# Patient Record
Sex: FEMALE | Race: WHITE | NOT HISPANIC OR LATINO | Employment: OTHER | URBAN - METROPOLITAN AREA
[De-identification: names, ages, dates, MRNs, and addresses within clinical notes are randomized per-mention and may not be internally consistent; named-entity substitution may affect disease eponyms.]

---

## 2017-01-03 ENCOUNTER — CARE COORDINATION (OUTPATIENT)
Dept: CARDIOLOGY | Facility: CLINIC | Age: 82
End: 2017-01-03

## 2017-01-04 NOTE — PROGRESS NOTES
Called patient's daughter Xi as the patient and her spouse are not answering calls.  Spoke to Xi's spouse and he states the patient and spouse are at there cabin and he does not have a phone number to contact them.  Asked whether or not patient has made a decision about surgery and he states the patient's is still undecided.  Will wait for patient's call.  She should be home in a few days.    Analilia Sepulveda RN   [phone]

## 2017-01-06 ENCOUNTER — HOSPITAL ENCOUNTER (EMERGENCY)
Facility: CLINIC | Age: 82
Discharge: HOME | End: 2017-01-06
Attending: EMERGENCY MEDICINE | Admitting: EMERGENCY MEDICINE
Payer: COMMERCIAL

## 2017-01-06 ENCOUNTER — APPOINTMENT (OUTPATIENT)
Dept: GENERAL RADIOLOGY | Facility: CLINIC | Age: 82
End: 2017-01-06
Attending: EMERGENCY MEDICINE
Payer: COMMERCIAL

## 2017-01-06 VITALS
TEMPERATURE: 97.6 F | OXYGEN SATURATION: 95 % | SYSTOLIC BLOOD PRESSURE: 108 MMHG | DIASTOLIC BLOOD PRESSURE: 59 MMHG | HEART RATE: 72 BPM | RESPIRATION RATE: 20 BRPM

## 2017-01-06 DIAGNOSIS — S20.221A BACK CONTUSION, RIGHT, INITIAL ENCOUNTER: ICD-10-CM

## 2017-01-06 DIAGNOSIS — M54.50 ACUTE RIGHT-SIDED LOW BACK PAIN WITHOUT SCIATICA: ICD-10-CM

## 2017-01-06 PROCEDURE — 99283 EMERGENCY DEPT VISIT LOW MDM: CPT

## 2017-01-06 PROCEDURE — 25000132 ZZH RX MED GY IP 250 OP 250 PS 637: Performed by: EMERGENCY MEDICINE

## 2017-01-06 PROCEDURE — 72100 X-RAY EXAM L-S SPINE 2/3 VWS: CPT

## 2017-01-06 RX ORDER — OXYCODONE HYDROCHLORIDE 5 MG/1
10 TABLET ORAL ONCE
Status: COMPLETED | OUTPATIENT
Start: 2017-01-06 | End: 2017-01-06

## 2017-01-06 RX ORDER — HYDROMORPHONE HCL/0.9% NACL/PF 0.2MG/0.2
0.2 SYRINGE (ML) INTRAVENOUS ONCE
Status: DISCONTINUED | OUTPATIENT
Start: 2017-01-06 | End: 2017-01-06

## 2017-01-06 RX ORDER — OXYCODONE HYDROCHLORIDE 5 MG/1
5 TABLET ORAL EVERY 6 HOURS PRN
Qty: 10 TABLET | Refills: 0 | Status: SHIPPED | OUTPATIENT
Start: 2017-01-06 | End: 2017-11-22

## 2017-01-06 RX ADMIN — OXYCODONE HYDROCHLORIDE 10 MG: 5 TABLET ORAL at 15:22

## 2017-01-06 NOTE — ED NOTES
Patient comes in via EMS for evaluation of back pain in middle and lower right side of back. Patient states she fell 2 days ago and landed on that side, denies hitting head or LOC. ABCs intact.

## 2017-01-06 NOTE — DISCHARGE INSTRUCTIONS
*You may resume diet and activities.  *Take medications as prescribed. Tylenol for pain, oxycodone for severe pain not relieved by tylenol.  Use a stool softener while taking this medication. Continue your current medications.  *Follow-up with your doctor in the next 2-3 days for reevaluation and ongoing medication prescriptions.  *Return if you develop numbness, weakness, bowel or bladder incontinence, faint or feel like you will faint or become worse in any way.    Discharge Instructions  Back Pain  You were seen today for back pain. Back pain can have many causes, but most will get better without surgery or other specific treatment. Sometimes there is a herniated ( slipped ) disc. We don t usually do MRI scans to look for these right away, since most herniated discs will get better on their own with time.  Today, we did not find any evidence that your back pain was caused by a serious condition, such as an infection, fracture, or tumor. However, sometimes symptoms develop over time and cannot be found during an emergency visit, so it is very important that you follow up with your primary doctor.  Return to the Emergency Department if:    You develop a fever with your back pain.     You have weakness or change in sensation in one or both legs.    You lose control of your bowels or bladder, or can t empty your bladder.    Your pain gets much worse.     Follow-up with your doctor:    Unless your pain has completely gone away, please make an appointment with your doctor within one week.  You may need further management of your back pain, such as more pain medication, imaging such as an X-ray or MRI, or physical therapy.    What can I do to help myself?    Remain active -- People are often afraid that they will hurt their back further or delay recovery by remaining active, but this is one of the best things you can do for your back. In fact, prolonged bed rest is not recommended. Studies have shown that people with  low back pain recover faster when they remain active. Movement helps to bring blood flow to the muscles and relieve muscle spasms as well as preventing loss of muscle strength.    Heat -- Using a heating pad can help with low back pain during the first few weeks. Do not sleep with a heating pad, as you can be burned.     Pain medications -- Take a pain medication such as, acetaminophen (Tylenol ), ibuprofen (Advil , Nuprin  ) or naproxen (Aleve ).  If you have been given a narcotic (such as codeine, hydrocodone, or oxycodone) or a muscle relaxant (such as Flexeril   or Soma  ), do not drive for four hours after you have taken it. If the narcotic contains acetaminophen (Tylenol), do not take Tylenol with it. All narcotics will cause constipation, so eat a high fiber diet.          Remember that you can always come back to the Emergency Department if you are not able to see your regular doctor in the amount of time listed above, if you get any new symptoms, or if there is anything that worries you.    Opioid Medication Information    You have been given a prescription for an opioid (narcotic) pain medicine and/or have received a pain medicine while here in the Emergency Department. These medicines can make you drowsy or impaired. You must not drive, operate dangerous equipment, or engage in any other dangerous activities while taking these medications. If you drive while taking these medications, you could be arrested for DUI, or driving under the influence. Do not drink any alcohol while you are taking these medications.   Opioid pain medications can cause addiction. If you have a history of chemical dependency of any type, you are at a higher risk of becoming addicted to pain medications.  Only take these prescribed medications to treat your pain when all other options have been tried. Take it for as short a time and as few doses as possible. Store your pain pills in a secure place, as they are frequently stolen and  provide a dangerous opportunity for children or visitors in your house to start abusing these powerful medications. We will not replace any lost or stolen medicine.  As soon as your pain is better, you should flush all your remaining medication.   Many prescription pain medications contain Tylenol  (acetaminophen), including Vicodin , Tylenol #3 , Norco , Lortab , and Percocet .  You should not take any extra pills of Tylenol  if you are using these prescription medications or you can get very sick.  Do not ever take more than 4000 mg of acetaminophen in any 24 hour period.  All opioids tend to cause constipation. Drink plenty of water and eat foods that have a lot of fiber, such as fruits, vegetables, prune juice, apple juice and high fiber cereal.  Take a laxative if you don t move your bowels at least every other day. Miralax , Milk of Magnesia, Colace , or Senna  can be used to keep you regular.

## 2017-01-06 NOTE — ED NOTES
Bed: ED39  Expected date: 1/6/17  Expected time: 2:31 PM  Means of arrival: Ambulance  Comments:  [facility] Female. back pain
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
## 2017-01-06 NOTE — ED AVS SNAPSHOT
[facility] Emergency Department    [address]    [facility] [address]    Phone:  [phone]    Fax:  [phone]                                       Giselle Chino   MRN: [id number]    Department:  [facility] Emergency Department   Date of Visit:  1/6/2017           After Visit Summary Signature Page     I have received my discharge instructions, and my questions have been answered. I have discussed any challenges I see with this plan with the nurse or doctor.    ..........................................................................................................................................  Patient/Patient Representative Signature      ..........................................................................................................................................  Patient Representative Print Name and Relationship to Patient    ..................................................               ................................................  Date                                            Time    ..........................................................................................................................................  Reviewed by Signature/Title    ...................................................              ..............................................  Date                                                            Time

## 2017-01-06 NOTE — ED AVS SNAPSHOT
[facility] Emergency Department    [address]    [facility] [address]    Phone:  [phone]    Fax:  [phone]                                       Giselle Chino   MRN: [id number]    Department:  [facility] Emergency Department   Date of Visit:  1/6/2017           Patient Information     Date Of Birth          1935        Your diagnoses for this visit were:     Acute right-sided low back pain without sciatica     Back contusion, right, initial encounter        You were seen by Marci Jackson MD.      Follow-up Information     Follow up with Shoaib Mills DO. Schedule an appointment as soon as possible for a visit in 3 days.    Specialty:  Family Practice    Contact information:    [facility]  [address]  [facility] [address] [phone]          Discharge Instructions       *You may resume diet and activities.  *Take medications as prescribed. Tylenol for pain, oxycodone for severe pain not relieved by tylenol.  Use a stool softener while taking this medication. Continue your current medications.  *Follow-up with your doctor in the next 2-3 days for reevaluation and ongoing medication prescriptions.  *Return if you develop numbness, weakness, bowel or bladder incontinence, faint or feel like you will faint or become worse in any way.    Discharge Instructions  Back Pain  You were seen today for back pain. Back pain can have many causes, but most will get better without surgery or other specific treatment. Sometimes there is a herniated ( slipped ) disc. We don t usually do MRI scans to look for these right away, since most herniated discs will get better on their own with time.  Today, we did not find any evidence that your back pain was caused by a serious condition, such as an infection, fracture, or tumor. However, sometimes symptoms develop over time and cannot be found during an emergency visit, so it is very important  that you follow up with your primary doctor.  Return to the Emergency Department if:    You develop a fever with your back pain.     You have weakness or change in sensation in one or both legs.    You lose control of your bowels or bladder, or can t empty your bladder.    Your pain gets much worse.     Follow-up with your doctor:    Unless your pain has completely gone away, please make an appointment with your doctor within one week.  You may need further management of your back pain, such as more pain medication, imaging such as an X-ray or MRI, or physical therapy.    What can I do to help myself?    Remain active -- People are often afraid that they will hurt their back further or delay recovery by remaining active, but this is one of the best things you can do for your back. In fact, prolonged bed rest is not recommended. Studies have shown that people with low back pain recover faster when they remain active. Movement helps to bring blood flow to the muscles and relieve muscle spasms as well as preventing loss of muscle strength.    Heat -- Using a heating pad can help with low back pain during the first few weeks. Do not sleep with a heating pad, as you can be burned.     Pain medications -- Take a pain medication such as, acetaminophen (Tylenol ), ibuprofen (Advil , Nuprin  ) or naproxen (Aleve ).  If you have been given a narcotic (such as codeine, hydrocodone, or oxycodone) or a muscle relaxant (such as Flexeril   or Soma  ), do not drive for four hours after you have taken it. If the narcotic contains acetaminophen (Tylenol), do not take Tylenol with it. All narcotics will cause constipation, so eat a high fiber diet.          Remember that you can always come back to the Emergency Department if you are not able to see your regular doctor in the amount of time listed above, if you get any new symptoms, or if there is anything that worries you.    Opioid Medication Information    You have been given a  prescription for an opioid (narcotic) pain medicine and/or have received a pain medicine while here in the Emergency Department. These medicines can make you drowsy or impaired. You must not drive, operate dangerous equipment, or engage in any other dangerous activities while taking these medications. If you drive while taking these medications, you could be arrested for DUI, or driving under the influence. Do not drink any alcohol while you are taking these medications.   Opioid pain medications can cause addiction. If you have a history of chemical dependency of any type, you are at a higher risk of becoming addicted to pain medications.  Only take these prescribed medications to treat your pain when all other options have been tried. Take it for as short a time and as few doses as possible. Store your pain pills in a secure place, as they are frequently stolen and provide a dangerous opportunity for children or visitors in your house to start abusing these powerful medications. We will not replace any lost or stolen medicine.  As soon as your pain is better, you should flush all your remaining medication.   Many prescription pain medications contain Tylenol  (acetaminophen), including Vicodin , Tylenol #3 , Norco , Lortab , and Percocet .  You should not take any extra pills of Tylenol  if you are using these prescription medications or you can get very sick.  Do not ever take more than 4000 mg of acetaminophen in any 24 hour period.  All opioids tend to cause constipation. Drink plenty of water and eat foods that have a lot of fiber, such as fruits, vegetables, prune juice, apple juice and high fiber cereal.  Take a laxative if you don t move your bowels at least every other day. Miralax , Milk of Magnesia, Colace , or Senna  can be used to keep you regular.                Future Appointments        Provider Department Dept Phone Center    3/24/2017 1:50 PM Tyler Frank MD [facility] [phone] [facility]      24 Hour Appointment Hotline       To make an appointment at any [place] clinic, call [phone] ([phone]). If you don't have a family doctor or clinic, we will help you find one. [place] clinics are conveniently located to serve the needs of you and your family.             Review of your medicines      START taking        Dose / Directions Last dose taken    oxyCODONE 5 MG IR tablet   Commonly known as:  ROXICODONE   Dose:  5 mg   Quantity:  10 tablet        Take 1 tablet (5 mg) by mouth every 6 hours as needed for pain   Refills:  0          Our records show that you are taking the medicines listed below. If these are incorrect, please call your family doctor or clinic.        Dose / Directions Last dose taken    amLODIPine 5 MG tablet   Commonly known as:  NORVASC   Dose:  5 mg   Quantity:  90 tablet        Take 1 tablet (5 mg) by mouth daily   Refills:  3        atorvastatin 20 MG tablet   Commonly known as:  LIPITOR   Dose:  20 mg   Quantity:  90 tablet        Take 1 tablet (20 mg) by mouth daily   Refills:  3        hydrALAZINE 25 MG tablet   Commonly known as:  APRESOLINE   Dose:  25 mg   Quantity:  90 tablet        Take 1 tablet (25 mg) by mouth 3 times daily   Refills:  11        Levothyroxine Sodium 112 MCG Caps        Take by mouth daily   Refills:  0        losartan 100 MG tablet   Commonly known as:  COZAAR   Dose:  100 mg        Take 100 mg by mouth daily   Refills:  0        metoprolol 50 MG tablet   Commonly known as:  LOPRESSOR   Dose:  25 mg        Take 0.5 tablets (25 mg) by mouth 2 times daily   Refills:  0        omeprazole 20 MG CR capsule   Commonly known as:  priLOSEC   Dose:  20 mg        Take 20 mg by mouth daily as needed Per patient.   Refills:  0        potassium chloride SA 10 MEQ CR tablet   Commonly known as:  K-DUR/KLOR-CON M   Quantity:  360 tablet        4 tablets daily   Refills:  3        torsemide 5 MG tablet   Commonly known as:   DEMADEX   Dose:  5 mg   Quantity:  45 tablet        Take 1 tablet (5 mg) by mouth every other day   Refills:  3        umeclidinium-vilanterol 62.5-25 MCG/INH oral inhaler   Commonly known as:  ANORO ELLIPTA   Dose:  1 puff   Quantity:  1 Inhaler        Inhale 1 puff into the lungs daily   Refills:  11        VESICARE 10 MG tablet   Dose:  10 mg   Generic drug:  solifenacin        Take 10 mg by mouth daily   Refills:  0                Prescriptions were sent or printed at these locations (1 Prescription)                   Other Prescriptions                Printed at Department/Unit printer (1 of 1)         oxyCODONE (ROXICODONE) 5 MG IR tablet                Procedures and tests performed during your visit     Lumbar spine XR, 2-3 views      Orders Needing Specimen Collection     None      Pending Results     No orders found from 1/5/2017 to 1/7/2017.            Pending Culture Results     No orders found from 1/5/2017 to 1/7/2017.       Test Results from your hospital stay           1/6/2017  3:43 PM - Interface, Radiant Ib      Narrative     XR LUMBAR SPINE 2-3 VIEWS 1/6/2017 3:42 PM    HISTORY: fall, right low back pain        Impression     IMPRESSION: Degenerative disc disease at the L4 level. Minimal  thoracolumbar scoliosis. Demineralized bones.    LYUBOV PIKE MD                Clinical Quality Measure: Blood Pressure Screening     Your blood pressure was checked while you were in the emergency department today. The last reading we obtained was  BP: 118/48 mmHg . Please read the guidelines below about what these numbers mean and what you should do about them.  If your systolic blood pressure (the top number) is less than 120 and your diastolic blood pressure (the bottom number) is less than 80, then your blood pressure is normal. There is nothing more that you need to do about it.  If your systolic blood pressure (the top number) is 120-139 or your diastolic blood pressure (the bottom number) is 80-89,  "your blood pressure may be higher than it should be. You should have your blood pressure rechecked within a year by a primary care provider.  If your systolic blood pressure (the top number) is 140 or greater or your diastolic blood pressure (the bottom number) is 90 or greater, you may have high blood pressure. High blood pressure is treatable, but if left untreated over time it can put you at risk for heart attack, stroke, or kidney failure. You should have your blood pressure rechecked by a primary care provider within the next 4 weeks.  If your provider in the emergency department today gave you specific instructions to follow-up with your doctor or provider even sooner than that, you should follow that instruction and not wait for up to 4 weeks for your follow-up visit.        Thank you for choosing [facility]       Thank you for choosing [facility] for your care. Our goal is always to provide you with excellent care. Hearing back from our patients is one way we can continue to improve our services. Please take a few minutes to complete the written survey that you may receive in the mail after you visit with us. Thank you!        [employer] Information     [employer] lets you send messages to your doctor, view your test results, renew your prescriptions, schedule appointments and more. To sign up, go to [email or web address] . Click on \"Log in\" on the left side of the screen, which will take you to the Welcome page. Then click on \"Sign up Now\" on the right side of the page.     You will be asked to enter the access code listed below, as well as some personal information. Please follow the directions to create your username and password.     Your access code is: [email or web address]  Expires: 2017  9:55 AM     Your access code will  in 90 days. If you need help or a new code, please call your [facility] clinic or [phone].        Care EveryWhere ID     This is your Care EveryWhere ID. This could be used by other " organizations to access your [facility] medical records  [id number]        After Visit Summary       This is your record. Keep this with you and show to your community pharmacist(s) and doctor(s) at your next visit.

## 2017-01-11 ENCOUNTER — DOCUMENTATION ONLY (OUTPATIENT)
Dept: CARDIOLOGY | Facility: CLINIC | Age: 82
End: 2017-01-11

## 2017-01-11 ENCOUNTER — HOSPITAL ENCOUNTER (OUTPATIENT)
Dept: CARDIOLOGY | Facility: CLINIC | Age: 82
Discharge: HOME | End: 2017-01-11
Attending: NURSE PRACTITIONER | Admitting: NURSE PRACTITIONER
Payer: COMMERCIAL

## 2017-01-11 DIAGNOSIS — I10 BENIGN ESSENTIAL HYPERTENSION: ICD-10-CM

## 2017-01-11 DIAGNOSIS — R00.2 PALPITATIONS: ICD-10-CM

## 2017-01-11 DIAGNOSIS — I35.1 NONRHEUMATIC AORTIC VALVE INSUFFICIENCY: ICD-10-CM

## 2017-01-11 DIAGNOSIS — R00.2 PALPITATIONS: Primary | ICD-10-CM

## 2017-01-11 DIAGNOSIS — R06.02 SOB (SHORTNESS OF BREATH): ICD-10-CM

## 2017-01-11 LAB
ANION GAP SERPL CALCULATED.3IONS-SCNC: 11 MMOL/L (ref 3–14)
BUN SERPL-MCNC: 62 MG/DL (ref 7–30)
CALCIUM SERPL-MCNC: 9.9 MG/DL (ref 8.5–10.1)
CHLORIDE SERPL-SCNC: 101 MMOL/L (ref 94–109)
CO2 SERPL-SCNC: 27 MMOL/L (ref 20–32)
CREAT SERPL-MCNC: 2.44 MG/DL (ref 0.52–1.04)
GFR SERPL CREATININE-BSD FRML MDRD: 19 ML/MIN/1.7M2
GLUCOSE SERPL-MCNC: 106 MG/DL (ref 70–99)
NT-PROBNP SERPL-MCNC: 486 PG/ML (ref 0–1800)
POTASSIUM SERPL-SCNC: 3.9 MMOL/L (ref 3.4–5.3)
SODIUM SERPL-SCNC: 139 MMOL/L (ref 133–144)

## 2017-01-11 PROCEDURE — 93270 REMOTE 30 DAY ECG REV/REPORT: CPT | Performed by: NURSE PRACTITIONER

## 2017-01-11 PROCEDURE — 93272 ECG/REVIEW INTERPRET ONLY: CPT | Performed by: INTERNAL MEDICINE

## 2017-01-11 PROCEDURE — 36415 COLL VENOUS BLD VENIPUNCTURE: CPT | Performed by: NURSE PRACTITIONER

## 2017-01-11 PROCEDURE — 83880 ASSAY OF NATRIURETIC PEPTIDE: CPT | Performed by: NURSE PRACTITIONER

## 2017-01-11 PROCEDURE — 80048 BASIC METABOLIC PNL TOTAL CA: CPT | Performed by: NURSE PRACTITIONER

## 2017-01-11 RX ORDER — POTASSIUM CHLORIDE 750 MG/1
10 TABLET, EXTENDED RELEASE ORAL 3 TIMES DAILY
COMMUNITY
Start: 2017-01-11 | End: 2018-01-24

## 2017-01-13 DIAGNOSIS — R00.2 PALPITATIONS: ICD-10-CM

## 2017-01-13 LAB
ANION GAP SERPL CALCULATED.3IONS-SCNC: 8 MMOL/L (ref 3–14)
BUN SERPL-MCNC: 56 MG/DL (ref 7–30)
CALCIUM SERPL-MCNC: 9.8 MG/DL (ref 8.5–10.1)
CHLORIDE SERPL-SCNC: 105 MMOL/L (ref 94–109)
CO2 SERPL-SCNC: 28 MMOL/L (ref 20–32)
CREAT SERPL-MCNC: 2 MG/DL (ref 0.52–1.04)
GFR SERPL CREATININE-BSD FRML MDRD: 24 ML/MIN/1.7M2
GLUCOSE SERPL-MCNC: 108 MG/DL (ref 70–99)
POTASSIUM SERPL-SCNC: 3.6 MMOL/L (ref 3.4–5.3)
SODIUM SERPL-SCNC: 141 MMOL/L (ref 133–144)

## 2017-01-13 PROCEDURE — 80048 BASIC METABOLIC PNL TOTAL CA: CPT | Performed by: NURSE PRACTITIONER

## 2017-01-13 PROCEDURE — 84439 ASSAY OF FREE THYROXINE: CPT | Performed by: NURSE PRACTITIONER

## 2017-01-13 PROCEDURE — 36415 COLL VENOUS BLD VENIPUNCTURE: CPT | Performed by: NURSE PRACTITIONER

## 2017-01-13 PROCEDURE — 84443 ASSAY THYROID STIM HORMONE: CPT | Performed by: NURSE PRACTITIONER

## 2017-01-13 NOTE — PROGRESS NOTES
Received URGENT [facility] call. Pt had severe bradycardia at 37 bpm at 6:45am today. Noted in chart, pt been having weakness and lightheadedness. See changes made per DTK on 1/11 below in note by KMortimer, RN. Pt due for BMP today at 10:30am. Called pt, she is currently sitting up in her chair and feeling pretty well. She denies significant weakness or lightheadedness at this time. Pt states she was asleep at 6:45am today. [facility] faxed, copy sent to file, copy on DTK's desk. Will send update to DTK and await lab results. CORINNE Nick

## 2017-01-13 NOTE — PROGRESS NOTES
BMP noted in EPIC, Creatinine improving. Reviewed with DTK, pt to stop Metoprolol and f/u as planned with repeat labs next week as scheduled. Attempted to call pt to review, no answer, VM full. Will try back later. CORINNE Nick

## 2017-01-13 NOTE — PROGRESS NOTES
Attempted to call pt back, no answer. Pt probably on her way to clinic now for 10:30am labs. Will try back. CORINNE Nick

## 2017-01-13 NOTE — PROGRESS NOTES
Called pt and reviewed, she will stop Metoprolol. Will repeat labs and see Dr. Machado as scheduled next week. CORINNE Nick

## 2017-01-13 NOTE — TELEPHONE ENCOUNTER
Reduce metoprolol dose  Change to long acting toprol xl 25mg tabs--1/2 tablet per day in evening    Start tomorrow  No more metoprolol/lopressor today    Will await labs

## 2017-01-14 LAB
T4 FREE SERPL-MCNC: 1.27 NG/DL (ref 0.76–1.46)
TSH SERPL DL<=0.005 MIU/L-ACNC: 6.79 MU/L (ref 0.4–4)

## 2017-01-16 NOTE — PROGRESS NOTES
Call from [facility], pt had heart rate as low as 35, sinus, between [phone] on 1/14/17. Called pt who has ongoing fatigue for review w/ Dr. Machado on 1/18. I asked her to call 911 if she feels faint. Event strips to Dr. Machado's chart prep.

## 2017-01-18 ENCOUNTER — OFFICE VISIT (OUTPATIENT)
Dept: CARDIOLOGY | Facility: CLINIC | Age: 82
End: 2017-01-18
Payer: COMMERCIAL

## 2017-01-18 VITALS
DIASTOLIC BLOOD PRESSURE: 50 MMHG | HEART RATE: 90 BPM | WEIGHT: 150.8 LBS | HEIGHT: 63 IN | BODY MASS INDEX: 26.72 KG/M2 | SYSTOLIC BLOOD PRESSURE: 134 MMHG

## 2017-01-18 DIAGNOSIS — I71.20 THORACIC AORTIC ANEURYSM WITHOUT RUPTURE (H): Primary | ICD-10-CM

## 2017-01-18 DIAGNOSIS — R00.2 PALPITATIONS: ICD-10-CM

## 2017-01-18 DIAGNOSIS — E87.6 HYPOKALEMIA: ICD-10-CM

## 2017-01-18 DIAGNOSIS — I71.20 THORACIC AORTIC ANEURYSM WITHOUT RUPTURE (H): ICD-10-CM

## 2017-01-18 LAB
ANION GAP SERPL CALCULATED.3IONS-SCNC: 10 MMOL/L (ref 3–14)
BUN SERPL-MCNC: 28 MG/DL (ref 7–30)
CALCIUM SERPL-MCNC: 9.7 MG/DL (ref 8.5–10.1)
CHLORIDE SERPL-SCNC: 104 MMOL/L (ref 94–109)
CO2 SERPL-SCNC: 25 MMOL/L (ref 20–32)
CREAT SERPL-MCNC: 1.18 MG/DL (ref 0.52–1.04)
GFR SERPL CREATININE-BSD FRML MDRD: 44 ML/MIN/1.7M2
GLUCOSE SERPL-MCNC: 100 MG/DL (ref 70–99)
HGB BLD-MCNC: 12.1 G/DL (ref 11.7–15.7)
POTASSIUM SERPL-SCNC: 3.4 MMOL/L (ref 3.4–5.3)
SODIUM SERPL-SCNC: 139 MMOL/L (ref 133–144)

## 2017-01-18 PROCEDURE — 85018 HEMOGLOBIN: CPT | Performed by: INTERNAL MEDICINE

## 2017-01-18 PROCEDURE — 36415 COLL VENOUS BLD VENIPUNCTURE: CPT | Performed by: NURSE PRACTITIONER

## 2017-01-18 PROCEDURE — 80048 BASIC METABOLIC PNL TOTAL CA: CPT | Performed by: NURSE PRACTITIONER

## 2017-01-18 PROCEDURE — 99215 OFFICE O/P EST HI 40 MIN: CPT | Performed by: INTERNAL MEDICINE

## 2017-01-18 RX ORDER — OXYBUTYNIN CHLORIDE 10 MG/1
10 TABLET, EXTENDED RELEASE ORAL DAILY
Status: ON HOLD | COMMUNITY
End: 2023-01-01

## 2017-01-18 RX ORDER — METOPROLOL SUCCINATE 25 MG/1
25 TABLET, EXTENDED RELEASE ORAL DAILY
Qty: 30 TABLET | Refills: 3 | Status: SHIPPED | OUTPATIENT
Start: 2017-01-18 | End: 2017-04-14

## 2017-01-18 NOTE — MR AVS SNAPSHOT
After Visit Summary   1/18/2017    Giselle Chino    MRN: [id number]           Patient Information     Date Of Birth          1935        Visit Information        Provider Department      1/18/2017 9:00 AM Tyler Machado MD [facility]        Today's Diagnoses     Thoracic aortic aneurysm without rupture (H)    -  1     Palpitations            Follow-ups after your visit        Additional Services     Follow-Up with Cardiac Advanced Practice Provider       ANY NP OR CAROLYN                  Your next 10 appointments already scheduled     Jan 18, 2017  9:45 AM   LAB with RU LAB   [facility] ([facility])    [address] [place] [id number] [phone]           Patient must bring picture ID.  Patient should be prepared to give a urine specimen  Please do not eat 10-12 hours before your appointment if you are coming in fasting for labs on lipids, cholesterol, or glucose (sugar).  Pregnant women should follow their Care Team instructions. Water with medications is okay. Do not drink coffee or other fluids.   If you have concerns about taking  your medications, please ask at office or if scheduling via [employer], send a message by clicking on Secure Messaging, Message Your Care Team.            Feb 17, 2017  8:45 AM   LAB with RU LAB   [facility] ([facility])    [address] [place] [id number] [phone]           Patient must bring picture ID.  Patient should be prepared to give a urine specimen  Please do not eat 10-12 hours before your appointment if you are coming in fasting for labs on lipids, cholesterol, or glucose (sugar).  Pregnant women should follow their Care Team instructions. Water with medications is okay. Do not drink coffee or other fluids.   If you have concerns about taking  your medications,  "please ask at office or if scheduling via [facility], send a message by clicking on Secure Messaging, Message Your Care Team.            Feb 17, 2017  9:30 AM   Return Visit with IRMA Holland CNP   [facility] [place] ([facility])    [address]  [facility] [id number] [phone]            Mar 24, 2017  1:50 PM   (Arrive by 1:35 PM)   Return Visit with Tyler Frank MD   [facility] ([facility])    [address]  3rd [address]  [facility] [address] [phone]              Future tests that were ordered for you today     Open Future Orders        Priority Expected Expires Ordered    Basic metabolic panel Routine 2/17/2017 1/18/2018 1/18/2017    Follow-Up with Cardiac Advanced Practice Provider Routine 2/17/2017 1/18/2018 1/18/2017            Who to contact     If you have questions or need follow up information about today's clinic visit or your schedule please contact [facility] [place] directly at [phone].  Normal or non-critical lab and imaging results will be communicated to you by MyChart, letter or phone within 4 business days after the clinic has received the results. If you do not hear from us within 7 days, please contact the clinic through [employer] or phone. If you have a critical or abnormal lab result, we will notify you by phone as soon as possible.  Submit refill requests through [facility] or call your pharmacy and they will forward the refill request to us. Please allow 3 business days for your refill to be completed.          Additional Information About Your Visit        [employer] Information     [facility] lets you send messages to your doctor, view your test results, renew your prescriptions, schedule appointments and more. To sign up, go to [email or web address] . Click on \"Log in\" on the left side of the screen, " "which will take you to the Welcome page. Then click on \"Sign up Now\" on the right side of the page.     You will be asked to enter the access code listed below, as well as some personal information. Please follow the directions to create your username and password.     Your access code is: [email or web address]  Expires: 2017  9:55 AM     Your access code will  in 90 days. If you need help or a new code, please call your [facility] or [phone].        Care EveryWhere ID     This is your Care EveryWhere ID. This could be used by other organizations to access your [facility] medical records  [id number]        Your Vitals Were     Pulse Height BMI (Body Mass Index)             90 1.6 m (5' 3\") 26.72 kg/m2          Blood Pressure from Last 3 Encounters:   17 134/50   17 108/59   16 123/63    Weight from Last 3 Encounters:   17 68.402 kg (150 lb 12.8 oz)   16 72.576 kg (160 lb)   16 69.854 kg (154 lb)              We Performed the Following     Follow-Up with Cardiologist          Today's Medication Changes          These changes are accurate as of: 17  9:27 AM.  If you have any questions, ask your nurse or doctor.               Start taking these medicines.        Dose/Directions    metoprolol 25 MG 24 hr tablet   Commonly known as:  TOPROL-XL   Used for:  Thoracic aortic aneurysm without rupture (H)   Started by:  Tyler Machado MD        Dose:  25 mg   Take 1 tablet (25 mg) by mouth daily   Quantity:  30 tablet   Refills:  3         Stop taking these medicines if you haven't already. Please contact your care team if you have questions.     VESICARE 10 MG tablet   Generic drug:  solifenacin   Stopped by:  Tyler Machado MD                Where to get your medicines      These medications were sent to [facility]pharm[id number] - [place], [place] [facility] [address]  [address] [address]     Phone:  [phone]    - metoprolol 25 MG 24 hr tablet    "          Primary Care Provider Office Phone # Fax #    Shoaib Mills [phone]       [facility] [address]  [facility] [address]        Thank you!     Thank you for choosing [facility] [place]  for your care. Our goal is always to provide you with excellent care. Hearing back from our patients is one way we can continue to improve our services. Please take a few minutes to complete the written survey that you may receive in the mail after your visit with us. Thank you!             Your Updated Medication List - Protect others around you: Learn how to safely use, store and throw away your medicines at www.disposemymeds.org.          This list is accurate as of: 1/18/17  9:27 AM.  Always use your most recent med list.                   Brand Name Dispense Instructions for use    amLODIPine 5 MG tablet    NORVASC    90 tablet    Take 1 tablet (5 mg) by mouth daily       atorvastatin 20 MG tablet    LIPITOR    90 tablet    Take 1 tablet (20 mg) by mouth daily       hydrALAZINE 25 MG tablet    APRESOLINE    90 tablet    Take 1 tablet (25 mg) by mouth 3 times daily       Levothyroxine Sodium 112 MCG Caps      Take by mouth daily       metoprolol 25 MG 24 hr tablet    TOPROL-XL    30 tablet    Take 1 tablet (25 mg) by mouth daily       omeprazole 20 MG CR capsule    priLOSEC     Take 20 mg by mouth daily as needed Per patient.       oxybutynin 10 MG 24 hr tablet    DITROPAN-XL     Take 10 mg by mouth daily       oxyCODONE 5 MG IR tablet    ROXICODONE    10 tablet    Take 1 tablet (5 mg) by mouth every 6 hours as needed for pain       potassium chloride SA 10 MEQ CR tablet    K-DUR/KLOR-CON M     2  tablets (20 meq) daily       umeclidinium-vilanterol 62.5-25 MCG/INH oral inhaler    ANORO ELLIPTA    1 Inhaler    Inhale 1 puff into the lungs daily

## 2017-01-18 NOTE — PROGRESS NOTES
2017      Shoaib Mills DO    [facility]    [address] [place]  [address]       Antonio Coreas MD    [facility]    [address]    [place]  [address]       Blaise Motley MD    [facility]    [address], [address]    [place]  [address]       RE: Giselle Chino   MRN: [id number]   : 1935      Dear Doctors:      I had the pleasure of following up on your mutual patient, Giselle Chino.  She is a 81-year-old woman whom I first met when she was really in extremis condition.  Back then, there was obviously psychosocial issues going on.  She stopped her medicines, was massively hypothyroid and was discovered to have a very large aortic aneurysm.  She had been seen previously by Cardiovascular Surgery and declined at that time her health was so poor.  She has made a significant recovery, although even today she still presents quite pleasant, much more alert but still very unkempt.  She has seen Dr. Antonio Coreas to discuss possible aortic surgery.  Of special note, however, is about 1-2 weeks ago, she had a significant fall and to the point that she bruised her right flank and it is still black and blue and she had to go to the emergency room.  I point this out because on 2016, her creatinine was 1.23 with a BUN of 45 and that was her stable number; however, on , which is a couple of days after her fall, her creatinine went up to 2.44 and on a couple of days later it was still 2.0.  When she was seen in the emergency room, I do not think they did any advanced testing, specifically no CAT scan with dye.  Our office called her.  She is wearing an event monitor for palpitations.  All it showed was that she was in sinus bradycardia during the day with heart rates at about 50, but at night when she was sleeping the heart rate went down to as low as 35.  Our office had her come  off her beta blocker.  They also had her come off her diuretic and her ARB medicine because of the acute renal failure.  I talked to the patient in depth to make sure she was not taking Advil or Aleve, and she states no, only Tylenol and oxycodone.  I do not know if she was doubling up on Tylenol doses or not.  She did have some weakness and dizziness which is improved.  She also was separately seen by Dr. Motley to get a pulmonary assessment of her possible thoracic surgery, and I highly appreciate his report.  It is in the Epic system from 01/11.  She was placed on a new inhaler, and she states her shortness of breath is now much better.  Today, her heart rate off beta blocker is 90, blood pressure has risen to 134/50.  It is interesting that she is still on a small dose of potassium, which was not stopped, and not on a diuretic, and yet today sodium is 139, potassium 3.4, BUN 28, creatinine 1.18, calcium 9.7, glucose 100.      I also see that she had a TSH drawn and she is compensated hypothyroid.  TSH is 6.79, T4 1.27.  I will ask Dr. Mills to determine if she should have repeat thyroid levels drawn in the next several months, remembering that in the past she had self-discontinued her thyroid pill and was massively hypothyroid when I first met her.      In discussing the issue in the emergency room, I cannot help but wonder if this fall was partially responsible for this significant increase in creatinine.  I am going to get a stat hemoglobin today, just to make sure that she did not have intra-abdominal bleeding which could have caused her to get hypovolemic, lower her blood pressure and have acute renal failure.  I am going to restart the metoprolol, but instead of short-acting metoprolol at 25 b.i.d., I am going to place her on Toprol-XL 25 mg daily, since her heart rate is already up to 90, her blood pressure is creeping up and she has an aortic aneurysm.  She is still wearing the cardiac event monitor,  so we will have at least another week's worth of heart rates to determine if the heart rate gets too low.  I asked her to come back in 4 weeks with another BMP check and an office visit to determine if we have to restart her previous medicines.      With regard to the surgery, Dr. Motley's report is actually better than I expected, even though he does quote a significant risk of prolonged ventilation, etc.; however, I cannot get out of my mind that this is still, in my mind, a frail woman, albeit much stronger than when I first met her, who is still unkempt and not clean.  I am still significantly concerned that she has been taking her medicines wrong, which might be what prompted the acute renal failure, and still concerned about her surgical risk, or at least her postop recovery and rehab potential.  The patient is still going to consider this.  I told her to call Dr. Banks when she comes up with her final decision.  We will still see her back in 4 weeks for evaluation of her medication, and again we will want to check her potassium and creatinine.  It is odd that she would need potassium supplement still if she is off her diuretic, which still makes me wonder if she is taking the medicines as we think she is.      Total consult time is 45 minutes, greater than 50% counseling.      Sincerely,            MD TY Kidd MD             D: 2017 09:26   T: 2017 14:02   MT: [id number]      Name:     INDIANA NYE   MRN:      [id number]        Account:      [id number]   :      1935           Service Date: 2017      Document: [id number]

## 2017-01-18 NOTE — Clinical Note
2017      Shoaib Mills DO    [facility]    [address] [place]  [address]       Antonio Coreas MD    [facility]    [address] [facility]    [place]  [address]       Blaise Motley MD    [facility]    [address] [facility]    [place]  [address]       RE: Giselle Chino   MRN: [id number]   : 1935      Dear Doctors:      I had the pleasure of following up on your mutual patient, Giselle Chino.  She is a 81-year-old woman whom I first met when she was really in extremis condition.  Back then, there was obviously psychosocial issues going on.  She stopped her medicines, was massively hypothyroid and was discovered to have a very large aortic aneurysm.  She had been seen previously by Cardiovascular Surgery and declined at that time her health was so poor.  She has made a significant recovery, although even today she still presents quite pleasant, much more alert but still very unkempt.  She has seen Dr. Antonio Coreas to discuss possible aortic surgery.  Of special note, however, is about 1-2 weeks ago, she had a significant fall and to the point that she bruised her right flank and it is still black and blue and she had to go to the emergency room.  I point this out because on 2016, her creatinine was 1.23 with a BUN of 45 and that was her stable number; however, on , which is a couple of days after her fall, her creatinine went up to 2.44 and on a couple of days later it was still 2.0.  When she was seen in the emergency room, I do not think they did any advanced testing, specifically no CAT scan with dye.  Our office called her.  She is wearing an event monitor for palpitations.  All it showed was that she was in sinus bradycardia during the day with heart rates at about 50, but at night when she was sleeping the heart rate went down to as low as 35.  Our office had her come  off her beta blocker.  They also had her come off her diuretic and her ARB medicine because of the acute renal failure.  I talked to the patient in depth to make sure she was not taking Advil or Aleve, and she states no, only Tylenol and oxycodone.  I do not know if she was doubling up on Tylenol doses or not.  She did have some weakness and dizziness which is improved.  She also was separately seen by Dr. Motley to get a pulmonary assessment of her possible thoracic surgery, and I highly appreciate his report.  It is in the Epic system from 01/11.  She was placed on a new inhaler, and she states her shortness of breath is now much better.  Today, her heart rate off beta blocker is 90, blood pressure has risen to 134/50.  It is interesting that she is still on a small dose of potassium, which was not stopped, and not on a diuretic, and yet today sodium is 139, potassium 3.4, BUN 28, creatinine 1.18, calcium 9.7, glucose 100.      I also see that she had a TSH drawn and she is compensated hypothyroid.  TSH is 6.79, T4 1.27.  I will ask Dr. Mills to determine if she should have repeat thyroid levels drawn in the next several months, remembering that in the past she had self-discontinued her thyroid pill and was massively hypothyroid when I first met her.      In discussing the issue in the emergency room, I cannot help but wonder if this fall was partially responsible for this significant increase in creatinine.  I am going to get a stat hemoglobin today, just to make sure that she did not have intra-abdominal bleeding which could have caused her to get hypovolemic, lower her blood pressure and have acute renal failure.  I am going to restart the metoprolol, but instead of short-acting metoprolol at 25 b.i.d., I am going to place her on Toprol-XL 25 mg daily, since her heart rate is already up to 90, her blood pressure is creeping up and she has an aortic aneurysm.  She is still wearing the cardiac event monitor,  so we will have at least another week's worth of heart rates to determine if the heart rate gets too low.  I asked her to come back in 4 weeks with another BMP check and an office visit to determine if we have to restart her previous medicines.      With regard to the surgery, Dr. Motley's report is actually better than I expected, even though he does quote a significant risk of prolonged ventilation, etc.; however, I cannot get out of my mind that this is still, in my mind, a frail woman, albeit much stronger than when I first met her, who is still unkempt and not clean.  I am still significantly concerned that she has been taking her medicines wrong, which might be what prompted the acute renal failure, and still concerned about her surgical risk, or at least her postop recovery and rehab potential.  The patient is still going to consider this.  I told her to call Dr. Banks when she comes up with her final decision.  We will still see her back in 4 weeks for evaluation of her medication, and again we will want to check her potassium and creatinine.  It is odd that she would need potassium supplement still if she is off her diuretic, which still makes me wonder if she is taking the medicines as we think she is.      Total consult time is 45 minutes, greater than 50% counseling.      Sincerely,            Tyler Machado MD
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
## 2017-01-19 ENCOUNTER — TELEPHONE (OUTPATIENT)
Dept: CARDIOLOGY | Facility: CLINIC | Age: 82
End: 2017-01-19

## 2017-01-19 NOTE — TELEPHONE ENCOUNTER
Attempt to devan Pt to review HGB result, no answer on any phone and all voice mail says full and not taking further messages called numerous times.  LEONARDO Castillo RN

## 2017-01-24 ENCOUNTER — CARE COORDINATION (OUTPATIENT)
Dept: CARDIOLOGY | Facility: CLINIC | Age: 82
End: 2017-01-24

## 2017-01-24 NOTE — PROGRESS NOTES
Called patient to see if she has decided to have her aneurysm surgery and patient states she fell recently and is have a lot of back and generalized muscle pain.  Patient states she will call when she feels better and discuss the surgery.    Analilia Sepulveda RN  [facility]  Cardiothoracic Surgery Care Coordinator  O) [phone]

## 2017-02-01 NOTE — PROGRESS NOTES
Event strip received dated 1/19/17. Rhythm sinus at 90 bpm, pt reported palpitations. Report strip to file. CORINNE Nick

## 2017-02-17 ENCOUNTER — OFFICE VISIT (OUTPATIENT)
Dept: CARDIOLOGY | Facility: CLINIC | Age: 82
End: 2017-02-17
Attending: INTERNAL MEDICINE
Payer: COMMERCIAL

## 2017-02-17 VITALS
HEIGHT: 64 IN | SYSTOLIC BLOOD PRESSURE: 120 MMHG | BODY MASS INDEX: 25.61 KG/M2 | WEIGHT: 150 LBS | DIASTOLIC BLOOD PRESSURE: 48 MMHG | HEART RATE: 78 BPM

## 2017-02-17 DIAGNOSIS — I71.20 THORACIC AORTIC ANEURYSM WITHOUT RUPTURE (H): ICD-10-CM

## 2017-02-17 LAB
ANION GAP SERPL CALCULATED.3IONS-SCNC: 10 MMOL/L (ref 3–14)
BUN SERPL-MCNC: 17 MG/DL (ref 7–30)
CALCIUM SERPL-MCNC: 9.5 MG/DL (ref 8.5–10.1)
CHLORIDE SERPL-SCNC: 107 MMOL/L (ref 94–109)
CO2 SERPL-SCNC: 25 MMOL/L (ref 20–32)
CREAT SERPL-MCNC: 0.82 MG/DL (ref 0.52–1.04)
GFR SERPL CREATININE-BSD FRML MDRD: 67 ML/MIN/1.7M2
GLUCOSE SERPL-MCNC: 102 MG/DL (ref 70–99)
POTASSIUM SERPL-SCNC: 3.7 MMOL/L (ref 3.4–5.3)
SODIUM SERPL-SCNC: 142 MMOL/L (ref 133–144)

## 2017-02-17 PROCEDURE — 99215 OFFICE O/P EST HI 40 MIN: CPT | Performed by: NURSE PRACTITIONER

## 2017-02-17 PROCEDURE — 36415 COLL VENOUS BLD VENIPUNCTURE: CPT | Performed by: INTERNAL MEDICINE

## 2017-02-17 PROCEDURE — 80048 BASIC METABOLIC PNL TOTAL CA: CPT | Performed by: INTERNAL MEDICINE

## 2017-02-17 RX ORDER — LOSARTAN POTASSIUM 50 MG/1
50 TABLET ORAL DAILY
Qty: 90 TABLET | Refills: 3 | Status: SHIPPED | OUTPATIENT
Start: 2017-02-17 | End: 2017-06-13

## 2017-02-17 NOTE — PATIENT INSTRUCTIONS
Restart Losartan at 50mg daily    Continue metoprol 50mg daily    Labs in 10 days nonfasting      See me back end of March for follow up
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
## 2017-02-17 NOTE — MR AVS SNAPSHOT
After Visit Summary   2/17/2017    Giselle Chino    MRN: [id number]           Patient Information     Date Of Birth          1935        Visit Information        Provider Department      2/17/2017 9:30 AM Avery Lambert APRN CNP [facility]        Today's Diagnoses     Thoracic aortic aneurysm without rupture (H)          Care Instructions    Restart Losartan at 50mg daily    Continue metoprol 50mg daily    Labs in 10 days nonfasting      See me back end of March for follow up        Follow-ups after your visit        Additional Services     Follow-Up with Cardiac Advanced Practice Provider       At [facility] on 3-31                  Your next 10 appointments already scheduled     Feb 28, 2017 10:15 AM CST   LAB with RU LAB   [facility]    [address] [address] [address] [phone]           Patient must bring picture ID.  Patient should be prepared to give a urine specimen  Please do not eat 10-12 hours before your appointment if you are coming in fasting for labs on lipids, cholesterol, or glucose (sugar).  Pregnant women should follow their Care Team instructions. Water with medications is okay. Do not drink coffee or other fluids.   If you have concerns about taking  your medications, please ask at office or if scheduling via Advanced Telemetryhart, send a message by clicking on Secure Messaging, Message Your Care Team.            Mar 24, 2017  1:50 PM CDT   (Arrive by 1:35 PM)   Return Visit with Tyler Frank MD   [facility]    [address] [place] [id number] [phone]            Mar 31, 2017 10:50 AM CDT   Return Visit with IRMA Holland CNP   [facility] ([facility])    [address] [facility]  [address]  [facility] [address]   [phone]              Future tests that were ordered for you today     Open Future Orders        Priority Expected Expires Ordered    Basic metabolic panel Routine 2017    Follow-Up with Cardiac Advanced Practice Provider Routine 3/31/2017 2018 2017            Who to contact     If you have questions or need follow up information about today's clinic visit or your schedule please contact [facility] [place] directly at [phone].  Normal or non-critical lab and imaging results will be communicated to you by Neituihart, letter or phone within 4 business days after the clinic has received the results. If you do not hear from us within 7 days, please contact the clinic through Neituihart or phone. If you have a critical or abnormal lab result, we will notify you by phone as soon as possible.  Submit refill requests through Krugle or call your pharmacy and they will forward the refill request to us. Please allow 3 business days for your refill to be completed.          Additional Information About Your Visit        MyChart Information     Krugle lets you send messages to your doctor, view your test results, renew your prescriptions, schedule appointments and more. To sign up, go to [email or web address] . Click on \"Log in\" on the left side of the screen, which will take you to the Welcome page. Then click on \"Sign up Now\" on the right side of the page.     You will be asked to enter the access code listed below, as well as some personal information. Please follow the directions to create your username and password.     Your access code is: [email or web address]  Expires: 2017 10:27 AM     Your access code will  in 90 days. If you need help or a new code, please call your [place] clinic or [phone].        Care EveryWhere ID     This is your Care EveryWhere ID. This could be used by other organizations to " "access your [facility] medical records  [id number]        Your Vitals Were     Pulse Height BMI (Body Mass Index)             78 1.626 m (5' 4\") 25.75 kg/m2          Blood Pressure from Last 3 Encounters:   02/17/17 120/48   01/18/17 134/50   01/06/17 108/59    Weight from Last 3 Encounters:   02/17/17 68 kg (150 lb)   01/18/17 68.4 kg (150 lb 12.8 oz)   12/21/16 72.6 kg (160 lb)              We Performed the Following     Follow-Up with Cardiac Advanced Practice Provider          Today's Medication Changes          These changes are accurate as of: 2/17/17 10:27 AM.  If you have any questions, ask your nurse or doctor.               Start taking these medicines.        Dose/Directions    losartan 50 MG tablet   Commonly known as:  COZAAR   Used for:  Thoracic aortic aneurysm without rupture (H)   Started by:  Avery Lambert APRN CNP        Dose:  50 mg   Take 1 tablet (50 mg) by mouth daily   Quantity:  90 tablet   Refills:  3         These medicines have changed or have updated prescriptions.        Dose/Directions    oxyCODONE 5 MG IR tablet   Commonly known as:  ROXICODONE   This may have changed:  how much to take        Dose:  5 mg   Take 1 tablet (5 mg) by mouth every 6 hours as needed for pain   Quantity:  10 tablet   Refills:  0         Stop taking these medicines if you haven't already. Please contact your care team if you have questions.     omeprazole 20 MG CR capsule   Commonly known as:  priLOSEC   Stopped by:  Avery Lambert APRN CNP           TORSEMIDE PO   Stopped by:  Avery Lambert APRN CNP                Where to get your medicines      These medications were sent to [facility] [place]/pharmacy #[id number] - [place], [place] - [address] [facility]  [address] [address] [facility] [address]     Phone:  [phone]     losartan 50 MG tablet                Primary Care Provider Office Phone # Fax #    Shoaib RIGGS DO Lina [phone]       [facility] [address] [address]  [facility] [address]        Thank you!     Thank you for choosing [facility] [place]  for your care. Our goal is always to provide you with excellent care. Hearing back from our patients is one way we can continue to improve our services. Please take a few minutes to complete the written survey that you may receive in the mail after your visit with us. Thank you!             Your Updated Medication List - Protect others around you: Learn how to safely use, store and throw away your medicines at www.disposemymeds.org.          This list is accurate as of: 2/17/17 10:27 AM.  Always use your most recent med list.                   Brand Name Dispense Instructions for use    amLODIPine 5 MG tablet    NORVASC    90 tablet    Take 1 tablet (5 mg) by mouth daily       atorvastatin 20 MG tablet    LIPITOR    90 tablet    Take 1 tablet (20 mg) by mouth daily       CYCLOBENZAPRINE HCL PO      Take 10 mg by mouth       hydrALAZINE 25 MG tablet    APRESOLINE    90 tablet    Take 1 tablet (25 mg) by mouth 3 times daily       Levothyroxine Sodium 112 MCG Caps      Take by mouth daily       losartan 50 MG tablet    COZAAR    90 tablet    Take 1 tablet (50 mg) by mouth daily       metoprolol 25 MG 24 hr tablet    TOPROL-XL    30 tablet    Take 1 tablet (25 mg) by mouth daily       oxybutynin 10 MG 24 hr tablet    DITROPAN-XL     Take 10 mg by mouth daily       oxyCODONE 5 MG IR tablet    ROXICODONE    10 tablet    Take 1 tablet (5 mg) by mouth every 6 hours as needed for pain       potassium chloride SA 10 MEQ CR tablet    K-DUR/KLOR-CON M     2  tablets (20 meq) daily       umeclidinium-vilanterol 62.5-25 MCG/INH oral inhaler    ANORO ELLIPTA    1 Inhaler    Inhale 1 puff into the lungs daily

## 2017-03-15 ENCOUNTER — TELEPHONE (OUTPATIENT)
Dept: CARDIOLOGY | Facility: CLINIC | Age: 82
End: 2017-03-15

## 2017-03-15 NOTE — TELEPHONE ENCOUNTER
Called and spoke to patient regarding her decision about having surgery.  Currently patient is not planning on having surgery to repair the aneurysm.  She will contact the [facility] when she is ready.      Analilia Sepulveda RN  [facility]  Cardiothoracic Surgery Care Coordinator  O) [phone]

## 2017-03-27 DIAGNOSIS — J44.9 COPD (CHRONIC OBSTRUCTIVE PULMONARY DISEASE) (H): Primary | ICD-10-CM

## 2017-03-27 NOTE — TELEPHONE ENCOUNTER
----- Message from Mary Carmen Hong CMA sent at 3/24/2017  1:49 PM CDT -----  Pt had an appt was scheduled for today 3/24/17. This appt was canceled by call center. Reason written as per pt. Pt says breathing is greatly improved but the inhaler prescribed was declined by insurance. She used a coupon for the first RX. Now w/o coupon its 300.oo dollars. Pt rescheduled for May but would like to try to get this covered. Pt is requesting that someone please call her in regards to the status of this script

## 2017-03-27 NOTE — TELEPHONE ENCOUNTER
Contacted pt regarding cost of Anoro Rx.  Advised that there is an alternative medication called Stiolto, pt will contact her insurance provider and enquire about cost and other possible alternatives that may be covered.  She will contact us with update.

## 2017-03-28 NOTE — TELEPHONE ENCOUNTER
Giselle returned call to clinic.  She has spoken to her insurance and they do not cover anoro or stiolto, their preferred formulary is Bevespi, a combination LAMA/LABA.  Message sent to MD to advise whether this would be suitable alternative

## 2017-03-30 ENCOUNTER — TELEPHONE (OUTPATIENT)
Dept: CARDIOLOGY | Facility: CLINIC | Age: 82
End: 2017-03-30

## 2017-03-30 DIAGNOSIS — J44.9 COPD (CHRONIC OBSTRUCTIVE PULMONARY DISEASE) (H): Primary | ICD-10-CM

## 2017-03-30 DIAGNOSIS — J44.9 COPD (CHRONIC OBSTRUCTIVE PULMONARY DISEASE) (H): ICD-10-CM

## 2017-03-30 NOTE — TELEPHONE ENCOUNTER
Called pt to review need for BMP prior to OV with [facility] on 3/31 as pt did not show for the 10 day BMP check on 2/28.  Pt states she has other plans and needed to cancel OV for 3/31.  Asked pt if she could come in for labs and she states she is unable to come in prior to r/s appt on 4/14/2017 with [facility].  BMP scheduled prior.  Appt note mailed out to pt.  Messaged update to [facility].  KMortimer RN

## 2017-04-14 ENCOUNTER — DOCUMENTATION ONLY (OUTPATIENT)
Dept: CARDIOLOGY | Facility: CLINIC | Age: 82
End: 2017-04-14

## 2017-04-14 ENCOUNTER — OFFICE VISIT (OUTPATIENT)
Dept: CARDIOLOGY | Facility: CLINIC | Age: 82
End: 2017-04-14
Payer: COMMERCIAL

## 2017-04-14 VITALS
SYSTOLIC BLOOD PRESSURE: 104 MMHG | HEIGHT: 61 IN | BODY MASS INDEX: 28.7 KG/M2 | HEART RATE: 96 BPM | WEIGHT: 152 LBS | DIASTOLIC BLOOD PRESSURE: 48 MMHG

## 2017-04-14 DIAGNOSIS — I71.20 THORACIC AORTIC ANEURYSM WITHOUT RUPTURE (H): ICD-10-CM

## 2017-04-14 DIAGNOSIS — I71.20 THORACIC AORTIC ANEURYSM WITHOUT RUPTURE (H): Primary | ICD-10-CM

## 2017-04-14 DIAGNOSIS — R00.0 TACHYCARDIA: ICD-10-CM

## 2017-04-14 LAB
ANION GAP SERPL CALCULATED.3IONS-SCNC: 4 MMOL/L (ref 3–14)
BUN SERPL-MCNC: 23 MG/DL (ref 7–30)
CALCIUM SERPL-MCNC: 9.2 MG/DL (ref 8.5–10.1)
CHLORIDE SERPL-SCNC: 104 MMOL/L (ref 94–109)
CO2 SERPL-SCNC: 30 MMOL/L (ref 20–32)
CREAT SERPL-MCNC: 0.85 MG/DL (ref 0.52–1.04)
GFR SERPL CREATININE-BSD FRML MDRD: 64 ML/MIN/1.7M2
GLUCOSE SERPL-MCNC: 98 MG/DL (ref 70–99)
POTASSIUM SERPL-SCNC: 3.7 MMOL/L (ref 3.4–5.3)
SODIUM SERPL-SCNC: 138 MMOL/L (ref 133–144)

## 2017-04-14 PROCEDURE — 80048 BASIC METABOLIC PNL TOTAL CA: CPT | Performed by: NURSE PRACTITIONER

## 2017-04-14 PROCEDURE — 36415 COLL VENOUS BLD VENIPUNCTURE: CPT | Performed by: NURSE PRACTITIONER

## 2017-04-14 PROCEDURE — 93000 ELECTROCARDIOGRAM COMPLETE: CPT | Performed by: NURSE PRACTITIONER

## 2017-04-14 PROCEDURE — 99214 OFFICE O/P EST MOD 30 MIN: CPT | Mod: 25 | Performed by: NURSE PRACTITIONER

## 2017-04-14 RX ORDER — TORSEMIDE 10 MG/1
10 TABLET ORAL DAILY
COMMUNITY
End: 2018-01-08

## 2017-04-14 RX ORDER — METOPROLOL SUCCINATE 50 MG/1
25 TABLET, EXTENDED RELEASE ORAL DAILY
Start: 2017-04-14 | End: 2017-06-13

## 2017-04-14 NOTE — PATIENT INSTRUCTIONS
Call my nurses when you get home and let them know if you have metoprolol--we want to start 25mg daily      See us back in the fall or sooner if dizzy or low blood pressure or high blood pressure

## 2017-04-14 NOTE — PROGRESS NOTES
She will be calling in    Should be on metroprolol xl 25mg daily    Not sure if she has it  She is going to check when she gets home; if she does not have it, order some    Let me know please

## 2017-04-14 NOTE — PROGRESS NOTES
"Pt calls. She has metoprolol (toprol xl) 50mg tablets. Said she has \"a lot\" of them left and would like to use up supply. I instructed her to take a half tablet (25mg) each day. Plan is for f/u w/ Dr. Machado 11/2017, I asked her to call in the interim w/ questions, concerns, side effects. She agrees to above plan.   "
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
## 2017-04-14 NOTE — MR AVS SNAPSHOT
After Visit Summary   4/14/2017    Giselle Chino    MRN: [id number]           Patient Information     Date Of Birth          1935        Visit Information        Provider Department      4/14/2017 9:30 AM Avery Lmabert APRN CNP [facility]        Today's Diagnoses     Thoracic aortic aneurysm without rupture (H)    -  1    Tachycardia          Care Instructions    Call my nurses when you get home and let them know if you have metoprolol--we want to start 25mg daily      See us back in the fall or sooner if dizzy or low blood pressure or high blood pressure        Follow-ups after your visit        Your next 10 appointments already scheduled     Jun 07, 2017  2:10 PM CDT   (Arrive by 1:55 PM)   Return Visit with Tyler Frank MD   [facility] ([facility])    [address] [place] [id number] [phone]              Who to contact     If you have questions or need follow up information about today's clinic visit or your schedule please contact [facility] [place] directly at [phone].  Normal or non-critical lab and imaging results will be communicated to you by MyChart, letter or phone within 4 business days after the clinic has received the results. If you do not hear from us within 7 days, please contact the clinic through MyChart or phone. If you have a critical or abnormal lab result, we will notify you by phone as soon as possible.  Submit refill requests through [employer] or call your pharmacy and they will forward the refill request to us. Please allow 3 business days for your refill to be completed.          Additional Information About Your Visit        MyChart Information     [employer] lets you send messages to your doctor, view your test results, renew your prescriptions, schedule appointments and more. To  "sign up, go to [email or web address] . Click on \"Log in\" on the left side of the screen, which will take you to the Welcome page. Then click on \"Sign up Now\" on the right side of the page.     You will be asked to enter the access code listed below, as well as some personal information. Please follow the directions to create your username and password.     Your access code is: [email or web address]  Expires: 2017 11:27 AM     Your access code will  in 90 days. If you need help or a new code, please call your [facility] clinic or [phone].        Care EveryWhere ID     This is your Care EveryWhere ID. This could be used by other organizations to access your [facility] medical records  [id number]        Your Vitals Were     Pulse Height BMI (Body Mass Index)             96 1.549 m (5' 1\") 28.72 kg/m2          Blood Pressure from Last 3 Encounters:   17 104/48   17 120/48   17 134/50    Weight from Last 3 Encounters:   17 68.9 kg (152 lb)   17 68 kg (150 lb)   17 68.4 kg (150 lb 12.8 oz)              We Performed the Following     EKG 12-lead complete w/read - Clinics (performed today)     Follow-Up with Cardiac Advanced Practice Provider          Today's Medication Changes          These changes are accurate as of: 17  9:59 AM.  If you have any questions, ask your nurse or doctor.               These medicines have changed or have updated prescriptions.        Dose/Directions    oxyCODONE 5 MG IR tablet   Commonly known as:  ROXICODONE   This may have changed:  how much to take        Dose:  5 mg   Take 1 tablet (5 mg) by mouth every 6 hours as needed for pain   Quantity:  10 tablet   Refills:  0                Primary Care Provider Office Phone # Fax #    Shoaibrt KEELY Mills [phone]       [facility] [address] [address] [facility] [address]        Thank you!     Thank you for choosing [facility] " [facility]  for your care. Our goal is always to provide you with excellent care. Hearing back from our patients is one way we can continue to improve our services. Please take a few minutes to complete the written survey that you may receive in the mail after your visit with us. Thank you!             Your Updated Medication List - Protect others around you: Learn how to safely use, store and throw away your medicines at www.disposemymeds.org.          This list is accurate as of: 4/14/17  9:59 AM.  Always use your most recent med list.                   Brand Name Dispense Instructions for use    amLODIPine 5 MG tablet    NORVASC    90 tablet    Take 1 tablet (5 mg) by mouth daily       atorvastatin 20 MG tablet    LIPITOR    90 tablet    Take 1 tablet (20 mg) by mouth daily       CYCLOBENZAPRINE HCL PO      Take 10 mg by mouth       Glycopyrrolate-Formoterol 9-4.8 MCG/ACT oral inhaler    BEVESPI AEROSPHERE    1 Inhaler    Inhale 2 puffs into the lungs 2 times daily       hydrALAZINE 25 MG tablet    APRESOLINE    90 tablet    Take 1 tablet (25 mg) by mouth 3 times daily       Levothyroxine Sodium 112 MCG Caps      Take by mouth daily       losartan 50 MG tablet    COZAAR    90 tablet    Take 1 tablet (50 mg) by mouth daily       metoprolol 25 MG 24 hr tablet    TOPROL-XL    30 tablet    Take 1 tablet (25 mg) by mouth daily       oxybutynin 10 MG 24 hr tablet    DITROPAN-XL     Take 10 mg by mouth daily       oxyCODONE 5 MG IR tablet    ROXICODONE    10 tablet    Take 1 tablet (5 mg) by mouth every 6 hours as needed for pain       potassium chloride SA 10 MEQ CR tablet    K-DUR/KLOR-CON M     10 mEq 3 times daily       torsemide 10 MG tablet    DEMADEX     Take 10 mg by mouth daily
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
## 2017-06-13 DIAGNOSIS — I71.20 THORACIC AORTIC ANEURYSM WITHOUT RUPTURE (H): ICD-10-CM

## 2017-06-13 RX ORDER — LOSARTAN POTASSIUM 50 MG/1
50 TABLET ORAL DAILY
Qty: 90 TABLET | Refills: 3 | Status: SHIPPED | OUTPATIENT
Start: 2017-06-13 | End: 2018-05-23

## 2017-06-13 RX ORDER — METOPROLOL SUCCINATE 25 MG/1
25 TABLET, EXTENDED RELEASE ORAL DAILY
Qty: 90 TABLET | Refills: 3 | Status: SHIPPED | OUTPATIENT
Start: 2017-06-13 | End: 2018-06-04

## 2017-06-13 NOTE — TELEPHONE ENCOUNTER
Pt calls to request refills of toprol xl and losartan. She states she's doing well and her BP is typically 110-120s/50. I asked her to call if her BP is consistently over 140/90. She agrees.

## 2017-08-02 DIAGNOSIS — I10 BENIGN ESSENTIAL HYPERTENSION: ICD-10-CM

## 2017-08-02 RX ORDER — HYDRALAZINE HYDROCHLORIDE 25 MG/1
25 TABLET, FILM COATED ORAL 3 TIMES DAILY
Qty: 180 TABLET | Refills: 3 | Status: SHIPPED | OUTPATIENT
Start: 2017-08-02 | End: 2018-03-05

## 2017-11-16 ENCOUNTER — PRE VISIT (OUTPATIENT)
Dept: CARDIOLOGY | Facility: CLINIC | Age: 82
End: 2017-11-16

## 2017-11-21 DIAGNOSIS — I10 BENIGN ESSENTIAL HYPERTENSION: ICD-10-CM

## 2017-11-21 DIAGNOSIS — I35.1 NONRHEUMATIC AORTIC VALVE INSUFFICIENCY: ICD-10-CM

## 2017-11-21 LAB
ALT SERPL W P-5'-P-CCNC: 24 U/L (ref 0–50)
ANION GAP SERPL CALCULATED.3IONS-SCNC: 5 MMOL/L (ref 3–14)
BUN SERPL-MCNC: 20 MG/DL (ref 7–30)
CALCIUM SERPL-MCNC: 9.8 MG/DL (ref 8.5–10.1)
CHLORIDE SERPL-SCNC: 102 MMOL/L (ref 94–109)
CHOLEST SERPL-MCNC: 147 MG/DL
CO2 SERPL-SCNC: 31 MMOL/L (ref 20–32)
CREAT SERPL-MCNC: 0.92 MG/DL (ref 0.52–1.04)
GFR SERPL CREATININE-BSD FRML MDRD: 58 ML/MIN/1.7M2
GLUCOSE SERPL-MCNC: 104 MG/DL (ref 70–99)
HDLC SERPL-MCNC: 68 MG/DL
LDLC SERPL CALC-MCNC: 61 MG/DL
NONHDLC SERPL-MCNC: 79 MG/DL
POTASSIUM SERPL-SCNC: 3.8 MMOL/L (ref 3.4–5.3)
SODIUM SERPL-SCNC: 138 MMOL/L (ref 133–144)
TRIGL SERPL-MCNC: 92 MG/DL

## 2017-11-21 PROCEDURE — 84460 ALANINE AMINO (ALT) (SGPT): CPT | Performed by: NURSE PRACTITIONER

## 2017-11-21 PROCEDURE — 36415 COLL VENOUS BLD VENIPUNCTURE: CPT | Performed by: NURSE PRACTITIONER

## 2017-11-21 PROCEDURE — 80061 LIPID PANEL: CPT | Performed by: NURSE PRACTITIONER

## 2017-11-21 PROCEDURE — 80048 BASIC METABOLIC PNL TOTAL CA: CPT | Performed by: NURSE PRACTITIONER

## 2017-11-22 ENCOUNTER — OFFICE VISIT (OUTPATIENT)
Dept: CARDIOLOGY | Facility: CLINIC | Age: 82
End: 2017-11-22
Attending: NURSE PRACTITIONER
Payer: COMMERCIAL

## 2017-11-22 VITALS
BODY MASS INDEX: 26.29 KG/M2 | HEART RATE: 89 BPM | SYSTOLIC BLOOD PRESSURE: 126 MMHG | HEIGHT: 64 IN | DIASTOLIC BLOOD PRESSURE: 59 MMHG | OXYGEN SATURATION: 94 % | WEIGHT: 154 LBS

## 2017-11-22 DIAGNOSIS — I10 BENIGN ESSENTIAL HYPERTENSION: ICD-10-CM

## 2017-11-22 DIAGNOSIS — I71.20 THORACIC AORTIC ANEURYSM WITHOUT RUPTURE (H): Primary | ICD-10-CM

## 2017-11-22 DIAGNOSIS — I35.9 AORTIC VALVE DISORDER: ICD-10-CM

## 2017-11-22 PROCEDURE — 99214 OFFICE O/P EST MOD 30 MIN: CPT | Performed by: INTERNAL MEDICINE

## 2017-11-22 NOTE — MR AVS SNAPSHOT
"              After Visit Summary   11/22/2017    Giselle Chino    MRN: [id number]           Patient Information     Date Of Birth          1935        Visit Information        Provider Department      11/22/2017 8:15 AM Tyler Machado MD [facility]        Today's Diagnoses     Thoracic aortic aneurysm without rupture (H)    -  1    Benign essential hypertension        Aortic valve disorder           Follow-ups after your visit        Additional Services     Follow-Up with Cardiologist                 Future tests that were ordered for you today     Open Future Orders        Priority Expected Expires Ordered    Echocardiogram Routine 5/21/2018 11/22/2018 11/22/2017    Follow-Up with Cardiologist Routine 5/21/2018 11/22/2018 11/22/2017            Who to contact     If you have questions or need follow up information about today's clinic visit or your schedule please contact [facility] directly at [phone].  Normal or non-critical lab and imaging results will be communicated to you by MyChart, letter or phone within 4 business days after the clinic has received the results. If you do not hear from us within 7 days, please contact the clinic through [employer] or phone. If you have a critical or abnormal lab result, we will notify you by phone as soon as possible.  Submit refill requests through [employer] or call your pharmacy and they will forward the refill request to us. Please allow 3 business days for your refill to be completed.          Additional Information About Your Visit        [employer] Information     [employer] lets you send messages to your doctor, view your test results, renew your prescriptions, schedule appointments and more. To sign up, go to [email or web address] . Click on \"Log in\" on the left side of the screen, which will take you to the Welcome page. Then click on \"Sign up Now\" on the right side of " "the page.     You will be asked to enter the access code listed below, as well as some personal information. Please follow the directions to create your username and password.     Your access code is: [email or web address]  Expires: 2018  8:48 AM     Your access code will  in 90 days. If you need help or a new code, please call your [facility] clinic or [phone].        Care EveryWhere ID     This is your Care EveryWhere ID. This could be used by other organizations to access your [facility] medical records  [id number]        Your Vitals Were     Pulse Height Pulse Oximetry BMI (Body Mass Index)          89 1.626 m (5' 4\") 94% 26.43 kg/m2         Blood Pressure from Last 3 Encounters:   17 126/59   17 104/48   17 120/48    Weight from Last 3 Encounters:   17 69.9 kg (154 lb)   17 68.9 kg (152 lb)   17 68 kg (150 lb)              We Performed the Following     Follow-Up with Cardiologist        Primary Care Provider Office Phone # Fax #    Sohaib RIGGS NedsabineDO darnell [phone]       [facility] [id number] [place] [id number]        Equal Access to Services     DHAVAL SALMON : Hadii aad ku hadasho Soomaali, waaxda luqadaha, qaybta kaalmada adeegyada, liza greenin hayaasobeida burton . So [facility] [phone].    ATENCIÓN: Si habla español, tiene a sneed disposición servicios gratuitos de asistencia lingüística. Llame al [phone].    We comply with applicable federal civil rights laws and [place] laws. We do not discriminate on the basis of race, color, national origin, age, disability, sex, sexual orientation, or gender identity.            Thank you!     Thank you for choosing [facility]  for your care. Our goal is always to provide you with excellent care. Hearing back from our patients is one way we can continue to improve our services. Please take a few minutes to complete the written " survey that you may receive in the mail after your visit with us. Thank you!             Your Updated Medication List - Protect others around you: Learn how to safely use, store and throw away your medicines at www.disposemymeds.org.          This list is accurate as of: 11/22/17  8:48 AM.  Always use your most recent med list.                   Brand Name Dispense Instructions for use Diagnosis    amLODIPine 5 MG tablet    NORVASC    90 tablet    Take 1 tablet (5 mg) by mouth daily    HTN (hypertension)       atorvastatin 20 MG tablet    LIPITOR    90 tablet    Take 1 tablet (20 mg) by mouth daily    Hyperlipidemia LDL goal <100       Glycopyrrolate-Formoterol 9-4.8 MCG/ACT oral inhaler    BEVESPI AEROSPHERE    1 Inhaler    Inhale 2 puffs into the lungs 2 times daily    COPD (chronic obstructive pulmonary disease) (H)       hydrALAZINE 25 MG tablet    APRESOLINE    180 tablet    Take 1 tablet (25 mg) by mouth 3 times daily    Benign essential hypertension       Levothyroxine Sodium 112 MCG Caps      Take by mouth daily        losartan 50 MG tablet    COZAAR    90 tablet    Take 1 tablet (50 mg) by mouth daily    Thoracic aortic aneurysm without rupture (H)       metoprolol 25 MG 24 hr tablet    TOPROL-XL    90 tablet    Take 1 tablet (25 mg) by mouth daily    Thoracic aortic aneurysm without rupture (H)       oxybutynin 10 MG 24 hr tablet    DITROPAN-XL     Take 10 mg by mouth daily        potassium chloride SA 10 MEQ CR tablet    K-DUR/KLOR-CON M     10 mEq 3 times daily    Benign essential hypertension       torsemide 10 MG tablet    DEMADEX     Take 10 mg by mouth daily

## 2017-11-22 NOTE — PROGRESS NOTES
2017             Shoaib Mills DO   [facility]    [address] [place] [address]      RE: Giselle Chino    MRN: [id number]   : 1935      Dear Dr. Mills:       I had the pleasure of following up on our mutual patient, Giselle Chino.  As you know, she has been a patient of mine for years.  She is an 82-year-old woman.  She is quite kind.  She was in the throes of severe mental illness when I first met her, and she was severely hypothyroid because she had stopped all of her medications.  She has a severely dilated ascending aorta.  She was refused surgery by one surgeon.  We had her see another surgeon, Dr. Coreas, who stated he would take her to surgery if she wished.  She had declined that, and Dr. Coreas has now since gone to [place].  She still has declined surgery.  At this point, there is significant aortic valve insufficiency from the aortic aneurysm.  Her last echo was actually a year ago.  That echo showed that the aorta had dilated to 77 mm with normal being under 37 mm.  She had moderate-plus aortic valve insufficiency.  Her LV size and function were normal; however, with significant aortic insufficiency, one might have expected a higher-than-normal ejection fraction.  She had borderline right heart enlargement.  She had mild to mildly moderate mitral insufficiency and mild tricuspid insufficiency.  Several years ago she had a stress test, and there was no cardiac ischemia identified.  The patient reports no chest pain.  We are trying to run her blood pressures in the roughly 120 range.  We have to be a little careful because she sometimes gets low blood pressure down in the 104 and feels lightheaded.  I told her we are trying to strike a balance here between keeping the blood pressure low so that the aorta does not expand further without the lightheadedness.  We talked about a plan today.  If her blood pressure gets much below 100, she will hold her  hydralazine for 1 dose and recheck, and then if her blood pressure is much above the 130 range systolic, she will take an extra hydralazine.  This seems to be working.  She is on low-dose diuretic, and she has absolutely no edema.  Her lungs are completely clear today, but she has a quite audible murmur, both systole and diastole.  Lab work was done today, and it is in Epic, but it shows the electrolyte panel is normal.  Lipid panel is normal.  Blood sugar is minimally elevated at 104.        At this juncture, again we talked about the warning signs of congestive heart failure from the aortic valve insufficiency, and we also talked about aortic dissection pain.  However, to be honest, if the aorta tore, it would probably be nonsurvivable, and she is already considered a poor surgical candidate, plus she has declined it.  Hopefully, that will not become an issue.  I am going to have her come back in the next 6 months for an echocardiogram, since the one I referred to is now a year old.  The last TSH I have on record was January of this year, and it was 6.79.  She probably will need to be checked, keeping in mind that this is the patient who had stopped all of her medicines and was severely hypothyroid and severely in the throes of mental illness when I first met her many years ago.        Today's visit was 25 minutes, greater than 50% counseling.      Sincerely,      MD TY Kidd MD             D: 2017 08:48   T: 2017 11:07   MT: akanksha      Name:     INDIANA NYE   MRN:      [id number]        Account:      [id number]   :      1935           Service Date: 2017      Document: [id number]
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
## 2017-11-22 NOTE — LETTER
2017             Shoaib Mills DO   [facility]    [address] [place] [address]      RE: Giselle Chino    MRN: [id number]   : 1935      Dear Dr. Mills:       I had the pleasure of following up on our mutual patient, Giselle Chino.  As you know, she has been a patient of mine for years.  She is an 82-year-old woman.  She is quite kind.  She was in the throes of severe mental illness when I first met her, and she was severely hypothyroid because she had stopped all of her medications.  She has a severely dilated ascending aorta.  She was refused surgery by one surgeon.  We had her see another surgeon, Dr. Coreas, who stated he would take her to surgery if she wished.  She had declined that, and Dr. Coreas has now since gone to [place].  She still has declined surgery.  At this point, there is significant aortic valve insufficiency from the aortic aneurysm.  Her last echo was actually a year ago.  That echo showed that the aorta had dilated to 77 mm with normal being under 37 mm.  She had moderate-plus aortic valve insufficiency.  Her LV size and function were normal; however, with significant aortic insufficiency, one might have expected a higher-than-normal ejection fraction.  She had borderline right heart enlargement.  She had mild to mildly moderate mitral insufficiency and mild tricuspid insufficiency.  Several years ago she had a stress test, and there was no cardiac ischemia identified.  The patient reports no chest pain.  We are trying to run her blood pressures in the roughly 120 range.  We have to be a little careful because she sometimes gets low blood pressure down in the 104 and feels lightheaded.  I told her we are trying to strike a balance here between keeping the blood pressure low so that the aorta does not expand further without the lightheadedness.  We talked about a plan today.  If her blood pressure gets much below 100, she will hold her  hydralazine for 1 dose and recheck, and then if her blood pressure is much above the 130 range systolic, she will take an extra hydralazine.  This seems to be working.  She is on low-dose diuretic, and she has absolutely no edema.  Her lungs are completely clear today, but she has a quite audible murmur, both systole and diastole.  Lab work was done today, and it is in Epic, but it shows the electrolyte panel is normal.  Lipid panel is normal.  Blood sugar is minimally elevated at 104.        At this juncture, again we talked about the warning signs of congestive heart failure from the aortic valve insufficiency, and we also talked about aortic dissection pain.  However, to be honest, if the aorta tore, it would probably be nonsurvivable, and she is already considered a poor surgical candidate, plus she has declined it.  Hopefully, that will not become an issue.  I am going to have her come back in the next 6 months for an echocardiogram, since the one I referred to is now a year old.  The last TSH I have on record was January of this year, and it was 6.79.  She probably will need to be checked, keeping in mind that this is the patient who had stopped all of her medicines and was severely hypothyroid and severely in the throes of mental illness when I first met her many years ago.        Today's visit was 25 minutes, greater than 50% counseling.       Outpatient Encounter Prescriptions as of 11/22/2017   Medication Sig Dispense Refill     hydrALAZINE (APRESOLINE) 25 MG tablet Take 1 tablet (25 mg) by mouth 3 times daily 180 tablet 3     metoprolol (TOPROL-XL) 25 MG 24 hr tablet Take 1 tablet (25 mg) by mouth daily 90 tablet 3     losartan (COZAAR) 50 MG tablet Take 1 tablet (50 mg) by mouth daily 90 tablet 3     torsemide (DEMADEX) 10 MG tablet Take 10 mg by mouth daily       Glycopyrrolate-Formoterol (BEVESPI AEROSPHERE) 9-4.8 MCG/ACT oral inhaler Inhale 2 puffs into the lungs 2 times daily 1 Inhaler 8      oxybutynin (DITROPAN-XL) 10 MG 24 hr tablet Take 10 mg by mouth daily       potassium chloride SA (K-DUR/KLOR-CON M) 10 MEQ CR tablet 10 mEq 3 times daily        amLODIPine (NORVASC) 5 MG tablet Take 1 tablet (5 mg) by mouth daily 90 tablet 3     atorvastatin (LIPITOR) 20 MG tablet Take 1 tablet (20 mg) by mouth daily 90 tablet 3     Levothyroxine Sodium 112 MCG CAPS Take by mouth daily        [DISCONTINUED] CYCLOBENZAPRINE HCL PO Take 10 mg by mouth       [DISCONTINUED] oxyCODONE (ROXICODONE) 5 MG IR tablet Take 1 tablet (5 mg) by mouth every 6 hours as needed for pain (Patient taking differently: Take 10 mg by mouth every 6 hours as needed for pain ) 10 tablet 0     No facility-administered encounter medications on file as of 11/22/2017.        Again, thank you for allowing me to participate in the care of your patient.      Sincerely,    Tyler Machado MD     [facility]

## 2018-01-08 DIAGNOSIS — I10 BENIGN ESSENTIAL HYPERTENSION: ICD-10-CM

## 2018-01-08 DIAGNOSIS — I10 HTN (HYPERTENSION): ICD-10-CM

## 2018-01-08 RX ORDER — TORSEMIDE 10 MG/1
10 TABLET ORAL DAILY
Qty: 90 TABLET | Refills: 3 | Status: SHIPPED | OUTPATIENT
Start: 2018-01-08 | End: 2018-12-19

## 2018-01-08 RX ORDER — AMLODIPINE BESYLATE 5 MG/1
5 TABLET ORAL DAILY
Qty: 90 TABLET | Refills: 3 | Status: SHIPPED | OUTPATIENT
Start: 2018-01-08 | End: 2019-05-17

## 2018-01-24 RX ORDER — POTASSIUM CHLORIDE 750 MG/1
10 TABLET, EXTENDED RELEASE ORAL 3 TIMES DAILY
Qty: 270 TABLET | Refills: 3 | Status: SHIPPED | OUTPATIENT
Start: 2018-01-24 | End: 2019-12-06

## 2018-03-05 DIAGNOSIS — I10 BENIGN ESSENTIAL HYPERTENSION: ICD-10-CM

## 2018-03-05 RX ORDER — HYDRALAZINE HYDROCHLORIDE 25 MG/1
25 TABLET, FILM COATED ORAL 3 TIMES DAILY
Qty: 270 TABLET | Refills: 2 | Status: ON HOLD | OUTPATIENT
Start: 2018-03-05 | End: 2018-07-03

## 2018-03-21 ENCOUNTER — TRANSFERRED RECORDS (OUTPATIENT)
Dept: HEALTH INFORMATION MANAGEMENT | Facility: CLINIC | Age: 83
End: 2018-03-21

## 2018-03-21 LAB
ALBUMIN SERPL-MCNC: 4.5 G/DL (ref 3.4–5)
ALP SERPL-CCNC: 123 U/L (ref 0–116)
ALT SERPL-CCNC: 25 U/L (ref 0–78)
ANION GAP SERPL CALCULATED.3IONS-SCNC: 17.5 MMOL/L (ref 9–19)
AST SERPL-CCNC: 20 U/L (ref 0–37)
BILIRUB SERPL-MCNC: 0.71 MG/DL (ref 0.2–1)
BILIRUBIN DIRECT: 0.2 MG/DL (ref 0–0.2)
BUN SERPL-MCNC: 16 MG/DL (ref 7–18)
CALCIUM SERPL-MCNC: 10.7 MG/DL (ref 8.5–10.1)
CHLORIDE SERPLBLD-SCNC: 103 MMOL/L (ref 98–107)
CHOL/HDLC RATIO - QUEST: NORMAL (ref 2.54–?)
CHOLEST SERPL-MCNC: 183 MG/DL (ref 0–200)
CO2 SERPL-SCNC: 30.1 MMOL/L (ref 21–32)
CREAT SERPL-MCNC: 0.88 MG/DL (ref 0.6–1.3)
ERYTHROCYTE [DISTWIDTH] IN BLOOD BY AUTOMATED COUNT: 13.4 % (ref 11.5–14.5)
GFR SERPL CREATININE-BSD FRML MDRD: ABNORMAL ML/MIN/1.73M2
GLUCOSE SERPL-MCNC: 88 MG/DL (ref 70–99)
HCT VFR BLD AUTO: 41.6 % (ref 36–45)
HDLC SERPL-MCNC: 73 MG/DL (ref 40–96)
HEMOGLOBIN: 13.7 G/DL (ref 12–15.5)
LDLC SERPL CALC-MCNC: 90 MG/DL (ref 0–130)
MCH RBC QN AUTO: 30.8 PG (ref 26.5–33)
MCHC RBC AUTO-ENTMCNC: 32.9 G/DL (ref 31.5–36.5)
MCV RBC AUTO: 93.5 FL (ref 78–99)
NONHDLC SERPL-MCNC: NORMAL MG/DL
PLATELET # BLD AUTO: 284 10^9/L (ref 150–430)
POTASSIUM SERPL-SCNC: 4.6 MMOL/L (ref 3.5–5.1)
PROT SERPL-MCNC: 8.4 G/DL (ref 6.4–8.2)
RBC # BLD AUTO: 4.45 10^12/L (ref 4–5.2)
SODIUM SERPL-SCNC: 146 MMOL/L (ref 136–145)
TRIGL SERPL-MCNC: 100 MG/DL (ref 30–200)
TSH SERPL-ACNC: 0.49 MCU/ML
WBC # BLD AUTO: 8.3 10^9/L (ref 4–10)

## 2018-05-18 ENCOUNTER — HOSPITAL ENCOUNTER (OUTPATIENT)
Dept: CARDIOLOGY | Facility: CLINIC | Age: 83
Discharge: HOME | End: 2018-05-18
Attending: INTERNAL MEDICINE | Admitting: INTERNAL MEDICINE
Payer: COMMERCIAL

## 2018-05-18 ENCOUNTER — TELEPHONE (OUTPATIENT)
Dept: CARDIOLOGY | Facility: CLINIC | Age: 83
End: 2018-05-18

## 2018-05-18 DIAGNOSIS — I35.9 AORTIC VALVE DISORDER: ICD-10-CM

## 2018-05-18 DIAGNOSIS — I71.20 THORACIC AORTIC ANEURYSM WITHOUT RUPTURE (H): ICD-10-CM

## 2018-05-18 PROCEDURE — 93306 TTE W/DOPPLER COMPLETE: CPT | Mod: 26 | Performed by: INTERNAL MEDICINE

## 2018-05-18 PROCEDURE — 93306 TTE W/DOPPLER COMPLETE: CPT

## 2018-05-18 NOTE — TELEPHONE ENCOUNTER
"RN called pt to find out how she is doing since her echo today. Pt denies SOB and no chest pain. She stated she does have dizziness at times but this is not new. She reports having some ankle swelling in both ankles but \"not bad\" starting the past couples of day. Advised pt to keep her legs elevated when sitting in a chair and if it worsens to call.   "

## 2018-05-23 ENCOUNTER — OFFICE VISIT (OUTPATIENT)
Dept: CARDIOLOGY | Facility: CLINIC | Age: 83
End: 2018-05-23
Attending: INTERNAL MEDICINE
Payer: COMMERCIAL

## 2018-05-23 VITALS
BODY MASS INDEX: 26.91 KG/M2 | WEIGHT: 157.6 LBS | SYSTOLIC BLOOD PRESSURE: 122 MMHG | HEART RATE: 78 BPM | HEIGHT: 64 IN | DIASTOLIC BLOOD PRESSURE: 50 MMHG

## 2018-05-23 DIAGNOSIS — I10 BENIGN ESSENTIAL HYPERTENSION: ICD-10-CM

## 2018-05-23 DIAGNOSIS — I71.20 THORACIC AORTIC ANEURYSM WITHOUT RUPTURE (H): ICD-10-CM

## 2018-05-23 DIAGNOSIS — I35.1 NONRHEUMATIC AORTIC VALVE INSUFFICIENCY: Primary | ICD-10-CM

## 2018-05-23 PROCEDURE — 99214 OFFICE O/P EST MOD 30 MIN: CPT | Performed by: INTERNAL MEDICINE

## 2018-05-23 RX ORDER — LOSARTAN POTASSIUM 100 MG/1
100 TABLET ORAL DAILY
COMMUNITY
End: 2018-05-23

## 2018-05-23 RX ORDER — LOSARTAN POTASSIUM 100 MG/1
50 TABLET ORAL DAILY
Qty: 30 TABLET | Refills: 1 | COMMUNITY
Start: 2018-05-23 | End: 2019-11-12
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
## 2018-05-23 NOTE — PROGRESS NOTES
Service Date: 05/23/2018      HISTORY OF PRESENT ILLNESS:  This is an introductory note to Giselle Chino.  I talked to Darcy, your nurse practitioner, today.  Please read the copious notes previously.  Ms. Chino is an 82-year-old woman.  I met her about 5 years ago.  She was in very bad straits when I met her.  She had a psychologic crisis.  She had an aortic aneurysm up to 74-78 mm with mild AS and mild AI and abdominal aortic aneurysm of 55 mm.  She has chronic lung disease with FEV1 of 59% of predicted.  The exact details about her mental status have slowly gotten better.  She had stopped all of her medicines at one point and when Dr. Pam Mulligan saw her for aorta surgery, he said that she was a very poor candidate for such a high risk surgery and I agreed.  She was in very bad straits medically and psychologically.  Slowly though, she started getting better and she saw your partner, Dr. Coreas, and he contemplated doing surgery.  The patient has always been hesitant but is now getting more and more worried about the problem with this the aorta and valve.  I have kept her blood pressure very low as a medical treatment for this aortic aneurysm.  She notes some dizziness now and also her weight is up a few pounds.  Although I do not see fluid, I have no doubt that she is starting to retain fluid.  We reviewed her echocardiogram today.  It shows normal LVEF of 55%-60%, normal LV size which is important but what is more important is that she has 3+ aortic insufficiency.  Her aorta is the same or even larger than it was before.  In regards to the dizziness, her sitting systolic blood pressure is 122.  Standing, it was 90.  I have no doubt that her dizziness is from some of the medicines I am giving her plus the aortic valve insufficiency.  If you will notice, she has a wide pulse pressure, consistent with the aortic insufficiency.  I am going to decrease the losartan from 100 mg down to 50 mg.  When you see her next  week, could you kindly also check blood pressure sitting and standing and if she has a significant aortic orthostatic drop, then I would probably take the amlodipine 5 mg and cut that in half to 2.5.  At some point, I will probably have to increase her Demadex from 10 mg a day up to 20 but I want to make sure she is not already hypotensive before I increase the torsemide dose.  So, I will also have our nurse practitioner see her next month.  The main issue for you, of course, is to discuss with her, is she a candidate for conduit aortic replacement with a valve in ascending aorta, fully understanding that even her descending aorta is enlarged?  Obviously, this is high risk and it is a patient who had abnormal pulmonary function tests.  No doubt you are going to want to get CAT scan of the aorta and a pulmonary function test and perhaps even carotid artery studies and then eventually a heart catheterization if both you and she agree to potentially move forward with an evaluation for aorta surgery.      We fully understand and she fully understands that this is a very high risk surgery.  She also understands that I have gone as far as I can go medically and she understands that if the aorta ruptures, there is a high likelihood of sudden death.      All these were discussed with her today over the course of this 30-minute office visit.      Thank you for allowing me to see her and again, if you could kindly check her blood pressure when you see her next week, as I alluded to above, and then our nurse practitioner will see her a few weeks after that with the plan as outlined above.      Total consult time 30 minutes, greater than 50% counseling.      Tyler White MD       cc:   Pam Mulligan MD    [facility] Cardiothoracic Surgery    [address]   [place] [address]      Shoaib Mills DO    [facility]    [address] [place] [address]         TYLER WHITE MD              D: 2018   T: 2018   MT: PEDRO PABLO      Name:     INDIANA NYE   MRN:      [id number]        Account:      [id number]   :      1935           Service Date: 2018      Document: [id number]

## 2018-05-23 NOTE — MR AVS SNAPSHOT
After Visit Summary   5/23/2018    Giselle Chino    MRN: [id number]           Patient Information     Date Of Birth          1935        Visit Information        Provider Department      5/23/2018 9:00 AM Tyler Machado MD [facility]        Today's Diagnoses     Nonrheumatic aortic valve insufficiency    -  1    Benign essential hypertension        Thoracic aortic aneurysm without rupture (H)           Follow-ups after your visit        Additional Services     Follow-Up with Cardiac Advanced Practice Provider       Any np--prefer rebekah            Follow-Up with Cardiologist                 Your next 10 appointments already scheduled     Jul 05, 2018  1:10 PM CDT   Return Visit with IRMA Holland CNP   [facility] ([facility])    [address]  [facility] [address] [phone]              Future tests that were ordered for you today     Open Future Orders        Priority Expected Expires Ordered    Follow-Up with Cardiac Advanced Practice Provider Routine 6/22/2018 5/23/2019 5/23/2018    Basic metabolic panel Routine 11/19/2018 5/23/2019 5/23/2018    Follow-Up with Cardiologist Routine 11/19/2018 5/23/2019 5/23/2018            Who to contact     If you have questions or need follow up information about today's clinic visit or your schedule please contact [facility] directly at [phone].  Normal or non-critical lab and imaging results will be communicated to you by MyChart, letter or phone within 4 business days after the clinic has received the results. If you do not hear from us within 7 days, please contact the clinic through MyChart or phone. If you have a critical or abnormal lab result, we will notify you by phone as soon as possible.  Submit refill requests through [employer] or call your pharmacy and they will  "forward the refill request to us. Please allow 3 business days for your refill to be completed.          Additional Information About Your Visit        Care EveryWhere ID     This is your Care EveryWhere ID. This could be used by other organizations to access your [place] medical records  [id number]        Your Vitals Were     Pulse Height BMI (Body Mass Index)             78 1.626 m (5' 4\") 27.05 kg/m2          Blood Pressure from Last 3 Encounters:   05/23/18 122/50   11/22/17 126/59   04/14/17 104/48    Weight from Last 3 Encounters:   05/23/18 71.5 kg (157 lb 9.6 oz)   11/22/17 69.9 kg (154 lb)   04/14/17 68.9 kg (152 lb)              We Performed the Following     Follow-Up with Cardiologist          Today's Medication Changes          These changes are accurate as of 5/23/18  9:47 AM.  If you have any questions, ask your nurse or doctor.               These medicines have changed or have updated prescriptions.        Dose/Directions    losartan 100 MG tablet   Commonly known as:  COZAAR   This may have changed:  how much to take   Changed by:  Tyler Machado MD        Dose:  50 mg   Take 0.5 tablets (50 mg) by mouth daily   Quantity:  30 tablet   Refills:  1                Primary Care Provider Office Phone # Fax #    Shoaib Mills  [phone]       [facility] [address] [place] [address]        Equal Access to Services     DHAVAL SALMON AH: Hadii maris ku hadasho Sodieterali, waaxda luqadaha, qaybta kaalmada adeegyada, liza ta. So [facility] [phone].    ATENCIÓN: Si habla español, tiene a sneed disposición servicios gratuitos de asistencia lingüística. Llame al [phone].    We comply with applicable federal civil rights laws and [place] laws. We do not discriminate on the basis of race, color, national origin, age, disability, sex, sexual orientation, or gender identity.            Thank you!     Thank you for choosing " [facility]  for your care. Our goal is always to provide you with excellent care. Hearing back from our patients is one way we can continue to improve our services. Please take a few minutes to complete the written survey that you may receive in the mail after your visit with us. Thank you!             Your Updated Medication List - Protect others around you: Learn how to safely use, store and throw away your medicines at www.disposemymeds.org.          This list is accurate as of 5/23/18  9:47 AM.  Always use your most recent med list.                   Brand Name Dispense Instructions for use Diagnosis    amLODIPine 5 MG tablet    NORVASC    90 tablet    Take 1 tablet (5 mg) by mouth daily    HTN (hypertension)       atorvastatin 20 MG tablet    LIPITOR    90 tablet    Take 1 tablet (20 mg) by mouth daily    Hyperlipidemia LDL goal <100       Glycopyrrolate-Formoterol 9-4.8 MCG/ACT oral inhaler    BEVESPI AEROSPHERE    1 Inhaler    Inhale 2 puffs into the lungs 2 times daily    COPD (chronic obstructive pulmonary disease) (H)       hydrALAZINE 25 MG tablet    APRESOLINE    270 tablet    Take 1 tablet (25 mg) by mouth 3 times daily    Benign essential hypertension       Levothyroxine Sodium 112 MCG Caps      Take by mouth daily        losartan 100 MG tablet    COZAAR    30 tablet    Take 0.5 tablets (50 mg) by mouth daily        metoprolol succinate 25 MG 24 hr tablet    TOPROL-XL    90 tablet    Take 1 tablet (25 mg) by mouth daily    Thoracic aortic aneurysm without rupture (H)       oxybutynin 10 MG 24 hr tablet    DITROPAN-XL     Take 10 mg by mouth daily        potassium chloride SA 10 MEQ CR tablet    K-DUR/KLOR-CON M    270 tablet    Take 1 tablet (10 mEq) by mouth 3 times daily    Benign essential hypertension       torsemide 10 MG tablet    DEMADEX    90 tablet    Take 1 tablet (10 mg) by mouth daily    HTN (hypertension)

## 2018-05-23 NOTE — LETTER
5/23/2018      Shoaib Mills DO  [facility] [address]  [facility] [address]      RE: Giselle Chino       Dear Colleague,    I had the pleasure of seeing Giselle Chino in the [facility].    Service Date: 05/23/2018      HISTORY OF PRESENT ILLNESS:  This is an introductory note to Giselle Chino.  I talked to Darcy, your nurse practitioner, today.  Please read the copious notes previously.  Ms. Chino is an 82-year-old woman.  I met her about 5 years ago.  She was in very bad straits when I met her.  She had a psychologic crisis.  She had an aortic aneurysm up to 74-78 mm with mild AS and mild AI and abdominal aortic aneurysm of 55 mm.  She has chronic lung disease with FEV1 of 59% of predicted.  The exact details about her mental status have slowly gotten better.  She had stopped all of her medicines at one point and when Dr. Pam Mulligan saw her for aorta surgery, he said that she was a very poor candidate for such a high risk surgery and I agreed.  She was in very bad straits medically and psychologically.  Slowly though, she started getting better and she saw your partner, Dr. Coreas, and he contemplated doing surgery.  The patient has always been hesitant but is now getting more and more worried about the problem with this the aorta and valve.  I have kept her blood pressure very low as a medical treatment for this aortic aneurysm.  She notes some dizziness now and also her weight is up a few pounds.  Although I do not see fluid, I have no doubt that she is starting to retain fluid.  We reviewed her echocardiogram today.  It shows normal LVEF of 55%-60%, normal LV size which is important but what is more important is that she has 3+ aortic insufficiency.  Her aorta is the same or even larger than it was before.  In regards to the dizziness, her sitting systolic blood pressure is 122.  Standing, it was 90.  I have no doubt that her dizziness  is from some of the medicines I am giving her plus the aortic valve insufficiency.  If you will notice, she has a wide pulse pressure, consistent with the aortic insufficiency.  I am going to decrease the losartan from 100 mg down to 50 mg.  When you see her next week, could you kindly also check blood pressure sitting and standing and if she has a significant aortic orthostatic drop, then I would probably take the amlodipine 5 mg and cut that in half to 2.5.  At some point, I will probably have to increase her Demadex from 10 mg a day up to 20 but I want to make sure she is not already hypotensive before I increase the torsemide dose.  So, I will also have our nurse practitioner see her next month.  The main issue for you, of course, is to discuss with her, is she a candidate for conduit aortic replacement with a valve in ascending aorta, fully understanding that even her descending aorta is enlarged?  Obviously, this is high risk and it is a patient who had abnormal pulmonary function tests.  No doubt you are going to want to get CAT scan of the aorta and a pulmonary function test and perhaps even carotid artery studies and then eventually a heart catheterization if both you and she agree to potentially move forward with an evaluation for aorta surgery.      We fully understand and she fully understands that this is a very high risk surgery.  She also understands that I have gone as far as I can go medically and she understands that if the aorta ruptures, there is a high likelihood of sudden death.      All these were discussed with her today over the course of this 30-minute office visit.      Thank you for allowing me to see her and again, if you could kindly check her blood pressure when you see her next week, as I alluded to above, and then our nurse practitioner will see her a few weeks after that with the plan as outlined above.      Total consult time 30 minutes, greater than 50% counseling.      Tyler  MD Carter       cc:   Pam Mulligan MD    [facility] Cardiothoracic Surgery    [address] [facility] [address]   [place] [address]      Shoaib Mills DO    [facility]    [address] [address]         TYLER MACHADO MD             D: 2018   T: 2018   MT: DW      Name:     INDIANA NYE   MRN:      [id number]        Account:      [id number]   :      1935           Service Date: 2018      Document: [id number]         Outpatient Encounter Prescriptions as of 2018   Medication Sig Dispense Refill     amLODIPine (NORVASC) 5 MG tablet Take 1 tablet (5 mg) by mouth daily 90 tablet 3     atorvastatin (LIPITOR) 20 MG tablet Take 1 tablet (20 mg) by mouth daily 90 tablet 3     Glycopyrrolate-Formoterol (BEVESPI AEROSPHERE) 9-4.8 MCG/ACT oral inhaler Inhale 2 puffs into the lungs 2 times daily (Patient not taking: Reported on 2018) 1 Inhaler 8     hydrALAZINE (APRESOLINE) 25 MG tablet Take 1 tablet (25 mg) by mouth 3 times daily 270 tablet 2     Levothyroxine Sodium 112 MCG CAPS Take by mouth daily        losartan (COZAAR) 100 MG tablet Take 0.5 tablets (50 mg) by mouth daily 30 tablet 1     metoprolol (TOPROL-XL) 25 MG 24 hr tablet Take 1 tablet (25 mg) by mouth daily 90 tablet 3     oxybutynin (DITROPAN-XL) 10 MG 24 hr tablet Take 10 mg by mouth daily       potassium chloride SA (K-DUR/KLOR-CON M) 10 MEQ CR tablet Take 1 tablet (10 mEq) by mouth 3 times daily 270 tablet 3     torsemide (DEMADEX) 10 MG tablet Take 1 tablet (10 mg) by mouth daily 90 tablet 3     [DISCONTINUED] losartan (COZAAR) 100 MG tablet Take 100 mg by mouth daily       [DISCONTINUED] losartan (COZAAR) 50 MG tablet Take 1 tablet (50 mg) by mouth daily 90 tablet 3     No facility-administered encounter medications on file as of 2018.        Again, thank you for allowing me to participate in the care of your patient.      Sincerely,    Tyler Machado MD     [facility]  [facility]

## 2018-06-04 ENCOUNTER — OFFICE VISIT (OUTPATIENT)
Dept: CARDIOLOGY | Facility: CLINIC | Age: 83
End: 2018-06-04
Payer: COMMERCIAL

## 2018-06-04 VITALS — SYSTOLIC BLOOD PRESSURE: 144 MMHG | HEART RATE: 88 BPM | DIASTOLIC BLOOD PRESSURE: 67 MMHG

## 2018-06-04 DIAGNOSIS — I71.20 THORACIC AORTIC ANEURYSM WITHOUT RUPTURE (H): ICD-10-CM

## 2018-06-04 DIAGNOSIS — I71.21 ASCENDING AORTIC ANEURYSM (H): Primary | ICD-10-CM

## 2018-06-04 RX ORDER — METOPROLOL SUCCINATE 25 MG/1
25 TABLET, EXTENDED RELEASE ORAL DAILY
Qty: 90 TABLET | Refills: 3 | Status: SHIPPED | OUTPATIENT
Start: 2018-06-04 | End: 2019-06-28

## 2018-06-04 NOTE — MR AVS SNAPSHOT
After Visit Summary   6/4/2018    Giselle Chino    MRN: [id number]           Patient Information     Date Of Birth          1935        Visit Information        Provider Department      6/4/2018 3:30 PM Pam Mulligan MD [facility] Cardiothoracic        Today's Diagnoses     Ascending aortic aneurysm (H)    -  1       Follow-ups after your visit        Your next 10 appointments already scheduled     Jun 11, 2018 12:30 PM CDT   Pulmonary Function with RH PULMONARY FUNCTION   [facility] Respiratory Therapy ([facility])    [address]  [facility] [address]   [phone]           No Inhalers for 6 hours prior to test No Smoking 2 hours prior to test            Jun 13, 2018 10:00 AM CDT   CT CHEST/ABD/PELVIS ANGIO W PROCESSING with SCICT1   [facility] (Cardiovascular Imaging at [facility])    [address] [place] [address]  [address]  [facility] [address]   [phone]           Please bring any scans or X-rays taken at other hospitals, if similar tests were done. Also bring a list of your medicines, including vitamins, minerals and over-the-counter drugs. It is safest to leave personal items at home.  Be sure to tell your doctor:   If you have any allergies.   If there s any chance you are pregnant.   If you are breastfeeding.    If you have diabetes as your medication may need to be adjusted for this exam.  You will have contrast for this exam. To prepare:   Do not eat or drink for 2 hours before your exam. If you need to take medicine, you may take it with small sips of water. (We may ask you to take liquid medicine as well.)   The day before your exam, drink extra fluids at least six 8-ounce glasses (unless your doctor tells you to restrict your fluids).  Patients over 70 or patients with diabetes or kidney problems:   If you haven t had a blood test (creatinine test) within the last 30 days, the Cardiologist/Radiologist may  require you to get this test prior to your exam.  Please wear loose clothing, such as a sweat suit or jogging clothes. Avoid snaps, zippers and other metal. We may ask you to undress and put on a hospital gown.  If you have any questions, please call the Imaging Department where you will have your exam.            Jul 05, 2018  1:10 PM CDT   Return Visit with IRMA Holland CNP   [facility] ([facility])    [address]  [facility] [address] [phone]              Who to contact     Please call your clinic at [phone] to:    Ask questions about your health    Make or cancel appointments    Discuss your medicines    Learn about your test results    Speak to your doctor            Additional Information About Your Visit        Care EveryWhere ID     This is your Care EveryWhere ID. This could be used by other organizations to access your [place] medical records  [id number]        Your Vitals Were     Pulse                   88            Blood Pressure from Last 3 Encounters:   06/04/18 144/67   05/23/18 122/50   11/22/17 126/59    Weight from Last 3 Encounters:   05/23/18 71.5 kg (157 lb 9.6 oz)   11/22/17 69.9 kg (154 lb)   04/14/17 68.9 kg (152 lb)                 Where to get your medicines      These medications were sent to [facility] #[id number] - [place], [place] - [address]  [address] [facility] [address]     Phone:  [phone]     metoprolol succinate 25 MG 24 hr tablet          Primary Care Provider Office Phone # Fax #    Shoaib Mills  [phone]       [facility] [address] [facility] [address]        Equal Access to Services     DHAVAL SALMON : Hamzah Nolan, julianna delgadillo, liza kelly So [facility] [phone].    ATENCIÓN: Si habla español, tiene a sneed disposición  servicios gratuitos de asistencia lingüística. Manuel valadez [phone].    We comply with applicable federal civil rights laws and [place] laws. We do not discriminate on the basis of race, color, national origin, age, disability, sex, sexual orientation, or gender identity.            Thank you!     Thank you for choosing [facility] CARDIOTHORACIC  for your care. Our goal is always to provide you with excellent care. Hearing back from our patients is one way we can continue to improve our services. Please take a few minutes to complete the written survey that you may receive in the mail after your visit with us. Thank you!             Your Updated Medication List - Protect others around you: Learn how to safely use, store and throw away your medicines at www.disposemymeds.org.          This list is accurate as of 6/4/18 11:59 PM.  Always use your most recent med list.                   Brand Name Dispense Instructions for use Diagnosis    amLODIPine 5 MG tablet    NORVASC    90 tablet    Take 1 tablet (5 mg) by mouth daily    HTN (hypertension)       atorvastatin 20 MG tablet    LIPITOR    90 tablet    Take 1 tablet (20 mg) by mouth daily    Hyperlipidemia LDL goal <100       Glycopyrrolate-Formoterol 9-4.8 MCG/ACT oral inhaler    BEVESPI AEROSPHERE    1 Inhaler    Inhale 2 puffs into the lungs 2 times daily    COPD (chronic obstructive pulmonary disease) (H)       hydrALAZINE 25 MG tablet    APRESOLINE    270 tablet    Take 1 tablet (25 mg) by mouth 3 times daily    Benign essential hypertension       Levothyroxine Sodium 112 MCG Caps      Take by mouth daily        losartan 100 MG tablet    COZAAR    30 tablet    Take 0.5 tablets (50 mg) by mouth daily        metoprolol succinate 25 MG 24 hr tablet    TOPROL-XL    90 tablet    Take 1 tablet (25 mg) by mouth daily    Thoracic aortic aneurysm without rupture (H)       oxybutynin 10 MG 24 hr tablet    DITROPAN-XL     Take 10 mg by mouth daily        potassium chloride SA  10 MEQ CR tablet    K-DUR/KLOR-CON M    270 tablet    Take 1 tablet (10 mEq) by mouth 3 times daily    Benign essential hypertension       torsemide 10 MG tablet    DEMADEX    90 tablet    Take 1 tablet (10 mg) by mouth daily    HTN (hypertension)
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
## 2018-06-05 DIAGNOSIS — I71.21 ASCENDING AORTIC ANEURYSM (H): Primary | ICD-10-CM
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
## 2018-06-11 ENCOUNTER — HOSPITAL ENCOUNTER (OUTPATIENT)
Dept: RESPIRATORY THERAPY | Facility: CLINIC | Age: 83
Discharge: HOME | End: 2018-06-11
Attending: SURGERY | Admitting: SURGERY
Payer: COMMERCIAL

## 2018-06-11 VITALS — OXYGEN SATURATION: 98 %

## 2018-06-11 DIAGNOSIS — I71.21 ASCENDING AORTIC ANEURYSM (H): ICD-10-CM

## 2018-06-11 LAB
DLCOUNC-%PRED-PRE: 67 %
DLCOUNC-PRE: 12.73 ML/MIN/MMHG
DLCOUNC-PRED: 18.89 ML/MIN/MMHG
ERV-%PRED-PRE: 109 %
ERV-PRE: 0.52 L
ERV-PRED: 0.48 L
EXPTIME-PRE: 7.75 SEC
FEF2575-%PRED-POST: 34 %
FEF2575-%PRED-PRE: 22 %
FEF2575-POST: 0.51 L/SEC
FEF2575-PRE: 0.34 L/SEC
FEF2575-PRED: 1.49 L/SEC
FEFMAX-%PRED-PRE: 24 %
FEFMAX-PRE: 1.11 L/SEC
FEFMAX-PRED: 4.55 L/SEC
FEV1-%PRED-PRE: 41 %
FEV1-PRE: 0.76 L
FEV1FEV6-PRE: 55 %
FEV1FEV6-PRED: 77 %
FEV1FVC-PRE: 54 %
FEV1FVC-PRED: 74 %
FEV1SVC-PRE: 56 %
FEV1SVC-PRED: 69 %
FIFMAX-PRE: 1.05 L/SEC
FRCPLETH-%PRED-PRE: 93 %
FRCPLETH-PRE: 2.49 L
FRCPLETH-PRED: 2.67 L
FVC-%PRED-PRE: 57 %
FVC-PRE: 1.4 L
FVC-PRED: 2.43 L
IC-%PRED-PRE: 37 %
IC-PRE: 0.84 L
IC-PRED: 2.22 L
MEP-PRE: 27 CMH2O
MIP-PRE: -42 CMH2O
MVV-%PRED-PRE: 47 %
MVV-PRE: 36 L/MIN
MVV-PRED: 77 L/MIN
RVPLETH-%PRED-PRE: 89 %
RVPLETH-PRE: 1.97 L
RVPLETH-PRED: 2.19 L
TLCPLETH-%PRED-PRE: 69 %
TLCPLETH-PRE: 3.33 L
TLCPLETH-PRED: 4.77 L
VA-%PRED-PRE: 57 %
VA-PRE: 2.8 L
VC-%PRED-PRE: 50 %
VC-PRE: 1.36 L
VC-PRED: 2.7 L

## 2018-06-11 PROCEDURE — 94799 UNLISTED PULMONARY SVC/PX: CPT

## 2018-06-11 PROCEDURE — 25000125 ZZHC RX 250

## 2018-06-11 PROCEDURE — 94060 EVALUATION OF WHEEZING: CPT

## 2018-06-11 PROCEDURE — 94729 DIFFUSING CAPACITY: CPT

## 2018-06-11 PROCEDURE — 94726 PLETHYSMOGRAPHY LUNG VOLUMES: CPT

## 2018-06-11 PROCEDURE — 40000809 ZZH STATISTIC NO DOCUMENTATION TO SUPPORT CHARGE

## 2018-06-11 PROCEDURE — 40000275 ZZH STATISTIC RCP TIME EA 10 MIN

## 2018-06-11 PROCEDURE — 94200 LUNG FUNCTION TEST (MBC/MVV): CPT

## 2018-06-11 RX ORDER — ALBUTEROL SULFATE 0.83 MG/ML
2.5 SOLUTION RESPIRATORY (INHALATION)
Status: COMPLETED | OUTPATIENT
Start: 2018-06-11 | End: 2018-06-11

## 2018-06-11 RX ORDER — ALBUTEROL SULFATE 0.83 MG/ML
SOLUTION RESPIRATORY (INHALATION)
Status: COMPLETED
Start: 2018-06-11 | End: 2018-06-11

## 2018-06-11 RX ADMIN — ALBUTEROL SULFATE 2.5 MG: 0.83 SOLUTION RESPIRATORY (INHALATION) at 12:26

## 2018-06-11 RX ADMIN — ALBUTEROL SULFATE 2.5 MG: 2.5 SOLUTION RESPIRATORY (INHALATION) at 12:26

## 2018-06-11 NOTE — PROGRESS NOTES
PFT Note:    Pt completed pulmonary function testing with DLCO.  Pre/post flow volume loops with 2.5mg Albuterol nebulizer.  Good Pt effort and cooperation despite persistent dyspnea.     Spirometry   Meets all ATS-ERS recommendations.     Plethysmography  All plethysmographic measurements meet ATS-ERS recommendations.    DLCO  Meets all ATS-ERS recommendations.  DLCO is an average of 2 maneuvers.  No recent Hgb for DLCO correction.    MVV  Pt gave good effort and tolerated well.    MIP Good effort with consistent results.    MEP Decreasing results with continued attempts, best test reported.    June 11, 2018.1:05 PM  Dwayne Gilliam

## 2018-06-13 ENCOUNTER — HOSPITAL ENCOUNTER (OUTPATIENT)
Dept: CARDIOLOGY | Facility: CLINIC | Age: 83
Discharge: HOME | End: 2018-06-13
Attending: SURGERY | Admitting: SURGERY
Payer: COMMERCIAL

## 2018-06-13 VITALS — SYSTOLIC BLOOD PRESSURE: 153 MMHG | HEART RATE: 72 BPM | DIASTOLIC BLOOD PRESSURE: 56 MMHG

## 2018-06-13 DIAGNOSIS — I71.21 ASCENDING AORTIC ANEURYSM (H): ICD-10-CM

## 2018-06-13 LAB
CREAT BLD-MCNC: 0.8 MG/DL (ref 0.52–1.04)
GFR SERPL CREATININE-BSD FRML MDRD: 69 ML/MIN/1.7M2

## 2018-06-13 PROCEDURE — 74174 CTA ABD&PLVS W/CONTRAST: CPT

## 2018-06-13 PROCEDURE — 82565 ASSAY OF CREATININE: CPT

## 2018-06-13 PROCEDURE — 25000128 H RX IP 250 OP 636: Performed by: INTERNAL MEDICINE

## 2018-06-13 RX ORDER — ONDANSETRON 2 MG/ML
4 INJECTION INTRAMUSCULAR; INTRAVENOUS
Status: DISCONTINUED | OUTPATIENT
Start: 2018-06-13 | End: 2018-06-14 | Stop reason: HOSPADM

## 2018-06-13 RX ORDER — DIPHENHYDRAMINE HCL 25 MG
25 CAPSULE ORAL
Status: DISCONTINUED | OUTPATIENT
Start: 2018-06-13 | End: 2018-06-14 | Stop reason: HOSPADM

## 2018-06-13 RX ORDER — DIPHENHYDRAMINE HYDROCHLORIDE 50 MG/ML
25-50 INJECTION INTRAMUSCULAR; INTRAVENOUS
Status: DISCONTINUED | OUTPATIENT
Start: 2018-06-13 | End: 2018-06-14 | Stop reason: HOSPADM

## 2018-06-13 RX ORDER — ACYCLOVIR 200 MG/1
0-1 CAPSULE ORAL
Status: DISCONTINUED | OUTPATIENT
Start: 2018-06-13 | End: 2018-06-14 | Stop reason: HOSPADM

## 2018-06-13 RX ORDER — METHYLPREDNISOLONE SODIUM SUCCINATE 125 MG/2ML
125 INJECTION, POWDER, LYOPHILIZED, FOR SOLUTION INTRAMUSCULAR; INTRAVENOUS
Status: DISCONTINUED | OUTPATIENT
Start: 2018-06-13 | End: 2018-06-14 | Stop reason: HOSPADM

## 2018-06-13 RX ORDER — IOPAMIDOL 755 MG/ML
150 INJECTION, SOLUTION INTRAVASCULAR ONCE
Status: COMPLETED | OUTPATIENT
Start: 2018-06-13 | End: 2018-06-13

## 2018-06-13 RX ADMIN — IOPAMIDOL 100 ML: 755 INJECTION, SOLUTION INTRAVENOUS at 10:29

## 2018-06-19 NOTE — PROCEDURES
Procedure Date: 2018      Please see medical chart for graphs and statistics related to this report.       REFERRING PHYSICIAN:   Pam Mulligan                   TECHNICIAN:   Dwayne Gilliam   DIAGNOSIS:   AAA, HTN, COPD   HEIGHT:   63.00 inches                             WEIGHT:   150.00 Lbs.      DYSPNEA:  After any exertion          COUGH:  No cough            WHEEZE:  Rare   TOBACCO PROD:   Cigarette   PKS/DAY:   1.0   YEARS QUIT:    10.0                                                              MEDICATIONS:           POST-TEST COMMENTS:   Patient completed pulmonary function testing with DLCO, MIP/MEP and MVV.   Pre/post flow volume loops with 2.5mg Albuterol nebulizer.  Good patient effort and cooperation despite persistent dyspnea.  Decreasing MEP results with continued attempts,   best test reported.  All other testing meets ATS-ERS recommendations.  DLCO is an average of 2 maneuvers.  No recent Hgb for DLCO correction.         INTERPRETATION:      1.  Severe obstruction   2.  Positive response to bronchodilator   3.  Air trapping   4.  Gas transfer mildly decreased   These studies have declined since previous tests, correlate clinically.         DEV MORAN MD             D: 2018   T: 2018   MT: AMY      Name:     INDIANA NYE   MRN:      [id number]        Account:        [id number]   :      1935           Procedure Date: 2018      Document: [id number]       cc: [facility]ramer [facility]

## 2018-07-02 ENCOUNTER — APPOINTMENT (OUTPATIENT)
Dept: GENERAL RADIOLOGY | Facility: CLINIC | Age: 83
DRG: 190 | End: 2018-07-02
Attending: EMERGENCY MEDICINE
Payer: COMMERCIAL

## 2018-07-02 ENCOUNTER — APPOINTMENT (OUTPATIENT)
Dept: CT IMAGING | Facility: CLINIC | Age: 83
DRG: 190 | End: 2018-07-02
Attending: EMERGENCY MEDICINE
Payer: COMMERCIAL

## 2018-07-02 ENCOUNTER — HOSPITAL ENCOUNTER (INPATIENT)
Facility: CLINIC | Age: 83
LOS: 4 days | Discharge: NURSING, REHAB OR LONG-TERM CARE | DRG: 190 | End: 2018-07-06
Attending: EMERGENCY MEDICINE | Admitting: INTERNAL MEDICINE
Payer: COMMERCIAL

## 2018-07-02 DIAGNOSIS — J44.1 COPD EXACERBATION (H): ICD-10-CM

## 2018-07-02 DIAGNOSIS — R41.0 CONFUSION: ICD-10-CM

## 2018-07-02 DIAGNOSIS — I16.0 HYPERTENSIVE URGENCY: ICD-10-CM

## 2018-07-02 DIAGNOSIS — I71.21 ASCENDING AORTIC ANEURYSM (H): ICD-10-CM

## 2018-07-02 DIAGNOSIS — H10.33 ACUTE BACTERIAL CONJUNCTIVITIS OF BOTH EYES: Primary | ICD-10-CM

## 2018-07-02 DIAGNOSIS — R06.02 SHORTNESS OF BREATH: ICD-10-CM

## 2018-07-02 LAB
ABO + RH BLD: NORMAL
ABO + RH BLD: NORMAL
ALBUMIN SERPL-MCNC: 4.2 G/DL (ref 3.4–5)
ALBUMIN UR-MCNC: 30 MG/DL
ALBUMIN UR-MCNC: ABNORMAL MG/DL
ALP SERPL-CCNC: 96 U/L (ref 40–150)
ALT SERPL W P-5'-P-CCNC: 22 U/L (ref 0–50)
ANION GAP SERPL CALCULATED.3IONS-SCNC: 12 MMOL/L (ref 3–14)
APPEARANCE UR: ABNORMAL
APPEARANCE UR: CLEAR
AST SERPL W P-5'-P-CCNC: 12 U/L (ref 0–45)
BASE DEFICIT BLDA-SCNC: 3 MMOL/L
BASE DEFICIT BLDV-SCNC: 2 MMOL/L
BASOPHILS # BLD AUTO: 0.1 10E9/L (ref 0–0.2)
BASOPHILS NFR BLD AUTO: 1.3 %
BILIRUB SERPL-MCNC: 1.4 MG/DL (ref 0.2–1.3)
BILIRUB UR QL STRIP: ABNORMAL
BILIRUB UR QL STRIP: NEGATIVE
BLD GP AB SCN SERPL QL: NORMAL
BLOOD BANK CMNT PATIENT-IMP: NORMAL
BUN SERPL-MCNC: 12 MG/DL (ref 7–30)
CALCIUM SERPL-MCNC: 9.9 MG/DL (ref 8.5–10.1)
CHLORIDE SERPL-SCNC: 107 MMOL/L (ref 94–109)
CO2 SERPL-SCNC: 25 MMOL/L (ref 20–32)
COLOR UR AUTO: ABNORMAL
COLOR UR AUTO: YELLOW
CREAT BLD-MCNC: 0.8 MG/DL (ref 0.52–1.04)
CREAT SERPL-MCNC: 0.81 MG/DL (ref 0.52–1.04)
DIFFERENTIAL METHOD BLD: NORMAL
EOSINOPHIL # BLD AUTO: 0.2 10E9/L (ref 0–0.7)
EOSINOPHIL NFR BLD AUTO: 2.2 %
ERYTHROCYTE [DISTWIDTH] IN BLOOD BY AUTOMATED COUNT: 12.8 % (ref 10–15)
ETHANOL SERPL-MCNC: <0.01 G/DL
GFR SERPL CREATININE-BSD FRML MDRD: 68 ML/MIN/1.7M2
GFR SERPL CREATININE-BSD FRML MDRD: 69 ML/MIN/1.7M2
GLUCOSE BLDC GLUCOMTR-MCNC: 61 MG/DL (ref 70–99)
GLUCOSE BLDC GLUCOMTR-MCNC: 85 MG/DL (ref 70–99)
GLUCOSE SERPL-MCNC: 73 MG/DL (ref 70–99)
GLUCOSE UR STRIP-MCNC: ABNORMAL MG/DL
GLUCOSE UR STRIP-MCNC: NEGATIVE MG/DL
HCO3 BLD-SCNC: 22 MMOL/L (ref 21–28)
HCO3 BLDV-SCNC: 24 MMOL/L (ref 21–28)
HCT VFR BLD AUTO: 38.6 % (ref 35–47)
HGB BLD-MCNC: 12.9 G/DL (ref 11.7–15.7)
HGB UR QL STRIP: ABNORMAL
HGB UR QL STRIP: ABNORMAL
IMM GRANULOCYTES # BLD: 0 10E9/L (ref 0–0.4)
IMM GRANULOCYTES NFR BLD: 0.3 %
INR PPP: 1.04 (ref 0.86–1.14)
KETONES UR STRIP-MCNC: 80 MG/DL
KETONES UR STRIP-MCNC: ABNORMAL MG/DL
LACTATE BLD-SCNC: 1.3 MMOL/L (ref 0.7–2)
LEUKOCYTE ESTERASE UR QL STRIP: ABNORMAL
LEUKOCYTE ESTERASE UR QL STRIP: ABNORMAL
LYMPHOCYTES # BLD AUTO: 2 10E9/L (ref 0.8–5.3)
LYMPHOCYTES NFR BLD AUTO: 28.4 %
MAGNESIUM SERPL-MCNC: 2 MG/DL (ref 1.6–2.3)
MCH RBC QN AUTO: 30.7 PG (ref 26.5–33)
MCHC RBC AUTO-ENTMCNC: 33.4 G/DL (ref 31.5–36.5)
MCV RBC AUTO: 92 FL (ref 78–100)
MONOCYTES # BLD AUTO: 0.6 10E9/L (ref 0–1.3)
MONOCYTES NFR BLD AUTO: 9.2 %
MUCOUS THREADS #/AREA URNS LPF: PRESENT /LPF
NEUTROPHILS # BLD AUTO: 4.1 10E9/L (ref 1.6–8.3)
NEUTROPHILS NFR BLD AUTO: 58.6 %
NITRATE UR QL: ABNORMAL
NITRATE UR QL: NEGATIVE
NRBC # BLD AUTO: 0 10*3/UL
NRBC BLD AUTO-RTO: 0 /100
O2/TOTAL GAS SETTING VFR VENT: ABNORMAL %
O2/TOTAL GAS SETTING VFR VENT: NORMAL %
OXYHGB MFR BLD: 93 % (ref 92–100)
PCO2 BLD: 37 MM HG (ref 35–45)
PCO2 BLDV: 44 MM HG (ref 40–50)
PH BLD: 7.38 PH (ref 7.35–7.45)
PH BLDV: 7.35 PH (ref 7.32–7.43)
PH UR STRIP: 6 PH (ref 5–7)
PH UR STRIP: ABNORMAL PH (ref 5–7)
PLATELET # BLD AUTO: 230 10E9/L (ref 150–450)
PO2 BLD: 68 MM HG (ref 80–105)
PO2 BLDV: 28 MM HG (ref 25–47)
POTASSIUM SERPL-SCNC: 3.2 MMOL/L (ref 3.4–5.3)
PROT SERPL-MCNC: 7.8 G/DL (ref 6.8–8.8)
RBC # BLD AUTO: 4.2 10E12/L (ref 3.8–5.2)
RBC #/AREA URNS AUTO: 3 /HPF (ref 0–2)
RBC #/AREA URNS AUTO: ABNORMAL /HPF (ref 0–2)
SODIUM SERPL-SCNC: 144 MMOL/L (ref 133–144)
SOURCE: ABNORMAL
SOURCE: ABNORMAL
SP GR UR STRIP: 1.01 (ref 1–1.03)
SP GR UR STRIP: ABNORMAL (ref 1–1.03)
SPECIMEN EXP DATE BLD: NORMAL
SQUAMOUS #/AREA URNS AUTO: 1 /HPF (ref 0–1)
TROPONIN I BLD-MCNC: 0.01 UG/L (ref 0–0.1)
UROBILINOGEN UR STRIP-MCNC: 2 MG/DL (ref 0–2)
UROBILINOGEN UR STRIP-MCNC: ABNORMAL MG/DL (ref 0–2)
WBC # BLD AUTO: 6.9 10E9/L (ref 4–11)
WBC #/AREA URNS AUTO: 11 /HPF (ref 0–5)
WBC #/AREA URNS AUTO: ABNORMAL /HPF (ref 0–5)

## 2018-07-02 PROCEDURE — 36415 COLL VENOUS BLD VENIPUNCTURE: CPT | Performed by: EMERGENCY MEDICINE

## 2018-07-02 PROCEDURE — 71045 X-RAY EXAM CHEST 1 VIEW: CPT

## 2018-07-02 PROCEDURE — 99285 EMERGENCY DEPT VISIT HI MDM: CPT | Mod: 25

## 2018-07-02 PROCEDURE — 87186 SC STD MICRODIL/AGAR DIL: CPT | Performed by: EMERGENCY MEDICINE

## 2018-07-02 PROCEDURE — 82565 ASSAY OF CREATININE: CPT

## 2018-07-02 PROCEDURE — 86900 BLOOD TYPING SEROLOGIC ABO: CPT | Performed by: EMERGENCY MEDICINE

## 2018-07-02 PROCEDURE — 25000128 H RX IP 250 OP 636: Performed by: EMERGENCY MEDICINE

## 2018-07-02 PROCEDURE — 85610 PROTHROMBIN TIME: CPT | Performed by: EMERGENCY MEDICINE

## 2018-07-02 PROCEDURE — 00000146 ZZHCL STATISTIC GLUCOSE BY METER IP

## 2018-07-02 PROCEDURE — 87086 URINE CULTURE/COLONY COUNT: CPT | Performed by: EMERGENCY MEDICINE

## 2018-07-02 PROCEDURE — 96361 HYDRATE IV INFUSION ADD-ON: CPT

## 2018-07-02 PROCEDURE — 87088 URINE BACTERIA CULTURE: CPT | Performed by: EMERGENCY MEDICINE

## 2018-07-02 PROCEDURE — 86901 BLOOD TYPING SEROLOGIC RH(D): CPT | Performed by: EMERGENCY MEDICINE

## 2018-07-02 PROCEDURE — 25000125 ZZHC RX 250: Performed by: EMERGENCY MEDICINE

## 2018-07-02 PROCEDURE — 82805 BLOOD GASES W/O2 SATURATION: CPT | Performed by: EMERGENCY MEDICINE

## 2018-07-02 PROCEDURE — 84484 ASSAY OF TROPONIN QUANT: CPT

## 2018-07-02 PROCEDURE — 93005 ELECTROCARDIOGRAM TRACING: CPT

## 2018-07-02 PROCEDURE — 74177 CT ABD & PELVIS W/CONTRAST: CPT

## 2018-07-02 PROCEDURE — 80053 COMPREHEN METABOLIC PANEL: CPT | Performed by: EMERGENCY MEDICINE

## 2018-07-02 PROCEDURE — 12000000 ZZH R&B MED SURG/OB

## 2018-07-02 PROCEDURE — 96365 THER/PROPH/DIAG IV INF INIT: CPT | Mod: 59

## 2018-07-02 PROCEDURE — 99223 1ST HOSP IP/OBS HIGH 75: CPT | Mod: AI | Performed by: INTERNAL MEDICINE

## 2018-07-02 PROCEDURE — 80320 DRUG SCREEN QUANTALCOHOLS: CPT | Performed by: EMERGENCY MEDICINE

## 2018-07-02 PROCEDURE — 87040 BLOOD CULTURE FOR BACTERIA: CPT | Performed by: EMERGENCY MEDICINE

## 2018-07-02 PROCEDURE — 25000131 ZZH RX MED GY IP 250 OP 636 PS 637: Performed by: EMERGENCY MEDICINE

## 2018-07-02 PROCEDURE — 86850 RBC ANTIBODY SCREEN: CPT | Performed by: EMERGENCY MEDICINE

## 2018-07-02 PROCEDURE — 85025 COMPLETE CBC W/AUTO DIFF WBC: CPT | Performed by: EMERGENCY MEDICINE

## 2018-07-02 PROCEDURE — 83605 ASSAY OF LACTIC ACID: CPT | Performed by: EMERGENCY MEDICINE

## 2018-07-02 PROCEDURE — 82803 BLOOD GASES ANY COMBINATION: CPT | Performed by: EMERGENCY MEDICINE

## 2018-07-02 PROCEDURE — 94640 AIRWAY INHALATION TREATMENT: CPT

## 2018-07-02 PROCEDURE — 25000128 H RX IP 250 OP 636: Performed by: INTERNAL MEDICINE

## 2018-07-02 PROCEDURE — 70450 CT HEAD/BRAIN W/O DYE: CPT

## 2018-07-02 PROCEDURE — 81001 URINALYSIS AUTO W/SCOPE: CPT | Performed by: EMERGENCY MEDICINE

## 2018-07-02 PROCEDURE — 96375 TX/PRO/DX INJ NEW DRUG ADDON: CPT

## 2018-07-02 PROCEDURE — 96376 TX/PRO/DX INJ SAME DRUG ADON: CPT

## 2018-07-02 PROCEDURE — 25000132 ZZH RX MED GY IP 250 OP 250 PS 637: Performed by: EMERGENCY MEDICINE

## 2018-07-02 PROCEDURE — 83735 ASSAY OF MAGNESIUM: CPT | Performed by: EMERGENCY MEDICINE

## 2018-07-02 RX ORDER — LABETALOL HYDROCHLORIDE 5 MG/ML
20 INJECTION, SOLUTION INTRAVENOUS ONCE
Status: DISCONTINUED | OUTPATIENT
Start: 2018-07-02 | End: 2018-07-02

## 2018-07-02 RX ORDER — LABETALOL HYDROCHLORIDE 5 MG/ML
10 INJECTION, SOLUTION INTRAVENOUS ONCE
Status: COMPLETED | OUTPATIENT
Start: 2018-07-02 | End: 2018-07-02

## 2018-07-02 RX ORDER — MAGNESIUM SULFATE HEPTAHYDRATE 40 MG/ML
2 INJECTION, SOLUTION INTRAVENOUS ONCE
Status: COMPLETED | OUTPATIENT
Start: 2018-07-02 | End: 2018-07-02

## 2018-07-02 RX ORDER — LIDOCAINE 40 MG/G
CREAM TOPICAL
Status: DISCONTINUED | OUTPATIENT
Start: 2018-07-02 | End: 2018-07-06 | Stop reason: HOSPADM

## 2018-07-02 RX ORDER — IOPAMIDOL 755 MG/ML
100 INJECTION, SOLUTION INTRAVASCULAR ONCE
Status: COMPLETED | OUTPATIENT
Start: 2018-07-02 | End: 2018-07-02

## 2018-07-02 RX ORDER — POTASSIUM CHLORIDE 1500 MG/1
20 TABLET, EXTENDED RELEASE ORAL ONCE
Status: COMPLETED | OUTPATIENT
Start: 2018-07-02 | End: 2018-07-02

## 2018-07-02 RX ORDER — IPRATROPIUM BROMIDE AND ALBUTEROL SULFATE 2.5; .5 MG/3ML; MG/3ML
3 SOLUTION RESPIRATORY (INHALATION)
Status: COMPLETED | OUTPATIENT
Start: 2018-07-02 | End: 2018-07-02

## 2018-07-02 RX ORDER — SODIUM CHLORIDE 9 MG/ML
1000 INJECTION, SOLUTION INTRAVENOUS CONTINUOUS
Status: DISCONTINUED | OUTPATIENT
Start: 2018-07-02 | End: 2018-07-02

## 2018-07-02 RX ORDER — LABETALOL HYDROCHLORIDE 5 MG/ML
20 INJECTION, SOLUTION INTRAVENOUS ONCE
Status: COMPLETED | OUTPATIENT
Start: 2018-07-02 | End: 2018-07-02

## 2018-07-02 RX ORDER — HYDRALAZINE HYDROCHLORIDE 20 MG/ML
10 INJECTION INTRAMUSCULAR; INTRAVENOUS ONCE
Status: COMPLETED | OUTPATIENT
Start: 2018-07-02 | End: 2018-07-02

## 2018-07-02 RX ADMIN — IPRATROPIUM BROMIDE AND ALBUTEROL SULFATE 3 ML: .5; 3 SOLUTION RESPIRATORY (INHALATION) at 20:50

## 2018-07-02 RX ADMIN — IPRATROPIUM BROMIDE AND ALBUTEROL SULFATE 3 ML: .5; 3 SOLUTION RESPIRATORY (INHALATION) at 21:05

## 2018-07-02 RX ADMIN — IOPAMIDOL 75 ML: 755 INJECTION, SOLUTION INTRAVENOUS at 18:45

## 2018-07-02 RX ADMIN — DEXTROSE AND SODIUM CHLORIDE 100 ML: 5; 900 INJECTION, SOLUTION INTRAVENOUS at 22:28

## 2018-07-02 RX ADMIN — POTASSIUM CHLORIDE 20 MEQ: 1500 TABLET, EXTENDED RELEASE ORAL at 18:51

## 2018-07-02 RX ADMIN — SODIUM CHLORIDE 1000 ML: 9 INJECTION, SOLUTION INTRAVENOUS at 20:48

## 2018-07-02 RX ADMIN — SODIUM CHLORIDE 500 ML: 9 INJECTION, SOLUTION INTRAVENOUS at 17:34

## 2018-07-02 RX ADMIN — HYDRALAZINE HYDROCHLORIDE 10 MG: 20 INJECTION INTRAMUSCULAR; INTRAVENOUS at 22:29

## 2018-07-02 RX ADMIN — SODIUM CHLORIDE 60 ML: 900 INJECTION, SOLUTION INTRAVENOUS at 18:46

## 2018-07-02 RX ADMIN — IPRATROPIUM BROMIDE AND ALBUTEROL SULFATE 3 ML: .5; 3 SOLUTION RESPIRATORY (INHALATION) at 20:47

## 2018-07-02 RX ADMIN — LABETALOL HYDROCHLORIDE 10 MG: 5 INJECTION INTRAVENOUS at 17:52

## 2018-07-02 RX ADMIN — LABETALOL HYDROCHLORIDE 20 MG: 5 INJECTION INTRAVENOUS at 19:07

## 2018-07-02 RX ADMIN — MAGNESIUM SULFATE HEPTAHYDRATE 2 G: 40 INJECTION, SOLUTION INTRAVENOUS at 21:21

## 2018-07-02 ASSESSMENT — ENCOUNTER SYMPTOMS
COUGH: 0
WEAKNESS: 1
FEVER: 0
CONFUSION: 1
VOMITING: 0
DIZZINESS: 1

## 2018-07-02 NOTE — LETTER
Transition Communication Hand-off for Care Transitions to Next Level of Care Provider    Hand-off for Care Transitions to Next Level of Care Provider  Name: Giselle Chino  : 1935  MRN #: [id number]  Reason for Hospitalization:  Shortness of breath [R06.02]  Confusion [R41.0]  COPD exacerbation (H) [J44.1]  Ascending aortic aneurysm (H) [I71.2]  Hypertensive urgency [I16.0]  Admit Date/Time: 2018  4:38 PM  Discharge Date: 2018    Reason for Communication Hand-off Referral: Admission diagnoses: COPD    Discharge Plan:  Discharged to: TCU: [facility]                   Patient agreeable to post-hospital support suggestions:  Yes    Patient is on new medications:   Yes    MTM follow up recommended: No    Tel-Assurance program:  Ineligible    Patient will receive  TCU  at:  Discharge to [facility].     Follow-up specialty is recommended: Yes. Recommend follow up with OT & PT while at TCU.     Follow-up plan:  No future appointments.    Any outstanding tests or procedures:  No. Recommend give two-step Mantoux (PPD) Per Facility Policy       Referrals     Future Labs/Procedures    Occupational Therapy Adult Consult     Comments:    Evaluate and treat as clinically indicated.    Reason:  Deconditioned weak    Physical Therapy Adult Consult     Comments:    Evaluate and treat as clinically indicated.    Reason:  *Deconditioned weak          Key Recommendations:  Pt admitted with COPD exacerbation. She is being treated with O2, IV solumedrol and nebs. She is confused and can be uncooperative at times. She is being followed by PT/OT and recommendations are for TCU on dc. She was discharged to [facility] with Combivent, Duoneb & Dexter.     Marci Parham    Communicated handoff via EPIC Comm Mgt to Dr. Shoaib Mills's CC at [phone].      Sent by Ashanti Freeman RN, BSN, CTS  [facility]  [phone]    AVS/Discharge Summary is the source of truth; this is a helpful guide for improved communication of  patient story

## 2018-07-02 NOTE — ED PROVIDER NOTES
History     Chief Complaint:  Altered mental status    HPI   Giselle Chino is a 83 year old female who presents with dizziness and confusion. The daughter of the patient visited her today and found her to be much more confused and weak than her baseline, which prompted her trip to the ED. The patient says she notices dizziness, confusion, and weakness. The patient says that these symptoms have been going on for the past couple weeks. She denies any chest pain, vomiting, fevers, or a cough. Around when these symptoms started, the patient was found to have an thoracic aneurism and also had an injection in her left eye.  The patient also has not been taking her medications in a normal pattern. The family notes that her vascular surgeon is Dr. Morris.       Allergies:  Amoxicillin  Piperacillin-Tazobactam In D5w     Medications:    Norvasc  Lipitor  Bevespi/Aerosphere inhaler  Apresoline  Cozaar  Toprol  Ditropan  Demadex     Past Medical History:    Adrenal mass  Aortic valve disorder  Burn  Cholelithiasis  COPD  hyperlipidemia  hypertension  Thorasic aneurysm  Thyroid disease  Wrist fracture      Past Surgical History:    The patient does not have any pertinent past surgical history.    Family History:    CAD  CHF  Cancer    Social History:  Patient presents with daughter  Former smoker  Positive for alcohol use.        Review of Systems   Constitutional: Negative for fever.   Respiratory: Negative for cough.    Cardiovascular: Negative for chest pain.   Gastrointestinal: Negative for vomiting.   Neurological: Positive for dizziness and weakness.   Psychiatric/Behavioral: Positive for confusion.   All other systems reviewed and are negative.    Physical Exam   First Vitals:  Patient Vitals for the past 24 hrs:   BP Temp Temp src Heart Rate Resp SpO2   07/02/18 1915 149/60 - - - - 98 %   07/02/18 1908 - - - - - 100 %   07/02/18 1906 176/65 - - - - -   07/02/18 1849 - - - - - 100 %   07/02/18 1848 191/63 - - - -  100 %   07/02/18 1815 189/62 - - - - 97 %   07/02/18 1800 182/71 - - - - 97 %   07/02/18 1754 - - - - - 99 %   07/02/18 1753 - - - - - 98 %   07/02/18 1752 196/67 - - - - 99 %   07/02/18 1739 - - - - - 100 %   07/02/18 1738 - - - - - 100 %   07/02/18 1736 193/85 - - 85 - 94 %   07/02/18 1735 174/89 - - 103 - 95 %   07/02/18 1734 - - - - - 100 %   07/02/18 1732 200/72 - - 70 - 100 %   07/02/18 1632 (!) 220/69 - - - - -   07/02/18 1631 - 97.7  F (36.5  C) Oral 72 16 99 %         Physical Exam  General: The patient is alert, in no respiratory distress.    HENT: Mucous membranes dry. Alopecia on right occiput.    Cardiovascular: Regular rate and rhythm. Good pulses in all four extremities. Normal capillary refill and skin turgor. Loud systolic murmur    Respiratory: Lungs are clear. No nasal flaring. No retractions. No wheezing, no crackles. Breath sounds decreased and requires encouragement to take a deep breath.      Gastrointestinal: Abdomen soft. No guarding, no rebound. No palpable hernias. Non-tender.    Musculoskeletal: No gross deformity.     Skin: No rashes or petechiae.     Neurologic: The patient is alert and oriented: she knows her name, that she's at the hospital, the year but not month, and knew 4th of July is upcoming. GCS 15. No testable cranial nerve deficit. Follows commands with clear and appropriate speech. Gives appropriate answers, but is slow to respond. No gross neurologic deficit. Gross sensation intact. Pupils are round and reactive. No meningismus.  Generalized weakness, but can lift each leg off bed with some drift against gravity. Good  strength.     Lymphatic: No cervical adenopathy. No lower extremity swelling.    Psychiatric: The patient is non-tearful.      Emergency Department Course   ECG:  Time: 1720  Vent. Rate 84 bpm. MI interval 192. QRS duration 88. QT/QTc 292/345. P-R-T axis 36 -41 21. Normal sinus rhythm. Left axis deviation. Voltage criteria for left ventricular  hypertrophy. Nonspecific T wave abnormality. Abnormal ECG. Read time: 1724      Imaging:  Radiographic findings were communicated with the patient and family who voiced understanding of the findings.  CT Aortic Survery w Contrast:  1. Ascending aortic large aneurysm, essentially unchanged from  6/13/2018.  2. 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  3. Ectasia of the suprarenal abdominal aorta is also noted and  unchanged.  4. Right adrenal stable 3.7 cm lesion.  5. Cholelithiasis and colonic diverticulosis.  6. Hepatic fatty infiltration--possible etiologies include consumption  of alcohol or excessive carbohydrate intake, especially  sugar/fructose.  Metabolic syndrome commonly occurs in combination  with nonalcoholic fatty liver disease. Although often reversible,  nonalcoholic fatty liver disease can progress to steatohepatitis and  cirrhosis.  Per radiology    CT Head w/o contrast:   Diffuse cerebral volume loss and cerebral white matter  changes consistent with chronic small vessel ischemic disease. No  evidence for acute intracranial pathology., per radiology.    Laboratory:  1731 - Glucose: 61 (L)  1754 - Glucose: 85  Troponin POCT: 0.01  CBC: WBC: 6.9, HGB: 12.9, PLT: 230  INR: 1.04  CMP: Potassium: 3.2 (L), Bilirubin: 1.4 (H), o/w WNL (Creatinine: 0.81)(Glucose: 71)  Magnesium: 2.0  Lactic Acid: 1.3  Blood Gas: WNL  Alcohol: <0.01  ABO/Rh: A+  1719 - Creatinine POCT: 0.8  Blood gas arterial: pO2: 68 (L) o/w WNL  Urine culture: pending  Blood culture x2: Pending    Interventions:  1752 - Sodium chloride bolus 1000 mL IV  1752 - Normodyne 10 mg IV  1851 - Potassium chloride 20 mEq PO  1846 - sodium chloride bolus 60 mL IV  1907 - Normodyne 20 mg IV    Emergency Department Course:    Nursing notes and vitals reviewed.  1650: I performed an exam of the patient as documented above. IV inserted and blood drawn.  Labs and imaging ordered    1733:  creatinine clear. Chest CT ordered    2034: I rechecked the patient. Patient was more short of breath and wheezy.    2053: I rechecked the patient. She was breathing easier with less distress.      Findings and plan explained to the Patient who consents to admission.     2155: Discussed the patient with Dr. Alberto, who will admit the patient to a medical bed for further monitoring, evaluation, and treatment.     Impression & Plan      Medical Decision Making:  The patient was brought into the ER by her daughter who noticed that she has been more confused. The patient was able to answer questions here, but did not know the month until I suggest that she stated the next upcoming holiday. There s no signs if any gross neurologic deficit here, but the patient does have some underlying medical conditions that are worrisome, such as her recently enlarged ascending thoracic aortic aneurysm. I did consider the potential with her blood pressure that this could be an encephalopathy and did treat her blood pressure and she did not grossly improve with that. I did discuss the case with Dr. Morris of thoracic surgery who said that she is not a candidate for surgery and her labs looked quite good. Unfortunately, she did not have enough urine to get a urinalysis, but a urine culture was sent. She did complain of some dizziness and generalized weakness. There was no signs to suggest and infection and no recent trauma. I did consider medication problems amongst others. The patient during her ER stay developed, shortness of breath, wheezing, and decreased breath sounds. It looked like she was working to breath, although she did respond nicely to nebs. I placed her on oxygen after an arterial blood gases was obtained that showed hypoxia but no signs of CO2 retention. I do not feel that the thoracic aortic aneurysm is likely involved. It has not changed from her previous image several weeks ago. She has no chest complaints other  than the COPD, and the patient was admitted for aggressive blood pressure control as well as treatment of COPD. I note that I did have her blood pressure coming down, as low as 130, but it started to trend back up.      Diagnosis:    ICD-10-CM   1. Confusion R41.0           2. Shortness of breath R06.02   3. COPD exacerbation (H) J44.1   4. Ascending aortic aneurysm (H) I71.2   5. Hypertensive urgency I16.0     I, Dwayne William, am serving as a scribe on 7/2/2018 at 4:50 PM to personally document services performed by Adalberto Cardona MD based on my observations and the provider's statements to me.       Dwayne William  7/2/2018   [facility] EMERGENCY DEPARTMENT       Adalberto Cardona MD  07/02/18 [id number]

## 2018-07-02 NOTE — IP AVS SNAPSHOT
` `     [place] [address] MEDICAL SURGICAL: [phone]            Medication Administration Report for Giselle Chino as of 07/06/18 1023   Legend:    Given Hold Not Given Due Canceled Entry Other Actions    Time Time (Time) Time  Time-Action       Inactive    Active    Linked        Medications 06/30/18 07/01/18 07/02/18 07/03/18 07/04/18 07/05/18 07/06/18    acetaminophen (TYLENOL) tablet 650 mg  Dose: 650 mg  Freq: EVERY 4 HOURS PRN Route: PO  PRN Reason: mild pain  Start: 07/03/18 0034   Admin Instructions: Alternate ibuprofen (if ordered) with acetaminophen.  Maximum acetaminophen dose from all sources = 75 mg/kg/day not to exceed 4 grams/day.    Admin. Amount: 2 tablet (2 × 325 mg tablet)  Dispense Loc:  ADS MS3E               albuterol neb solution 2.5 mg  Dose: 2.5 mg  Freq: EVERY 2 HOURS PRN Route: NEBULIZATION  PRN Reason: wheezing  Start: 07/03/18 2305   Admin. Amount: 2.5 mg = 3 mL Conc: 2.5 mg/3 mL  Dispense Loc:  ADS [facility]  Volume: 3 mL               amLODIPine (NORVASC) tablet 10 mg  Dose: 10 mg  Freq: DAILY Route: PO  Start: 07/03/18 2000   Admin. Amount: 1 tablet (1 × 10 mg tablet)  Last Admin: 07/05/18 2136  Dispense Loc:  ADS MS3E        2024 (10 mg)-Given [C]        1939 (10 mg)-Given        2136 (10 mg)-Given        [ ] 2000           benztropine (COGENTIN) tablet 1-2 mg  Dose: 1-2 mg  Freq: 3 TIMES DAILY PRN Route: PO  PRN Reason: other  PRN Comment: for extrapyramidal effects  Start: 07/05/18 0919   Admin. Amount: 1-2 tablet (1-2 × 1 mg tablet)  Dispense Loc:  Main Pharmacy               bisacodyl (DULCOLAX) Suppository 10 mg  Dose: 10 mg  Freq: DAILY PRN Route: RE  PRN Reason: constipation  Start: 07/03/18 0034   Admin Instructions: Hold for loose stools.  This is the third step of a three step constipation treatment.    Admin. Amount: 1 suppository (1 × 10 mg suppository)  Dispense Loc:  ADS [facility]               erythromycin (ROMYCIN) ophthalmic ointment  Freq: 4 TIMES DAILY  Route: RIGHT EYE  Start: 07/03/18 0045   Last Admin: 07/06/18 0845  Dispense Loc:  Main Pharmacy        0210 (1 g)-Given       0941 (1 g)-Given       1312 (1 g)-Given       1800 (1 g)-Given       2157 (1 g)-Given        0817 (1 g)-Given       1226 (1 g)-Given       1749 (1 g)-Given       2109 (1 g)-Given        0804 (1 g)-Given       1248 (1 g)-Given       1926 (1 g)-Given       2137 (1 g)-Given        0845 (1 g)-Given       [ ] 1300       [ ] 1800       [ ] 2200           glucose gel 15-30 g  Dose: 15-30 g  Freq: EVERY 15 MIN PRN Route: PO  PRN Reason: low blood sugar  Start: 07/03/18 0044   Admin Instructions: Give 15 g for BG 51 to 69 mg/dL IF patient is conscious and able to swallow. Give 30 g for BG less than or equal to 50 mg/dL IF patient is conscious and able to swallow. Do NOT give glucose gel via enteral tube.  IF patient has enteral tube: give apple juice 120 mL (4 oz or 15 g of CHO) via enteral tube for BG 51 to 69 mg/dL.  Give apple juice 240 mL (8 oz or 30 g of CHO) via enteral tube for BG less than or equal to 50 mg/dL.    ~Oral gel is preferable for conscious and able to swallow patient.   ~IF gel unavailable or patient refuses may provide apple juice 120 mL (4 oz or 15 g of CHO). Document juice on I and O flowsheet.    Admin. Amount: 15-30 g  Dispense Loc: [facility]S [facility]  Volume: 93.75 mL              Or  dextrose 50 % injection 25-50 mL  Dose: 25-50 mL  Freq: EVERY 15 MIN PRN Route: IV  PRN Reason: low blood sugar  Start: 07/03/18 0044   Admin Instructions: Use if have IV access, BG less than 70 mg/dL and meet dose criteria below:  Dose if conscious and alert (or disorientated) and NPO = 25 mL  Dose if unconscious / not alert = 50 mL  Vesicant. For ordered doses up to 25 g, give IV Push undiluted. Give each 5g over 1 minute.    Admin. Amount: 25-50 mL  Dispense Loc: [facility]  Infused Over: 1-5 Minutes  Volume: 50 mL              Or  glucagon injection 1 mg  Dose: 1 mg  Freq: EVERY 15 MIN PRN  Route: SC  PRN Reason: low blood sugar  PRN Comment: May repeat x 1 only  Start: 07/03/18 0044   Admin Instructions: May give SQ or IM. ONLY use glucagon IF patient has NO IV access AND is UNABLE to swallow AND blood glucose is LESS than or EQUAL to 50 mg/dL.  If ordered IV, give IV Push over 1 minute. Reconstitute with 1mL sterile water.    Admin. Amount: 1 mg  Dispense Loc: [facility] ADS [facility]               haloperidol lactate (HALDOL) injection 2 mg  Dose: 2 mg  Freq: EVERY 6 HOURS PRN Route: IV/IM  PRN Reason: agitation  Start: 07/04/18 1828   Admin Instructions: For ordered doses up to 5 mg, drug can be give IV Push undiluted over 1 minute.    Admin. Amount: 2 mg = 0.4 mL Conc: 5 mg/mL  Last Admin: 07/04/18 1839  Dispense Loc: [facility]S [facility]  Infused Over: 1 Minutes  Volume: 1 mL         1839 (2 mg)-Given             hydrALAZINE (APRESOLINE) injection 10 mg  Dose: 10 mg  Freq: EVERY 4 HOURS PRN Route: IV  PRN Reason: high blood pressure  PRN Comment: sbp > 160  Start: 07/03/18 0034   Admin Instructions: For ordered doses up to 40 mg, give IV Push undiluted over 1 minute.    Admin. Amount: 10 mg = 0.5 mL Conc: 20 mg/mL  Dispense Loc: [facility]S [facility]  Volume: 0.5 mL               hydrALAZINE (APRESOLINE) tablet 25 mg  Dose: 25 mg  Freq: 3 TIMES DAILY Route: PO  Start: 07/03/18 0045   Admin. Amount: 1 tablet (1 × 25 mg tablet)  Last Admin: 07/06/18 0856  Dispense Loc: [facility]S [facility]        (0209)-Not Given       0940 (25 mg)-Given       1649 (25 mg)-Given       2156 (25 mg)-Given        0816 (25 mg)-Given       1604 (25 mg)-Given       2108 (25 mg)-Given        0804 (25 mg)-Given       1548 (25 mg)-Given       2136 (25 mg)-Given        0856 (25 mg)-Given       [ ] 1600       [ ] 2200           insulin aspart (NovoLOG) inj (RAPID ACTING)  Dose: 1-3 Units  Freq: AT BEDTIME Route: SC  Start: 07/03/18 0034   Admin Instructions: LOW INSULIN RESISTANCE DOSING    Do Not give Bedtime Correction Insulin if BG less than 200.   For  -  299 give 1 unit.   For  - 399 give 2 units   For BG greater than or equal 400 give 3 units.   Notify provider if glucose greater than or equal to 350 mg/dL after administration of correction dose.  If given at mealtime, must be administered 5 min before meal or immediately after.    Admin. Amount: 1-3 Units  Last Admin: 07/03/18 2158  Dispense Loc: Contact Rx for dose  Volume: 3 mL        (0210)-Not Given       2158 (1 Units)-Given [C]        (2230)-Not Given        (2200)-Not Given [C]        [ ] 2200           insulin aspart (NovoLOG) inj (RAPID ACTING)  Dose: 1-3 Units  Freq: 3 TIMES DAILY BEFORE MEALS Route: SC  Start: 07/03/18 0730   Admin Instructions: Correction Scale - LOW INSULIN RESISTANCE DOSING     Do Not give Correction Insulin if Pre-Meal BG less than 140.   For Pre-Meal  - 239 give 1 unit.   For Pre-Meal  - 339 give 2 units.   For Pre-Meal BG greater than or equal to 340 give 3 units.   To be given with prandial insulin, and based on pre-meal blood glucose.   Notify provider if glucose greater than or equal to 350 mg/dL after administration of correction dose.  If given at mealtime, must be administered 5 min before meal or immediately after.    Admin. Amount: 1-3 Units  Last Admin: 07/05/18 1714  Dispense Loc: Contact Rx for dose  Volume: 3 mL        1020 (1 Units)-Given [C]       1311 (2 Units)-Given [C]       1756 (2 Units)-Given        0817 (1 Units)-Given       1226 (1 Units)-Given       (1737)-Not Given [C]        (0805)-Not Given [C]       1249 (1 Units)-Given [C]       1714 (1 Units)-Given [C]        (0847)-Not Given [C]       [ ] 1200       [ ] 1700           ipratropium - albuterol 0.5 mg/2.5 mg/3 mL (DUONEB) neb solution 3 mL  Dose: 3 mL  Freq: 4 TIMES DAILY Route: NEBULIZATION  Start: 07/04/18 0800   Admin. Amount: 3 mL  Last Admin: 07/06/18 0720  Dispense Loc:  ADS MS3[facility]  Volume: 3 mL         0748 (3 mL)-Given       1159 (3 mL)-Given       1530 (3 mL)-Given       1914  "(3 mL)-Given        (0903)-Not Given [C]       1058 (3 mL)-Given              1537 (3 mL)-Given       1915 (3 mL)-Given        0720 (3 mL)-Given       [ ] 1200       [ ] 1600       [ ] 2000           labetalol (NORMODYNE/TRANDATE) injection 20 mg  Dose: 20 mg  Freq: EVERY 4 HOURS PRN Route: IV  PRN Reason: high blood pressure  PRN Comment: sbp > 160  Start: 07/03/18 0034   Admin Instructions: For ordered doses up to 80 mg, give IV Push undiluted. Give each 20 mg over 2 minutes.    Admin. Amount: 20 mg = 4 mL Conc: 5 mg/mL  Dispense Loc:  Main Pharmacy  Infused Over: 2-8 Minutes  Volume: 4 mL               levothyroxine (SYNTHROID/LEVOTHROID) tablet 112 mcg  Dose: 112 mcg  Freq: DAILY Route: PO  Start: 07/03/18 0900   Admin. Amount: 1 tablet (1 × 112 mcg tablet)  Last Admin: 07/06/18 0856  Dispense Loc:  ADS [facility]        0940 (112 mcg)-Given        0816 (112 mcg)-Given        0804 (112 mcg)-Given        0856 (112 mcg)-Given           lidocaine (LMX4) kit  Freq: EVERY 1 HOUR PRN Route: Top  PRN Reason: pain  PRN Comment: with VAD insertion or accessing implanted port.  Start: 07/02/18 1706   Admin Instructions: Do NOT give if patient has a history of allergy to any local anesthetic or any \"abiel\" product.   Apply 30 minutes prior to VAD insertion or port access.  MAX Dose:  2.5 g (  of 5 g tube)    Dispense Loc:  ADS MS3E               lidocaine 1 % 1 mL  Dose: 1 mL  Freq: EVERY 1 HOUR PRN Route: OTHER  PRN Comment: mild pain with VAD insertion or accessing implanted port  Start: 07/02/18 1706   Admin Instructions: Do NOT give if patient has a history of allergy to any local anesthetic or any \"abiel\" product. MAX dose 1 mL subcutaneous OR intradermal in divided doses.    Admin. Amount: 1 mL  Dispense Loc: [facility] Floor Stock  Volume: 2 mL               losartan (COZAAR) tablet 50 mg  Dose: 50 mg  Freq: DAILY Route: PO  Start: 07/03/18 0900   Admin. Amount: 1 tablet (1 × 50 mg tablet)  Last Admin: 07/06/18 " 0856  Dispense Loc:  ADS MS3[facility]        (1333)-Not Given [C]        0815 (50 mg)-Given        0804 (50 mg)-Given        0856 (50 mg)-Given           magnesium sulfate 2 g in water intermittent infusion  Dose: 2 g  Freq: DAILY PRN Route: IV  PRN Reason: magnesium supplementation  Start: 07/03/18 0034   Admin Instructions: For Serum Mg++ 1.6 - 2 mg/dL  Give 2 g and recheck magnesium level next AM.    Admin. Amount: 2 g = 50 mL Conc: 0.04 g/mL  Dispense Loc:  Main Pharmacy  Infused Over: 60 Minutes  Volume: 50 mL               magnesium sulfate 4 g in 100 mL sterile water (premade)  Dose: 4 g  Freq: EVERY 4 HOURS PRN Route: IV  PRN Reason: magnesium supplementation  Start: 07/03/18 0034   Admin Instructions: For serum Mg++ less than 1.6 mg/dL  Give 4 g and recheck magnesium level 2 hours after dose, and next AM.    Admin. Amount: 4 g = 100 mL Conc: 4 g/100 mL  Dispense Loc:  Main Pharmacy  Infused Over: 120 Minutes  Volume: 100 mL               melatonin tablet 1 mg  Dose: 1 mg  Freq: AT BEDTIME PRN Route: PO  PRN Reason: sleep  Start: 07/03/18 0034   Admin Instructions: Do not give unless at least 6 hours of uninterrupted sleep is expected.    Admin. Amount: 1 tablet (1 × 1 mg tablet)  Dispense Loc:  [id number]               metoprolol succinate (TOPROL-XL) 24 hr tablet 50 mg  Dose: 50 mg  Freq: DAILY Route: PO  Start: 07/04/18 0900   Admin Instructions: DO NOT CRUSH. Tablet may be split in half along score line.    Admin. Amount: 1 tablet (1 × 50 mg tablet)  Last Admin: 07/06/18 0857  Dispense Loc:  ADS MS3E         0816 (50 mg)-Given        0804 (50 mg)-Given        0857 (50 mg)-Given           naloxone (NARCAN) injection 0.1-0.4 mg  Dose: 0.1-0.4 mg  Freq: EVERY 2 MIN PRN Route: IV  PRN Reason: opioid reversal  Start: 07/03/18 0034   Admin Instructions: For respiratory rate LESS than or EQUAL to 8.  Partial reversal dose:  0.1 mg titrated q 2 minutes for Analgesia Side Effects Monitoring Sedation Level of 3  (frequently drowsy, arousable, drifts to sleep during conversation).Full reversal dose:  0.4 mg bolus for Analgesia Side Effects Monitoring Sedation Level of 4 (somnolent, minimal or no response to stimulation).  For ordered doses up to 2mg give IVP. Give each 0.4mg over 15 seconds in emergency situations. For non-emergent situations further dilute in 9mL of NS to facilitate titration of response.    Admin. Amount: 0.1-0.4 mg = 0.25-1 mL Conc: 0.4 mg/mL  Dispense Loc: [facility] ADS MS3E  Volume: 1 mL               ondansetron (ZOFRAN-ODT) ODT tab 4 mg  Dose: 4 mg  Freq: EVERY 6 HOURS PRN Route: PO  PRN Reasons: nausea,vomiting  Start: 07/03/18 0034   Admin Instructions: This is Step 1 of nausea and vomiting management.  If nausea not resolved in 15 minutes, go to Step 2 prochlorperazine (COMPAZINE). Do not push through foil backing. Peel back foil and gently remove. Place on tongue immediately. Administration with liquid unnecessary  With dry hands, peel back foil backing and gently remove tablet; do not push oral disintegrating tablet through foil backing; administer immediately on tongue and oral disintegrating tablet dissolves in seconds; then swallow with saliva; liquid not required.    Admin. Amount: 1 tablet (1 × 4 mg tablet)  Dispense Loc: [facility] ADS MS3E              Or  ondansetron (ZOFRAN) injection 4 mg  Dose: 4 mg  Freq: EVERY 6 HOURS PRN Route: IV  PRN Reasons: nausea,vomiting  Start: 07/03/18 0034   Admin Instructions: This is Step 1 of nausea and vomiting management.  If nausea not resolved in 15 minutes, go to Step 2 prochlorperazine (COMPAZINE).  Irritant. For ordered doses up to 4 mg, give IV Push undiluted over 2-5 minutes.    Admin. Amount: 4 mg = 2 mL Conc: 4 mg/2 mL  Dispense Loc: [facility]S MS3[facility]  Infused Over: 2-5 Minutes  Volume: 2 mL               oxybutynin (DITROPAN-XL) 24 hr tablet 10 mg  Dose: 10 mg  Freq: DAILY Route: PO  Start: 07/03/18 0900   Admin Instructions: DO NOT CRUSH.    Admin. Amount: 2  tablet (2 × 5 mg tablet)  Last Admin: 07/06/18 0856  Dispense Loc: [facility] ADS MS[facility]        0940 (10 mg)-Given        0815 (10 mg)-Given        0807 (10 mg)-Given        0856 (10 mg)-Given           potassium chloride (K-TAB,KLOR-CON) CR tablet 10 mEq  Dose: 10 mEq  Freq: 3 TIMES DAILY Route: PO  Start: 07/03/18 0900   Admin Instructions: DO NOT CRUSH.    Admin. Amount: 1 tablet (1 × 10 mEq tablet)  Last Admin: 07/06/18 0856  Dispense Loc:  ADS MS3[facility]        0941 (10 mEq)-Given       1649 (10 mEq)-Given       2156 (10 mEq)-Given        0816 (10 mEq)-Given       1604 (10 mEq)-Given       2108 (10 mEq)-Given        0804 (10 mEq)-Given       1548 (10 mEq)-Given       2136 (10 mEq)-Given        0856 (10 mEq)-Given       [ ] 1600       [ ] 2200           potassium chloride (KLOR-CON) Packet 20-40 mEq  Dose: 20-40 mEq  Freq: EVERY 2 HOURS PRN Route: ORAL OR FEED  PRN Reason: potassium supplementation  Start: 07/03/18 0034   Admin Instructions: Use if unable to tolerate tablets.    If Serum K+ 3.4-4.0, dose = 20 mEq x1. Recheck K+ level the next AM.  If Serum K+ 3.0-3.3, dose = 60 mEq po total dose (40 mEq x 1 followed in 2 hours by 20 mEq X1). Recheck K+ level 4 hours after dose and the next AM.  If Serum K+ 2.5-2.9, dose = 80 mEq po total dose (40 mEq Q2H x2). Recheck K+ level 4 hours after dose and the next AM.  If Serum K+ less than 2.5, See IV order.  Dissolve packet contents in 4-8 ounces of cold water or juice.    Admin. Amount: 20-40 mEq  Last Admin: 07/03/18 1015  Dispense Loc: RH ADS MS3E        1015 (20 mEq)-Given              potassium chloride 10 mEq in 100 mL intermittent infusion with 10 mg lidocaine  Dose: 10 mEq  Freq: EVERY 1 HOUR PRN Route: IV  PRN Reason: potassium supplementation  Start: 07/03/18 0034   Admin Instructions: Infuse via PERIPHERAL LINE. Use potassium with lidocaine for pain with peripheral administration.  If Serum K+ 3.4-4.0, dose = 10 mEq/hr x2 doses. Recheck K+ level the next AM.  If Serum  K+ 3.0-3.3, dose = 10 mEq/hr x4 doses (40 mEq IV total dose). Recheck K+ level 2 hours after dose and the next AM.  If Serum K+ less than 3.0, dose = 10 mEq/hr x6 doses (60 mEq IV total dose). Recheck K+ level 2 hours after dose and the next AM.    Admin. Amount: 10 mEq = 100 mL Conc: 10 mEq/100 mL  Dispense Loc:  Main Pharmacy  Infused Over: 1 Hours  Volume: 100 mL               potassium chloride 10 mEq in 100 mL sterile water intermittent infusion (premix)  Dose: 10 mEq  Freq: EVERY 1 HOUR PRN Route: IV  PRN Reason: potassium supplementation  Start: 07/03/18 0034   Admin Instructions: Infuse via PERIPHERAL LINE or CENTRAL LINE. Use for central line replacement if patient weight less than 65 kg, if patient is on TPN with high potassium content or if unit does not stock 20 mEq bags.  If Serum K+ 3.4-4.0, dose = 10 mEq/hr x2 doses. Recheck K+ level the next AM.  If Serum K+ 3.0-3.3, dose = 10 mEq/hr x4 doses (40 mEq IV total dose). Recheck K+ level 2 hours after dose and the next AM.  If Serum K+ less than 3.0, dose = 10 mEq/hr x6 doses (60 mEq IV total dose). Recheck K+ level 2 hours after dose and the next AM.    Admin. Amount: 10 mEq = 100 mL Conc: 10 mEq/100 mL  Dispense Loc: [facility]  Infused Over: 60 Minutes  Volume: 100 mL               potassium chloride 20 mEq in 50 mL intermittent infusion  Dose: 20 mEq  Freq: EVERY 1 HOUR PRN Route: IV  PRN Reason: potassium supplementation  Start: 07/03/18 0034   Admin Instructions: Infuse via CENTRAL LINE Only.  May need EKG if less than 65 kg or on TPN - Max rate is 0.3 mEq/kg/hr for patients not on EKG monitoring.    If Serum K+ 3.4-4.0, dose = 20 mEq/hr x1 doses. Recheck K+ level the next AM.  If Serum K+ 3.0-3.3, dose = 20 mEq/hr x2 doses (40 mEq IV total dose).  Recheck K+ level 2 hours after dose and the next AM.  If Serum K+ less than 3.0, dose = 20 mEq/hr x3 doses (60 mEq IV total dose). Recheck K+ level 2 hours after dose and the next AM.    Admin.  Amount: 20 mEq = 50 mL Conc: 20 mEq/50 mL  Dispense Loc:  Main Pharmacy  Volume: 50 mL               potassium chloride SA (K-DUR/KLOR-CON M) CR tablet 20-40 mEq  Dose: 20-40 mEq  Freq: EVERY 2 HOURS PRN Route: PO  PRN Reason: potassium supplementation  Start: 07/03/18 0034   Admin Instructions: Use if able to take PO.   If Serum K+ 3.4-4.0, dose = 20 mEq x1. Recheck K+ level the next AM.  If Serum K+ 3.0-3.3, dose = 60 mEq po total dose (40 mEq x1 followed in 2 hours by 20 mEq x1). Recheck K+ level 4 hours after dose and the next AM.  If Serum K+ 2.5-2.9, dose = 80 mEq po total dose (40 mEq Q2H x2). Recheck K+ level 4 hours after dose and the next AM.  If Serum K+ less than 2.5, See IV order.  DO NOT CRUSH    Admin. Amount: 1-2 tablet (1-2 × 20 mEq tablet)  Last Admin: 07/04/18 0816  Dispense Loc:  ADS MS3E         0816 (20 mEq)-Given [C]             predniSONE (DELTASONE) tablet 40 mg  Dose: 40 mg  Freq: DAILY Route: PO  Start: 07/05/18 0930   Admin. Amount: 2 tablet (2 × 20 mg tablet)  Last Admin: 07/06/18 0856  Dispense Loc:  ADS MS3E          (0930)-Not Given [C]        0856 (40 mg)-Given           QUEtiapine (SEROquel) half-tab 12.5-25 mg  Dose: 12.5-25 mg  Freq: EVERY 6 HOURS PRN Route: PO  PRN Comment: Agitation  Start: 07/05/18 0919   Admin. Amount: 1-2 half-tab (1-2 × 12.5 mg half-tab)  Dispense Loc:  ADS MS3E               senna-docusate (SENOKOT-S;PERICOLACE) 8.6-50 MG per tablet 1 tablet  Dose: 1 tablet  Freq: 2 TIMES DAILY PRN Route: PO  PRN Reason: constipation  Start: 07/03/18 0034   Admin Instructions: If no bowel movement in 24 hours, increase to 2 tablets PO.  Hold for loose stools.  This is the first step of a three step constipation treatment.    Admin. Amount: 1 tablet  Dispense Loc:  ADS MS3E              Or  senna-docusate (SENOKOT-S;PERICOLACE) 8.6-50 MG per tablet 2 tablet  Dose: 2 tablet  Freq: 2 TIMES DAILY PRN Route: PO  PRN Reason: constipation  Start: 07/03/18 0034   Admin  Instructions: Hold for loose stools.  This is the first step of a three step constipation treatment.    Admin. Amount: 2 tablet  Dispense Loc: [facility] ADS MS3E               sodium chloride (PF) 0.9% PF flush 3 mL  Dose: 3 mL  Freq: EVERY 8 HOURS Route: IK  Start: 07/02/18 1709   Admin Instructions: And Q1H PRN, to lock peripheral IV dormant line.    Admin. Amount: 3 mL  Last Admin: 07/06/18 0855  Dispense Loc: [facility] Floor Stock  Volume: 4 mL   Current Line: Peripheral IV 06/13/18 Left       0212 (3 mL)-Given       (0213)-Not Given       0836 (3 mL)-Given       (1649)-Not Given        (0016)-Not Given       0817 (3 mL)-Given       1604 (3 mL)-Given               0015 (3 mL)-Given       1549 (3 mL)-Given        0028 (3 mL)-Given       0855 (3 mL)-Given       [ ] 1600           sodium chloride (PF) 0.9% PF flush 3 mL  Dose: 3 mL  Freq: EVERY 1 HOUR PRN Route: IK  PRN Reason: line flush  PRN Comment: for peripheral IV flush post IV meds  Start: 07/02/18 1706   Admin. Amount: 3 mL  Dispense Loc: [facility] Floor Stock  Volume: 4 mL               torsemide (DEMADEX) tablet 10 mg  Dose: 10 mg  Freq: DAILY Route: PO  Start: 07/03/18 0900   Admin. Amount: 1 tablet (1 × 10 mg tablet)  Last Admin: 07/06/18 0856  Dispense Loc: [facility] ADS MS3[facility]        0940 (10 mg)-Given        0816 (10 mg)-Given        0804 (10 mg)-Given        0856 (10 mg)-Given          Discontinued Medications  Medications 06/30/18 07/01/18 07/02/18 07/03/18 07/04/18 07/05/18 07/06/18         Dose: 5 mg  Freq: DAILY Route: PO  Start: 07/03/18 0045   End: 07/03/18 1253   Admin. Amount: 1 tablet (1 × 5 mg tablet)  Dispense Loc: [facility] ADS MS3[facility]        (0208)-Not Given [C]       1253-Med Discontinued            Rate: 75 mL/hr   Freq: CONTINUOUS Route: IV  Last Dose: Stopped (07/04/18 0845)  Start: 07/03/18 0045   End: 07/04/18 0839   Last Admin: 07/04/18 0810  Dispense Loc: [facility] Floor Stock  Volume: 1,000 mL   Current Line: Peripheral IV 06/13/18 Left       0211 ( )-New Bag       3571  ( )-New Bag       1634-Stopped [C]       2236 ( )-Started        0810 ( )-Rate/Dose Verify       0839-Med Discontinued  0845-Stopped               Dose: 3 mL  Freq: EVERY 2 HOURS PRN Route: NEBULIZATION  PRN Reasons: wheezing,shortness of breath / dyspnea  Start: 07/03/18 0034   End: 07/03/18 2306   Admin. Amount: 3 mL  Dispense Loc: [facility] ADS MS3E  Volume: 3 mL        2306-Med Discontinued            Dose: 3 mL  Freq: EVERY 4 HOURS WHILE AWAKE Route: NEBULIZATION  Start: 07/03/18 0034   End: 07/03/18 2305   Admin. Amount: 3 mL  Last Admin: 07/03/18 1945  Dispense Loc: RH ADS MS3E  Volume: 3 mL               (0842)-Not Given       (1154)-Not Given [C]       (1520)-Not Given [C]       1945 (3 mL)-Given       2305-Med Discontinued            Dose: 40 mg  Freq: 2 TIMES DAILY Route: IV  Start: 07/03/18 0034   End: 07/05/18 0918   Admin Instructions: Give Doses 125mg and less IV Push over 2-3 minutes - reconstitute with 1 mL of bacteriostatic water if no diluent is provided. Doses greater than 125 mg need to be in at least 50 mL IVPB.    Admin. Amount: 40 mg = 1 mL Conc: 40 mg/mL  Last Admin: 07/05/18 0902  Dispense Loc: [facility]S [facility]  Volume: 1 mL        0208 (40 mg)-Given       0836 (40 mg)-Given       2233 (40 mg)-Given        0815 (40 mg)-Given       2228 (40 mg)-Given        0902 (40 mg)-Given       0918-Med Discontinued          Dose: 25 mg  Freq: DAILY Route: PO  Start: 07/03/18 0900   End: 07/03/18 1251   Admin Instructions: DO NOT CRUSH. Tablet may be split in half along score line.    Admin. Amount: 1 tablet (1 × 25 mg tablet)  Last Admin: 07/03/18 0940  Dispense Loc: [facility] ADS MS3[facility]        0940 (25 mg)-Given       1251-Med Discontinued       Medications 06/30/18 07/01/18 07/02/18 07/03/18 07/04/18 07/05/18 07/06/18

## 2018-07-02 NOTE — ED TRIAGE NOTES
Pt presents with daughter for confusion. Pt's daughter states pt has been acting confused over last couple weeks. Daughter checked on pt today and noticed she was more confused than normal, can't remember dates and other things she normally can. Pt lives with  at home. Hx of aneurism. ABCs intact.     Has not been taking blood pressure medication at home.

## 2018-07-02 NOTE — IP AVS SNAPSHOT
"[place] [address] MEDICAL SURGICAL: [phone]                                              INTERAGENCY TRANSFER FORM - LAB / IMAGING / EKG / EMG RESULTS   2018                    Hospital Admission Date: 2018  [place] NYE   : 1935  Sex: Female        Attending Provider: Pascual Guzman MD     Allergies:  Amoxicillin, Piperacillin-tazobactam In D5w    Infection:  None   Service:  GENERAL MEDI    Ht:  1.626 m (5' 4\")   Wt:  68.7 kg (151 lb 9 oz)   Admission Wt:  67.3 kg (148 lb 4.8 oz)    BMI:  26.02 kg/m 2   BSA:  1.76 m 2            Patient PCP Information     Provider PCP Type    Shoaib Mills DO General         Lab Results - 3 Days      Glucose by meter [[id number]]  Resulted: 18 0756, Result status: Final result    Ordering provider: Pascual Guzman MD  18 0744 Resulting lab: POINT OF CARE TEST, GLUCOSE    Specimen Information    Type Source Collected On     18 0744          Components       Value Reference Range Flag Lab   Glucose 89 70 - 99 mg/dL  170            Blood culture [[id number]]  Resulted: 18 0301, Result status: Preliminary result    Ordering provider: Adalberto Cardona MD  18 1708 Resulting lab: INFECTIOUS DISEASE DI[facility]NOSTIC LABORATORY    Specimen Information    Type Source Collected On   Blood  18 1719   Comment:  Left Arm          Components       Value Reference Range Flag Lab   Specimen Description Blood Left Arm      Special Requests Aerobic and anaerobic bottles received   75   Culture Micro No growth after 4 days   225            Blood culture [[id number]]  Resulted: 18 0301, Result status: Preliminary result    Ordering provider: Adalberto Cardona MD  18 1744 Resulting lab: INFECTIOUS DISEASE DIAGNOSTIC LABORATORY    Specimen Information    Type Source Collected On   Blood  18 1820   Comment:  Left Arm          Components       Value Reference Range Flag Lab "   Specimen Description Blood Left Arm      Special Requests Received in aerobic bottle only   75   Culture Micro No growth after 4 days   225            Glucose by meter [[id number]] (Abnormal)  Resulted: 07/06/18 0223, Result status: Final result    Ordering provider: Pascual Guzman MD  07/06/18 0209 Resulting lab: POINT OF CARE TEST, GLUCOSE    Specimen Information    Type Source Collected On     07/06/18 0209          Components       Value Reference Range Flag Lab   Glucose 122 70 - 99 mg/dL H 170            Glucose by meter [[id number]] (Abnormal)  Resulted: 07/05/18 2214, Result status: Final result    Ordering provider: Pascual Guzman MD  07/05/18 2200 Resulting lab: POINT OF CARE TEST, GLUCOSE    Specimen Information    Type Source Collected On     07/05/18 2200          Components       Value Reference Range Flag Lab   Glucose 187 70 - 99 mg/dL H 170            Glucose by meter [[id number]] (Abnormal)  Resulted: 07/05/18 1702, Result status: Final result    Ordering provider: Pascual Guzman MD  07/05/18 1649 Resulting lab: POINT OF CARE TEST, GLUCOSE    Specimen Information    Type Source Collected On     07/05/18 1649          Components       Value Reference Range Flag Lab   Glucose 142 70 - 99 mg/dL H 170            Glucose by meter [[id number]] (Abnormal)  Resulted: 07/05/18 0828, Result status: Final result    Ordering provider: Pascual Guzman MD  07/05/18 0805 Resulting lab: POINT OF CARE TEST, GLUCOSE    Specimen Information    Type Source Collected On     07/05/18 0805          Components       Value Reference Range Flag Lab   Glucose 116 70 - 99 mg/dL H 170            Potassium [[id number]]  Resulted: 07/05/18 0701, Result status: Final result    Ordering provider: Jb Cadet MD  07/05/18 0000 Resulting lab: [facility]    Specimen Information    Type Source Collected On   Blood  07/05/18 0645          Components        Value Reference Range Flag Lab   Potassium 4.4 3.4 - 5.3 mmol/L  [facility]            Glucose by meter [[id number]] (Abnormal)  Resulted: 07/05/18 0207, Result status: Final result    Ordering provider: Pascual Guzman MD  07/05/18 0154 Resulting lab: POINT OF CARE TEST, GLUCOSE    Specimen Information    Type Source Collected On     07/05/18 0154          Components       Value Reference Range Flag Lab   Glucose 139 70 - 99 mg/dL H 170            Glucose by meter [[id number]] (Abnormal)  Resulted: 07/04/18 2246, Result status: Final result    Ordering provider: Adalberto Cardona MD  07/03/18 0151 Resulting lab: POINT OF CARE TEST, GLUCOSE    Specimen Information    Type Source Collected On     07/03/18 0151          Components       Value Reference Range Flag Lab   Glucose 109 70 - 99 mg/dL H 170            Glucose by meter [[id number]] (Abnormal)  Resulted: 07/04/18 2244, Result status: Final result    Ordering provider: Pascual Guzman MD  07/04/18 2226 Resulting lab: POINT OF CARE TEST, GLUCOSE    Specimen Information    Type Source Collected On     07/04/18 2226          Components       Value Reference Range Flag Lab   Glucose 135 70 - 99 mg/dL H 170            Urine Culture Aerobic Bacterial [[id number]] (Abnormal)  Resulted: 07/04/18 2146, Result status: Final result    Ordering provider: Adalberto Cardona MD  07/02/18 2211 Resulting lab: INFECT[facility] LAB[facility]    Specimen Information    Type Source Collected On   Catheterized Urine  07/02/18 2211          Components       Value Reference Range Flag Lab   Specimen Description Catheterized Urine      Special Requests --   75   Result:         Specimen leaked in transit.  Due to possible contamination, results should be interpreted   with caution.     Culture Micro --  A 225   Result:         10,000 to 50,000 colonies/mL  Enterococcus faecalis              Glucose by meter [[id number]] (Abnormal)  Resulted:  07/04/18 1748, Result status: Final result    Ordering provider: Pascual Guzman MD  07/04/18 1736 Resulting lab: POINT OF CARE TEST, GLUCOSE    Specimen Information    Type Source Collected On     07/04/18 1736          Components       Value Reference Range Flag Lab   Glucose 135 70 - 99 mg/dL H 170            Glucose by meter [[id number]] (Abnormal)  Resulted: 07/04/18 1234, Result status: Final result    Ordering provider: Pascual Guzman MD  07/04/18 1218 Resulting lab: POINT OF CARE TEST, GLUCOSE    Specimen Information    Type Source Collected On     07/04/18 1218          Components       Value Reference Range Flag Lab   Glucose 150 70 - 99 mg/dL H 170            Urine Culture [[id number]] (Abnormal)  Resulted: 07/04/18 0846, Result status: Final result    Ordering provider: Adalberto Cardona MD  07/02/18 1708 Resulting lab: INFECTIOUS [facility]EASE DIAGNOSTIC LABORATORY    Specimen Information    Type Source Collected On   Catheterized Urine  07/02/18 1932          Components       Value Reference Range Flag Lab   Specimen Description Catheterized Urine      Culture Micro --  A 225   Result:         10,000 to 50,000 colonies/mL  Enterococcus faecalis     Culture Micro Susceptibility testing done on previous specimen   225   Result:              Glucose by meter [[id number]] (Abnormal)  Resulted: 07/04/18 0822, Result status: Final result    Ordering provider: Pascual Guzman MD  07/04/18 0808 Resulting lab: POINT OF CARE TEST, GLUCOSE    Specimen Information    Type Source Collected On     07/04/18 0808          Components       Value Reference Range Flag Lab   Glucose 190 70 - 99 mg/dL H 170            Potassium [[id number]]  Resulted: 07/04/18 0721, Result status: Final result    Ordering provider: Lionel Hayden MD  07/04/18 0001 Resulting lab: [facility]    Specimen Information    Type Source Collected On   Blood  07/04/18 0639          Components        Value Reference Range Flag Lab   Potassium 3.7 3.4 - 5.3 mmol/L  [facility]            Glucose by meter [[id number]] (Abnormal)  Resulted: 07/04/18 0203, Result status: Final result    Ordering provider: Pascual Guzman MD  07/04/18 0150 Resulting lab: POINT OF CARE TEST, GLUCOSE    Specimen Information    Type Source Collected On     07/04/18 0150          Components       Value Reference Range Flag Lab   Glucose 209 70 - 99 mg/dL H 170            Glucose by meter [[id number]] (Abnormal)  Resulted: 07/03/18 2214, Result status: Final result    Ordering provider: Adalberto Cardona MD  07/03/18 2158 Resulting lab: POINT OF CARE TEST, GLUCOSE    Specimen Information    Type Source Collected On     07/03/18 2158          Components       Value Reference Range Flag Lab   Glucose 220 70 - 99 mg/dL H 170            Glucose by meter [[id number]] (Abnormal)  Resulted: 07/03/18 1715, Result status: Final result    Ordering provider: Adalberto Cardona MD  07/03/18 1702 Resulting lab: POINT OF CARE TEST, GLUCOSE    Specimen Information    Type Source Collected On     07/03/18 1702          Components       Value Reference Range Flag Lab   Glucose 297 70 - 99 mg/dL H 170            Glucose by meter [[id number]] (Abnormal)  Resulted: 07/03/18 1329, Result status: Final result    Ordering provider: Adalberto Cardona MD  07/03/18 1310 Resulting lab: POINT OF CARE TEST, GLUCOSE    Specimen Information    Type Source Collected On     07/03/18 1310          Components       Value Reference Range Flag Lab   Glucose 265 70 - 99 mg/dL H 170            Glucose by meter [[id number]] (Abnormal)  Resulted: 07/03/18 0842, Result status: Final result    Ordering provider: Adalberto Cardona MD  07/03/18 0823 Resulting lab: POINT OF CARE TEST, GLUCOSE    Specimen Information    Type Source Collected On     07/03/18 0823          Components       Value Reference Range Flag Lab   Glucose 171 70 - 99 mg/dL H 170             Basic metabolic panel [[id number]] (Abnormal)  Resulted: 07/03/18 0656, Result status: Final result    Ordering provider: Pascual Guzman MD  07/03/18 0034 Resulting lab: [facility]    Specimen Information    Type Source Collected On   Blood  07/03/18 0620          Components       Value Reference Range Flag Lab   Sodium 141 133 - 144 mmol/L  FrRdHs   Potassium 3.7 3.4 - 5.3 mmol/L  FrRdHs   Comment:  Specimen slightly hemolyzed, potassium may be falsely elevated   Chloride 109 94 - 109 mmol/L  FrRdHs   Carbon Dioxide 21 20 - 32 mmol/L  FrRdHs   Anion Gap 11 3 - 14 mmol/L  FrRdHs   Glucose 148 70 - 99 mg/dL H FrRdHs   Urea Nitrogen 8 7 - 30 mg/dL  FrRdHs   Creatinine 0.63 0.52 - 1.04 mg/dL  FrRdHs   GFR Estimate >90 >60 mL/min/1.7m2  FrRd   Comment:  Non  GFR Calc   GFR Estimate If Black >90 >60 mL/min/1.7m2  [facility]   Comment:  African American GFR Calc   Calcium 9.0 8.5 - 10.1 mg/dL  FrRdHs            UA reflex to Microscopic and Culture [[id number]] (Abnormal)  Resulted: 07/02/18 2233, Result status: Edited Result - FINAL    Ordering provider: Adalberto Cardona MD  07/02/18 2209 Resulting lab: [facility]    Specimen Information    Type Source Collected On   Catheterized Urine  07/02/18 2211          Components       Value Reference Range Flag Lab   Color Urine Yellow   FrRdHs   Appearance Urine Clear   FrRdHs   Glucose Urine Negative NEG^Negative mg/dL  FrRdHs   Bilirubin Urine Negative NEG^Negative  FrRdHs   Ketones Urine 80 NEG^Negative mg/dL A FrRdHs   Specific Gravity Urine 1.010 1.003 - 1.035  FrRdHs   Blood Urine Small NEG^Negative A FrRdHs   pH Urine 6.0 5.0 - 7.0 pH  FrRdHs   Protein Albumin Urine 30 NEG^Negative mg/dL A FrRdHs   Urobilinogen mg/dL 2.0 0.0 - 2.0 mg/dL  FrRdHs   Nitrite Urine Negative NEG^Negative  FrRdHs   Leukocyte Esterase Urine Small NEG^Negative A FrRdHs   Source Catheterized Urine   FrRdHs   RBC Urine 3 0 - 2 /HPF H  "FrRdHs   WBC Urine 11 0 - 5 /HPF H FrRdHs   Squamous Epithelial /HPF Urine 1 0 - 1 /HPF  FrRdHs   Mucous Urine Present NEG^Negative /LPF A [facility]            Testing Performed By     Lab - Abbreviation Name Director Address Valid Date Range    12 - [facility] Unknown [address]  [facility] [address] 05/08/15 1057 - Present    75 - Unknown [facility] Unknown [address] [facility] [address] 01/15/15 1019 - Present    170 - Unknown POINT OF CARE TEST, GLUCOSE Unknown Unknown 10/31/11 1114 - Present    225 - Unknown INF[facility]EASE [facility] LAB[facility]AT[facility] Unknown [address] [facility] [address] 12/19/14 0954 - Present            Unresulted Labs (24h ago through future)    Start       Ordered    Unscheduled  Potassium  (Potassium Replacement - \"High\" - Replacement for all levels less than 4.1 mmol/L - UU,UR,UA,RH,SH,PH,WY )  CONDITIONAL (SPECIFY),   Routine     Comments:  Obtain Potassium Level for these conditions:  *IF no potassium result within 24 hrs before initiation of order set, draw potassium level with next lab collect.    *2 HOURS AFTER last IV potassium replacement dose and 4 hours after an oral replacement dose when potassium replacement given for level less than 3.4.  *Next morning after potassium dose.     Repeat Potassium Replacement if necessary.    07/03/18 0034    Unscheduled  Magnesium  (Magnesium Replacement - Adult - \"High\" - Replacement for all levels less than or equal to 2 mg/dL)  CONDITIONAL (SPECIFY),   Routine     Comments:  Obtain Magnesium Level for these conditions:  *IF no magnesium result within 24 hrs before initiation of order set, draw magnesium level with next lab collect.    *2 HOURS AFTER last magnesium replacement dose when magnesium replacement given for level less than 1.6  *Next morning after magnesium dose.     Repeat Magnesium Replacement if necessary.    07/03/18 0034         Imaging Results - 3 Days    "   CT Aortic Survey w Contrast [[id number]]  Resulted: 07/02/18 4237, Result status: Final result    Ordering provider: Adalberto Cardona MD  07/02/18 1737 Resulted by: Elton Donald MD    Performed: 07/02/18 1834 - 07/02/18 1851 Resulting lab: RADIOLOGY RESULTS    Narrative:       CT AORTIC SURVEY WITH CONTRAST  7/2/2018  6:51 PM     HISTORY: Confusion, headache with aortic aneurysm.    CONTRAST DOSE:  75mL [id number]    Radiation dose for this scan was reduced using automated exposure  control, adjustment of the mA and/or kV according to patient size, or  iterative reconstruction technique.    FINDINGS:  There is an ascending aortic aneurysm measuring up to 7.8  cm in diameter, similar if not slightly increased since 6/13/2018.  There is no evidence of aortic dissection. Tortuosity of the bilateral  common carotid and right subclavian arteries are noted. Coronary  artery calcifications are noted. Also noted is a saccular aneurysm of  the descending aorta immediately above the diaphragms measuring  approximately 4.8 x 4.9 cm, also essentially unchanged from 6/13/2018.  The lungs are essentially clear. Hepatic fatty infiltration is noted.  3.2 x 3.7 cm right adrenal mass is noted, unchanged. Multiple stones  are present within the gallbladder. Sigmoid diverticulosis is noted  without evidence of diverticulitis. Calcified uterine fibroids are  also incidentally noted.      Impression:       IMPRESSION:  1. Ascending aortic large aneurysm, essentially unchanged from  6/13/2018.  2. 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  3. Ectasia of the suprarenal abdominal aorta is also noted and  unchanged.  4. Right adrenal stable 3.7 cm lesion.  5. Cholelithiasis and colonic diverticulosis.  6. Hepatic fatty infiltration--possible etiologies include consumption  of alcohol or excessive carbohydrate intake, especially  sugar/fructose.  Metabolic syndrome  commonly occurs in combination  with nonalcoholic fatty liver disease. Although often reversible,  nonalcoholic fatty liver disease can progress to steatohepatitis and  cirrhosis.    PETRONA PATEL MD      Testing Performed By     Lab - Abbreviation Name Director Address Valid Date Range    104 - [facility] RADIOLOGY RESULTS Unknown Unknown 02/16/05 1553 - Present            Encounter-Level Documents:     There are no encounter-level documents.      Order-Level Documents:     There are no order-level documents.

## 2018-07-02 NOTE — IP AVS SNAPSHOT
"` `           [place] [address] MEDICAL SURGICAL: [phone]                                              INTERAGENCY TRANSFER FORM - NURSING   2018                    Hospital Admission Date: 2018  [place] NYE   : 1935  Sex: Female        Attending Provider: Pascual Guzman MD     Allergies:  Amoxicillin, Piperacillin-tazobactam In D5w    Infection:  None   Service:  GENERAL MEDI    Ht:  1.626 m (5' 4\")   Wt:  68.7 kg (151 lb 9 oz)   Admission Wt:  67.3 kg (148 lb 4.8 oz)    BMI:  26.02 kg/m 2   BSA:  1.76 m 2            Patient PCP Information     Provider PCP Type    DO Pawel Carver      Current Code Status     Date Active Code Status Order ID Comments User Context       Prior      Code Status History     Date Active Date Inactive Code Status Order ID Comments User Context    2018 10:10 AM  Full Code [id number]  Valeriano Beck MD Outpatient    7/3/2018 12:34 AM 2018 10:10 AM Full Code [id number]  Pascual Guzman MD Inpatient    2013 12:40 AM 2013  6:30 PM Full Code [id number]  Marimar Crain RN Inpatient    2013 12:40 AM 2013 12:40 AM Full Code [id number]  Marimar Crain RN Inpatient    2013 12:32 AM 2013 12:40 AM Full Code [id number]  Katia Lagos, PA Inpatient      Advance Directives        Scanned docmt in ACP Activity?           No scanned doc        Hospital Problems as of 2018              Priority Class Noted POA    Hypoxemic respiratory failure, chronic (H) Medium  7/3/2018 Yes      Non-Hospital Problems as of 2018              Priority Class Noted    Benign essential hypertension Medium  6/3/2014    Aneurysm of thoracic aorta (H) Medium  2014    Aortic valve disorder Medium  2014    Hyperlipidemia Medium  2014    Orthostatic hypotension Medium  2014    Thyroid disease Medium  2014    NONSPECIFIC MEDICAL HISTORY Medium  2014    COPD (chronic " obstructive pulmonary disease) (H) Medium  12/31/2014    Adrenal mass, right (H) Medium  12/31/2014      Immunizations     Name Date      Influenza (High Dose) 3 valent vaccine 12/21/16     Influenza (IIV3) PF 11/11/13     TDAP Vaccine (Boostrix) 12/21/16          END      ASSESSMENT     Discharge Profile Flowsheet     EXPECTED DISCHARGE     Resources List  Skilled Nursing Facility 07/05/18 1519    Expected Discharge Date  07/07/18 (Home, ) 07/05/18 1528   Skilled Nursing Facility  [facility]) [phone], Fax: [phone] 07/05/18 1519    DISCHARGE NEEDS ASSESSMENT     PAS Number  [id number] 07/05/18 1531    Equipment Currently Used at Home  cane, straight;walker, rolling 07/03/18 1122   Existing Resources/Services  Home Care 11/18/13 1502    Transportation Available  car;family or friend will provide 07/03/18 1122   SKIN      Primary Care Clinic Name  [facility] 07/03/18 1410   Inspection of bony prominences  Full 07/06/18 0929    [facility] 07/[facility]18 1410   Inspection under devices  Full 07/05/18 2130    Equipment Used at Home  walker, rolling 11/15/13 0923   Skin WDL  ex 07/06/18 0929    GASTROINTESTINAL (ADULT,PEDIATRIC,OB)     Skin Color/Characteristics  bruised (ecchymotic) 07/06/18 0929    GI WDL  ex 07/06/18 0929   Skin Temperature  warm 07/06/18 0324    Abdominal Appearance  obese 07/06/18 0929   Skin Moisture  dry 07/06/18 0324    All Quadrants Bowel Sounds  audible and normoactive 07/06/18 0929   Skin Elasticity  quick return to original state 07/05/18 2130    Last Bowel Movement  07/04/18 07/06/18 0929   Skin Integrity  bruise(s) 07/06/18 0929    Passing flatus  yes 07/05/18 2130   SAFETY      COMMUNICATION ASSESSMENT     Safety WDL  WDL 07/06/18 0929    Patient's communication style  spoken language (English or Bilingual) 07/02/18 1630   All Alarms  alarm(s) activated and audible 07/06/18 0929    FINAL RESOURCES     Safety Factors  bed in [facility]  "position;wheels locked;call light in reach;upper side rails raised x 2;ID band on 07/06/18 0929                 Assessment WDL (Within Defined Limits) Definitions           Safety WDL     Effective: 09/28/15    Row Information: <b>WDL Definition:</b> Bed in low position, wheels locked; call light in reach; upper side rails up x 2; ID band on<br> <font color=\"gray\"><i>Item=AS safety wdl>>List=AS safety wdl>>Version=F14</i></font>      Skin WDL     Effective: 09/28/15    Row Information: <b>WDL Definition:</b> Warm; dry; intact; elastic; without discoloration; pressure points without redness<br> <font color=\"gray\"><i>Item=AS skin wdl>>List=AS skin wdl>>Version=F14</i></font>      Vitals     Vital Signs Flowsheet     VITAL SIGNS     0-10 Pain Scale  0 07/05/18 0859    Temp  95.3  F (35.2  C) 07/06/18 0739   HEIGHT AND WEIGHT      Temp src  Oral 07/06/18 0739   Weight  68.7 kg (151 lb 9 oz) 07/06/18 0610    Resp  16 07/06/18 0739   Weight Method  Bed scale 07/06/18 0610    Heart Rate  0 07/06/18 0857   JUANY COMA SCALE      Pulse/Heart Rate Source  Monitor 07/06/18 0739   Best Eye Response  4-->(E4) spontaneous 07/05/18 2126    BP  (!)  0/0 07/06/18 0857   Best Motor Response  6-->(M6) obeys commands 07/05/18 2126    BP Location  Left arm 07/06/18 0739   Best Verbal Response  4-->(V4) confused 07/05/18 2126    LYING ORTHOSTATIC BP     [place] Coma Scale Score  14 07/05/18 2126    Lying Orthostatic BP  -- (PT refused) 07/03/18 0419   POSITIONING      OXYGEN THERAPY     Body Position  independently positioning 07/06/18 0324    SpO2  94 % 07/06/18 0739   Head of Bed (HOB)  HOB flat 07/06/18 0324    O2 Device  None (Room air) 07/06/18 0739   Positioning/Transfer Devices  pillows 07/03/18 1840    Oxygen Delivery  2 LPM 07/03/18 0828   Chair  Upright in chair 07/06/18 0929    PAIN/COMFORT     DAILY CARE      Patient Currently in Pain  denies 07/06/18 0746   Activity Management  activity adjusted per tolerance;activity " encouraged;up in chair 07/06/18 0929    Preferred Pain Scale  number (Numeric Rating Pain Scale) 07/05/18 2126   Activity Assistance Provided  assistance, 1 person 07/06/18 0929    Patient's Stated Pain Goal  No pain 07/05/18 0859   Assistive Device Utilized  walker 07/06/18 0929            Patient Lines/Drains/Airways Status    Active LINES/DRAINS/AIRWAYS     Name: Placement date: Placement time: Site: Days: Last dressing change:    Peripheral IV 06/13/18 Left 06/13/18   1000      23     Peripheral IV 07/05/18 Left Upper arm 07/05/18   1715   Upper arm   less than 1             Patient Lines/Drains/Airways Status    Active PICC/CVC     None            Intake/Output Detail Report     Date Intake     Output    Shift P.O. I.V. IV Piggyback Total Total       Noc 07/04/18 2300 - 07/05/18 0659 -- -- -- -- -- 0    Day 07/05/18 0700 - 07/05/18 1459 240 -- -- 240 -- 240    Mee 07/05/18 1500 - 07/05/18 2259 240 3 -- 243 -- 243    Noc 07/05/18 2300 - 07/06/18 0659 -- -- -- -- -- 0    Day 07/06/18 0700 - 07/06/18 1459 -- -- -- -- -- 0      Last Void/BM       Most Recent Value    Urine Occurrence 1 at 07/06/2018 0609    Stool Occurrence 1 at 07/06/2018 0609      Case Management/Discharge Planning     Case Management/Discharge Planning Flowsheet     REFERRAL INFORMATION     Support Assessment  Adequate family and caregiver support;Adequate social supports 07/03/18 1410    Did the Initial Social Work Assessment result in a Social Work Case?  Yes 07/03/18 1410   COPING/STRESS      Admission Type  inpatient 07/03/18 1410   Major Change/Loss/Stressor  hospitalization;illness 07/03/18 1241    Arrived From  home or self-care 07/03/18 1410   EXPECTED DISCHARGE      Referral Source  physician 07/03/18 1410   Expected Discharge Date  07/07/18 (Home, ) 07/05/18 1528    Reason For Consult  discharge planning 07/03/18 1410   DISCHARGE PLANNING      Record Reviewed  history and physical;medical record 07/03/18 1410   Transportation  Available  car;family or friend will provide 07/03/18 1122    CTS Assigned to Case  Marci Parham RN 07/03/18 1508   Equipment Used at Home  walker, rolling 11/15/13 0923    Primary Care Clinic Name  [facility] 07/03/18 1410   FINAL RESOURCES      Primary Care MD Name  Dhirajdarnell 07/03/18 1410   Equipment Currently Used at Home  cane, straight;walker, rolling 07/03/18 1122    LIVING ENVIRONMENT     Resources List  Skilled Nursing Facility 07/05/18 1519    Lives With  spouse 07/03/18 1122   [facility]) [phone], Fax: [phone] 07/05/18 1519    Living Arrangements  house 07/03/18 1122   [facility] Number  [id number] 07/05/18 1531    Provides Primary Care For  no one 07/03/18 1410   Existing Resources/Services  Home Care 11/18/13 1502    Quality Of Family Relationships  supportive 07/03/18 1410   ABUSE RISK SCREEN      Able to Return to Prior Living Arrangements  yes 07/03/18 1410   QUESTION TO PATIENT:  Has a member of your family or a partner(now or in the past) intimidated, hurt, manipulated, or controlled you in any way?  no 07/02/18 1632    HOME SAFETY     QUESTION TO PATIENT: Do you feel safe going back to the place where you are living?  yes 07/02/18 1632    Patient Feels Safe Living in Home?  yes 07/03/18 1410   OBSERVATION: Is there reason to believe there has been maltreatment of a vulnerable adult (ie. Physical/Sexual/Emotional abuse, self neglect, lack of adequate food, shelter, medical care, or financial exploitation)?  no 07/02/18 1632    ASSESSMENT OF FAMILY/SOCIAL SUPPORT     HOMICIDE RISK      Marital Status   07/03/18 1410   Feels Like Hurting Others  no 07/02/18 1632    Who is your support system?  ;Children 07/03/18 1410   OTHER      Spouse's Name  Flavio 07/03/18 1410   Are you depressed or being treated for depression?  No 07/04/18 1147    Description of Support System  Supportive;Involved 07/03/18 1410

## 2018-07-02 NOTE — LETTER
Key Recommendations:  Pt admitted with COPD exacerbation. She is being treated with O2, IV solumedrol and nebs. She is confused and can be uncooperative at times. She is being followed by PT/OT and recommendations are for TCU on dc.    Marci Parham    AVS/Discharge Summary is the source of truth; this is a helpful guide for improved communication of patient story

## 2018-07-02 NOTE — IP AVS SNAPSHOT
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 patient.      Discharge Medications   Current Discharge Medication List      START taking these medications    Details   ipratropium - albuterol 0.5 mg/2.5 mg/3 mL (DUONEB) 0.5-2.5 (3) MG/3ML neb solution Take 1 vial (3 mLs) by nebulization every 6 hours as needed for shortness of breath / dyspnea or wheezing  Refills: 0    Associated Diagnoses: COPD exacerbation (H)      Ipratropium-Albuterol (COMBIVENT RESPIMAT)  MCG/ACT inhaler Inhale 1 puff into the lungs 4 times daily Not to exceed 6 doses per day.  Qty: 1 Inhaler, Refills: 0    Associated Diagnoses: COPD exacerbation (H)      predniSONE (DELTASONE) 20 MG tablet Take 1 tablet (20 mg) by mouth daily  Qty: 3 tablet, Refills: 0    Associated Diagnoses: COPD exacerbation (H)         CONTINUE these medications which have CHANGED    Details   erythromycin (ROMYCIN) ophthalmic ointment Place 0.5 inches into both eyes 2 times daily for 3 days  Refills: 0    Associated Diagnoses: Acute bacterial conjunctivitis of both eyes      hydrALAZINE (APRESOLINE) 25 MG tablet Take 1 tablet (25 mg) by mouth 3  times daily  Refills: 0    Associated Diagnoses: Hypertensive urgency         CONTINUE these medications which have NOT CHANGED    Details   amLODIPine (NORVASC) 5 MG tablet Take 1 tablet (5 mg) by mouth daily  Qty: 90 tablet, Refills: 3    Associated Diagnoses: HTN (hypertension)      atorvastatin (LIPITOR) 20 MG tablet Take 1 tablet (20 mg) by mouth daily  Qty: 90 tablet, Refills: 3    Associated Diagnoses: Hyperlipidemia LDL goal <100      levothyroxine (SYNTHROID/LEVOTHROID) 112 MCG tablet Take 112 mcg by mouth daily      losartan (COZAAR) 100 MG tablet Take 100 mg by mouth daily   Qty: 30 tablet, Refills: 1      metoprolol succinate (TOPROL-XL) 25 MG 24 hr tablet Take 1 tablet (25 mg) by mouth daily  Qty: 90 tablet, Refills: 3    Associated Diagnoses: Thoracic aortic aneurysm without rupture (H)      oxybutynin (DITROPAN-XL) 10 MG 24 hr tablet Take 10 mg by mouth daily      potassium chloride SA (K-DUR/KLOR-CON M) 10 MEQ CR tablet Take 1 tablet (10 mEq) by mouth 3 times daily  Qty: 270 tablet, Refills: 3    Associated Diagnoses: Benign essential hypertension      torsemide (DEMADEX) 10 MG tablet Take 1 tablet (10 mg) by mouth daily  Qty: 90 tablet, Refills: 3    Associated Diagnoses: HTN (hypertension)         STOP taking these medications       Levothyroxine Sodium 112 MCG CAPS Comments:   Reason for Stopping:             Allergies   Allergies   Allergen Reactions     Amoxicillin Rash     Piperacillin-Tazobactam In D5w Rash     Data   Most Recent 3 CBC's:  Recent Labs   Lab Test  07/02/18   1725 03/21/18 01/18/17   0924  09/16/14   1439   WBC  6.9  8.3   --   10.8   HGB  12.9  13.7  12.1  12.8   MCV  92  93.5   --   92   PLT  230  284   --   310      Most Recent 3 BMP's:  Recent Labs   Lab Test  07/05/18   0645  07/04/18   0639  07/03/18   0620  07/02/18   1725 03/21/18   NA   --    --   141  144  146.0*   POTASSIUM  4.4  3.7  3.7  3.2*  4.6   CHLORIDE   --    --   109  107  103.0   CO2   --    --   21  25   30.1   BUN   --    --   8  12  16.0   CR   --    --   0.63  0.81  0.88   ANIONGAP   --    --   11  12  17.5   KRISTEL   --    --   9.0  9.9  10.7*   GLC   --    --   148*  73  88.0     Most Recent 2 LFT's:  Recent Labs   Lab Test  07/02/18   1725 03/21/18   AST  12  20.0   ALT  22  25.0   ALKPHOS  96  123.0*   BILITOTAL  1.4*  0.71     Most Recent INR's and Anticoagulation Dosing History:  Anticoagulation Dose History     Recent Dosing and Labs Latest Ref Rng & Units 7/2/2018    INR 0.86 - 1.14 1.04        Most Recent 3 Troponin's:  Recent Labs   Lab Test  07/02/18   1729  11/14/13   0702  11/14/13   0115  12/22/12   2000   TROPI   --   <0.012  <0.012  0.024   TROPONIN  0.01   --    --    --      Most Recent Cholesterol Panel:  Recent Labs   Lab Test 03/21/18   CHOL  183.0   LDL  90.0   HDL  73.0   TRIG  100.0     Most Recent 6 Bacteria Isolates From Any Culture (See EPIC Reports for Culture Details):  Recent Labs   Lab Test  07/02/18   2211  07/02/18   1932  07/02/18   1820  07/02/18   1719  11/14/13   1040  12/22/12   1920   CULT  10,000 to 50,000 colonies/mL  Enterococcus faecalis  *  10,000 to 50,000 colonies/mL  Enterococcus faecalis  *  Susceptibility testing done on previous specimen  No growth after 4 days  No growth after 4 days  >100,000 colonies/mL Escherichia coli  10 to 50,000 colonies/mL Mixed gram negative and positive christopher Multiple species present, probable perineal contamination. Susceptibility testing not routinely done     Most Recent TSH, T4 and A1c Labs:  Recent Labs   Lab Test 03/21/18 01/13/17   1037   TSH  0.492  6.79*   T4   --   1.27     Results for orders placed or performed during the hospital encounter of 07/02/18   CT Head w/o Contrast    Narrative    CT OF THE HEAD WITHOUT CONTRAST 7/2/2018 6:48 PM     COMPARISON: Head CT 12/22/2012    HISTORY: Altered mental status, generalized weakness, evaluate for  cerebrovascular accident.      TECHNIQUE: 5 mm thick axial CT images of the head were  acquired  without IV contrast material.    FINDINGS: There is moderate diffuse cerebral volume loss. There are  extensive confluent areas of decreased density in the cerebral white  matter bilaterally that are consistent with sequela of chronic small  vessel ischemic disease.    The ventricles and basal cisterns are within normal limits in  configuration given the degree of cerebral volume loss. There is no  midline shift. There are no extra-axial fluid collections.    No intracranial hemorrhage, mass or recent infarct.    The visualized paranasal sinuses are well-aerated. There is no  mastoiditis. There are no fractures of the visualized bones.      Impression    IMPRESSION: Diffuse cerebral volume loss and cerebral white matter  changes consistent with chronic small vessel ischemic disease. No  evidence for acute intracranial pathology.      Radiation dose for this scan was reduced using automated exposure  control, adjustment of the mA and/or kV according to patient size, or  iterative reconstruction technique    NEGRITA RUEDA MD   CT Aortic Survey w Contrast    Narrative    CT AORTIC SURVEY WITH CONTRAST  7/2/2018  6:51 PM     HISTORY: Confusion, headache with aortic aneurysm.    CONTRAST DOSE:  75mL [id number]    Radiation dose for this scan was reduced using automated exposure  control, adjustment of the mA and/or kV according to patient size, or  iterative reconstruction technique.    FINDINGS:  There is an ascending aortic aneurysm measuring up to 7.8  cm in diameter, similar if not slightly increased since 6/13/2018.  There is no evidence of aortic dissection. Tortuosity of the bilateral  common carotid and right subclavian arteries are noted. Coronary  artery calcifications are noted. Also noted is a saccular aneurysm of  the descending aorta immediately above the diaphragms measuring  approximately 4.8 x 4.9 cm, also essentially unchanged from 6/13/2018.  The lungs are essentially clear. Hepatic fatty  infiltration is noted.  3.2 x 3.7 cm right adrenal mass is noted, unchanged. Multiple stones  are present within the gallbladder. Sigmoid diverticulosis is noted  without evidence of diverticulitis. Calcified uterine fibroids are  also incidentally noted.      Impression    IMPRESSION:  1. Ascending aortic large aneurysm, essentially unchanged from  6/13/2018.  2. 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  3. Ectasia of the suprarenal abdominal aorta is also noted and  unchanged.  4. Right adrenal stable 3.7 cm lesion.  5. Cholelithiasis and colonic diverticulosis.  6. Hepatic fatty infiltration--possible etiologies include consumption  of alcohol or excessive carbohydrate intake, especially  sugar/fructose.  Metabolic syndrome commonly occurs in combination  with nonalcoholic fatty liver disease. Although often reversible,  nonalcoholic fatty liver disease can progress to steatohepatitis and  cirrhosis.    PETRONA PATEL MD   Chest  XR, 1 view PORTABLE    Narrative    CHEST PORTABLE ONE VIEW   7/2/2018 9:24 PM     COMPARISON:  Two view chest x-ray 12/21/2016.    HISTORY: Shortness of breath.       Impression    IMPRESSION: Moderate cardiomegaly again noted. There is new streaky  hyperdensity in the left lung base that could represent atelectasis.  The lungs are otherwise clear. There is no pleural effusion or  pneumothorax. There is no definite evidence for congestive failure.     NEGRITA RUEDA MD           Disclaimer: This note consists of symbols derived from keyboarding, dictation and/or voice recognition software. As a result, there may be errors in the script that have gone undetected. Please consider this when interpreting information found in this chart.[FC1.1]       Revision History        User Key Date/Time User Provider Type Action    > FC1.1 7/6/2018 10:15 AM Valeriano Beck MD Physician Sign                     Consult Notes       Consults by White, Corinne C, LSW at 7/3/2018  3:29 PM     Author:  White, Corinne C, LSW Service:  (none) Author Type:      Filed:  7/3/2018  3:29 PM Date of Service:  7/3/2018  3:29 PM Creation Time:  7/3/2018  3:21 PM    Status:  Signed :  White, Corinne C, LSW ()     Consult Orders:    1. Social Work IP Consult [[id number]] ordered by Pascual Guzman MD at 07/03/18 0700                Care Transition Initial Assessment -   Reason For Consult: discharge planning  Met with: Patient  Attempted to reach daugher, unable to leave VM message. Spouse not reachable   Active Problems:    Hypoxemic respiratory failure, chronic (H)       DATA  Lives With: spouse  Living Arrangements: house  Description of Support System: Supportive, Involved  Who is your support system?: , Children  Support Assessment: Adequate family and caregiver support, Adequate social supports. PT stated she has good support but due to memory issues not clear on support. SW aware that pt live with her spouse in a home with out any outside support services  Pt previous was admitted and dc to home with spouse.   Identified issues/concerns regarding health management: PT admitted due to COPD related issus  Pt is not on home o2 at this time.       Quality Of Family Relationships: supportive  Transportation Available: car, family or friend will provide    ASSESSMENT  Cognitive Status:  awake, alert and disoriented  Concerns to be addressed: Per conversation with [facility] rehab recommendations are for TCU.  However rehab staff believe pt lives alone,. Pt does live with her spouse.  SW has attempted reach daughter but unable to leave VM message.  Pt does better when spouse present. Rn staff expressed concern about spouse and his memory issues  Pt is not open to any support service.  Pt has a cane and walker per her report at home     PLAN  sw will need to follow up with family to discuss dc planning support  and need  Pt has had home care support in the past[CW1.1]          Revision History        User Key Date/Time User Provider Type Action    > CW1.1 7/3/2018  3:29 PM White, Corinne C, ERICAW  Sign            Consults by Marci Parham, RN at 7/3/2018  3:18 PM     Author:  Marci Parham, RN Service:  (none) Author Type:      Filed:  7/3/2018  3:18 PM Date of Service:  7/3/2018  3:18 PM Creation Time:  7/3/2018  3:08 PM    Status:  Signed :  Marci Parham, RN ()     Consult Orders:    1. Care Coordinator IP Consult [[id number]] ordered by Lionel Hayden MD at 07/03/18 1500                Care Coordination:  CTS following for COPD dx. Met with pt at bedside to discuss plan of care with COPD. Pt states she has had COPD for awhile and follows with a Pulmonologist and cardiologist. She states she lives with her  in [place]. She has 2 children that are supportive. She has confusion during discussion but answers other questions appropriately. She states she does not have any in home services and does not use home oxygen. She has a cane and a walker at home for ambulation. Chart reviewed and information verified. She follows at [facility] for her PCP, Dr Mills. PT/OT are following in hospital for recommendations on dc plan of care. Per PT/OT, recommendations for TCU on dc. Sw consulted for dc planning and TCU placement on dc. Will cont to follow along with SW for dc plan of care.    Marci Parham RN CTS[AH1.1]     Revision History        User Key Date/Time User Provider Type Action    > AH1.1 7/3/2018  3:18 PM Marci Parham, RN Case Manager Sign                     Progress Notes - Physician (Notes from 07/03/18 through 07/06/18)      Progress Notes by Ely Kang at 7/6/2018 10:14 AM     Author:  Ely Kang Service:  Social Work Author Type:      Filed:  7/6/2018 10:15 AM Date of Service:  7/6/2018 10:14 AM Creation Time:  7/6/2018 10:14 AM    Status:  Signed :   Ely Kang ()         SWS  Pt has d/c orders and family plans to transport.  SW sent orders to [facility].  They request pt leave at 11:30.  Updated family.  P:  No further SW needs[AJ1.1]     Revision History        User Key Date/Time User Provider Type Action    > AJ1.1 7/6/2018 10:15 AM Ely Kang  Sign            Progress Notes by David Luevano at 7/5/2018  3:31 PM     Author:  David Luevano Service:  (none) Author Type:  Coordinator    Filed:  7/5/2018  3:31 PM Date of Service:  7/5/2018  3:31 PM Creation Time:  7/5/2018  3:31 PM    Status:  Signed :  David Luevano (Coordinator)         Your information has been submitted on July 05th, 2018 at 03:31:19 PM CDT. The confirmation number is [id number][BB1.1]       Revision History        User Key Date/Time User Provider Type Action    > BB1.1 7/5/2018  3:31 PM David Luevano Coordinator Sign            Progress Notes by White, Corinne C, LSW at 7/5/2018  3:17 PM     Author:  White, Corinne C, LSW Service:  (none) Author Type:      Filed:  7/5/2018  3:18 PM Date of Service:  7/5/2018  3:17 PM Creation Time:  7/5/2018  3:17 PM    Status:  Signed :  White, Corinne C, LSW ()         Discharge Planner   Discharge Plans in progress: Pt has been accepted for TCU for tomorrow at [facility]   Barriers to discharge plan: none. Family plan to transport at [place]   Follow up plan: [facility] for tomorrow if pt remains sitter free        Entered by: Corinne C. White 07/05/2018 3:17 PM[CW1.1]          Revision History        User Key Date/Time User Provider Type Action    > CW1.1 7/5/2018  3:18 PM White, Corinne C, LSW  Sign            Progress Notes by Jb Cadet MD at 7/5/2018  2:59 PM     Author:  Jb Cadet MD Service:  Hospitalist Author Type:  Physician    Filed:  7/5/2018  3:14 PM Date of Service:  7/5/2018  2:59 PM Creation Time:  7/5/2018  2:59 PM    Status:  Signed :   Jb Cadet MD (Physician)         [facility]    Hospitalist Progress Note    Date of Service (when I saw the patient): 07/05/2018    Assessment & Plan      Giselle Chino is a 83 year old female with PMH including COPD, hypertension, 8.1 cm aortic aneurysm who presents with confusion and dyspnea.  It appears she has been recently missing some of her doses of medications and presented with accelerated hypertension of 220 systolic.  Remarkably she did not have chest pain or pressure and CT imaging of her aorta shows that her very large ascending and thoracic aortic aneurysms are stable compared with the prior CT scan from over 2 weeks ago.  She is being admitted for presumed COPD exacerbation as well as blood pressure control.     The bigger picture issue here seems to be difficulty managing at home and missing medications leading to her accelerated hypertension and COPD exacerbation.  PT and OT and social work will be consulted as well.     Assessment and Plan:     1.  Acute COPD exacerbation:  -- improved on nebs and iv steroid   -- continue nebs, change steroid to oral with quick taper     2.   Accelerated hypertension:  -- likely from non compliance due to cognitive dysfucntion   -- BP better now ,continue current medications--Increased  Toprol to 50 mg daily and Norvasc to 10 mg daily  --needs increased level of care on discharge      3.   History of very large ascending aortic and thoracic aortic aneurysms: her ascending aortic aneurysm was 7.8 cm and abdominal aneurysm was 7.4 cm in diameter as far back as 2013 based upon a CT scan of that time.  CT imaging shows that her aneurysms are stable compared with the prior study from about 2 weeks ago but at that time her aneurysms had enlarged slightly compared with the prior study from 2013.  In any case I would focus on good blood pressure control to minimize any risk of dissection. The ER provider, Dr. Cardona actually did call her vascular  surgeon Dr. Morris to review the case.  It was reported to me that she is not considered a surgical candidate due to very high risk and the current plan is for monitoring.     4.  Hypoglycemia: Her blood sugar was as low as 61 in the emergency room.    Patient blood sugar is elevated now, blood sugar is 116.  Continue to monitor on hypoglycemia protocol      5.  Confusion:  Acute encephalopathy suspected due to underlying cognitive dysfunction which is not diagnosed.  Patient remains confused but she remains pleasant and no evidence of agitation or psychosis.   --There is no focal neurologic deficit, no indication of cerebrovascular accident.   --I spoke with her daughter at bedside and further care plan discussed.  Patient definitely needs assistance with management of her medications as well as safety at home.  Social service and care coordination consulted to assist with further management of discharge needs.    7.  Bacterial conjunctivitis: Her right eye has obvious purulent drainage with conjunctivitis.    Continue  with erythromycin 4 times daily for 5 days total  Day#3 today .    8.Generalized weakness and medication administration safety issues   -- PT/OT and SW   -- may need TCU   9.Acute delirium with agitation last night requiring haldol   -- better this morning , pleasant   -- start delirium protocol with Seroquel   -- monitor , may need qhs dosing if she remains delirious     10.Urine culture noted for enterococcus 10-15k colony-forming units with no symptoms of urinary tract infection.     DVT Prophylaxis: Pneumatic Compression Devices  Code Status: Full Code.     Disposition Plan   May discharge to TCU vs Assisted living in next 1-2 days if no acute event overnight   Daughter questions addressed and her concerns discussed as well. Patient is not able to return home due to safety issues           Entered: Jb Cadet 07/05/2018, 2:59 PM         Jb Cadet MD      Interval History      Patient was seen and examined by me this morning.  Patient's daughter at bedside.  Events from last night noted and patient had episode of agitation requiring Haldol injection with improvement of her symptoms.  Patient up in chair, no new complaints.  She has no fever chills.  No urinary symptoms.  No shortness of breath or chest pain.    -Data reviewed today: I reviewed all new labs and imaging results over the last 24 hours. I personally reviewed no images or EKG's today.    Physical Exam   Temp: 97.7  F (36.5  C) Temp src: Oral BP: 119/40   Heart Rate: 63 Resp: 16 SpO2: 95 % O2 Device: None (Room air)    Vitals:    07/02/18 1631 07/05/18 0546   Weight: 67.3 kg (148 lb 4.8 oz) 68.3 kg (150 lb 9 oz)     Vital Signs with Ranges  Temp:  [96  F (35.6  C)-97.7  F (36.5  C)] 97.7  F (36.5  C)  Heart Rate:  [63-71] 63  Resp:  [16-18] 16  BP: (119-158)/(40-46) 119/40  SpO2:  [95 %-99 %] 95 %  I/O last 3 completed shifts:  In: 972 [P.O.:780; I.V.:192]  Out: -     Constitutional: Awake, alert, cooperative, no apparent distress, sitting in chair   Respiratory: Clear to auscultation bilaterally with moderate decrease in breath sounds, no crackles or wheezing   Cardiovascular: Regular rate and rhythm, normal S1 and S2, and no murmur noted   Abdomen: Normal bowel sounds, soft, non-distended, non-tender, no hepatosplenomegaly    Skin: No rashes, no cyanosis, dry to touch   Neuro: Alert and oriented ×1.   Extremities: No edema, normal range of motion   Other(s): Eye with surrounding redness and irritation of eyelid on right        All other systems: Negative       Medications       amLODIPine  10 mg Oral Daily     erythromycin   Right Eye 4x Daily     hydrALAZINE  25 mg Oral TID     insulin aspart  1-3 Units Subcutaneous TID AC     insulin aspart  1-3 Units Subcutaneous At Bedtime     ipratropium - albuterol 0.5 mg/2.5 mg/3 mL  3 mL Nebulization 4x daily     levothyroxine  112 mcg Oral Daily     losartan  50 mg Oral Daily      metoprolol succinate  50 mg Oral Daily     oxybutynin  10 mg Oral Daily     potassium chloride  10 mEq Oral TID     predniSONE  40 mg Oral Daily     sodium chloride (PF)  3 mL Intracatheter Q8H     torsemide  10 mg Oral Daily       Data     Recent Labs  Lab 07/05/18  0645 07/04/18  0639 07/03/18  0620 07/02/18  1729 07/02/18  1725   WBC  --   --   --   --  6.9   HGB  --   --   --   --  12.9   MCV  --   --   --   --  92   PLT  --   --   --   --  230   INR  --   --   --   --  1.04   NA  --   --  141  --  144   POTASSIUM 4.4 3.7 3.7  --  3.2*   CHLORIDE  --   --  109  --  107   CO2  --   --  21  --  25   BUN  --   --  8  --  12   CR  --   --  0.63  --  0.81   ANIONGAP  --   --  11  --  12   KRISTEL  --   --  9.0  --  9.9   GLC  --   --  148*  --  73   ALBUMIN  --   --   --   --  4.2   PROTTOTAL  --   --   --   --  7.8   BILITOTAL  --   --   --   --  1.4*   ALKPHOS  --   --   --   --  96   ALT  --   --   --   --  22   AST  --   --   --   --  12   TROPONIN  --   --   --  0.01  --        Imaging:  No results found for this or any previous visit (from the past 24 hour(s)).[MA1.1]       Revision History        User Key Date/Time User Provider Type Action    > MA1.1 7/5/2018  3:14 PM Jb Cadet MD Physician Sign            Progress Notes by Jb Cadet MD at 7/5/2018  9:21 AM     Author:  Jb Cadet MD Service:  Hospitalist Author Type:  Physician    Filed:  7/5/2018  9:24 AM Date of Service:  7/5/2018  9:21 AM Creation Time:  7/5/2018  9:21 AM    Status:  Signed :  Jb Cadet MD (Physician)         Patient was seen and examined by me this morning.  Patient's daughter at bedside.  Events from last night noted and patient had episode of agitation requiring Haldol injection with improvement of her symptoms.  Patient up in chair, no new complaints.  She has no fever chills.  No urinary symptoms.  No shortness of breath or chest pain.  Urine culture noted for enterococcus 10-15k colony-forming  units with no symptoms of urinary tract infection.  Patient daughter is concerned about patient's agitation and decline in function.  Plan is to start on delirium treatment protocol, reorientation, avoiding sleep interruptions, hold antibiotics as patient has no symptoms, change steroid to oral and may discontinue on discharge.  We will continue physical therapy, occupational therapy and discharge planning likely in the next 1 or 2 days.[MA1.1]     Revision History        User Key Date/Time User Provider Type Action    > MA1.1 7/5/2018  9:24 AM Jb Cadet MD Physician Sign            Progress Notes by Spenser Jacobo MD at 7/4/2018  6:27 PM     Author:  Spenser Jacobo MD Service:  Hospitalist Author Type:  Physician    Filed:  7/4/2018 10:21 PM Date of Service:  7/4/2018  6:27 PM Creation Time:  7/4/2018  6:27 PM    Status:  Addendum :  Spenser Jacobo MD (Physician)         X-cover    Called re agitation.  Chart reviewed.  Will order haldol prn.  ECG with Qtc <400[SL1.1]      Addendum:  Called re urine cx results.   Growing enterococcus 10-50K colonies.  UA on admit with minimal changes and only 11 WBC.  No fever and WBC normal.  PCN allergy noted    Not convinced this represents true infection; may be colonization.  Favor monitoring off antibiotics for now.  If spikes a fever antibiotic would be indicated[SL1.2]     Revision History        User Key Date/Time User Provider Type Action    > SL1.2 7/4/2018 10:21 PM Spenser Jacobo MD Physician Addend     SL1.1 7/4/2018  6:28 PM Spenser Jacobo MD Physician Sign            Progress Notes by Jb Cadet MD at 7/4/2018  1:54 PM     Author:  Jb Cadet MD Service:  Hospitalist Author Type:  Physician    Filed:  7/4/2018  2:01 PM Date of Service:  7/4/2018  1:54 PM Creation Time:  7/4/2018  1:54 PM    Status:  Signed :  Jb Cadet MD (Physician)         [facility]    Hospitalist  Progress Note    Date of Service (when I saw the patient): 07/04/2018    Assessment & Plan   Giselle Chino is a 83 year old female with PMH including COPD, hypertension, 8.1 cm aortic aneurysm who presents with confusion and dyspnea.  It appears she has been recently missing some of her doses of medications and presented with accelerated hypertension of 220 systolic.  Remarkably she did not have chest pain or pressure and CT imaging of her aorta shows that her very large ascending and thoracic aortic aneurysms are stable compared with the prior CT scan from over 2 weeks ago.  She is being admitted for presumed COPD exacerbation as well as blood pressure control.     The bigger picture issue here seems to be difficulty managing at home and missing medications leading to her accelerated hypertension and COPD exacerbation.  PT and OT and social work will be consulted as well.     Assessment and Plan:   \  1.  Acute COPD exacerbation: Certainly she could have some dyspnea related to accelerated hypertension from missing medications as well.     --Patient is improving, continue steroid in the form of oral prednisone, continue DuoNeb, continue oxygen supplementation as needed.  Monitor closely.   2.   Accelerated hypertension:  --  It seems as though she has not been regularly taking her medications recently which I think is at least in part to blame for her severe hypertension.  There is no history of focal neurologic deficits to suggest stroke.  Given #3 below I would plan on very aggressive blood pressure control to minimize any risk of dissection.    -- resume her home regimen including amlodipine, metoprolol and Cozaar and also have IV hydralazine and labetalol available for blood pressures greater than 160 systolic.    --As noted above she does not have any chest or abdominal pain to suggest an aortic dissection at this point and CT imaging shows that her aorta is quite stable compared with 2+ weeks  ago.  --Increaseg Toprol to 50 mg daily and Norvasc to 10 mg daily  --Main issue with this patient is compliance, continue to monitor blood pressure     3.   History of very large ascending aortic and thoracic aortic aneurysms: her ascending aortic aneurysm was 7.8 cm and abdominal aneurysm was 7.4 cm in diameter as far back as 2013 based upon a CT scan of that time.  CT imaging shows that her aneurysms are stable compared with the prior study from about 2 weeks ago but at that time her aneurysms had enlarged slightly compared with the prior study from 2013.  In any case I would focus on good blood pressure control to minimize any risk of dissection. The ER provider, Dr. Cardona actually did call her vascular surgeon Dr. Morris to review the case.  It was reported to me that she is not considered a surgical candidate due to very high risk and the current plan is for monitoring.     4.  Hypoglycemia: Her blood sugar was as low as 61 in the emergency room.    Patient blood sugar is elevated now, blood sugar is 150.  Continue to monitor on hypoglycemia protocol      5.  Confusion:  Acute encephalopathy suspected due to underlying cognitive dysfunction which is not diagnosed.  Patient remains confused but she remains pleasant and no evidence of agitation or psychosis.   --There is no focal neurologic deficit, no indication of cerebrovascular accident.   --I spoke with her daughter at bedside and further care plan discussed.  Patient definitely needs assistance with management of her medications as well as safety at home.  Social service and care coordination consulted to assist with further management of discharge needs.     T   7.  Bacterial conjunctivitis: Her right eye has obvious purulent drainage with conjunctivitis.    Continue  with erythromycin 4 times daily for 5 days total.  Monitor.     DVT Prophylaxis: Pneumatic Compression Devices  Code Status: Full Code.     Disposition Plan  patient is medically stable but  she appears to be unsafe for discharge to prior to living arrangements.  I spoke with patient daughter at bedside and her concerns and questions addressed.  Patient count be discharged to alternative facilities including transition unit versus long-term unit.  Social service and care coordinator consulted to assist with this.     Entered: Jb Cadet 07/04/2018, 1:54 PM         Jb Cadet MD      Interval History       Patient is doing well in terms of new complaints.  She has no chest pain or shortness of breath.  No fever chills.  She is up in chair and eating, she appears to be confused and she is disoriented ×2.  She knows her daughter otherwise she is not oriented to time place     -Data reviewed today: I reviewed all new labs and imaging results over the last 24 hours. I personally reviewed no images or EKG's today.    Physical Exam   Temp: 95  F (35  C) Temp src: Oral BP: 148/49   Heart Rate: 66 Resp: 18 SpO2: 97 % O2 Device: None (Room air)    Vitals:    07/02/18 1631   Weight: 67.3 kg (148 lb 4.8 oz)     Vital Signs with Ranges  Temp:  [95  F (35  C)-96.3  F (35.7  C)] 95  F (35  C)  Heart Rate:  [66-96] 66  Resp:  [16-18] 18  BP: (121-164)/(42-60) 148/49  SpO2:  [95 %-99 %] 97 %  I/O last 3 completed shifts:  In: 1688 [P.O.:560; I.V.:1128]  Out: -     Constitutional: Awake, alert, cooperative, no apparent distress, sitting in chair   Respiratory: Clear to auscultation bilaterally with moderate decrease in breath sounds, no crackles or wheezing   Cardiovascular: Regular rate and rhythm, normal S1 and S2, and no murmur noted   Abdomen: Normal bowel sounds, soft, non-distended, non-tender, no hepatosplenomegaly    Skin: No rashes, no cyanosis, dry to touch   Neuro: Alert and oriented ×1.   Extremities: No edema, normal range of motion   Other(s): Eye with surrounding redness and irritation of eyelid on right        All other systems: Negative       Medications       amLODIPine  10 mg Oral Daily      erythromycin   Right Eye 4x Daily     hydrALAZINE  25 mg Oral TID     insulin aspart  1-3 Units Subcutaneous TID AC     insulin aspart  1-3 Units Subcutaneous At Bedtime     ipratropium - albuterol 0.5 mg/2.5 mg/3 mL  3 mL Nebulization 4x daily     levothyroxine  112 mcg Oral Daily     losartan  50 mg Oral Daily     methylPREDNISolone  40 mg Intravenous BID     metoprolol succinate  50 mg Oral Daily     oxybutynin  10 mg Oral Daily     potassium chloride  10 mEq Oral TID     sodium chloride (PF)  3 mL Intracatheter Q8H     torsemide  10 mg Oral Daily       Data     Recent Labs  Lab 07/04/18  0639 07/03/18  0620 07/02/18  1729 07/02/18  1725   WBC  --   --   --  6.9   HGB  --   --   --  12.9   MCV  --   --   --  92   PLT  --   --   --  230   INR  --   --   --  1.04   NA  --  141  --  144   POTASSIUM 3.7 3.7  --  3.2*   CHLORIDE  --  109  --  107   CO2  --  21  --  25   BUN  --  8  --  12   CR  --  0.63  --  0.81   ANIONGAP  --  11  --  12   KRISTEL  --  9.0  --  9.9   GLC  --  148*  --  73   ALBUMIN  --   --   --  4.2   PROTTOTAL  --   --   --  7.8   BILITOTAL  --   --   --  1.4*   ALKPHOS  --   --   --  96   ALT  --   --   --  22   AST  --   --   --  12   TROPONIN  --   --  0.01  --        Imaging:  No results found for this or any previous visit (from the past 24 hour(s)).[MA1.1]       Revision History        User Key Date/Time User Provider Type Action    > MA1.1 7/4/2018  2:01 PM Jb Cadet MD Physician Sign            Progress Notes by Hayley Ferguson LSW at 7/4/2018 10:27 AM     Author:  Hayley Ferguson LSW Service:  (none) Author Type:      Filed:  7/4/2018 10:29 AM Date of Service:  7/4/2018 10:27 AM Creation Time:  7/4/2018 10:27 AM    Status:  Signed :  Hayley Ferguson LSW ()         SW met with patient at bedside for assessment of discharge planning needs. Patient is conversant, and able to participate in discharge planning. Also present was her daughter Helga  ([phone]). Met with patient to discuss need for skilled nursing facility at discharge. The patient was given a list of skilled nursing facilities which includes the medicare.gov website for a comprehensive list of skilled nursing facilities.      Patient and daughter have chosen  1. [facility]  2. [facility], 3. [facility] 4. [facility]. SW faxed over referral.         JANIE Stover  [phone][LP1.1]               Revision History        User Key Date/Time User Provider Type Action    > LP1.1 7/4/2018 10:29 AM Hayley Ferguson LSW  Sign            Progress Notes by Jb Cadet MD at 7/4/2018  8:46 AM     Author:  Jb Cadet MD Service:  Hospitalist Author Type:  Physician    Filed:  7/4/2018 10:04 AM Date of Service:  7/4/2018  8:46 AM Creation Time:  7/4/2018  8:46 AM    Status:  Addendum :  Jb Cadet MD (Physician)         Patient was seen and examined by me this morning.  Patient care was assumed today.  Patient is 83-year-old female who appears to have cognitive dysfunction admitted with COPD exacerbation and encephalopathy.  Patient is currently up in chair and eating.  She is confused.  She does[MA1.1] not[MA1.2]  require oxygen, she has no distress.  Lung sounds are diminished with no wheezing.  Plan is to discontinue IV fluid, discontinue telemetry and discharge planning most likely his rehab facility.  Social service and care coordinator to assist with discharge planning.  Please review my detailed progress note at a later time.[MA1.1]     Revision History        User Key Date/Time User Provider Type Action    > MA1.2 7/4/2018 10:04 AM Jb Cadet MD Physician Addend     MA1.1 7/4/2018  8:47 AM Jb Cadet MD Physician Sign            Progress Notes by Milan Romo RT at 7/3/2018 11:06 PM     Author:  Milan Romo RT Service:  (none) Author Type:  Respiratory Therapist    Filed:  7/3/2018 11:08 PM Date of Service:  7/3/2018 11:06  "PM Creation Time:  7/3/2018 11:06 PM    Status:  Signed :  Milan Romo RT (Respiratory Therapist)         [facility]AT     Date:7/3/2018  Admission Dx:COPD Exac  Pulmonary History:COPD  Home Nebulizer/MDI Use:None  Home Oxygen:None  Acuity Level (RCAT flow sheet):3  Aerosol Therapy initiated:Douneb QID, Alb Q2 prn      Pulmonary Hygiene initiated:Deep breath and coughing techniques      Volume Expansion initiated:IS      Current Oxygen Requirements:RA  Current SpO2:96%    Re-evaluation date:7/6/2018    Patient Education:Education performed with patient in regards to indications/benefits of bronchodilators. Will continue to educate with patient as needed.         See \"RT Assessments\" flow sheet for patient assessment scoring and Acuity Level Details.[JW1.1]                Revision History        User Key Date/Time User Provider Type Action    > JW1.1 7/3/2018 11:08 PM Milan Romo, RT Respiratory Therapist Sign            Progress Notes by Kelsie Sherwood, PT at 7/3/2018 10:36 AM     Author:  Kelsie Sherwood, PT Service:  (none) Author Type:  Physical Therapist    Filed:  7/3/2018  2:47 PM Date of Service:  7/3/2018 10:36 AM Creation Time:  7/3/2018  2:47 PM    Status:  Signed :  Kelsie Sherwood PT (Physical Therapist)          07/03/18 1036   Quick Adds   Type of Visit Initial PT Evaluation   Living Environment   Lives With spouse   Living Arrangements house   Home Accessibility bed and bath are not on the first floor;tub/shower is not walk in   Number of Stairs to Enter Home 0   Number of Stairs Within Home 13   Stair Railings at Home inside, present on right side   Transportation Available car;family or friend will provide   Living Environment Comment split entry, bedroom up, bath on both levels.   Self-Care   Dominant Hand right   Usual Activity Tolerance good   Regular Exercise yes   Activity/Exercise Type walking   Exercise Amount/Frequency daily   Equipment Currently Used at Home cane, " straight;walker, rolling   Activity/Exercise/Self-Care Comment Pt states she uses a 2ww within the home.    Functional Level Prior   Ambulation 1-->assistive equipment   Transferring 1-->assistive equipment   Toileting 0-->independent   Bathing 0-->independent   Dressing 0-->independent   Eating 0-->independent   Communication 0-->understands/communicates without difficulty   Swallowing 0-->swallows foods/liquids without difficulty   Cognition 1 - attention or memory deficits   Fall history within last six months no   General Information   Onset of Illness/Injury or Date of Surgery - Date 07/02/18   Referring Physician Pascual Guzman MD   Patient/Family Goals Statement patient would like to DC to daughter's home   Pertinent History of Current Problem (include personal factors and/or comorbidities that impact the POC) Patient with PMH including COPD, HTN, 8.1 cm aortic aneurysm now admitted for confusion and dyspnea, as well as accelerated HTN.     Precautions/Limitations fall precautions;oxygen therapy device and L/min  (3L NC)   Cognitive Status Examination   Orientation person   Follows Commands and Answers Questions able to follow single-step instructions   Personal Safety and Judgment at risk behaviors demonstrated;impulsive   Memory impaired   Pain Assessment   Patient Currently in Pain No   Integumentary/Edema   Integumentary/Edema no deficits were identifed   Posture    Posture Forward head position;Protracted shoulders   Range of Motion (ROM)   ROM Comment WFL   Strength   Strength Comments moderate strength deficits, requires assist for transfers   Bed Mobility   Bed Mobility Comments min A   Transfer Skills   Transfer Comments min A and 2WW   Gait   Gait Comments Amb 5 feet, 15 feet between bed, toilet, and bedside chair with 2WW, min A and direct, near constant cueing for safe technique.   Balance   Balance Comments Fall risk secondary to confusion and impulsivity   General Therapy  "Interventions   Planned Therapy Interventions balance training;bed mobility training;gait training;strengthening;transfer training;home program guidelines;progressive activity/exercise   Clinical Impression   Criteria for Skilled Therapeutic Intervention yes, treatment indicated   PT Diagnosis decreased independence with mobility   Clinical Presentation Stable/Uncomplicated   Clinical Presentation Rationale complex pmh, stable presentation, fair social support   Clinical Decision Making (Complexity) Low complexity   Therapy Frequency` 5 times/week   Predicted Duration of Therapy Intervention (days/wks) 5 days   Anticipated Discharge Disposition Transitional Care Facility   Risk & Benefits of therapy have been explained Yes   Patient, Family & other staff in agreement with plan of care Yes   [facility] TM \"6 Clicks\"   2016, Trustees of [facility], under license to "[employer].  All rights reserved.   6 Clicks Short Forms Basic Mobility Inpatient Short Form   [facility]  \"6 Clicks\" V.2 Basic Mobility Inpatient Short Form   1. Turning from your back to your side while in a flat bed without using bedrails? 4 - None   2. Moving from lying on your back to sitting on the side of a flat bed without using bedrails? 4 - None   3. Moving to and from a bed to a chair (including a wheelchair)? 3 - A Little   4. Standing up from a chair using your arms (e.g., wheelchair, or bedside chair)? 3 - A Little   5. To walk in hospital room? 3 - A Little   6. Climbing 3-5 steps with a railing? 3 - A Little   Basic Mobility Raw Score (Score out of 24.Lower scores equate to lower levels of function) 20   Total Evaluation Time   Total Evaluation Time (Minutes) 5[MM1.1]        Revision History        User Key Date/Time User Provider Type Action    > MM1.1 7/3/2018  2:47 PM Kelsie Sherwood, PT Physical Therapist Sign            Progress Notes by Lionel Hayden MD at 7/3/2018 12:44 PM     Author:  " Lionel Hayden MD Service:  Hospitalist Author Type:  Physician    Filed:  7/3/2018 12:53 PM Date of Service:  7/3/2018 12:44 PM Creation Time:  7/3/2018 12:44 PM    Status:  Addendum :  Lionel Hayden MD (Physician)         [facility]    Hospitalist Progress Note    Date of Service (when I saw the patient): 07/03/2018    Assessment & Plan   Giselle Chino is a 83 year old female with PMH including COPD, hypertension, 8.1 cm aortic aneurysm who presents with confusion and dyspnea.  It appears she has been recently missing some of her doses of medications and presented with accelerated hypertension of 220 systolic.  Remarkably she did not have chest pain or pressure and CT imaging of her aorta shows that her very large ascending and thoracic aortic aneurysms are stable compared with the prior CT scan from over 2 weeks ago.  She is being admitted for presumed COPD exacerbation as well as blood pressure control.     The bigger picture issue here seems to be difficulty managing at home and missing medications leading to her accelerated hypertension and COPD exacerbation.  PT and OT and social work will be consulted as well.     Assessment and Plan:   \  1.  Acute COPD exacerbation: Certainly she could have some dyspnea related to accelerated hypertension from missing medications as well.     1. continue to treat with nebulizers and IV steroids and work on better blood pressure control as noted below.  2. Treat for COPD exacerbation with IV Solu-Medrol 40 mg twice daily as well as scheduled and as needed duo nebs and supplemental oxygen     2.   Accelerated hypertension: It seems as though she has not been regularly taking her medications recently which I think is at least in part to blame for her severe hypertension.  There is no history of focal neurologic deficits to suggest stroke.  Given #3 below I would plan on very aggressive blood pressure control to minimize any risk of dissection.     -- resume her home regimen including amlodipine, metoprolol and Cozaar and also have IV hydralazine and labetalol available for blood pressures greater than 160 systolic.    --As noted above she does not have any chest or abdominal pain to suggest an aortic dissection at this point and CT imaging shows that her aorta is quite stable compared with 2+ weeks ago.  --Increase Toprol to 50 mg daily[PK1.1] and Norvasc to 10 mg daily[PK1.2]     3.   History of very large ascending aortic and thoracic aortic aneurysms: her ascending aortic aneurysm was 7.8 cm and abdominal aneurysm was 7.4 cm in diameter as far back as 2013 based upon a CT scan of that time.  CT imaging shows that her aneurysms are stable compared with the prior study from about 2 weeks ago but at that time her aneurysms had enlarged slightly compared with the prior study from 2013.  In any case I would focus on good blood pressure control to minimize any risk of dissection. The ER provider, Dr. Cardona actually did call her vascular surgeon Dr. Morris to review the case.  It was reported to me that she is not considered a surgical candidate due to very high risk and the current plan is for monitoring.     4.  Hypoglycemia: Her blood sugar was as low as 61 in the emergency room.  We will have her on dextrose containing fluids and hypoglycemia protocol will be ordered.  resolved     5.  Confusion:  there is been a steady long-term decline in cognitive function but seem to be somewhat worse the past couple of days.  Fortunately, her daughter notes that the patient is already returning to what seems to be her baseline after some IV hydration and blood pressure control in the emergency room.  There is no focal neurologic deficits to suggest a CVA or vascular event.  --continue management as above.       7.  Bacterial conjunctivitis: Her right eye has obvious purulent drainage with conjunctivitis.  Will treat with erythromycin 4 times daily for 5 days total.   Monitor.     DVT Prophylaxis: Pneumatic Compression Devices  Code Status: Full Code.     Disposition Plan   Expected discharge in 2-3 days to transitional care unit once breathing better and stabilized.     Entered: Lionel Hayden 07/03/2018, 12:44 PM         Lionel Hayden MD  Text Page    Interval History   Patient states her breathing is better.  No wheezing or chest pain.  No fever or chills.  Eye feels better.   Is confused.       -Data reviewed today: I reviewed all new labs and imaging results over the last 24 hours. I personally reviewed no images or EKG's today.    Physical Exam   Temp: 95.5  F (35.3  C) Temp src: Axillary BP: 148/53   Heart Rate: 97 Resp: 16 SpO2: 96 % O2 Device: None (Room air) Oxygen Delivery: 2 LPM  There were no vitals filed for this visit.  Vital Signs with Ranges  Temp:  [95.3  F (35.2  C)-97.7  F (36.5  C)] 95.5  F (35.3  C)  Heart Rate:  [] 97  Resp:  [16] 16  BP: (128-220)/() 148/53  SpO2:  [92 %-100 %] 96 %       Constitutional: Awake, alert, cooperative, no apparent distress, sitting in chair   Respiratory: Clear to auscultation bilaterally with moderate decrease in breath sounds, no crackles or wheezing   Cardiovascular: Regular rate and rhythm, normal S1 and S2, and no murmur noted   Abdomen: Normal bowel sounds, soft, non-distended, non-tender, no hepatosplenomegaly    Skin: No rashes, no cyanosis, dry to touch   Neuro: Alert and oriented x3,    Extremities: No edema, normal range of motion   Other(s): Eye with surrounding redness and irritation of eyelid on right        All other systems: Negative       Medications     dextrose 5% and 0.9% NaCl 75 mL/hr at 07/03/18 0211       amLODIPine  5 mg Oral Daily     erythromycin   Right Eye 4x Daily     hydrALAZINE  25 mg Oral TID     insulin aspart  1-3 Units Subcutaneous TID AC     insulin aspart  1-3 Units Subcutaneous At Bedtime     ipratropium - albuterol 0.5 mg/2.5 mg/3 mL  3 mL Nebulization Q4H While awake      levothyroxine  112 mcg Oral Daily     losartan  50 mg Oral Daily     methylPREDNISolone  40 mg Intravenous BID     metoprolol succinate  25 mg Oral Daily     oxybutynin  10 mg Oral Daily     potassium chloride  10 mEq Oral TID     sodium chloride (PF)  3 mL Intracatheter Q8H     torsemide  10 mg Oral Daily       Data     Recent Labs  Lab 07/03/18  0620 07/02/18  1729 07/02/18  1725   WBC  --   --  6.9   HGB  --   --  12.9   MCV  --   --  92   PLT  --   --  230   INR  --   --  1.04     --  144   POTASSIUM 3.7  --  3.2*   CHLORIDE 109  --  107   CO2 21  --  25   BUN 8  --  12   CR 0.63  --  0.81   ANIONGAP 11  --  12   KRISTEL 9.0  --  9.9   *  --  73   ALBUMIN  --   --  4.2   PROTTOTAL  --   --  7.8   BILITOTAL  --   --  1.4*   ALKPHOS  --   --  96   ALT  --   --  22   AST  --   --  12   TROPONIN  --  0.01  --        Imaging:  Recent Results (from the past 24 hour(s))   CT Head w/o Contrast    Narrative    CT OF THE HEAD WITHOUT CONTRAST 7/2/2018 6:48 PM     COMPARISON: Head CT 12/22/2012    HISTORY: Altered mental status, generalized weakness, evaluate for  cerebrovascular accident.      TECHNIQUE: 5 mm thick axial CT images of the head were acquired  without IV contrast material.    FINDINGS: There is moderate diffuse cerebral volume loss. There are  extensive confluent areas of decreased density in the cerebral white  matter bilaterally that are consistent with sequela of chronic small  vessel ischemic disease.    The ventricles and basal cisterns are within normal limits in  configuration given the degree of cerebral volume loss. There is no  midline shift. There are no extra-axial fluid collections.    No intracranial hemorrhage, mass or recent infarct.    The visualized paranasal sinuses are well-aerated. There is no  mastoiditis. There are no fractures of the visualized bones.      Impression    IMPRESSION: Diffuse cerebral volume loss and cerebral white matter  changes consistent with chronic small  vessel ischemic disease. No  evidence for acute intracranial pathology.      Radiation dose for this scan was reduced using automated exposure  control, adjustment of the mA and/or kV according to patient size, or  iterative reconstruction technique    NEGRITA RUEDA MD   CT Aortic Survey w Contrast    Narrative    CT AORTIC SURVEY WITH CONTRAST  7/2/2018  6:51 PM     HISTORY: Confusion, headache with aortic aneurysm.    CONTRAST DOSE:  75mL [id number]    Radiation dose for this scan was reduced using automated exposure  control, adjustment of the mA and/or kV according to patient size, or  iterative reconstruction technique.    FINDINGS:  There is an ascending aortic aneurysm measuring up to 7.8  cm in diameter, similar if not slightly increased since 6/13/2018.  There is no evidence of aortic dissection. Tortuosity of the bilateral  common carotid and right subclavian arteries are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 Comment Pt states the bed/bathroom are located upstairs. Attempted to call pt's daughter regarding PLOF however unable to get a hold of her.   Self-Care   Dominant Hand right   Usual Activity Tolerance good   Current Activity Tolerance moderate   Regular Exercise yes " "  Activity/Exercise Type walking   Exercise Amount/Frequency daily   Equipment Currently Used at Home cane, straight;walker, rolling   Activity/Exercise/Self-Care Comment Pt states she uses a 2ww within the home.    Functional Level Prior   Ambulation 1-->assistive equipment   Transferring 1-->assistive equipment   Toileting 0-->independent   Bathing 0-->independent   Dressing 0-->independent   Eating 0-->independent   Communication 0-->understands/communicates without difficulty   Swallowing 0-->swallows foods/liquids without difficulty   Cognition 1 - attention or memory deficits   Fall history within last six months no   Which of the above functional risks had a recent onset or change? toileting;bathing;dressing;cognition   Prior Functional Level Comment Pt states she was IND in all ADLs PTA   General Information   Onset of Illness/Injury or Date of Surgery - Date 07/02/18   Referring Physician Pascual Guzman MD   Patient/Family Goals Statement to go and live with her daughter because its a \"comfortable safe place\"   Additional Occupational Profile Info/Pertinent History of Current Problem Pt is a 83 year old female with PMH including COPD, hypertension, 8.1 cm aortic aneurysm who presents with confusion and dyspnea.    Precautions/Limitations fall precautions;oxygen therapy device and L/min  (3Lpm via oxymask)   General Info Comments Alert, pleasant; somewhat disheveled appearance   Cognitive Status Examination   Orientation person   Level of Consciousness alert   Able to Follow Commands moderate impairment;severe impairment   Personal Safety (Cognitive) decreased awareness, need for assist;decreased awareness, need for safety   Memory impaired   Attention Sustained attention impaired   Organization/Problem Solving Problem solving impaired   Executive Function Working memory impaired, decreased storage of information for performing tasks   Cognitive Comment Unable to state she is in the hospital; States " January 17, 2018 is the date. Pt very confused during session and with inconsistent ability to follow simple commands   Visual Perception   Visual Perception Wears glasses   Sensory Examination   Sensory Comments denies n/t    Pain Assessment   Patient Currently in Pain No   Range of Motion (ROM)   ROM Comment B UE WFL   Hand Strength   Hand Strength Comments good bilaterally   Mobility   Bed Mobility Comments not observed   Transfer Skills   Transfer Comments not observed however per PT note, pt needing Min A   Bathing   Level of [place] moderate assist (50% patients effort)   Lower Body Dressing   Level of [place]: Dress Lower Body minimum assist (75% patients effort)   Toileting   Level of [place]: Toilet moderate assist (50% patients effort)   Instrumental Activities of Daily Living (IADL)   Previous Responsibilities meal prep;housekeeping;driving;medication management   IADL Comments Pt states her spouse does the shopping and yardwork. Pt states she manages her own medications and drives.    Activities of Daily Living Analysis   Impairments Contributing to Impaired Activities of Daily Living cognition impaired;strength decreased   General Therapy Interventions   Planned Therapy Interventions cognition;progressive activity/exercise   Clinical Impression   Criteria for Skilled Therapeutic Interventions Met yes, treatment indicated   OT Diagnosis Decreased ADL/IADLs   Influenced by the following impairments cognition impaired;strength decreased   Assessment of Occupational Performance 5 or more Performance Deficits   Identified Performance Deficits ADL/IADLs: dressing, bathing, toileting, driving, meal prep   Clinical Decision Making (Complexity) Moderate complexity   Therapy Frequency 5 times/wk   Predicted Duration of Therapy Intervention (days/wks) 1 week   Anticipated Discharge Disposition Transitional Care Facility   Risks and Benefits of Treatment have been explained. Yes   Patient, Family &  "other staff in agreement with plan of care Yes   Clinical Impression Comments Agreeable to session   [facility] AM-PAC TM \"6 Clicks\"   2016, Trustees of [facility], under license to [employer].  All rights reserved.   6 Clicks Short Forms Daily Activity Inpatient Short Form   [facility] AM-PAC  \"6 Clicks\" Daily Activity Inpatient Short Form   1. Putting on and taking off regular lower body clothing? 3 - A Little   2. Bathing (including washing, rinsing, drying)? 2 - A Lot   3. Toileting, which includes using toilet, bedpan or urinal? 3 - A Little   4. Putting on and taking off regular upper body clothing? 3 - A Little   5. Taking care of personal grooming such as brushing teeth? 3 - A Little   6. Eating meals? 4 - None   Daily Activity Raw Score (Score out of 24.Lower scores equate to lower levels of function) 18   Total Evaluation Time   Total Evaluation Time (Minutes) 10[TR1.1]        Revision History        User Key Date/Time User Provider Type Action    > TR1.1 7/3/2018 12:28 PM Tereza Castellanos, OT Occupational Therapist Sign            "

## 2018-07-02 NOTE — IP AVS SNAPSHOT
MRN:[id number]                      After Visit Summary   7/2/2018    Giselle Chino    MRN: [id number]           Thank you!     Thank you for choosing [facility] for your care. Our goal is always to provide you with excellent care. Hearing back from our patients is one way we can continue to improve our services. Please take a few minutes to complete the written survey that you may receive in the mail after you visit. If you would like to speak to someone directly about your visit please contact Patient Relations at [phone]. Thank you!          Patient Information     Date Of Birth          1935        Designated Caregiver       Most Recent Value    Caregiver    Will someone help with your care after discharge? yes    Name of designated caregiver Flavio Chino    Phone number of caregiver [phone]    Caregiver address [place]      About your hospital stay     You were admitted on:  July 2, 2018 You last received care in the:  [place] [address] [facility]    You were discharged on:  July 6, 2018        Reason for your hospital stay       COPD exacerbation                  Who to Call     For medical emergencies, please call 911.  For non-urgent questions about your medical care, please call your primary care provider or clinic, [phone]          Attending Provider     Provider Specialty    Adalberto Cardona MD Emergency Medicine    [facility]       Primary Care Provider Office Phone # Fax #    Shoaib RIGGS DO Lina [phone]      After Care Instructions     Activity - Up with nursing assistance           Advance Diet as Tolerated       Follow this diet upon discharge: Orders Placed This Encounter      Room Service      Combination Diet Regular Diet Adult            Fall precautions           General info for SNF       Length of Stay Estimate: Short Term Care: Estimated # of Days <30  Condition  at Discharge: Improving  Level of care:skilled   Rehabilitation Potential: Good  Admission H&P remains valid and up-to-date: Yes  Recent Chemotherapy: N/A  Use Nursing Home Standing Orders: Yes            Mantoux instructions       Give two-step Mantoux (PPD) Per Facility Policy Yes                  Follow-up Appointments     Follow Up and recommended labs and tests       Follow-up with nursing home physician in 3 days                  Additional Services     Occupational Therapy Adult Consult       Evaluate and treat as clinically indicated.    Reason:  Deconditioned weak            Physical Therapy Adult Consult       Evaluate and treat as clinically indicated.    Reason:  *Deconditioned weak                  Pending Results     Date and Time Order Name Status Description    7/2/2018 1744 Blood culture Preliminary     7/2/2018 1708 Blood culture Preliminary             Statement of Approval     Ordered          07/06/18 1010  I have reviewed and agree with all the recommendations and orders detailed in this document.  EFFECTIVE NOW     Approved and electronically signed by:  Valeriano Beck MD             Admission Information     Date & Time Provider Department Dept. Phone    7/2/2018 Pascual Guzman MD [place] [id number] [facility] [phone]      Your Vitals Were     Blood Pressure Temperature Respirations Weight Pulse Oximetry BMI (Body Mass Index)    0/0 95.3  F (35.2  C) (Oral) 16 68.7 kg (151 lb 9 oz) 94% 26.02 kg/m2      Care EveryWhere ID     This is your Care EveryWhere ID. This could be used by other organizations to access your [facility] medical records  [id number]        Equal Access to Services     DHAVAL SALMON AH: Hadii maris Nolan, julianna delgadillo, qaliza carvalho. [facility] [phone].    ATENCIÓN: Si habla español, tiene a sneed disposición servicios gratuitos de asistencia lingüística. Llame al  [phone].    We comply with applicable federal civil rights laws and [place] laws. We do not discriminate on the basis of race, color, national origin, age, disability, sex, sexual orientation, or gender identity.               Review of your medicines      START taking        Dose / Directions    * Ipratropium-Albuterol  MCG/ACT inhaler   Commonly known as:  COMBIVENT RESPIMAT   Used for:  COPD exacerbation (H)        Dose:  1 puff   Inhale 1 puff into the lungs 4 times daily Not to exceed 6 doses per day.   Quantity:  1 Inhaler   Refills:  0       * ipratropium - albuterol 0.5 mg/2.5 mg/3 mL 0.5-2.5 (3) MG/3ML neb solution   Commonly known as:  DUONEB   Used for:  COPD exacerbation (H)        Dose:  1 vial   Take 1 vial (3 mLs) by nebulization every 6 hours as needed for shortness of breath / dyspnea or wheezing   Refills:  0       predniSONE 20 MG tablet   Commonly known as:  DELTASONE   Used for:  COPD exacerbation (H)        Dose:  20 mg   Start taking on:  7/7/2018   Take 1 tablet (20 mg) by mouth daily   Quantity:  3 tablet   Refills:  0       * Notice:  This list has 2 medication(s) that are the same as other medications prescribed for you. Read the directions carefully, and ask your doctor or other care provider to review them with you.      CONTINUE these medicines which may have CHANGED, or have new prescriptions. If we are uncertain of the size of tablets/capsules you have at home, strength may be listed as something that might have changed.        Dose / Directions    erythromycin ophthalmic ointment   Commonly known as:  ROMYCIN   This may have changed:  when to take this   Used for:  Acute bacterial conjunctivitis of both eyes        Dose:  0.5 inch   Place 0.5 inches into both eyes 2 times daily for 3 days   Refills:  0         CONTINUE these medicines which have NOT CHANGED        Dose / Directions    amLODIPine 5 MG tablet   Commonly known as:  NORVASC   Used for:  HTN (hypertension)         Dose:  5 mg   Take 1 tablet (5 mg) by mouth daily   Quantity:  90 tablet   Refills:  3       atorvastatin 20 MG tablet   Commonly known as:  LIPITOR   Used for:  Hyperlipidemia LDL goal <100        Dose:  20 mg   Take 1 tablet (20 mg) by mouth daily   Quantity:  90 tablet   Refills:  3       hydrALAZINE 25 MG tablet   Commonly known as:  APRESOLINE   Used for:  Hypertensive urgency        Dose:  25 mg   Take 1 tablet (25 mg) by mouth 3 times daily   Refills:  0       levothyroxine 112 MCG tablet   Commonly known as:  SYNTHROID/LEVOTHROID        Dose:  112 mcg   Take 112 mcg by mouth daily   Refills:  0       losartan 100 MG tablet   Commonly known as:  COZAAR        Dose:  100 mg   Take 100 mg by mouth daily   Quantity:  30 tablet   Refills:  1       metoprolol succinate 25 MG 24 hr tablet   Commonly known as:  TOPROL-XL   Used for:  Thoracic aortic aneurysm without rupture (H)        Dose:  25 mg   Take 1 tablet (25 mg) by mouth daily   Quantity:  90 tablet   Refills:  3       oxybutynin 10 MG 24 hr tablet   Commonly known as:  DITROPAN-XL        Dose:  10 mg   Take 10 mg by mouth daily   Refills:  0       potassium chloride SA 10 MEQ CR tablet   Commonly known as:  K-DUR/KLOR-CON M   Used for:  Benign essential hypertension        Dose:  10 mEq   Take 1 tablet (10 mEq) by mouth 3 times daily   Quantity:  270 tablet   Refills:  3       torsemide 10 MG tablet   Commonly known as:  DEMADEX   Used for:  HTN (hypertension)        Dose:  10 mg   Take 1 tablet (10 mg) by mouth daily   Quantity:  90 tablet   Refills:  3            Where to get your medicines      Some of these will need a paper prescription and others can be bought over the counter. Ask your nurse if you have questions.     You don't need a prescription for these medications     erythromycin ophthalmic ointment    hydrALAZINE 25 MG tablet    ipratropium - albuterol 0.5 mg/2.5 mg/3 mL 0.5-2.5 (3) MG/3ML neb solution    Ipratropium-Albuterol   MCG/ACT inhaler    predniSONE 20 MG tablet                Protect others around you: Learn how to safely use, store and throw away your medicines at www.disposemymeds.org.             Medication List: This is a list of all your medications and when to take them. Check marks below indicate your daily home schedule. Keep this list as a reference.      Medications           Morning Afternoon Evening Bedtime As Needed    amLODIPine 5 MG tablet   Commonly known as:  NORVASC   Take 1 tablet (5 mg) by mouth daily   Last time this was given:  10 mg on 7/5/2018  9:36 PM                                atorvastatin 20 MG tablet   Commonly known as:  LIPITOR   Take 1 tablet (20 mg) by mouth daily                                erythromycin ophthalmic ointment   Commonly known as:  ROMYCIN   Place 0.5 inches into both eyes 2 times daily for 3 days   Last time this was given:  1 g on 7/6/2018  8:45 AM                                hydrALAZINE 25 MG tablet   Commonly known as:  APRESOLINE   Take 1 tablet (25 mg) by mouth 3 times daily   Last time this was given:  25 mg on 7/6/2018  8:56 AM                                * Ipratropium-Albuterol  MCG/ACT inhaler   Commonly known as:  COMBIVENT RESPIMAT   Inhale 1 puff into the lungs 4 times daily Not to exceed 6 doses per day.                                * ipratropium - albuterol 0.5 mg/2.5 mg/3 mL 0.5-2.5 (3) MG/3ML neb solution   Commonly known as:  DUONEB   Take 1 vial (3 mLs) by nebulization every 6 hours as needed for shortness of breath / dyspnea or wheezing   Last time this was given:  3 mLs on 7/6/2018  7:20 AM                                levothyroxine 112 MCG tablet   Commonly known as:  SYNTHROID/LEVOTHROID   Take 112 mcg by mouth daily   Last time this was given:  112 mcg on 7/6/2018  8:56 AM                                losartan 100 MG tablet   Commonly known as:  COZAAR   Take 100 mg by mouth daily   Last time this was given:  50 mg on 7/6/2018  8:56 AM                                 metoprolol succinate 25 MG 24 hr tablet   Commonly known as:  TOPROL-XL   Take 1 tablet (25 mg) by mouth daily   Last time this was given:  50 mg on 7/6/2018  8:57 AM                                oxybutynin 10 MG 24 hr tablet   Commonly known as:  DITROPAN-XL   Take 10 mg by mouth daily   Last time this was given:  10 mg on 7/6/2018  8:56 AM                                potassium chloride SA 10 MEQ CR tablet   Commonly known as:  K-DUR/KLOR-CON M   Take 1 tablet (10 mEq) by mouth 3 times daily   Last time this was given:  20 mEq on 7/4/2018  8:16 AM                                predniSONE 20 MG tablet   Commonly known as:  DELTASONE   Take 1 tablet (20 mg) by mouth daily   Start taking on:  7/7/2018   Last time this was given:  40 mg on 7/6/2018  8:56 AM                                torsemide 10 MG tablet   Commonly known as:  DEMADEX   Take 1 tablet (10 mg) by mouth daily   Last time this was given:  10 mg on 7/6/2018  8:56 AM                                * Notice:  This list has 2 medication(s) that are the same as other medications prescribed for you. Read the directions carefully, and ask your doctor or other care provider to review them with you.              More Information        Understanding Acute Respiratory Distress Syndrome (ARDS)   Acute respiratory distress syndrome (ARDS) is a severe lung condition. It most often happens within days after a serious illness or injury. Most people with ARDS are already in hospital care. ARDS causes lungs to become inflamed and the small air sacs in the lungs (alveoli) to fill with fluid. The lungs then can t work well enough to bring oxygen into the body. ARDS can be hard to treat, and can lead to severe health problems and death.   What causes ARDS?   Experts don t yet understand why ARDS occurs in some people and not in others. It can happen after an illness or injury, such as:    Severe body inflammation (sepsis)    Breathing  stomach contents into the lungs (aspiration)    Breathing water into the lungs in a near-drowning    Lung infection such as pneumonia    Chest injury that bruises the lungs    Breathing in smoke or other fumes    Severe burns or bleeding    Severe reaction to medicine such as chemotherapy    Inflammation of the pancreas (pancreatitis)    Other severe injury   Certain things can make you more at risk for ARDS. These include:    Recent high-risk surgery, such as heart or abdominal surgery    Having a large blood transfusion    Being a smoker    History of alcohol abuse    Obesity    Symptoms of ARDS   Symptoms usually start 48 to 72 hours after the original illness or injury. They get worse quickly. They can include:    Shortness of breath    Fast breathing    Coughing    Fever    Fast heart rate    Chest pain when inhaling    Blue tint to nails and lips   Diagnosing ARDS   Your healthcare provider will ask about your health history and give you a physical exam. He or she will listen to your lungs with a stethoscope as you breathe. A crackling sound may mean you have fluid in the lungs. You may have tests to check for signs of ARDS or other conditions that can cause fluid in the lungs. Tests may include:    Blood tests. These check your blood oxygen level and look for signs of infection.    Chest X-ray. This test can show fluid in the lungs.    Echocardiogram. This imaging test looks at the heart as it beats. It s done to check for signs of heart failure.    Sputum culture. This test is done on mucus from your lungs. It checks for signs of lung infection, such as bacteria.   Treatment for ARDS   The most common treatment for ARDS is mechanical ventilation. This means having a breathing machine send oxygen-rich air into your lungs. A tube is put through your mouth and throat, and down into your lungs. The tube is connected to a machine called a ventilator that gives you air. It can be adjusted to give you as much air  as needed. You may need to be on a ventilator for a week or more.   You'll also likely be given medicine to keep you relaxed (sedated) and relieve pain while the tube is in your throat. This is because the tube is uncomfortable, and you need to not move too much while it s in place. In some cases, a tube may be put through a small cut (incision) in the front of your throat. This is called a tracheostomy. This can be more comfortable and allow for less sedation while you re on the breathing machine.   This is usually done after the breathing tube has been in your throat for several days.   You'll also be given other types of treatment that may include:    Liquid nutrition through a tube that leads to your stomach    Liquid nutrition through a tube put in a vein in your chest or arm    Antibiotics to treat an infection    Diuretic medicine to help remove extra fluid from your body    Medicine to prevent blood clots or stomach bleeding  Because ARDS can be a life-threatening condition, you may need other tests and to make decisions about your care in a short period of time.  Your healthcare team will keep you and your family informed of your condition. They will let you and your family know the treatments being used or considered. They can answer your questions and concerns.   When you start to recover, you will be weaned off the ventilator. This means less air will be used and your lungs will do more work. Weaning is done carefully over days. The breathing tube is removed when your lungs are working well enough.  Possible complications of ARDS   ARDS can cause scarring of the lungs (fibrosis). It can cause organ failure from lack of oxygen to the organs. It can also cause death.   Life after ARDS   Recovery from ARDS can take time. After ARDS, you may have problems such as less lung function. You may feel weak and get tired more easily. You may need to use oxygen at home. You may also need other home-based services  such as physical and occupational therapy.  You may have depression and anxiety, or problems with thinking and remembering. ARDS can cause emotional stress for both the patient and family. Talk with the healthcare team about treatment for depression and anxiety. Also ask about counseling and ARDS support groups for you and your family. You should see a healthcare provider who has experience with ARDS for follow-up care.      Call 911   Call 911 right away if you have these symptoms after an illness or injury:    Shortness of breath    Fast breathing    Coughing    Fever of 100.4 F (38 C) or higher, or as directed by your healthcare provider    Fast heart rate    Chest pain when inhaling    Blue tint to nails and lips   Date Last Reviewed: 9/1/2017 2000-2017 [employer]. [address] [place] [address], [place] [id number]. All rights reserved. This information is not intended as a substitute for professional medical care. Always follow your healthcare professional's instructions.                Abdominal Aortic Aneurysm (Stable)    The aorta is the body s main artery that carries oxygen-rich blood from the heart. It travels from the heart down to the lower abdomen, where it divides into smaller blood vessels. An aneurysm is a bulge or dilatation in the wall of an artery, in this example, the aorta. This happens because there is a weakened spot in the artery wall causing that area to begin to deteriorate. This allows the artery to bulge or balloon out creating the aneurysm. It may remain stable and cause no problems, or it can expand and lengthen. If this happens, it can affect the blood flow to different organs. It may also leak or rupture (break open) and cause internal bleeding and even death.    A number of things can cause aortic aneurysms including:    Atherosclerosis    Hypertension    Injury    Infection    Marfan syndrome. This is an inherited condition that most commonly affects the heart, eyes, blood  vessels, and skeleton.  Risk factors that have been associated with aortic aneurysms include:    Atherosclerosis    Hypertension    Older age    Family history    Elevated cholesterol    Obesity    Tobacco use    Men more than women  Most aortic aneurysms do not cause any symptoms until they begin to expand rapidly or rupture. Therefore, most aneurysms are discovered on exams or tests done for other reasons (like an X-ray, ultrasound or CT scan). When there are symptoms, they may be vague, or they can include:    Deep, steady pain in the abdomen and back    Pulsating feeling in your abdomen    Weakness    Dizziness, fainting    Low blood pressure  Once there are symptoms, it is important that it be taken care of. An expanding aneurysm causes symptoms of abdomen, back, flank or groin pain, which may come and go at first, or become constant. When an aneurysm ruptures, there can be sudden abdominal, back or groin pain, followed by weakness, dizziness and loss of consciousness as blood pressure drops and a shock state occurs. This is a fatal condition unless immediate surgery is performed.  Small aneurysms rarely rupture and can often be treated with medicines to lower blood pressure and reduce stress on the aortic wall. Routine ultrasound or CT scans can determine if the aneurysm is growing. Larger or expanding aneurysms will require surgery. Surgical treatment involves removing the section of aorta where the aneurysm is and replacing it with an aortic graft (artificial blood vessel). A newer alternative to surgery, which can be used in certain cases, involves the placement of a stent (tubular wire mesh) inside the aorta to support the wall and reduce stress on the aneurysm. Rarely, a blood clot can form inside of an aortic aneurysm with no symptoms. A piece of the clot can break off and pass to smaller blood vessels in the intestines or legs and cause pain and loss of blood flow to that part.  If a small aneurysm has  been identified, which does not require surgery, you should still change any lifestyle factors that may improve your overall cardiovascular health. This includes such things as following a healthy diet, losing weight, stopping smoking, and lowering your cholesterol.  Home care    Your aneurysm is small and does not need surgery. You will be followed along as an outpatient with routine ultrasound screening exams to measure the size of the aneurysm every 6 months.    You may return to your usual level of activity.    Follow these guidelines to improve your cardiac health:  ? If you are overweight, begin a weight loss program.  ? If you have hypertension, reduce your salt intake. Don't eat high-salt foods, and don t add salt when cooking.  ? Begin an exercise program. Discuss with your doctor what type of exercise program would be best for you. It doesn't have to be difficult. Even brisk walking for 20 minutes three times a week is a good form of exercise.  ? Don't take medicines containing stimulants. This includes many cold and sinus decongestant pills and sprays as well as diet pills. Check the warnings about hypertension on the label. Stimulants such as amphetamine or cocaine could be lethal for someone with hypertension. Never take these.  ? Limit your caffeine intake or switch to caffeine-free products.  ? Stop smoking. No matter how long you've smoked, quitting can be hard. Enroll in a stop-smoking program to improve your chance of success.    Learning how to handle stress better is an important part of any program to lower blood pressure. Learn about relaxation methods such as meditation, yoga, or biofeedback.    If medicines were prescribed for hypertension, take them exactly as directed. Missing doses may cause your blood pressure get out of control.    Consider buying an automatic blood pressure machine (available at most pharmacies). Use this to monitor your blood pressure at home and report the results to  your doctor.  Follow up care  Regular visits to your healthcare provider for blood pressure checks and periodic ultrasounds of the aorta are an important part of your care. Make a follow-up appointment with your doctor, or as advised.  When to seek medical advice   Call your healthcare provider right away if any of the following occur:    Sudden severe abdominal, back, flank or groin pain    Blood in your stools    Weakness or dizziness    Weakness, numbness, pain or coolness of one leg  Call 911  The symptoms of a heart attack or stroke can be life threatening. If you see or have any of the following symptoms, call 911 right away:     Trouble breathing    Confused or difficulty arousing    Fainting or loss of consciousness    Rapid heart rate    New chest, arm, shoulder, neck or upper back pain    Difficulty with speech or vision, weakness of an arm or leg    Difficulty walking or talking, loss of balance, numbness or weakness in one side of you body, facial droop  Date Last Reviewed: 9/25/2015 2000-2017 [employer]. [address] [place] [address]. All rights reserved. This information is not intended as a substitute for professional medical care. Always follow your healthcare professional's instructions.                Treatment for COPD    Your healthcare provider will prescribe the best treatments for your COPD.  Treatment  Recommendations include the following:    Medicines. Some medicines help relieve symptoms when you have them. Others are taken daily to control inflammation in the lungs. Always take your medicines as prescribed. Learn the names of your medicines, as well as how and when to use them.    Oxygen therapy. Oxygen may be prescribed if tests show that your blood contains too little oxygen.    Smoking. If you smoke, quit. Smoking is the main cause of COPD. Quitting will help you be able to better manage your COPD. Ask your healthcare provider about ways to help you quit  smoking.    Avoiding infections. Infections, like a cold or the flu, can cause your symptoms to worsen. Try to stay away from people who are sick. Wash your hands often. And, ask your healthcare provider about vaccines for the flu and pneumonia.  Coping with shortness of breath  Coping tips include the following:    Exercise. Try to be as active as possible. This will improve energy levels and strengthen your muscles, so you can do more.    Breathing techniques. Ask your healthcare provider or nurse to show you how to do pursed-lip breathing.    Balance rest and activity. Each day, try to balance rest periods with activity. For example, you might start the day with getting dressed and eating breakfast, then relax and read the paper. After that, take a brief walk. And then sit with your feet up for a while.    Pulmonary rehabilitation. Ask your provider, or call your local hospital to find out about pulmonary rehab programs. The programs help with managing your disease, breathing techniques, exercise, support and counseling.    Healthy eating. Eating a healthy, balanced diet and making an effort to maintain your ideal weight are important to staying as healthy as possible. Make sure you have a lot of fruit and vegetables every day, as well as balanced portions of whole grains, lean meats and fish, and low-fat dairy products.  Date Last Reviewed: 5/1/2016 2000-2017 [employer]. [address] [place] [address], [place] [address]. All rights reserved. This information is not intended as a substitute for professional medical care. Always follow your healthcare professional's instructions.

## 2018-07-02 NOTE — IP AVS SNAPSHOT
"    [place] [address] [facility]: [phone]                                              INTERAGENCY TRANSFER FORM - PHYSICIAN ORDERS   2018                    Hospital Admission Date: 2018  [place] NYE   : 1935  Sex: Female        Attending Provider: Pascual Guzman MD     Allergies:  Amoxicillin, Piperacillin-tazobactam In D5w    Infection:  None   Service:  GENERAL MEDI    Ht:  1.626 m (5' 4\")   Wt:  68.7 kg (151 lb 9 oz)   Admission Wt:  67.3 kg (148 lb 4.8 oz)    BMI:  26.02 kg/m 2   BSA:  1.76 m 2            Patient PCP Information     Provider PCP Type    Shoaib Mills DO [facility]      ED Clinical Impression     Diagnosis Description Comment Added By Time Added    Confusion [R41.0] Confusion [R41.0]  Adalberto Cardona MD 2018  9:36 PM    Shortness of breath [R06.02] Shortness of breath [R06.02]  Adalberto Cardona MD 2018  9:36 PM    COPD exacerbation (H) [J44.1] COPD exacerbation (H) [J44.1]  Adalberto Cardona MD 2018  9:37 PM    Ascending aortic aneurysm (H) [I71.2] Ascending aortic aneurysm (H) [I71.2]  Adalberto Cardona MD 2018  9:37 PM    Hypertensive urgency [I16.0] Hypertensive urgency [I16.0]  Adalberto Cardona MD 2018  9:37 PM      Hospital Problems as of 2018              Priority Class Noted POA    Hypoxemic respiratory failure, chronic (H) Medium  7/3/2018 Yes      Non-Hospital Problems as of 2018              Priority Class Noted    Benign essential hypertension Medium  6/3/2014    Aneurysm of thoracic aorta (H) Medium  2014    Aortic valve disorder Medium  2014    Hyperlipidemia Medium  2014    Orthostatic hypotension Medium  2014    Thyroid disease Medium  2014    NONSPECIFIC MEDICAL HISTORY Medium  2014    COPD (chronic obstructive pulmonary disease) (H) Medium  2014    Adrenal mass, right (H) Medium  2014      Code Status History     Date Active " Date Inactive Code Status Order ID Comments User Context    7/6/2018 10:10 AM  Full Code [id number]  Valeriano Beck MD Outpatient    7/3/2018 12:34 AM 7/6/2018 10:10 AM Full Code [id number]  Pascual Guzman MD Inpatient    11/14/2013 12:40 AM 11/18/2013  6:30 PM Full Code [id number]  Marimar Crain RN Inpatient    11/14/2013 12:40 AM 11/14/2013 12:40 AM Full Code [id number]  Marimar Crain RN Inpatient    11/14/2013 12:32 AM 11/14/2013 12:40 AM Full Code [id number]  Katia Lagos PA Inpatient         Medication Review      START taking        Dose / Directions Comments    * Ipratropium-Albuterol  MCG/ACT inhaler   Commonly known as:  COMBIVENT RESPIMAT   Used for:  COPD exacerbation (H)        Dose:  1 puff   Inhale 1 puff into the lungs 4 times daily Not to exceed 6 doses per day.   Quantity:  1 Inhaler   Refills:  0        * ipratropium - albuterol 0.5 mg/2.5 mg/3 mL 0.5-2.5 (3) MG/3ML neb solution   Commonly known as:  DUONEB   Used for:  COPD exacerbation (H)        Dose:  1 vial   Take 1 vial (3 mLs) by nebulization every 6 hours as needed for shortness of breath / dyspnea or wheezing   Refills:  0        predniSONE 20 MG tablet   Commonly known as:  DELTASONE   Used for:  COPD exacerbation (H)        Dose:  20 mg   Start taking on:  7/7/2018   Take 1 tablet (20 mg) by mouth daily   Quantity:  3 tablet   Refills:  0        * Notice:  This list has 2 medication(s) that are the same as other medications prescribed for you. Read the directions carefully, and ask your doctor or other care provider to review them with you.      CONTINUE these medications which may have CHANGED, or have new prescriptions. If we are uncertain of the size of tablets/capsules you have at home, strength may be listed as something that might have changed.        Dose / Directions Comments    erythromycin ophthalmic ointment   Commonly known as:  ROMYCIN   This may have changed:  when to take this   Used for:   Acute bacterial conjunctivitis of both eyes        Dose:  0.5 inch   Place 0.5 inches into both eyes 2 times daily for 3 days   Refills:  0          CONTINUE these medications which have NOT CHANGED        Dose / Directions Comments    amLODIPine 5 MG tablet   Commonly known as:  NORVASC   Used for:  HTN (hypertension)        Dose:  5 mg   Take 1 tablet (5 mg) by mouth daily   Quantity:  90 tablet   Refills:  3        atorvastatin 20 MG tablet   Commonly known as:  LIPITOR   Used for:  Hyperlipidemia LDL goal <100        Dose:  20 mg   Take 1 tablet (20 mg) by mouth daily   Quantity:  90 tablet   Refills:  3        hydrALAZINE 25 MG tablet   Commonly known as:  APRESOLINE   Used for:  Hypertensive urgency        Dose:  25 mg   Take 1 tablet (25 mg) by mouth 3 times daily   Refills:  0        levothyroxine 112 MCG tablet   Commonly known as:  SYNTHROID/LEVOTHROID        Dose:  112 mcg   Take 112 mcg by mouth daily   Refills:  0        losartan 100 MG tablet   Commonly known as:  COZAAR        Dose:  100 mg   Take 100 mg by mouth daily   Quantity:  30 tablet   Refills:  1        metoprolol succinate 25 MG 24 hr tablet   Commonly known as:  TOPROL-XL   Used for:  Thoracic aortic aneurysm without rupture (H)        Dose:  25 mg   Take 1 tablet (25 mg) by mouth daily   Quantity:  90 tablet   Refills:  3        oxybutynin 10 MG 24 hr tablet   Commonly known as:  DITROPAN-XL        Dose:  10 mg   Take 10 mg by mouth daily   Refills:  0        potassium chloride SA 10 MEQ CR tablet   Commonly known as:  K-DUR/KLOR-CON M   Used for:  Benign essential hypertension        Dose:  10 mEq   Take 1 tablet (10 mEq) by mouth 3 times daily   Quantity:  270 tablet   Refills:  3        torsemide 10 MG tablet   Commonly known as:  DEMADEX   Used for:  HTN (hypertension)        Dose:  10 mg   Take 1 tablet (10 mg) by mouth daily   Quantity:  90 tablet   Refills:  3                Summary of Visit     Reason for your hospital stay        COPD exacerbation             After Care     Activity - Up with nursing assistance           Fall precautions           General info for SNF       Length of Stay Estimate: Short Term Care: Estimated # of Days <30  Condition at Discharge: Improving  Level of care:skilled   Rehabilitation Potential: Good  Admission H&P remains valid and up-to-date: Yes  Recent Chemotherapy: N/A  Use Nursing Home Standing Orders: Yes       Mantoux instructions       Give two-step Mantoux (PPD) Per Facility Policy Yes             Referrals     Occupational Therapy Adult Consult       Evaluate and treat as clinically indicated.    Reason:  Deconditioned weak       Physical Therapy Adult Consult       Evaluate and treat as clinically indicated.    Reason:  *Deconditioned weak             Follow-Up Appointment Instructions     Future Labs/Procedures    Follow Up and recommended labs and tests     Comments:    Follow-up with nursing home physician in 3 days      Follow-Up Appointment Instructions     Follow Up and recommended labs and tests       Follow-up with nursing home physician in 3 days             Statement of Approval     Ordered          07/06/18 1010  I have reviewed and agree with all the recommendations and orders detailed in this document.  EFFECTIVE NOW     Approved and electronically signed by:  Valeriano Beck MD

## 2018-07-02 NOTE — IP AVS SNAPSHOT
[place] [address] [facility]    [address]    [facility] [address]    Phone:  [phone]    Fax:  [phone]                                       After Visit Summary   7/2/2018    Giselle Chino    MRN: [id number]           After Visit Summary Signature Page     I have received my discharge instructions, and my questions have been answered. I have discussed any challenges I see with this plan with the nurse or doctor.    ..........................................................................................................................................  Patient/Patient Representative Signature      ..........................................................................................................................................  Patient Representative Print Name and Relationship to Patient    ..................................................               ................................................  Date                                            Time    ..........................................................................................................................................  Reviewed by Signature/Title    ...................................................              ..............................................  Date                                                            Time

## 2018-07-03 ENCOUNTER — APPOINTMENT (OUTPATIENT)
Dept: PHYSICAL THERAPY | Facility: CLINIC | Age: 83
DRG: 190 | End: 2018-07-03
Attending: INTERNAL MEDICINE
Payer: COMMERCIAL

## 2018-07-03 ENCOUNTER — APPOINTMENT (OUTPATIENT)
Dept: OCCUPATIONAL THERAPY | Facility: CLINIC | Age: 83
DRG: 190 | End: 2018-07-03
Attending: INTERNAL MEDICINE
Payer: COMMERCIAL

## 2018-07-03 PROBLEM — J96.11 HYPOXEMIC RESPIRATORY FAILURE, CHRONIC (H): Status: ACTIVE | Noted: 2018-07-03

## 2018-07-03 LAB
ANION GAP SERPL CALCULATED.3IONS-SCNC: 11 MMOL/L (ref 3–14)
BUN SERPL-MCNC: 8 MG/DL (ref 7–30)
CALCIUM SERPL-MCNC: 9 MG/DL (ref 8.5–10.1)
CHLORIDE SERPL-SCNC: 109 MMOL/L (ref 94–109)
CO2 SERPL-SCNC: 21 MMOL/L (ref 20–32)
CREAT SERPL-MCNC: 0.63 MG/DL (ref 0.52–1.04)
GFR SERPL CREATININE-BSD FRML MDRD: >90 ML/MIN/1.7M2
GLUCOSE BLDC GLUCOMTR-MCNC: 171 MG/DL (ref 70–99)
GLUCOSE BLDC GLUCOMTR-MCNC: 220 MG/DL (ref 70–99)
GLUCOSE BLDC GLUCOMTR-MCNC: 265 MG/DL (ref 70–99)
GLUCOSE BLDC GLUCOMTR-MCNC: 297 MG/DL (ref 70–99)
GLUCOSE SERPL-MCNC: 148 MG/DL (ref 70–99)
INTERPRETATION ECG - MUSE: NORMAL
POTASSIUM SERPL-SCNC: 3.7 MMOL/L (ref 3.4–5.3)
SODIUM SERPL-SCNC: 141 MMOL/L (ref 133–144)

## 2018-07-03 PROCEDURE — 99233 SBSQ HOSP IP/OBS HIGH 50: CPT | Performed by: INTERNAL MEDICINE

## 2018-07-03 PROCEDURE — 40000275 ZZH STATISTIC RCP TIME EA 10 MIN

## 2018-07-03 PROCEDURE — 97161 PT EVAL LOW COMPLEX 20 MIN: CPT | Mod: GP

## 2018-07-03 PROCEDURE — 36415 COLL VENOUS BLD VENIPUNCTURE: CPT | Performed by: INTERNAL MEDICINE

## 2018-07-03 PROCEDURE — 12000007 ZZH R&B INTERMEDIATE

## 2018-07-03 PROCEDURE — 25000132 ZZH RX MED GY IP 250 OP 250 PS 637: Performed by: INTERNAL MEDICINE

## 2018-07-03 PROCEDURE — 80048 BASIC METABOLIC PNL TOTAL CA: CPT | Performed by: INTERNAL MEDICINE

## 2018-07-03 PROCEDURE — 25000131 ZZH RX MED GY IP 250 OP 636 PS 637: Performed by: INTERNAL MEDICINE

## 2018-07-03 PROCEDURE — 25000128 H RX IP 250 OP 636: Performed by: INTERNAL MEDICINE

## 2018-07-03 PROCEDURE — 97535 SELF CARE MNGMENT TRAINING: CPT | Mod: GO

## 2018-07-03 PROCEDURE — 94640 AIRWAY INHALATION TREATMENT: CPT

## 2018-07-03 PROCEDURE — 00000146 ZZHCL STATISTIC GLUCOSE BY METER IP

## 2018-07-03 PROCEDURE — 97166 OT EVAL MOD COMPLEX 45 MIN: CPT | Mod: GO

## 2018-07-03 PROCEDURE — 40000193 ZZH STATISTIC PT WARD VISIT

## 2018-07-03 PROCEDURE — 97116 GAIT TRAINING THERAPY: CPT | Mod: GP

## 2018-07-03 PROCEDURE — 40000133 ZZH STATISTIC OT WARD VISIT

## 2018-07-03 PROCEDURE — 97530 THERAPEUTIC ACTIVITIES: CPT | Mod: GP

## 2018-07-03 PROCEDURE — 25000125 ZZHC RX 250: Performed by: INTERNAL MEDICINE

## 2018-07-03 PROCEDURE — 40000274 ZZH STATISTIC RCP CONSULT EA 30 MIN

## 2018-07-03 RX ORDER — AMLODIPINE BESYLATE 5 MG/1
5 TABLET ORAL DAILY
Status: DISCONTINUED | OUTPATIENT
Start: 2018-07-03 | End: 2018-07-03

## 2018-07-03 RX ORDER — ONDANSETRON 4 MG/1
4 TABLET, ORALLY DISINTEGRATING ORAL EVERY 6 HOURS PRN
Status: DISCONTINUED | OUTPATIENT
Start: 2018-07-03 | End: 2018-07-06 | Stop reason: HOSPADM

## 2018-07-03 RX ORDER — BISACODYL 10 MG
10 SUPPOSITORY, RECTAL RECTAL DAILY PRN
Status: DISCONTINUED | OUTPATIENT
Start: 2018-07-03 | End: 2018-07-06 | Stop reason: HOSPADM

## 2018-07-03 RX ORDER — NALOXONE HYDROCHLORIDE 0.4 MG/ML
.1-.4 INJECTION, SOLUTION INTRAMUSCULAR; INTRAVENOUS; SUBCUTANEOUS
Status: DISCONTINUED | OUTPATIENT
Start: 2018-07-03 | End: 2018-07-06 | Stop reason: HOSPADM

## 2018-07-03 RX ORDER — IPRATROPIUM BROMIDE AND ALBUTEROL SULFATE 2.5; .5 MG/3ML; MG/3ML
3 SOLUTION RESPIRATORY (INHALATION)
Status: DISCONTINUED | OUTPATIENT
Start: 2018-07-03 | End: 2018-07-03

## 2018-07-03 RX ORDER — AMOXICILLIN 250 MG
1 CAPSULE ORAL 2 TIMES DAILY PRN
Status: DISCONTINUED | OUTPATIENT
Start: 2018-07-03 | End: 2018-07-06 | Stop reason: HOSPADM

## 2018-07-03 RX ORDER — NICOTINE POLACRILEX 4 MG
15-30 LOZENGE BUCCAL
Status: DISCONTINUED | OUTPATIENT
Start: 2018-07-03 | End: 2018-07-06 | Stop reason: HOSPADM

## 2018-07-03 RX ORDER — AMLODIPINE BESYLATE 10 MG/1
10 TABLET ORAL DAILY
Status: DISCONTINUED | OUTPATIENT
Start: 2018-07-03 | End: 2018-07-06 | Stop reason: HOSPADM

## 2018-07-03 RX ORDER — IPRATROPIUM BROMIDE AND ALBUTEROL SULFATE 2.5; .5 MG/3ML; MG/3ML
3 SOLUTION RESPIRATORY (INHALATION)
Status: DISCONTINUED | OUTPATIENT
Start: 2018-07-04 | End: 2018-07-06 | Stop reason: HOSPADM

## 2018-07-03 RX ORDER — ACETAMINOPHEN 325 MG/1
650 TABLET ORAL EVERY 4 HOURS PRN
Status: DISCONTINUED | OUTPATIENT
Start: 2018-07-03 | End: 2018-07-06 | Stop reason: HOSPADM

## 2018-07-03 RX ORDER — ERYTHROMYCIN 5 MG/G
OINTMENT OPHTHALMIC 4 TIMES DAILY
Status: DISCONTINUED | OUTPATIENT
Start: 2018-07-03 | End: 2018-07-06 | Stop reason: HOSPADM

## 2018-07-03 RX ORDER — TORSEMIDE 10 MG/1
10 TABLET ORAL DAILY
Status: DISCONTINUED | OUTPATIENT
Start: 2018-07-03 | End: 2018-07-06 | Stop reason: HOSPADM

## 2018-07-03 RX ORDER — POTASSIUM CHLORIDE 29.8 MG/ML
20 INJECTION INTRAVENOUS
Status: DISCONTINUED | OUTPATIENT
Start: 2018-07-03 | End: 2018-07-06 | Stop reason: HOSPADM

## 2018-07-03 RX ORDER — ALBUTEROL SULFATE 0.83 MG/ML
2.5 SOLUTION RESPIRATORY (INHALATION)
Status: DISCONTINUED | OUTPATIENT
Start: 2018-07-03 | End: 2018-07-06 | Stop reason: HOSPADM

## 2018-07-03 RX ORDER — LEVOTHYROXINE SODIUM 112 UG/1
112 TABLET ORAL DAILY
Status: DISCONTINUED | OUTPATIENT
Start: 2018-07-03 | End: 2018-07-06 | Stop reason: HOSPADM

## 2018-07-03 RX ORDER — MAGNESIUM SULFATE HEPTAHYDRATE 40 MG/ML
2 INJECTION, SOLUTION INTRAVENOUS DAILY PRN
Status: DISCONTINUED | OUTPATIENT
Start: 2018-07-03 | End: 2018-07-06 | Stop reason: HOSPADM

## 2018-07-03 RX ORDER — MAGNESIUM SULFATE HEPTAHYDRATE 40 MG/ML
4 INJECTION, SOLUTION INTRAVENOUS EVERY 4 HOURS PRN
Status: DISCONTINUED | OUTPATIENT
Start: 2018-07-03 | End: 2018-07-06 | Stop reason: HOSPADM

## 2018-07-03 RX ORDER — METOPROLOL SUCCINATE 50 MG/1
50 TABLET, EXTENDED RELEASE ORAL DAILY
Status: DISCONTINUED | OUTPATIENT
Start: 2018-07-04 | End: 2018-07-06 | Stop reason: HOSPADM

## 2018-07-03 RX ORDER — LEVOTHYROXINE SODIUM 112 UG/1
50 TABLET ORAL DAILY
COMMUNITY
End: 2021-08-31 | Stop reason: DRUGHIGH

## 2018-07-03 RX ORDER — POTASSIUM CHLORIDE 1.5 G/1.58G
20-40 POWDER, FOR SOLUTION ORAL
Status: DISCONTINUED | OUTPATIENT
Start: 2018-07-03 | End: 2018-07-06 | Stop reason: HOSPADM

## 2018-07-03 RX ORDER — POTASSIUM CL/LIDO/0.9 % NACL 10MEQ/0.1L
10 INTRAVENOUS SOLUTION, PIGGYBACK (ML) INTRAVENOUS
Status: DISCONTINUED | OUTPATIENT
Start: 2018-07-03 | End: 2018-07-06 | Stop reason: HOSPADM

## 2018-07-03 RX ORDER — HYDRALAZINE HYDROCHLORIDE 25 MG/1
25 TABLET, FILM COATED ORAL 3 TIMES DAILY
Status: DISCONTINUED | OUTPATIENT
Start: 2018-07-03 | End: 2018-07-06 | Stop reason: HOSPADM

## 2018-07-03 RX ORDER — ONDANSETRON 2 MG/ML
4 INJECTION INTRAMUSCULAR; INTRAVENOUS EVERY 6 HOURS PRN
Status: DISCONTINUED | OUTPATIENT
Start: 2018-07-03 | End: 2018-07-06 | Stop reason: HOSPADM

## 2018-07-03 RX ORDER — AMOXICILLIN 250 MG
2 CAPSULE ORAL 2 TIMES DAILY PRN
Status: DISCONTINUED | OUTPATIENT
Start: 2018-07-03 | End: 2018-07-06 | Stop reason: HOSPADM

## 2018-07-03 RX ORDER — LOSARTAN POTASSIUM 50 MG/1
50 TABLET ORAL DAILY
Status: DISCONTINUED | OUTPATIENT
Start: 2018-07-03 | End: 2018-07-06 | Stop reason: HOSPADM

## 2018-07-03 RX ORDER — LABETALOL HYDROCHLORIDE 5 MG/ML
20 INJECTION, SOLUTION INTRAVENOUS EVERY 4 HOURS PRN
Status: DISCONTINUED | OUTPATIENT
Start: 2018-07-03 | End: 2018-07-06 | Stop reason: HOSPADM

## 2018-07-03 RX ORDER — POTASSIUM CHLORIDE 750 MG/1
10 TABLET, EXTENDED RELEASE ORAL 3 TIMES DAILY
Status: DISCONTINUED | OUTPATIENT
Start: 2018-07-03 | End: 2018-07-06 | Stop reason: HOSPADM

## 2018-07-03 RX ORDER — POTASSIUM CHLORIDE 7.45 MG/ML
10 INJECTION INTRAVENOUS
Status: DISCONTINUED | OUTPATIENT
Start: 2018-07-03 | End: 2018-07-06 | Stop reason: HOSPADM

## 2018-07-03 RX ORDER — OXYBUTYNIN CHLORIDE 5 MG/1
10 TABLET, EXTENDED RELEASE ORAL DAILY
Status: DISCONTINUED | OUTPATIENT
Start: 2018-07-03 | End: 2018-07-06 | Stop reason: HOSPADM

## 2018-07-03 RX ORDER — HYDRALAZINE HYDROCHLORIDE 20 MG/ML
10 INJECTION INTRAMUSCULAR; INTRAVENOUS EVERY 4 HOURS PRN
Status: DISCONTINUED | OUTPATIENT
Start: 2018-07-03 | End: 2018-07-06 | Stop reason: HOSPADM

## 2018-07-03 RX ORDER — ERYTHROMYCIN 5 MG/G
0.5 OINTMENT OPHTHALMIC AT BEDTIME
Status: ON HOLD | COMMUNITY
End: 2018-07-06

## 2018-07-03 RX ORDER — METHYLPREDNISOLONE SODIUM SUCCINATE 40 MG/ML
40 INJECTION, POWDER, LYOPHILIZED, FOR SOLUTION INTRAMUSCULAR; INTRAVENOUS 2 TIMES DAILY
Status: DISCONTINUED | OUTPATIENT
Start: 2018-07-03 | End: 2018-07-05

## 2018-07-03 RX ORDER — METOPROLOL SUCCINATE 25 MG/1
25 TABLET, EXTENDED RELEASE ORAL DAILY
Status: DISCONTINUED | OUTPATIENT
Start: 2018-07-03 | End: 2018-07-03

## 2018-07-03 RX ORDER — POTASSIUM CHLORIDE 1500 MG/1
20-40 TABLET, EXTENDED RELEASE ORAL
Status: DISCONTINUED | OUTPATIENT
Start: 2018-07-03 | End: 2018-07-06 | Stop reason: HOSPADM

## 2018-07-03 RX ORDER — DEXTROSE MONOHYDRATE 25 G/50ML
25-50 INJECTION, SOLUTION INTRAVENOUS
Status: DISCONTINUED | OUTPATIENT
Start: 2018-07-03 | End: 2018-07-06 | Stop reason: HOSPADM

## 2018-07-03 RX ADMIN — METHYLPREDNISOLONE SODIUM SUCCINATE 40 MG: 40 INJECTION, POWDER, FOR SOLUTION INTRAMUSCULAR; INTRAVENOUS at 22:33

## 2018-07-03 RX ADMIN — METOPROLOL SUCCINATE 25 MG: 25 TABLET, EXTENDED RELEASE ORAL at 09:40

## 2018-07-03 RX ADMIN — ERYTHROMYCIN 1 G: 5 OINTMENT OPHTHALMIC at 21:57

## 2018-07-03 RX ADMIN — DEXTROSE AND SODIUM CHLORIDE: 5; 900 INJECTION, SOLUTION INTRAVENOUS at 22:36

## 2018-07-03 RX ADMIN — ERYTHROMYCIN 1 G: 5 OINTMENT OPHTHALMIC at 02:10

## 2018-07-03 RX ADMIN — METHYLPREDNISOLONE SODIUM SUCCINATE 40 MG: 40 INJECTION, POWDER, FOR SOLUTION INTRAMUSCULAR; INTRAVENOUS at 08:36

## 2018-07-03 RX ADMIN — POTASSIUM CHLORIDE 10 MEQ: 750 TABLET, FILM COATED, EXTENDED RELEASE ORAL at 21:56

## 2018-07-03 RX ADMIN — POTASSIUM CHLORIDE 10 MEQ: 750 TABLET, FILM COATED, EXTENDED RELEASE ORAL at 09:41

## 2018-07-03 RX ADMIN — DEXTROSE AND SODIUM CHLORIDE: 5; 900 INJECTION, SOLUTION INTRAVENOUS at 02:11

## 2018-07-03 RX ADMIN — ERYTHROMYCIN 1 G: 5 OINTMENT OPHTHALMIC at 18:00

## 2018-07-03 RX ADMIN — TORSEMIDE 10 MG: 10 TABLET ORAL at 09:40

## 2018-07-03 RX ADMIN — METHYLPREDNISOLONE SODIUM SUCCINATE 40 MG: 40 INJECTION, POWDER, FOR SOLUTION INTRAMUSCULAR; INTRAVENOUS at 02:08

## 2018-07-03 RX ADMIN — POTASSIUM CHLORIDE 10 MEQ: 750 TABLET, FILM COATED, EXTENDED RELEASE ORAL at 16:49

## 2018-07-03 RX ADMIN — LEVOTHYROXINE SODIUM 112 MCG: 112 TABLET ORAL at 09:40

## 2018-07-03 RX ADMIN — INSULIN ASPART 2 UNITS: 100 INJECTION, SOLUTION INTRAVENOUS; SUBCUTANEOUS at 17:56

## 2018-07-03 RX ADMIN — OXYBUTYNIN CHLORIDE 10 MG: 5 TABLET, EXTENDED RELEASE ORAL at 09:40

## 2018-07-03 RX ADMIN — HYDRALAZINE HYDROCHLORIDE 25 MG: 25 TABLET ORAL at 09:40

## 2018-07-03 RX ADMIN — AMLODIPINE BESYLATE 10 MG: 10 TABLET ORAL at 20:24

## 2018-07-03 RX ADMIN — IPRATROPIUM BROMIDE AND ALBUTEROL SULFATE 3 ML: .5; 3 SOLUTION RESPIRATORY (INHALATION) at 19:45

## 2018-07-03 RX ADMIN — POTASSIUM CHLORIDE 20 MEQ: 1.5 POWDER, FOR SOLUTION ORAL at 10:15

## 2018-07-03 RX ADMIN — INSULIN ASPART 1 UNITS: 100 INJECTION, SOLUTION INTRAVENOUS; SUBCUTANEOUS at 10:20

## 2018-07-03 RX ADMIN — ERYTHROMYCIN 1 G: 5 OINTMENT OPHTHALMIC at 09:41

## 2018-07-03 RX ADMIN — ERYTHROMYCIN 1 G: 5 OINTMENT OPHTHALMIC at 13:12

## 2018-07-03 RX ADMIN — HYDRALAZINE HYDROCHLORIDE 25 MG: 25 TABLET ORAL at 21:56

## 2018-07-03 RX ADMIN — DEXTROSE AND SODIUM CHLORIDE: 5; 900 INJECTION, SOLUTION INTRAVENOUS at 14:23

## 2018-07-03 RX ADMIN — INSULIN ASPART 2 UNITS: 100 INJECTION, SOLUTION INTRAVENOUS; SUBCUTANEOUS at 13:11

## 2018-07-03 RX ADMIN — HYDRALAZINE HYDROCHLORIDE 25 MG: 25 TABLET ORAL at 16:49

## 2018-07-03 ASSESSMENT — ACTIVITIES OF DAILY LIVING (ADL)
PREVIOUS_RESPONSIBILITIES: MEAL PREP;HOUSEKEEPING;DRIVING;MEDICATION MANAGEMENT
ADLS_ACUITY_SCORE: 19
ADLS_ACUITY_SCORE: 19
ADLS_ACUITY_SCORE: 20
ADLS_ACUITY_SCORE: 12
ADLS_ACUITY_SCORE: 16

## 2018-07-03 NOTE — CONSULTS
Care Transition Initial Assessment - SW  Reason For Consult: discharge planning  Met with: Patient  Attempted to reach daugher, unable to leave VM message. Spouse not reachable   Active Problems:    Hypoxemic respiratory failure, chronic (H)       DATA  Lives With: spouse  Living Arrangements: house  Description of Support System: Supportive, Involved  Who is your support system?: , Children  Support Assessment: Adequate family and caregiver support, Adequate social supports. PT stated she has good support but due to memory issues not clear on support. SW aware that pt live with her spouse in a home with out any outside support services  Pt previous was admitted and dc to home with spouse.   Identified issues/concerns regarding health management: PT admitted due to COPD related issus  Pt is not on home o2 at this time.       Quality Of Family Relationships: supportive  Transportation Available: car, family or friend will provide    ASSESSMENT  Cognitive Status:  awake, alert and disoriented  Concerns to be addressed: Per conversation with [facility] rehab recommendations are for TCU.  However rehab staff believe pt lives alone,. Pt does live with her spouse.  SW has attempted reach daughter but unable to leave VM message.  Pt does better when spouse present. Rn staff expressed concern about spouse and his memory issues  Pt is not open to any support service.  Pt has a cane and walker per her report at home     PLAN  sw will need to follow up with family to discuss dc planning support and need  Pt has had home care support in the past

## 2018-07-03 NOTE — PLAN OF CARE
Problem: Patient Care Overview  Goal: Plan of Care/Patient Progress Review  Outcome: No Change  Vitals: BP (!) 147/38  Temp 95.3  F (35.2  C) (Axillary)  Resp 16  SpO2 98%  Oxygen: On 2 LPM on Oxymask, won't leave on NC. On cont. Monitoring sats 95-96%  LOC: Confused, uncooperative, refusing things, irritable.  Pain: Denies  Ambulation: Assist of 2. Need PT to eval.  Tele: SB w/ prolonged QT, BP running high.  Lungs: Bilateral wheezing. On IV solumedrol, has prn nebs. Dx w/ COPD excab.  GI/: Incont of B/B  Lines/drains: IV infusing   Hx: COPD, HTN, history of very large ascending aortic and thoracic aortic aneurysms.  Plan: Admitted today. SW to meet with Pt and family, talk about code status, and living arrangements. PT/OT following.

## 2018-07-03 NOTE — PROGRESS NOTES
" 07/03/18 1035   Quick Adds   Type of Visit Initial Occupational Therapy Evaluation   Living Environment   Lives With spouse   Living Arrangements house   Home Accessibility bed and bath are not on the first floor;tub/shower is not walk in   Number of Stairs to Enter Home 0   Number of Stairs Within Home 13   Transportation Available car;family or friend will provide   Living Environment Comment Pt states the bed/bathroom are located upstairs. Attempted to call pt's daughter regarding PLOF however unable to get a hold of her.   Self-Care   Dominant Hand right   Usual Activity Tolerance good   Current Activity Tolerance moderate   Regular Exercise yes   Activity/Exercise Type walking   Exercise Amount/Frequency daily   Equipment Currently Used at Home cane, straight;walker, rolling   Activity/Exercise/Self-Care Comment Pt states she uses a 2ww within the home.    Functional Level Prior   Ambulation 1-->assistive equipment   Transferring 1-->assistive equipment   Toileting 0-->independent   Bathing 0-->independent   Dressing 0-->independent   Eating 0-->independent   Communication 0-->understands/communicates without difficulty   Swallowing 0-->swallows foods/liquids without difficulty   Cognition 1 - attention or memory deficits   Fall history within last six months no   Which of the above functional risks had a recent onset or change? toileting;bathing;dressing;cognition   Prior Functional Level Comment Pt states she was IND in all ADLs PTA   General Information   Onset of Illness/Injury or Date of Surgery - Date 07/02/18   Referring Physician Pascual Guzman MD   Patient/Family Goals Statement to go and live with her daughter because its a \"comfortable safe place\"   Additional Occupational Profile Info/Pertinent History of Current Problem Pt is a 83 year old female with PMH including COPD, hypertension, 8.1 cm aortic aneurysm who presents with confusion and dyspnea.    Precautions/Limitations fall " precautions;oxygen therapy device and L/min  (3Lpm via oxymask)   General Info Comments Alert, pleasant; somewhat disheveled appearance   Cognitive Status Examination   Orientation person   Level of Consciousness alert   Able to Follow Commands moderate impairment;severe impairment   Personal Safety (Cognitive) decreased awareness, need for assist;decreased awareness, need for safety   Memory impaired   Attention Sustained attention impaired   Organization/Problem Solving Problem solving impaired   Executive Function Working memory impaired, decreased storage of information for performing tasks   Cognitive Comment Unable to state she is in the hospital; States January 17, 2018 is the date. Pt very confused during session and with inconsistent ability to follow simple commands   Visual Perception   Visual Perception Wears glasses   Sensory Examination   Sensory Comments denies n/t    Pain Assessment   Patient Currently in Pain No   Range of Motion (ROM)   ROM Comment B UE WFL   Hand Strength   Hand Strength Comments good bilaterally   Mobility   Bed Mobility Comments not observed   Transfer Skills   Transfer Comments not observed however per PT note, pt needing Min A   Bathing   Level of [place] moderate assist (50% patients effort)   Lower Body Dressing   Level of [place]: Dress Lower Body minimum assist (75% patients effort)   Toileting   Level of [place]: Toilet moderate assist (50% patients effort)   Instrumental Activities of Daily Living (IADL)   Previous Responsibilities meal prep;housekeeping;driving;medication management   IADL Comments Pt states her spouse does the shopping and yardwork. Pt states she manages her own medications and drives.    Activities of Daily Living Analysis   Impairments Contributing to Impaired Activities of Daily Living cognition impaired;strength decreased   General Therapy Interventions   Planned Therapy Interventions cognition;progressive activity/exercise  "  Clinical Impression   Criteria for Skilled Therapeutic Interventions Met yes, treatment indicated   OT Diagnosis Decreased ADL/IADLs   Influenced by the following impairments cognition impaired;strength decreased   Assessment of Occupational Performance 5 or more Performance Deficits   Identified Performance Deficits ADL/IADLs: dressing, bathing, toileting, driving, meal prep   Clinical Decision Making (Complexity) Moderate complexity   Therapy Frequency 5 times/wk   Predicted Duration of Therapy Intervention (days/wks) 1 week   Anticipated Discharge Disposition Transitional Care Facility   Risks and Benefits of Treatment have been explained. Yes   Patient, Family & other staff in agreement with plan of care Yes   Clinical Impression Comments Agreeable to session   [facility] TM \"6 Clicks\"   2016, Trustees of [facility], under license to [employer].  All rights reserved.   6 Clicks Short Forms Daily Activity Inpatient Short Form   [facility]  \"6 Clicks\" Daily Activity Inpatient Short Form   1. Putting on and taking off regular lower body clothing? 3 - A Little   2. Bathing (including washing, rinsing, drying)? 2 - A Lot   3. Toileting, which includes using toilet, bedpan or urinal? 3 - A Little   4. Putting on and taking off regular upper body clothing? 3 - A Little   5. Taking care of personal grooming such as brushing teeth? 3 - A Little   6. Eating meals? 4 - None   Daily Activity Raw Score (Score out of 24.Lower scores equate to lower levels of function) 18   Total Evaluation Time   Total Evaluation Time (Minutes) 10     "

## 2018-07-03 NOTE — PLAN OF CARE
Problem: ARDS (Acute Resp Distress Syndrome) (Adult)  Goal: Signs and Symptoms of Listed Potential Problems Will be Absent, Minimized or Managed (ARDS)  Signs and symptoms of listed potential problems will be absent, minimized or managed by discharge/transition of care (reference ARDS (Acute Resp Distress Syndrome) (Adult) CPG).  Outcome: Improving  Pt oriented to self only. Apical pulse regular with intermittent bradycardia noted. Tele SR/SB with prolong QT. Lung sounds diminished with fine crackles in bases,pt on torsemide. No wheezing noted. /56, PO HTN medications administered. 1200 /53, HR 97. Pt assist of 1 with walker. D5 0.9%NaCl infusing at 75cc/hr. , , insulin administered. Pt incontinent of bowel and bladder.     Pt  unable to help with admission profile, reports daughter will be in this evening after work.   Plan: Continue IV steroids, Duonebs Pt/OT/SW following
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
## 2018-07-03 NOTE — ED NOTES
Patient respiratory status declining. Ins/Exp wheezing present bilateral despite Nebs/Magnesium. Patient appears to be tiring from work of breathing. Can speak only words at a time instead of sentencing. Vitals stable with exception of BP which is elevated. Oxy mask at 5L presently on patient. Daughter remains at bedside. Physician aware of status.

## 2018-07-03 NOTE — CONSULTS
Care Coordination:  CTS following for COPD dx. Met with pt at bedside to discuss plan of care with COPD. Pt states she has had COPD for awhile and follows with a Pulmonologist and cardiologist. She states she lives with her  in [place]. She has 2 children that are supportive. She has confusion during discussion but answers other questions appropriately. She states she does not have any in home services and does not use home oxygen. She has a cane and a walker at home for ambulation. Chart reviewed and information verified. She follows at [facility] for her PCP, Dr Mills. PT/OT are following in hospital for recommendations on dc plan of care. Per PT/OT, recommendations for TCU on dc. Sw consulted for dc planning and TCU placement on dc. Will cont to follow along with SW for dc plan of care.    Marci Parham RN CTS

## 2018-07-03 NOTE — PHARMACY-ADMISSION MEDICATION HISTORY
Admission medication history interview status for this patient is complete. See [facility] admission navigator for allergy information, prior to admission medications and immunization status.     Medication history interview source(s):Patient's  and [facility] pharmacy  Medication history resources (including written lists, pill bottles, clinic record):None  Primary pharmacy:[place], [place]    Changes made to PTA medication list:  Added: Erythromycin eye ointment  Deleted: Hydralazine and Bevespi aerosphere   Changed: Losartan 50 mg to 100 mg and levothyroxine from no amount to 112 mcg per day.    Actions taken by pharmacist (provider contacted, etc):Called patient's  and spoke with [facility] pharmacy about past filled medication.     Additional medication history information:Patient is unable to communicate how she has been taking her medication. Doctor's note states that she hasn't been taking medications consistently per family. Spoke with her  over the phone, but was very unsure about everything. Was able to get hold of her primary pharmacy and filled out the medication list leaving only medications filled within the last 3 months(Patient gets 90 day supplies). It is unknown to what medications she is actually taking at home with 100 percent certainty.     Medication reconciliation/reorder completed by provider prior to medication history? Yes    Do you take OTC medications (eg tylenol, ibuprofen, fish oil, eye/ear drops, etc)? Unknown( unsure)    For patients on insulin therapy: NO      Prior to Admission medications    Medication Sig Last Dose Taking? Auth Provider   amLODIPine (NORVASC) 5 MG tablet Take 1 tablet (5 mg) by mouth daily Unknown at Unknown time  Tyler Machado MD   atorvastatin (LIPITOR) 20 MG tablet Take 1 tablet (20 mg) by mouth daily Unknown at Unknown time  Tyler Machado MD   erythromycin (ROMYCIN) ophthalmic ointment Place 0.5 inches into both eyes At Bedtime Unknown  at Unknown time  Unknown, Entered By History   levothyroxine (SYNTHROID/LEVOTHROID) 112 MCG tablet Take 112 mcg by mouth daily Unknown at Unknown time  Unknown, Entered By History   losartan (COZAAR) 100 MG tablet Take 100 mg by mouth daily  Unknown at Unknown time  Tyler Machado MD   metoprolol succinate (TOPROL-XL) 25 MG 24 hr tablet Take 1 tablet (25 mg) by mouth daily Unknown at Unknown time  Avery Lambert APRN CNP   oxybutynin (DITROPAN-XL) 10 MG 24 hr tablet Take 10 mg by mouth daily Unknown at Unknown time  Reported, Patient   potassium chloride SA (K-DUR/KLOR-CON M) 10 MEQ CR tablet Take 1 tablet (10 mEq) by mouth 3 times daily Unknown at Unknown time  Avery Lambert APRN CNP   torsemide (DEMADEX) 10 MG tablet Take 1 tablet (10 mg) by mouth daily Unknown at Unknown time  Tyler Machado MD

## 2018-07-03 NOTE — PROGRESS NOTES
07/03/18 1036   Quick Adds   Type of Visit Initial PT Evaluation   Living Environment   Lives With spouse   Living Arrangements house   Home Accessibility bed and bath are not on the first floor;tub/shower is not walk in   Number of Stairs to Enter Home 0   Number of Stairs Within Home 13   Stair Railings at Home inside, present on right side   Transportation Available car;family or friend will provide   Living Environment Comment split entry, bedroom up, bath on both levels.   Self-Care   Dominant Hand right   Usual Activity Tolerance good   Regular Exercise yes   Activity/Exercise Type walking   Exercise Amount/Frequency daily   Equipment Currently Used at Home cane, straight;walker, rolling   Activity/Exercise/Self-Care Comment Pt states she uses a 2ww within the home.    Functional Level Prior   Ambulation 1-->assistive equipment   Transferring 1-->assistive equipment   Toileting 0-->independent   Bathing 0-->independent   Dressing 0-->independent   Eating 0-->independent   Communication 0-->understands/communicates without difficulty   Swallowing 0-->swallows foods/liquids without difficulty   Cognition 1 - attention or memory deficits   Fall history within last six months no   General Information   Onset of Illness/Injury or Date of Surgery - Date 07/02/18   Referring Physician Pascual Guzman MD   Patient/Family Goals Statement patient would like to DC to daughter's home   Pertinent History of Current Problem (include personal factors and/or comorbidities that impact the POC) Patient with PMH including COPD, HTN, 8.1 cm aortic aneurysm now admitted for confusion and dyspnea, as well as accelerated HTN.     Precautions/Limitations fall precautions;oxygen therapy device and L/min  (3L NC)   Cognitive Status Examination   Orientation person   Follows Commands and Answers Questions able to follow single-step instructions   Personal Safety and Judgment at risk behaviors demonstrated;impulsive  "  Memory impaired   Pain Assessment   Patient Currently in Pain No   Integumentary/Edema   Integumentary/Edema no deficits were identifed   Posture    Posture Forward head position;Protracted shoulders   Range of Motion (ROM)   ROM Comment WFL   Strength   Strength Comments moderate strength deficits, requires assist for transfers   Bed Mobility   Bed Mobility Comments min A   Transfer Skills   Transfer Comments min A and 2WW   Gait   Gait Comments Amb 5 feet, 15 feet between bed, toilet, and bedside chair with 2WW, min A and direct, near constant cueing for safe technique.   Balance   Balance Comments Fall risk secondary to confusion and impulsivity   General Therapy Interventions   Planned Therapy Interventions balance training;bed mobility training;gait training;strengthening;transfer training;home program guidelines;progressive activity/exercise   Clinical Impression   Criteria for Skilled Therapeutic Intervention yes, treatment indicated   PT Diagnosis decreased independence with mobility   Clinical Presentation Stable/Uncomplicated   Clinical Presentation Rationale complex pmh, stable presentation, fair social support   Clinical Decision Making (Complexity) Low complexity   Therapy Frequency` 5 times/week   Predicted Duration of Therapy Intervention (days/wks) 5 days   Anticipated Discharge Disposition Transitional Care Facility   Risk & Benefits of therapy have been explained Yes   Patient, Family & other staff in agreement with plan of care Yes   [facility] [employer] \"6 Clicks\"   2016, Trustees of [facility], under license to [email or web address].  All rights reserved.   6 Clicks Short Forms Basic Mobility Inpatient Short Form   [facility]  \"6 Clicks\" V.2 Basic Mobility Inpatient Short Form   1. Turning from your back to your side while in a flat bed without using bedrails? 4 - None   2. Moving from lying on your back to sitting on the side of a flat bed without using bedrails? 4 - None "   3. Moving to and from a bed to a chair (including a wheelchair)? 3 - A Little   4. Standing up from a chair using your arms (e.g., wheelchair, or bedside chair)? 3 - A Little   5. To walk in hospital room? 3 - A Little   6. Climbing 3-5 steps with a railing? 3 - A Little   Basic Mobility Raw Score (Score out of 24.Lower scores equate to lower levels of function) 20   Total Evaluation Time   Total Evaluation Time (Minutes) 5
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
## 2018-07-03 NOTE — PROGRESS NOTES
[facility]    Hospitalist Progress Note    Date of Service (when I saw the patient): 07/03/2018    Assessment & Plan   Giselle Chino is a 83 year old female with PMH including COPD, hypertension, 8.1 cm aortic aneurysm who presents with confusion and dyspnea.  It appears she has been recently missing some of her doses of medications and presented with accelerated hypertension of 220 systolic.  Remarkably she did not have chest pain or pressure and CT imaging of her aorta shows that her very large ascending and thoracic aortic aneurysms are stable compared with the prior CT scan from over 2 weeks ago.  She is being admitted for presumed COPD exacerbation as well as blood pressure control.     The bigger picture issue here seems to be difficulty managing at home and missing medications leading to her accelerated hypertension and COPD exacerbation.  PT and OT and social work will be consulted as well.     Assessment and Plan:   \  1.  Acute COPD exacerbation: Certainly she could have some dyspnea related to accelerated hypertension from missing medications as well.     1. continue to treat with nebulizers and IV steroids and work on better blood pressure control as noted below.  2. Treat for COPD exacerbation with IV Solu-Medrol 40 mg twice daily as well as scheduled and as needed duo nebs and supplemental oxygen     2.   Accelerated hypertension: It seems as though she has not been regularly taking her medications recently which I think is at least in part to blame for her severe hypertension.  There is no history of focal neurologic deficits to suggest stroke.  Given #3 below I would plan on very aggressive blood pressure control to minimize any risk of dissection.    -- resume her home regimen including amlodipine, metoprolol and Cozaar and also have IV hydralazine and labetalol available for blood pressures greater than 160 systolic.    --As noted above she does not have any chest or abdominal  pain to suggest an aortic dissection at this point and CT imaging shows that her aorta is quite stable compared with 2+ weeks ago.  --Increase Toprol to 50 mg daily and Norvasc to 10 mg daily     3.   History of very large ascending aortic and thoracic aortic aneurysms: her ascending aortic aneurysm was 7.8 cm and abdominal aneurysm was 7.4 cm in diameter as far back as 2013 based upon a CT scan of that time.  CT imaging shows that her aneurysms are stable compared with the prior study from about 2 weeks ago but at that time her aneurysms had enlarged slightly compared with the prior study from 2013.  In any case I would focus on good blood pressure control to minimize any risk of dissection. The ER provider, Dr. Cardona actually did call her vascular surgeon Dr. Morris to review the case.  It was reported to me that she is not considered a surgical candidate due to very high risk and the current plan is for monitoring.     4.  Hypoglycemia: Her blood sugar was as low as 61 in the emergency room.  We will have her on dextrose containing fluids and hypoglycemia protocol will be ordered.  resolved     5.  Confusion:  there is been a steady long-term decline in cognitive function but seem to be somewhat worse the past couple of days.  Fortunately, her daughter notes that the patient is already returning to what seems to be her baseline after some IV hydration and blood pressure control in the emergency room.  There is no focal neurologic deficits to suggest a CVA or vascular event.  --continue management as above.       7.  Bacterial conjunctivitis: Her right eye has obvious purulent drainage with conjunctivitis.  Will treat with erythromycin 4 times daily for 5 days total.  Monitor.     DVT Prophylaxis: Pneumatic Compression Devices  Code Status: Full Code.     Disposition Plan   Expected discharge in 2-3 days to transitional care unit once breathing better and stabilized.     Entered: Lionel Hayden 07/03/2018, 12:44  PM         Lionel Hayden MD  Text Page    Interval History   Patient states her breathing is better.  No wheezing or chest pain.  No fever or chills.  Eye feels better.   Is confused.       -Data reviewed today: I reviewed all new labs and imaging results over the last 24 hours. I personally reviewed no images or EKG's today.    Physical Exam   Temp: 95.5  F (35.3  C) Temp src: Axillary BP: 148/53   Heart Rate: 97 Resp: 16 SpO2: 96 % O2 Device: None (Room air) Oxygen Delivery: 2 LPM  There were no vitals filed for this visit.  Vital Signs with Ranges  Temp:  [95.3  F (35.2  C)-97.7  F (36.5  C)] 95.5  F (35.3  C)  Heart Rate:  [] 97  Resp:  [16] 16  BP: (128-220)/() 148/53  SpO2:  [92 %-100 %] 96 %       Constitutional: Awake, alert, cooperative, no apparent distress, sitting in chair   Respiratory: Clear to auscultation bilaterally with moderate decrease in breath sounds, no crackles or wheezing   Cardiovascular: Regular rate and rhythm, normal S1 and S2, and no murmur noted   Abdomen: Normal bowel sounds, soft, non-distended, non-tender, no hepatosplenomegaly    Skin: No rashes, no cyanosis, dry to touch   Neuro: Alert and oriented x3,    Extremities: No edema, normal range of motion   Other(s): Eye with surrounding redness and irritation of eyelid on right        All other systems: Negative       Medications     dextrose 5% and 0.9% NaCl 75 mL/hr at 07/03/18 0211       amLODIPine  5 mg Oral Daily     erythromycin   Right Eye 4x Daily     hydrALAZINE  25 mg Oral TID     insulin aspart  1-3 Units Subcutaneous TID AC     insulin aspart  1-3 Units Subcutaneous At Bedtime     ipratropium - albuterol 0.5 mg/2.5 mg/3 mL  3 mL Nebulization Q4H While awake     levothyroxine  112 mcg Oral Daily     losartan  50 mg Oral Daily     methylPREDNISolone  40 mg Intravenous BID     metoprolol succinate  25 mg Oral Daily     oxybutynin  10 mg Oral Daily     potassium chloride  10 mEq Oral TID     sodium chloride (PF)   3 mL Intracatheter Q8H     torsemide  10 mg Oral Daily       Data     Recent Labs  Lab 07/03/18  0620 07/02/18  1729 07/02/18  1725   WBC  --   --  6.9   HGB  --   --  12.9   MCV  --   --  92   PLT  --   --  230   INR  --   --  1.04     --  144   POTASSIUM 3.7  --  3.2*   CHLORIDE 109  --  107   CO2 21  --  25   BUN 8  --  12   CR 0.63  --  0.81   ANIONGAP 11  --  12   KRISTEL 9.0  --  9.9   *  --  73   ALBUMIN  --   --  4.2   PROTTOTAL  --   --  7.8   BILITOTAL  --   --  1.4*   ALKPHOS  --   --  96   ALT  --   --  22   AST  --   --  12   TROPONIN  --  0.01  --        Imaging:  Recent Results (from the past 24 hour(s))   CT Head w/o Contrast    Narrative    CT OF THE HEAD WITHOUT CONTRAST 7/2/2018 6:48 PM     COMPARISON: Head CT 12/22/2012    HISTORY: Altered mental status, generalized weakness, evaluate for  cerebrovascular accident.      TECHNIQUE: 5 mm thick axial CT images of the head were acquired  without IV contrast material.    FINDINGS: There is moderate diffuse cerebral volume loss. There are  extensive confluent areas of decreased density in the cerebral white  matter bilaterally that are consistent with sequela of chronic small  vessel ischemic disease.    The ventricles and basal cisterns are within normal limits in  configuration given the degree of cerebral volume loss. There is no  midline shift. There are no extra-axial fluid collections.    No intracranial hemorrhage, mass or recent infarct.    The visualized paranasal sinuses are well-aerated. There is no  mastoiditis. There are no fractures of the visualized bones.      Impression    IMPRESSION: Diffuse cerebral volume loss and cerebral white matter  changes consistent with chronic small vessel ischemic disease. No  evidence for acute intracranial pathology.      Radiation dose for this scan was reduced using automated exposure  control, adjustment of the mA and/or kV according to patient size, or  iterative reconstruction  technique    NEGRITA RUEDA MD   CT Aortic Survey w Contrast    Narrative    CT AORTIC SURVEY WITH CONTRAST  7/2/2018  6:51 PM     HISTORY: Confusion, headache with aortic aneurysm.    CONTRAST DOSE:  75mL Isovue-370    Radiation dose for this scan was reduced using automated exposure  control, adjustment of the mA and/or kV according to patient size, or  iterative reconstruction technique.    FINDINGS:  There is an ascending aortic aneurysm measuring up to 7.8  cm in diameter, similar if not slightly increased since 6/13/2018.  There is no evidence of aortic dissection. Tortuosity of the bilateral  common carotid and right subclavian arteries are noted. Coronary  artery calcifications are noted. Also noted is a saccular aneurysm of  the descending aorta immediately above the diaphragms measuring  approximately 4.8 x 4.9 cm, also essentially unchanged from 6/13/2018.  The lungs are essentially clear. Hepatic fatty infiltration is noted.  3.2 x 3.7 cm right adrenal mass is noted, unchanged. Multiple stones  are present within the gallbladder. Sigmoid diverticulosis is noted  without evidence of diverticulitis. Calcified uterine fibroids are  also incidentally noted.      Impression    IMPRESSION:  1. Ascending aortic large aneurysm, essentially unchanged from  6/13/2018.  2. 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  3. Ectasia of the suprarenal abdominal aorta is also noted and  unchanged.  4. Right adrenal stable 3.7 cm lesion.  5. Cholelithiasis and colonic diverticulosis.  6. Hepatic fatty infiltration--possible etiologies include consumption  of alcohol or excessive carbohydrate intake, especially  sugar/fructose.  Metabolic syndrome commonly occurs in combination  with nonalcoholic fatty liver disease. Although often reversible,  nonalcoholic fatty liver disease can progress to steatohepatitis and  cirrhosis.    PETRONA PATEL MD   Chest  XR, 1 view  PORTABLE    Narrative    CHEST PORTABLE ONE VIEW   7/2/2018 9:24 PM     COMPARISON:  Two view chest x-ray 12/21/2016.    HISTORY: Shortness of breath.       Impression    IMPRESSION: Moderate cardiomegaly again noted. There is new streaky  hyperdensity in the left lung base that could represent atelectasis.  The lungs are otherwise clear. There is no pleural effusion or  pneumothorax. There is no definite evidence for congestive failure.     NEGRITA RUEDA MD

## 2018-07-03 NOTE — PLAN OF CARE
"Problem: Patient Care Overview  Goal: Plan of Care/Patient Progress Review  OT- eval completed, tx initiated. Pt lives in a multi-level home with her . Pt states she drives and manages her own medications at baseline. She states she was IND in all ADLs PTA. Pt is a poor historian, attempted to contact pt's daughter to verify PLOF however unable to reach her via telephone.    Discharge Planner OT   Patient plan for discharge: \"to my daughter's home because it is safe and comfortable\". When prompted why not return to her own home, pt unable to answer  Current status: Pt seated in bedside chair upon OT arrival, agreeable to session. Noted pt had just returned to chair following PT session; completed toileting with PT. Pt alert to self only; inconsistent ability to follow simple 1 step commands. Completed SLUMS cognitive screen; pt scored 4/30 indicating significant cognitive deficits in the areas of executive functioning, attention, and short term memory. Pt with poor ability to attend to therapist questions throughout session and presents with significant confusion.   Barriers to return to prior living situation: significant cognitive impairment  Recommendations for discharge: TCU; pt likely will need increased services/assist following TCU stay  Rationale for recommendations: Pt presents with significant cognitive impairment and decreased level of independence in completing ADLs; pt to benefit from continued OT services to maximize safety and independence in daily activities       Entered by: Tereza Castellanos 07/03/2018 12:34 PM           "

## 2018-07-03 NOTE — PLAN OF CARE
Problem: Patient Care Overview  Goal: Plan of Care/Patient Progress Review  PT: Patient seen by physical therapy for evaluation and treatment.  Patient with PMH including COPD, HTN, 8.1 cm aortic aneurysm now admitted for confusion and dyspnea, as well as accelerated HTN.  Patient confused during session, with limited ability to provide history; attempted to call daughter for history, however daughter did not answer phone.  Patient recognized the 2WW at bedside, reported that she has a walker at home.    Discharge Planner PT   Patient plan for discharge: unknown  Current status: Patient supine upon arrival.  Required min A and direct cueing to transfer to sitting at EOB.  Patient confused, though she was in an apartment complex, unaware of month, asked if it was snowing outside.  Patient transferred to standing with min A and 2WW.  Amb 5 feet, 15 feet between bed, toilet, and bedside chair with 2WW, min A and direct, near constant cueing for safe technique.  Patient repeatedly taking hands off walker to reach for furniture (bed, sink, grab bar, etc), required assist to advance walker within room.  Patient required min A with 2WW to perform toilet transfer.  Patient required max A for donning/doffing briefs and pericares, patient incontinent.  Patient with transfers from standing to sitting, patient requires direct cueing, walker management, hand over hand assist for safe technique.  Patient remained confused during session, at one point taking off her nasal cannula and placing it in her mouth to chew on.  Patient in bedside chair with chair alarm on at end of session.  Barriers to return to prior living situation: stairs (unknown living situation, but patient does report she lives in a house, likely stairs present), reports she lives alone, cognition  Recommendations for discharge: TCU  Rationale for recommendations: Patient currently very confused, required direct and near constant verbal cueing/hand over hand  cueing for safe technique with walker management and transfers.  Patient currently requires 24/7 care secondary to high fall risk.  Patient would benefit from ongoing physical therapy in order to increase independence with mobility       Entered by: Kelsie Sherwood 07/03/2018 10:23 AM

## 2018-07-04 LAB
BACTERIA SPEC CULT: ABNORMAL
GLUCOSE BLDC GLUCOMTR-MCNC: 109 MG/DL (ref 70–99)
GLUCOSE BLDC GLUCOMTR-MCNC: 135 MG/DL (ref 70–99)
GLUCOSE BLDC GLUCOMTR-MCNC: 135 MG/DL (ref 70–99)
GLUCOSE BLDC GLUCOMTR-MCNC: 150 MG/DL (ref 70–99)
GLUCOSE BLDC GLUCOMTR-MCNC: 190 MG/DL (ref 70–99)
GLUCOSE BLDC GLUCOMTR-MCNC: 209 MG/DL (ref 70–99)
Lab: ABNORMAL
POTASSIUM SERPL-SCNC: 3.7 MMOL/L (ref 3.4–5.3)
SPECIMEN SOURCE: ABNORMAL
SPECIMEN SOURCE: ABNORMAL

## 2018-07-04 PROCEDURE — 36415 COLL VENOUS BLD VENIPUNCTURE: CPT | Performed by: INTERNAL MEDICINE

## 2018-07-04 PROCEDURE — 40000275 ZZH STATISTIC RCP TIME EA 10 MIN

## 2018-07-04 PROCEDURE — 25000132 ZZH RX MED GY IP 250 OP 250 PS 637: Performed by: INTERNAL MEDICINE

## 2018-07-04 PROCEDURE — 12000000 ZZH R&B MED SURG/OB

## 2018-07-04 PROCEDURE — 00000146 ZZHCL STATISTIC GLUCOSE BY METER IP

## 2018-07-04 PROCEDURE — 94640 AIRWAY INHALATION TREATMENT: CPT | Mod: 76

## 2018-07-04 PROCEDURE — 25000128 H RX IP 250 OP 636: Performed by: INTERNAL MEDICINE

## 2018-07-04 PROCEDURE — 40000916 ZZH STATISTIC SITTER, NIGHT HOURS

## 2018-07-04 PROCEDURE — 40000915 ZZH STATISTIC SITTER, EVENING HOURS

## 2018-07-04 PROCEDURE — 94640 AIRWAY INHALATION TREATMENT: CPT

## 2018-07-04 PROCEDURE — 84132 ASSAY OF SERUM POTASSIUM: CPT | Performed by: INTERNAL MEDICINE

## 2018-07-04 PROCEDURE — 99233 SBSQ HOSP IP/OBS HIGH 50: CPT | Performed by: INTERNAL MEDICINE

## 2018-07-04 PROCEDURE — 25000125 ZZHC RX 250: Performed by: INTERNAL MEDICINE

## 2018-07-04 RX ORDER — HALOPERIDOL 5 MG/ML
2 INJECTION INTRAMUSCULAR EVERY 6 HOURS PRN
Status: DISCONTINUED | OUTPATIENT
Start: 2018-07-04 | End: 2018-07-06 | Stop reason: HOSPADM

## 2018-07-04 RX ADMIN — ERYTHROMYCIN 1 G: 5 OINTMENT OPHTHALMIC at 12:26

## 2018-07-04 RX ADMIN — AMLODIPINE BESYLATE 10 MG: 10 TABLET ORAL at 19:39

## 2018-07-04 RX ADMIN — POTASSIUM CHLORIDE 10 MEQ: 750 TABLET, FILM COATED, EXTENDED RELEASE ORAL at 16:04

## 2018-07-04 RX ADMIN — LOSARTAN POTASSIUM 50 MG: 50 TABLET ORAL at 08:15

## 2018-07-04 RX ADMIN — IPRATROPIUM BROMIDE AND ALBUTEROL SULFATE 3 ML: .5; 3 SOLUTION RESPIRATORY (INHALATION) at 07:48

## 2018-07-04 RX ADMIN — OXYBUTYNIN CHLORIDE 10 MG: 5 TABLET, EXTENDED RELEASE ORAL at 08:15

## 2018-07-04 RX ADMIN — IPRATROPIUM BROMIDE AND ALBUTEROL SULFATE 3 ML: .5; 3 SOLUTION RESPIRATORY (INHALATION) at 11:59

## 2018-07-04 RX ADMIN — ERYTHROMYCIN 1 G: 5 OINTMENT OPHTHALMIC at 21:09

## 2018-07-04 RX ADMIN — HYDRALAZINE HYDROCHLORIDE 25 MG: 25 TABLET ORAL at 16:04

## 2018-07-04 RX ADMIN — ERYTHROMYCIN 1 G: 5 OINTMENT OPHTHALMIC at 17:49

## 2018-07-04 RX ADMIN — INSULIN ASPART 1 UNITS: 100 INJECTION, SOLUTION INTRAVENOUS; SUBCUTANEOUS at 12:26

## 2018-07-04 RX ADMIN — TORSEMIDE 10 MG: 10 TABLET ORAL at 08:16

## 2018-07-04 RX ADMIN — METHYLPREDNISOLONE SODIUM SUCCINATE 40 MG: 40 INJECTION, POWDER, FOR SOLUTION INTRAMUSCULAR; INTRAVENOUS at 08:15

## 2018-07-04 RX ADMIN — METHYLPREDNISOLONE SODIUM SUCCINATE 40 MG: 40 INJECTION, POWDER, FOR SOLUTION INTRAMUSCULAR; INTRAVENOUS at 22:28

## 2018-07-04 RX ADMIN — LEVOTHYROXINE SODIUM 112 MCG: 112 TABLET ORAL at 08:16

## 2018-07-04 RX ADMIN — IPRATROPIUM BROMIDE AND ALBUTEROL SULFATE 3 ML: .5; 3 SOLUTION RESPIRATORY (INHALATION) at 15:30

## 2018-07-04 RX ADMIN — ERYTHROMYCIN 1 G: 5 OINTMENT OPHTHALMIC at 08:17

## 2018-07-04 RX ADMIN — INSULIN ASPART 1 UNITS: 100 INJECTION, SOLUTION INTRAVENOUS; SUBCUTANEOUS at 08:17

## 2018-07-04 RX ADMIN — POTASSIUM CHLORIDE 20 MEQ: 1500 TABLET, EXTENDED RELEASE ORAL at 08:16

## 2018-07-04 RX ADMIN — HYDRALAZINE HYDROCHLORIDE 25 MG: 25 TABLET ORAL at 21:08

## 2018-07-04 RX ADMIN — POTASSIUM CHLORIDE 10 MEQ: 750 TABLET, FILM COATED, EXTENDED RELEASE ORAL at 08:16

## 2018-07-04 RX ADMIN — METOPROLOL SUCCINATE 50 MG: 50 TABLET, EXTENDED RELEASE ORAL at 08:16

## 2018-07-04 RX ADMIN — IPRATROPIUM BROMIDE AND ALBUTEROL SULFATE 3 ML: .5; 3 SOLUTION RESPIRATORY (INHALATION) at 19:14

## 2018-07-04 RX ADMIN — POTASSIUM CHLORIDE 10 MEQ: 750 TABLET, FILM COATED, EXTENDED RELEASE ORAL at 21:08

## 2018-07-04 RX ADMIN — HYDRALAZINE HYDROCHLORIDE 25 MG: 25 TABLET ORAL at 08:16

## 2018-07-04 RX ADMIN — HALOPERIDOL LACTATE 2 MG: 5 INJECTION, SOLUTION INTRAMUSCULAR at 18:39

## 2018-07-04 ASSESSMENT — ACTIVITIES OF DAILY LIVING (ADL)
ADLS_ACUITY_SCORE: 20
ADLS_ACUITY_SCORE: 18

## 2018-07-04 NOTE — PROGRESS NOTES
"[facility]     Date:7/3/2018  Admission Dx:COPD Exac  Pulmonary History:COPD  Home Nebulizer/MDI Use:None  Home Oxygen:None  Acuity Level (RCAT flow sheet):3  Aerosol Therapy initiated:Douneb QID, Alb Q2 prn      Pulmonary Hygiene initiated:Deep breath and coughing techniques      Volume Expansion initiated:IS      Current Oxygen Requirements:RA  Current SpO2:96%    Re-evaluation date:7/6/2018    Patient Education:Education performed with patient in regards to indications/benefits of bronchodilators. Will continue to educate with patient as needed.         See \"RT Assessments\" flow sheet for patient assessment scoring and Acuity Level Details.             "

## 2018-07-04 NOTE — PROGRESS NOTES
SW met with patient at bedside for assessment of discharge planning needs. Patient is conversant, and able to participate in discharge planning. Also present was her daughter Helga ([phone]). Met with patient to discuss need for skilled nursing facility at discharge. The patient was given a list of skilled nursing facilities which includes the medicare.gov website for a comprehensive list of skilled nursing facilities.      Patient and daughter have chosen  1. [facility]  [facility]. [facility], 3. [facility] 4. [facility]. [facility] faxed over referral.         JANIE Stover  [phone]

## 2018-07-04 NOTE — PROGRESS NOTES
X-cover    Called re agitation.  Chart reviewed.  Will order haldol prn.  ECG with Qtc <400      Addendum:  Called re urine cx results.   Growing enterococcus 10-50K colonies.  UA on admit with minimal changes and only 11 WBC.  No fever and WBC normal.  PCN allergy noted    Not convinced this represents true infection; may be colonization.  Favor monitoring off antibiotics for now.  If spikes a fever antibiotic would be indicated

## 2018-07-04 NOTE — PLAN OF CARE
Problem: ARDS (Acute Resp Distress Syndrome) (Adult)  Goal: Signs and Symptoms of Listed Potential Problems Will be Absent, Minimized or Managed (ARDS)  Signs and symptoms of listed potential problems will be absent, minimized or managed by discharge/transition of care (reference ARDS (Acute Resp Distress Syndrome) (Adult) CPG).   Outcome: Improving  Vitals stable.  BP improved after am medications.  Up with 1 assist and FWW and gait belt.  Oriented to self only.  Difficulty following commands at times; multiple redirections needed with activity.  High fall risk; alarms on at all times.  Denies pain.  SOB with activity.  RA SPO2.  R eye still pink; ointment per orders.  IV SL.  Tele discontinued.  Discharge plan: TBD based on placement availability.  Continue to monitor.  Daughter Xi visited today and is aware of plan as well.  SW and MD spoke with family at bedside.      Handouts on ARDS, COPD, Aneurysm given to pt with little to no verbalization of comprehension.  Will need to be reviewed with family or caregivers at later date.

## 2018-07-04 NOTE — PLAN OF CARE
Problem: ARDS (Acute Resp Distress Syndrome) (Adult)  Goal: Signs and Symptoms of Listed Potential Problems Will be Absent, Minimized or Managed (ARDS)  Signs and symptoms of listed potential problems will be absent, minimized or managed by discharge/transition of care (reference ARDS (Acute Resp Distress Syndrome) (Adult) CPG).   Outcome: No Change  VS stable. LAMB. Diminished LS. No complaints of pain. Alert x 1. Tele is sinus rhythm. Slept well throughout the night.

## 2018-07-04 NOTE — PROGRESS NOTES
Patient was seen and examined by me this morning.  Patient care was assumed today.  Patient is 83-year-old female who appears to have cognitive dysfunction admitted with COPD exacerbation and encephalopathy.  Patient is currently up in chair and eating.  She is confused.  She does not  require oxygen, she has no distress.  Lung sounds are diminished with no wheezing.  Plan is to discontinue IV fluid, discontinue telemetry and discharge planning most likely his rehab facility.  Social service and care coordinator to assist with discharge planning.  Please review my detailed progress note at a later time.

## 2018-07-04 NOTE — PROGRESS NOTES
[facility]    Hospitalist Progress Note    Date of Service (when I saw the patient): 07/04/2018    Assessment & Plan   Giselle Chino is a 83 year old female with PMH including COPD, hypertension, 8.1 cm aortic aneurysm who presents with confusion and dyspnea.  It appears she has been recently missing some of her doses of medications and presented with accelerated hypertension of 220 systolic.  Remarkably she did not have chest pain or pressure and CT imaging of her aorta shows that her very large ascending and thoracic aortic aneurysms are stable compared with the prior CT scan from over 2 weeks ago.  She is being admitted for presumed COPD exacerbation as well as blood pressure control.     The bigger picture issue here seems to be difficulty managing at home and missing medications leading to her accelerated hypertension and COPD exacerbation.  PT and OT and social work will be consulted as well.     Assessment and Plan:   \  1.  Acute COPD exacerbation: Certainly she could have some dyspnea related to accelerated hypertension from missing medications as well.     --Patient is improving, continue steroid in the form of oral prednisone, continue DuoNeb, continue oxygen supplementation as needed.  Monitor closely.   2.   Accelerated hypertension:  --  It seems as though she has not been regularly taking her medications recently which I think is at least in part to blame for her severe hypertension.  There is no history of focal neurologic deficits to suggest stroke.  Given #3 below I would plan on very aggressive blood pressure control to minimize any risk of dissection.    -- resume her home regimen including amlodipine, metoprolol and Cozaar and also have IV hydralazine and labetalol available for blood pressures greater than 160 systolic.    --As noted above she does not have any chest or abdominal pain to suggest an aortic dissection at this point and CT imaging shows that her aorta is quite  stable compared with 2+ weeks ago.  --Increaseg Toprol to 50 mg daily and Norvasc to 10 mg daily  --Main issue with this patient is compliance, continue to monitor blood pressure     3.   History of very large ascending aortic and thoracic aortic aneurysms: her ascending aortic aneurysm was 7.8 cm and abdominal aneurysm was 7.4 cm in diameter as far back as 2013 based upon a CT scan of that time.  CT imaging shows that her aneurysms are stable compared with the prior study from about 2 weeks ago but at that time her aneurysms had enlarged slightly compared with the prior study from 2013.  In any case I would focus on good blood pressure control to minimize any risk of dissection. The ER provider, Dr. Cardona actually did call her vascular surgeon Dr. Morris to review the case.  It was reported to me that she is not considered a surgical candidate due to very high risk and the current plan is for monitoring.     4.  Hypoglycemia: Her blood sugar was as low as 61 in the emergency room.    Patient blood sugar is elevated now, blood sugar is 150.  Continue to monitor on hypoglycemia protocol      5.  Confusion:  Acute encephalopathy suspected due to underlying cognitive dysfunction which is not diagnosed.  Patient remains confused but she remains pleasant and no evidence of agitation or psychosis.   --There is no focal neurologic deficit, no indication of cerebrovascular accident.   --I spoke with her daughter at bedside and further care plan discussed.  Patient definitely needs assistance with management of her medications as well as safety at home.  Social service and care coordination consulted to assist with further management of discharge needs.     T   7.  Bacterial conjunctivitis: Her right eye has obvious purulent drainage with conjunctivitis.    Continue  with erythromycin 4 times daily for 5 days total.  Monitor.     DVT Prophylaxis: Pneumatic Compression Devices  Code Status: Full Code.     Disposition Plan   patient is medically stable but she appears to be unsafe for discharge to prior to living arrangements.  I spoke with patient daughter at bedside and her concerns and questions addressed.  Patient count be discharged to alternative facilities including transition unit versus long-term unit.  Social service and care coordinator consulted to assist with this.     Entered: bJ Cadet 07/04/2018, 1:54 PM         Jb Cadet MD      Interval History       Patient is doing well in terms of new complaints.  She has no chest pain or shortness of breath.  No fever chills.  She is up in chair and eating, she appears to be confused and she is disoriented ×2.  She knows her daughter otherwise she is not oriented to time place     -Data reviewed today: I reviewed all new labs and imaging results over the last 24 hours. I personally reviewed no images or EKG's today.    Physical Exam   Temp: 95  F (35  C) Temp src: Oral BP: 148/49   Heart Rate: 66 Resp: 18 SpO2: 97 % O2 Device: None (Room air)    Vitals:    07/02/18 1631   Weight: 67.3 kg (148 lb 4.8 oz)     Vital Signs with Ranges  Temp:  [95  F (35  C)-96.3  F (35.7  C)] 95  F (35  C)  Heart Rate:  [66-96] 66  Resp:  [16-18] 18  BP: (121-164)/(42-60) 148/49  SpO2:  [95 %-99 %] 97 %  I/O last 3 completed shifts:  In: 1688 [P.O.:560; I.V.:1128]  Out: -     Constitutional: Awake, alert, cooperative, no apparent distress, sitting in chair   Respiratory: Clear to auscultation bilaterally with moderate decrease in breath sounds, no crackles or wheezing   Cardiovascular: Regular rate and rhythm, normal S1 and S2, and no murmur noted   Abdomen: Normal bowel sounds, soft, non-distended, non-tender, no hepatosplenomegaly    Skin: No rashes, no cyanosis, dry to touch   Neuro: Alert and oriented ×1.   Extremities: No edema, normal range of motion   Other(s): Eye with surrounding redness and irritation of eyelid on right        All other systems: Negative       Medications        amLODIPine  10 mg Oral Daily     erythromycin   Right Eye 4x Daily     hydrALAZINE  25 mg Oral TID     insulin aspart  1-3 Units Subcutaneous TID AC     insulin aspart  1-3 Units Subcutaneous At Bedtime     ipratropium - albuterol 0.5 mg/2.5 mg/3 mL  3 mL Nebulization 4x daily     levothyroxine  112 mcg Oral Daily     losartan  50 mg Oral Daily     methylPREDNISolone  40 mg Intravenous BID     metoprolol succinate  50 mg Oral Daily     oxybutynin  10 mg Oral Daily     potassium chloride  10 mEq Oral TID     sodium chloride (PF)  3 mL Intracatheter Q8H     torsemide  10 mg Oral Daily       Data     Recent Labs  Lab 07/04/18  0639 07/03/18  0620 07/02/18  1729 07/02/18  1725   WBC  --   --   --  6.9   HGB  --   --   --  12.9   MCV  --   --   --  92   PLT  --   --   --  230   INR  --   --   --  1.04   NA  --  141  --  144   POTASSIUM 3.7 3.7  --  3.2*   CHLORIDE  --  109  --  107   CO2  --  21  --  25   BUN  --  8  --  12   CR  --  0.63  --  0.81   ANIONGAP  --  11  --  12   KRISTEL  --  9.0  --  9.9   GLC  --  148*  --  73   ALBUMIN  --   --   --  4.2   PROTTOTAL  --   --   --  7.8   BILITOTAL  --   --   --  1.4*   ALKPHOS  --   --   --  96   ALT  --   --   --  22   AST  --   --   --  12   TROPONIN  --   --  0.01  --        Imaging:  No results found for this or any previous visit (from the past 24 hour(s)).

## 2018-07-04 NOTE — PLAN OF CARE
Problem: ARDS (Acute Resp Distress Syndrome) (Adult)  Goal: Signs and Symptoms of Listed Potential Problems Will be Absent, Minimized or Managed (ARDS)  Signs and symptoms of listed potential problems will be absent, minimized or managed by discharge/transition of care (reference ARDS (Acute Resp Distress Syndrome) (Adult) CPG).   Outcome: Improving  Pt AOx1 w/ forgetfulness and hallucinations noted.  At beginning of shift pt pulled out IV from right upper arm. New IV placed to right hand infusing D5NS @75ml/hr. Pt ext.1 w/ walker. Pt incont of B&B - brief in place. LS = diminished to all fields w/ nonproductive cough. BG checks 297 & 220 - sliding scale utilized. VS WDL. Pt resting.

## 2018-07-05 ENCOUNTER — APPOINTMENT (OUTPATIENT)
Dept: PHYSICAL THERAPY | Facility: CLINIC | Age: 83
DRG: 190 | End: 2018-07-05
Payer: COMMERCIAL

## 2018-07-05 LAB
GLUCOSE BLDC GLUCOMTR-MCNC: 116 MG/DL (ref 70–99)
GLUCOSE BLDC GLUCOMTR-MCNC: 139 MG/DL (ref 70–99)
GLUCOSE BLDC GLUCOMTR-MCNC: 142 MG/DL (ref 70–99)
GLUCOSE BLDC GLUCOMTR-MCNC: 187 MG/DL (ref 70–99)
POTASSIUM SERPL-SCNC: 4.4 MMOL/L (ref 3.4–5.3)

## 2018-07-05 PROCEDURE — 25000125 ZZHC RX 250: Performed by: INTERNAL MEDICINE

## 2018-07-05 PROCEDURE — 25000128 H RX IP 250 OP 636: Performed by: INTERNAL MEDICINE

## 2018-07-05 PROCEDURE — 25000132 ZZH RX MED GY IP 250 OP 250 PS 637: Performed by: INTERNAL MEDICINE

## 2018-07-05 PROCEDURE — 12000000 ZZH R&B MED SURG/OB

## 2018-07-05 PROCEDURE — 94640 AIRWAY INHALATION TREATMENT: CPT | Mod: 76

## 2018-07-05 PROCEDURE — 40000193 ZZH STATISTIC PT WARD VISIT: Performed by: PHYSICAL THERAPY ASSISTANT

## 2018-07-05 PROCEDURE — 36415 COLL VENOUS BLD VENIPUNCTURE: CPT | Performed by: INTERNAL MEDICINE

## 2018-07-05 PROCEDURE — 97530 THERAPEUTIC ACTIVITIES: CPT | Mod: GP | Performed by: PHYSICAL THERAPY ASSISTANT

## 2018-07-05 PROCEDURE — 84132 ASSAY OF SERUM POTASSIUM: CPT | Performed by: INTERNAL MEDICINE

## 2018-07-05 PROCEDURE — 94640 AIRWAY INHALATION TREATMENT: CPT

## 2018-07-05 PROCEDURE — 99233 SBSQ HOSP IP/OBS HIGH 50: CPT | Performed by: INTERNAL MEDICINE

## 2018-07-05 PROCEDURE — 40000914 ZZH STATISTIC SITTER, DAY HOURS

## 2018-07-05 PROCEDURE — 00000146 ZZHCL STATISTIC GLUCOSE BY METER IP

## 2018-07-05 PROCEDURE — 40000275 ZZH STATISTIC RCP TIME EA 10 MIN

## 2018-07-05 RX ORDER — BENZTROPINE MESYLATE 1 MG/1
1-2 TABLET ORAL 3 TIMES DAILY PRN
Status: DISCONTINUED | OUTPATIENT
Start: 2018-07-05 | End: 2018-07-06 | Stop reason: HOSPADM

## 2018-07-05 RX ORDER — PREDNISONE 20 MG/1
40 TABLET ORAL DAILY
Status: DISCONTINUED | OUTPATIENT
Start: 2018-07-05 | End: 2018-07-06 | Stop reason: HOSPADM

## 2018-07-05 RX ADMIN — INSULIN ASPART 1 UNITS: 100 INJECTION, SOLUTION INTRAVENOUS; SUBCUTANEOUS at 12:49

## 2018-07-05 RX ADMIN — IPRATROPIUM BROMIDE AND ALBUTEROL SULFATE 3 ML: .5; 3 SOLUTION RESPIRATORY (INHALATION) at 15:37

## 2018-07-05 RX ADMIN — METHYLPREDNISOLONE SODIUM SUCCINATE 40 MG: 40 INJECTION, POWDER, FOR SOLUTION INTRAMUSCULAR; INTRAVENOUS at 09:02

## 2018-07-05 RX ADMIN — IPRATROPIUM BROMIDE AND ALBUTEROL SULFATE 3 ML: .5; 3 SOLUTION RESPIRATORY (INHALATION) at 19:15

## 2018-07-05 RX ADMIN — HYDRALAZINE HYDROCHLORIDE 25 MG: 25 TABLET ORAL at 08:04

## 2018-07-05 RX ADMIN — POTASSIUM CHLORIDE 10 MEQ: 750 TABLET, FILM COATED, EXTENDED RELEASE ORAL at 15:48

## 2018-07-05 RX ADMIN — POTASSIUM CHLORIDE 10 MEQ: 750 TABLET, FILM COATED, EXTENDED RELEASE ORAL at 08:04

## 2018-07-05 RX ADMIN — INSULIN ASPART 1 UNITS: 100 INJECTION, SOLUTION INTRAVENOUS; SUBCUTANEOUS at 17:14

## 2018-07-05 RX ADMIN — ERYTHROMYCIN 1 G: 5 OINTMENT OPHTHALMIC at 21:37

## 2018-07-05 RX ADMIN — AMLODIPINE BESYLATE 10 MG: 10 TABLET ORAL at 21:36

## 2018-07-05 RX ADMIN — TORSEMIDE 10 MG: 10 TABLET ORAL at 08:04

## 2018-07-05 RX ADMIN — ERYTHROMYCIN 1 G: 5 OINTMENT OPHTHALMIC at 08:04

## 2018-07-05 RX ADMIN — HYDRALAZINE HYDROCHLORIDE 25 MG: 25 TABLET ORAL at 15:48

## 2018-07-05 RX ADMIN — LOSARTAN POTASSIUM 50 MG: 50 TABLET ORAL at 08:04

## 2018-07-05 RX ADMIN — OXYBUTYNIN CHLORIDE 10 MG: 5 TABLET, EXTENDED RELEASE ORAL at 08:07

## 2018-07-05 RX ADMIN — LEVOTHYROXINE SODIUM 112 MCG: 112 TABLET ORAL at 08:04

## 2018-07-05 RX ADMIN — POTASSIUM CHLORIDE 10 MEQ: 750 TABLET, FILM COATED, EXTENDED RELEASE ORAL at 21:36

## 2018-07-05 RX ADMIN — METOPROLOL SUCCINATE 50 MG: 50 TABLET, EXTENDED RELEASE ORAL at 08:04

## 2018-07-05 RX ADMIN — IPRATROPIUM BROMIDE AND ALBUTEROL SULFATE 3 ML: .5; 3 SOLUTION RESPIRATORY (INHALATION) at 10:58

## 2018-07-05 RX ADMIN — ERYTHROMYCIN 1 G: 5 OINTMENT OPHTHALMIC at 19:26

## 2018-07-05 RX ADMIN — ERYTHROMYCIN 1 G: 5 OINTMENT OPHTHALMIC at 12:48

## 2018-07-05 RX ADMIN — HYDRALAZINE HYDROCHLORIDE 25 MG: 25 TABLET ORAL at 21:36

## 2018-07-05 ASSESSMENT — ACTIVITIES OF DAILY LIVING (ADL)
ADLS_ACUITY_SCORE: 18

## 2018-07-05 NOTE — PHARMACY-CONSULT NOTE
7/5/2018   Reviewed pateint's PTA and inpatient medications. The following PTA medication was identified as to potentially contributing to delirium due to high anticholinergic properties: Oxybutynin XL. - Low dose Detrol, Ditropan XL may be better if needed, per Beer's Criteria. Pharmacy will continue to follow as new medications are ordered.   Darlin Cardenas, PharmD

## 2018-07-05 NOTE — PROGRESS NOTES
Your information has been submitted on July 05th, 2018 at 03:31:19 PM CDT. The confirmation number is [id number]

## 2018-07-05 NOTE — PROGRESS NOTES
Patient was seen and examined by me this morning.  Patient's daughter at bedside.  Events from last night noted and patient had episode of agitation requiring Haldol injection with improvement of her symptoms.  Patient up in chair, no new complaints.  She has no fever chills.  No urinary symptoms.  No shortness of breath or chest pain.  Urine culture noted for enterococcus 10-15k colony-forming units with no symptoms of urinary tract infection.  Patient daughter is concerned about patient's agitation and decline in function.  Plan is to start on delirium treatment protocol, reorientation, avoiding sleep interruptions, hold antibiotics as patient has no symptoms, change steroid to oral and may discontinue on discharge.  We will continue physical therapy, occupational therapy and discharge planning likely in the next 1 or 2 days.

## 2018-07-05 NOTE — PLAN OF CARE
Problem: Patient Care Overview  Goal: Plan of Care/Patient Progress Review  Outcome: Declining    Pt is alert to self only, VSS. Pt denies pain, headache, dizziness, n/v & SOB. Pt very confused to situation, place, and time. Pt tried several times to leave room/unit. Tried redirecting, but became increasingly agitated. Administered 0.2mg of Haldol and now has bedside attendant. R eye still pink and drainage, ointment per orders. IV SL. , 135. Discharge plan to TCU based on placement availability. Urine culture positive for ENTEROCOCCUS FAECALIS . Will continue with plan of care.

## 2018-07-05 NOTE — PLAN OF CARE
Problem: ARDS (Acute Resp Distress Syndrome) (Adult)  Goal: Signs and Symptoms of Listed Potential Problems Will be Absent, Minimized or Managed (ARDS)  Signs and symptoms of listed potential problems will be absent, minimized or managed by discharge/transition of care (reference ARDS (Acute Resp Distress Syndrome) (Adult) CPG).   Outcome: No Change  VS stable. No complaints of pain. Diminished LS with non productive cough. LAMB. Alert x 1. Slept well throughout the night.

## 2018-07-05 NOTE — PROGRESS NOTES
Discharge Planner   Discharge Plans in progress: Pt has been accepted for TCU for tomorrow at [facility]   Barriers to discharge plan: none. Family plan to transport at [place]   Follow up plan: [facility] for tomorrow if pt remains sitter free        Entered by: Corinne C. White 07/05/2018 3:17 PM

## 2018-07-05 NOTE — PLAN OF CARE
Problem: Patient Care Overview  Goal: Plan of Care/Patient Progress Review  Outcome: Declining  /40 (BP Location: Left arm)  Temp 97.7  F (36.5  C) (Oral)  Resp 16  Wt 68.3 kg (150 lb 9 oz)  SpO2 95%  BMI 25.84 kg/m2    Pt very weak today after receiving Haldol yesterday evening. Now up with SS-was up with assist x1 and walker. PSC dc'd-alarms activated. Daughter here this AM and updated. No agitation this shift. Started on delirium protocol. Some assistance needed with setting up meals. IV steroids changed to PO. Difficulty working with therapies today d/t increased weakness. K+ 4.4-no replacement needed per protocol. , 159. DC next 1-2 days per MD. Eye drops for right eye conjunctivitis.

## 2018-07-05 NOTE — PLAN OF CARE
Problem: Patient Care Overview  Goal: Plan of Care/Patient Progress Review  Discharge Planner PT   Patient plan for discharge: not sure  Current status: dependant at time of PT session RN aware and feels is side effect of meds from over night. Currnelty unalbe to move UE and LE other than open and close hands unable to sit self forward in chair. RN aware of this.  Barriers to return to prior living situation: mobility safety  Recommendations for discharge: PT- Per plan established by the Physical Therapist, the discharge recommendation is TCU    Rationale for recommendations: unable to complete meaningful session with PT this date due to meds       Entered by: Marci Good 07/05/2018 9:53 AM

## 2018-07-05 NOTE — PLAN OF CARE
OT- Per chart review and discussion with PTA, pt very limited in functional activity this AM secondary to medication side effects; will check back again this afternoon otherwise will reschedule for tomorrow as appropriate.

## 2018-07-05 NOTE — PROGRESS NOTES
[facility]    Hospitalist Progress Note    Date of Service (when I saw the patient): 07/05/2018    Assessment & Plan      Giselle Chino is a 83 year old female with PMH including COPD, hypertension, 8.1 cm aortic aneurysm who presents with confusion and dyspnea.  It appears she has been recently missing some of her doses of medications and presented with accelerated hypertension of 220 systolic.  Remarkably she did not have chest pain or pressure and CT imaging of her aorta shows that her very large ascending and thoracic aortic aneurysms are stable compared with the prior CT scan from over 2 weeks ago.  She is being admitted for presumed COPD exacerbation as well as blood pressure control.     The bigger picture issue here seems to be difficulty managing at home and missing medications leading to her accelerated hypertension and COPD exacerbation.  PT and OT and social work will be consulted as well.     Assessment and Plan:     1.  Acute COPD exacerbation:  -- improved on nebs and iv steroid   -- continue nebs, change steroid to oral with quick taper     2.   Accelerated hypertension:  -- likely from non compliance due to cognitive dysfucntion   -- BP better now ,continue current medications--Increased  Toprol to 50 mg daily and Norvasc to 10 mg daily  --needs increased level of care on discharge      3.   History of very large ascending aortic and thoracic aortic aneurysms: her ascending aortic aneurysm was 7.8 cm and abdominal aneurysm was 7.4 cm in diameter as far back as 2013 based upon a CT scan of that time.  CT imaging shows that her aneurysms are stable compared with the prior study from about 2 weeks ago but at that time her aneurysms had enlarged slightly compared with the prior study from 2013.  In any case I would focus on good blood pressure control to minimize any risk of dissection. The ER provider, Dr. Cardona actually did call her vascular surgeon Dr. Morris to review the case.   It was reported to me that she is not considered a surgical candidate due to very high risk and the current plan is for monitoring.     4.  Hypoglycemia: Her blood sugar was as low as 61 in the emergency room.    Patient blood sugar is elevated now, blood sugar is 116.  Continue to monitor on hypoglycemia protocol      5.  Confusion:  Acute encephalopathy suspected due to underlying cognitive dysfunction which is not diagnosed.  Patient remains confused but she remains pleasant and no evidence of agitation or psychosis.   --There is no focal neurologic deficit, no indication of cerebrovascular accident.   --I spoke with her daughter at bedside and further care plan discussed.  Patient definitely needs assistance with management of her medications as well as safety at home.  Social service and care coordination consulted to assist with further management of discharge needs.    7.  Bacterial conjunctivitis: Her right eye has obvious purulent drainage with conjunctivitis.    Continue  with erythromycin 4 times daily for 5 days total  Day#3 today .    8.Generalized weakness and medication administration safety issues   -- PT/OT and SW   -- may need TCU   9.Acute delirium with agitation last night requiring haldol   -- better this morning , pleasant   -- start delirium protocol with Seroquel   -- monitor , may need qhs dosing if she remains delirious     10.Urine culture noted for enterococcus 10-15k colony-forming units with no symptoms of urinary tract infection.     DVT Prophylaxis: Pneumatic Compression Devices  Code Status: Full Code.     Disposition Plan   May discharge to TCU vs Assisted living in next 1-2 days if no acute event overnight   Daughter questions addressed and her concerns discussed as well. Patient is not able to return home due to safety issues           Entered: Jb Cadet 07/05/2018, 2:59 PM         Jb Cadet MD      Interval History     Patient was seen and examined by me this  morning.  Patient's daughter at bedside.  Events from last night noted and patient had episode of agitation requiring Haldol injection with improvement of her symptoms.  Patient up in chair, no new complaints.  She has no fever chills.  No urinary symptoms.  No shortness of breath or chest pain.    -Data reviewed today: I reviewed all new labs and imaging results over the last 24 hours. I personally reviewed no images or EKG's today.    Physical Exam   Temp: 97.7  F (36.5  C) Temp src: Oral BP: 119/40   Heart Rate: 63 Resp: 16 SpO2: 95 % O2 Device: None (Room air)    Vitals:    07/02/18 1631 07/05/18 0546   Weight: 67.3 kg (148 lb 4.8 oz) 68.3 kg (150 lb 9 oz)     Vital Signs with Ranges  Temp:  [96  F (35.6  C)-97.7  F (36.5  C)] 97.7  F (36.5  C)  Heart Rate:  [63-71] 63  Resp:  [16-18] 16  BP: (119-158)/(40-46) 119/40  SpO2:  [95 %-99 %] 95 %  I/O last 3 completed shifts:  In: 972 [P.O.:780; I.V.:192]  Out: -     Constitutional: Awake, alert, cooperative, no apparent distress, sitting in chair   Respiratory: Clear to auscultation bilaterally with moderate decrease in breath sounds, no crackles or wheezing   Cardiovascular: Regular rate and rhythm, normal S1 and S2, and no murmur noted   Abdomen: Normal bowel sounds, soft, non-distended, non-tender, no hepatosplenomegaly    Skin: No rashes, no cyanosis, dry to touch   Neuro: Alert and oriented ×1.   Extremities: No edema, normal range of motion   Other(s): Eye with surrounding redness and irritation of eyelid on right        All other systems: Negative       Medications       amLODIPine  10 mg Oral Daily     erythromycin   Right Eye 4x Daily     hydrALAZINE  25 mg Oral TID     insulin aspart  1-3 Units Subcutaneous TID AC     insulin aspart  1-3 Units Subcutaneous At Bedtime     ipratropium - albuterol 0.5 mg/2.5 mg/3 mL  3 mL Nebulization 4x daily     levothyroxine  112 mcg Oral Daily     losartan  50 mg Oral Daily     metoprolol succinate  50 mg Oral Daily      oxybutynin  10 mg Oral Daily     potassium chloride  10 mEq Oral TID     predniSONE  40 mg Oral Daily     sodium chloride (PF)  3 mL Intracatheter Q8H     torsemide  10 mg Oral Daily       Data     Recent Labs  Lab 07/05/18  0645 07/04/18  0639 07/03/18  0620 07/02/18  1729 07/02/18  1725   WBC  --   --   --   --  6.9   HGB  --   --   --   --  12.9   MCV  --   --   --   --  92   PLT  --   --   --   --  230   INR  --   --   --   --  1.04   NA  --   --  141  --  144   POTASSIUM 4.4 3.7 3.7  --  3.2*   CHLORIDE  --   --  109  --  107   CO2  --   --  21  --  25   BUN  --   --  8  --  12   CR  --   --  0.63  --  0.81   ANIONGAP  --   --  11  --  12   KRISTEL  --   --  9.0  --  9.9   GLC  --   --  148*  --  73   ALBUMIN  --   --   --   --  4.2   PROTTOTAL  --   --   --   --  7.8   BILITOTAL  --   --   --   --  1.4*   ALKPHOS  --   --   --   --  96   ALT  --   --   --   --  22   AST  --   --   --   --  12   TROPONIN  --   --   --  0.01  --        Imaging:  No results found for this or any previous visit (from the past 24 hour(s)).

## 2018-07-06 VITALS
RESPIRATION RATE: 16 BRPM | OXYGEN SATURATION: 94 % | TEMPERATURE: 95.3 F | BODY MASS INDEX: 26.02 KG/M2 | WEIGHT: 151.56 LBS

## 2018-07-06 LAB
GLUCOSE BLDC GLUCOMTR-MCNC: 122 MG/DL (ref 70–99)
GLUCOSE BLDC GLUCOMTR-MCNC: 159 MG/DL (ref 70–99)
GLUCOSE BLDC GLUCOMTR-MCNC: 89 MG/DL (ref 70–99)

## 2018-07-06 PROCEDURE — 99239 HOSP IP/OBS DSCHRG MGMT >30: CPT | Performed by: INTERNAL MEDICINE

## 2018-07-06 PROCEDURE — 94640 AIRWAY INHALATION TREATMENT: CPT

## 2018-07-06 PROCEDURE — 25000125 ZZHC RX 250: Performed by: INTERNAL MEDICINE

## 2018-07-06 PROCEDURE — 25000132 ZZH RX MED GY IP 250 OP 250 PS 637: Performed by: INTERNAL MEDICINE

## 2018-07-06 PROCEDURE — 40000275 ZZH STATISTIC RCP TIME EA 10 MIN

## 2018-07-06 PROCEDURE — 00000146 ZZHCL STATISTIC GLUCOSE BY METER IP

## 2018-07-06 RX ORDER — ERYTHROMYCIN 5 MG/G
0.5 OINTMENT OPHTHALMIC 2 TIMES DAILY
Refills: 0 | DISCHARGE
Start: 2018-07-06 | End: 2018-07-09

## 2018-07-06 RX ORDER — PREDNISONE 20 MG/1
20 TABLET ORAL DAILY
Qty: 3 TABLET | Refills: 0 | DISCHARGE
Start: 2018-07-07 | End: 2019-12-06

## 2018-07-06 RX ORDER — HYDRALAZINE HYDROCHLORIDE 25 MG/1
25 TABLET, FILM COATED ORAL 3 TIMES DAILY
Refills: 0 | DISCHARGE
Start: 2018-07-06 | End: 2019-04-01

## 2018-07-06 RX ORDER — IPRATROPIUM BROMIDE AND ALBUTEROL SULFATE 2.5; .5 MG/3ML; MG/3ML
1 SOLUTION RESPIRATORY (INHALATION) EVERY 6 HOURS PRN
Refills: 0 | Status: ON HOLD | DISCHARGE
Start: 2018-07-06 | End: 2023-01-01

## 2018-07-06 RX ADMIN — OXYBUTYNIN CHLORIDE 10 MG: 5 TABLET, EXTENDED RELEASE ORAL at 08:56

## 2018-07-06 RX ADMIN — METOPROLOL SUCCINATE 50 MG: 50 TABLET, EXTENDED RELEASE ORAL at 08:57

## 2018-07-06 RX ADMIN — POTASSIUM CHLORIDE 10 MEQ: 750 TABLET, FILM COATED, EXTENDED RELEASE ORAL at 08:56

## 2018-07-06 RX ADMIN — LOSARTAN POTASSIUM 50 MG: 50 TABLET ORAL at 08:56

## 2018-07-06 RX ADMIN — ERYTHROMYCIN 1 G: 5 OINTMENT OPHTHALMIC at 08:45

## 2018-07-06 RX ADMIN — PREDNISONE 40 MG: 20 TABLET ORAL at 08:56

## 2018-07-06 RX ADMIN — TORSEMIDE 10 MG: 10 TABLET ORAL at 08:56

## 2018-07-06 RX ADMIN — HYDRALAZINE HYDROCHLORIDE 25 MG: 25 TABLET ORAL at 08:56

## 2018-07-06 RX ADMIN — LEVOTHYROXINE SODIUM 112 MCG: 112 TABLET ORAL at 08:56

## 2018-07-06 RX ADMIN — IPRATROPIUM BROMIDE AND ALBUTEROL SULFATE 3 ML: .5; 3 SOLUTION RESPIRATORY (INHALATION) at 07:20

## 2018-07-06 ASSESSMENT — ACTIVITIES OF DAILY LIVING (ADL)
ADLS_ACUITY_SCORE: 18
ADLS_ACUITY_SCORE: 20
ADLS_ACUITY_SCORE: 18

## 2018-07-06 NOTE — PLAN OF CARE
Problem: ARDS (Acute Resp Distress Syndrome) (Adult)  Goal: Signs and Symptoms of Listed Potential Problems Will be Absent, Minimized or Managed (ARDS)  Signs and symptoms of listed potential problems will be absent, minimized or managed by discharge/transition of care (reference ARDS (Acute Resp Distress Syndrome) (Adult) CPG).   Outcome: No Change  VS stable. LAMB. Diminished LS with non productive cough. Alert x 1. No complaints of pain. Slept well throughout the night.
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
## 2018-07-06 NOTE — PLAN OF CARE
Problem: Patient Care Overview  Goal: Plan of Care/Patient Progress Review  Discharging to TCU. Mentation and alertness seems to be getting closer to baseline per families assessment. All belongings with patient and family. Questions answered.

## 2018-07-06 NOTE — PLAN OF CARE
Problem: Patient Care Overview  Goal: Plan of Care/Patient Progress Review  Occupational Therapy Discharge Summary    Reason for therapy discharge:    Discharged to transitional care facility.    Progress towards therapy goal(s). See goals on Care Plan in [facility] electronic health record for goal details.  Goals not met.  Barriers to achieving goals:   limited tolerance for therapy and discharge from facility.    Therapy recommendation(s):    Continued therapy is recommended.  Rationale/Recommendations:  to maximize ADL and functional mobility I as well as increased activity and assess cognitive status for safe discharge home.     *Patient not seen by discharging OT, information for discharge summary was taken from treating OT notes.*

## 2018-07-06 NOTE — PLAN OF CARE
Problem: Patient Care Overview  Goal: Plan of Care/Patient Progress Review  PT- unable to see prior to expected discharge at 1130 to TCU. Goals were not met at best performance ( per chart review) with PT staff gait to 15' with RW and 1 assist, basic transfers also with 1 assist.

## 2018-07-06 NOTE — PLAN OF CARE
Problem: Patient Care Overview  Goal: Plan of Care/Patient Progress Review  Physical Therapy Discharge Summary     Reason for therapy discharge:    Discharged to transitional care facility.     Progress towards therapy goal(s). See goals on Care Plan in [facility] electronic health record for goal details.  Goals not met.  Barriers to achieving goals:   limited tolerance for therapy.     Therapy recommendation(s):    Continued therapy is recommended.  Rationale/Recommendations:  Pt is below baseline with functional mobility and strength and would benefit from continued PT to progress skills.

## 2018-07-06 NOTE — PLAN OF CARE
Problem: Patient Care Overview  Goal: Plan of Care/Patient Progress Review  Outcome: Declining  Pt is alert to self only, VSS. Pt denies pain, headache, dizziness, n/v & SOB. Pt very confused to situation, place, and time. R eye still pink and drainage, ointment per orders. IV SL. , 187. Pt mobility greatly diminished, pt not able to stand or bear weight, used Latia-steady for all transfers. Pt affect withdrawn. Discharge plan to TCU based on placement availability. Will continue with plan of care.

## 2018-07-06 NOTE — PROGRESS NOTES
SWS  Pt has d/c orders and family plans to transport.  SW sent orders to [facility].  They request pt leave at 11:30.  Updated family.  P:  No further SW needs

## 2018-07-08 LAB
BACTERIA SPEC CULT: NO GROWTH
BACTERIA SPEC CULT: NO GROWTH
Lab: NORMAL
Lab: NORMAL
SPECIMEN SOURCE: NORMAL
SPECIMEN SOURCE: NORMAL

## 2018-07-08 NOTE — PROGRESS NOTES
Hand-off for Care Transitions to Next Level of Care Provider  Name: Giselle Chino  : 1935  MRN #: [id number]  Reason for Hospitalization:  Shortness of breath [R06.02]  Confusion [R41.0]  COPD exacerbation (H) [J44.1]  Ascending aortic aneurysm (H) [I71.2]  Hypertensive urgency [I16.0]  Admit Date/Time: 2018  4:38 PM  Discharge Date: 2018    Reason for Communication Hand-off Referral: Admission diagnoses: COPD    Discharge Plan:  Discharged to: TCU: [facility]                   Patient agreeable to post-hospital support suggestions:  Yes    Patient is on new medications:   Yes    MTM follow up recommended: No    Tel-Assurance program:  Ineligible    Patient will receive  TCU  at:  Discharge to [facility].     Follow-up specialty is recommended: Yes. Recommend follow up with OT & PT while at TCU.     Follow-up plan:  No future appointments.    Any outstanding tests or procedures:  No. Recommend give two-step Mantoux (PPD) Per Facility Policy       Referrals     Future Labs/Procedures    Occupational Therapy Adult Consult     Comments:    Evaluate and treat as clinically indicated.    Reason:  Deconditioned weak    Physical Therapy Adult Consult     Comments:    Evaluate and treat as clinically indicated.    Reason:  *Deconditioned weak          Key Recommendations:  Pt admitted with COPD exacerbation. She is being treated with O2, IV solumedrol and nebs. She is confused and can be uncooperative at times. She is being followed by PT/OT and recommendations are for TCU on dc. She was discharged to [facility] with Combivent, Duoneb & Deltasone.     Marci Parham    Communicated handoff via EPIC Comm Mgt to Dr. Shoaib Mills's CC at [phone].      Sent by Ashanti Freeman, RN, BSN, CTS  [facility]  [phone]

## 2018-07-09 ENCOUNTER — RECORDS - HEALTHEAST (OUTPATIENT)
Dept: LAB | Facility: CLINIC | Age: 83
End: 2018-07-09

## 2018-07-10 LAB
25(OH)D3 SERPL-MCNC: 11.3 NG/ML (ref 30–80)
ANION GAP SERPL CALCULATED.3IONS-SCNC: 9 MMOL/L (ref 5–18)
BUN SERPL-MCNC: 31 MG/DL (ref 8–28)
CALCIUM SERPL-MCNC: 10.1 MG/DL (ref 8.5–10.5)
CHLORIDE BLD-SCNC: 106 MMOL/L (ref 98–107)
CO2 SERPL-SCNC: 26 MMOL/L (ref 22–31)
CREAT SERPL-MCNC: 0.8 MG/DL (ref 0.6–1.1)
ERYTHROCYTE [DISTWIDTH] IN BLOOD BY AUTOMATED COUNT: 13.1 % (ref 11–14.5)
GFR SERPL CREATININE-BSD FRML MDRD: >60 ML/MIN/1.73M2
GLUCOSE BLD-MCNC: 117 MG/DL (ref 70–125)
HCT VFR BLD AUTO: 42.7 % (ref 35–47)
HGB BLD-MCNC: 13.9 G/DL (ref 12–16)
MCH RBC QN AUTO: 30.3 PG (ref 27–34)
MCHC RBC AUTO-ENTMCNC: 32.6 G/DL (ref 32–36)
MCV RBC AUTO: 93 FL (ref 80–100)
PLATELET # BLD AUTO: 237 THOU/UL (ref 140–440)
PMV BLD AUTO: 12.7 FL (ref 8.5–12.5)
POTASSIUM BLD-SCNC: 3.8 MMOL/L (ref 3.5–5)
RBC # BLD AUTO: 4.59 MILL/UL (ref 3.8–5.4)
SODIUM SERPL-SCNC: 141 MMOL/L (ref 136–145)
TSH SERPL DL<=0.005 MIU/L-ACNC: 16.82 UIU/ML (ref 0.3–5)
VIT B12 SERPL-MCNC: 581 PG/ML (ref 213–816)
WBC: 11.2 THOU/UL (ref 4–11)

## 2018-07-12 ENCOUNTER — RECORDS - HEALTHEAST (OUTPATIENT)
Dept: LAB | Facility: CLINIC | Age: 83
End: 2018-07-12

## 2018-07-18 ENCOUNTER — RECORDS - HEALTHEAST (OUTPATIENT)
Dept: LAB | Facility: CLINIC | Age: 83
End: 2018-07-18

## 2018-07-18 LAB
C DIFF TOX B STL QL: NEGATIVE
RIBOTYPE 027/NAP1/BI: NORMAL
SHIGA TOXIN 1: NEGATIVE
SHIGA TOXIN 2: NEGATIVE

## 2018-07-20 LAB — BACTERIA SPEC CULT: NORMAL

## 2018-08-14 ENCOUNTER — APPOINTMENT (OUTPATIENT)
Dept: GENERAL RADIOLOGY | Facility: CLINIC | Age: 83
End: 2018-08-14
Attending: EMERGENCY MEDICINE
Payer: COMMERCIAL

## 2018-08-14 ENCOUNTER — HOSPITAL ENCOUNTER (EMERGENCY)
Facility: CLINIC | Age: 83
Discharge: HOME | End: 2018-08-14
Attending: EMERGENCY MEDICINE | Admitting: EMERGENCY MEDICINE
Payer: COMMERCIAL

## 2018-08-14 VITALS
OXYGEN SATURATION: 97 % | SYSTOLIC BLOOD PRESSURE: 103 MMHG | DIASTOLIC BLOOD PRESSURE: 66 MMHG | TEMPERATURE: 98.1 F | RESPIRATION RATE: 16 BRPM

## 2018-08-14 DIAGNOSIS — R42 DIZZINESS: ICD-10-CM

## 2018-08-14 LAB
ALBUMIN UR-MCNC: NEGATIVE MG/DL
ANION GAP SERPL CALCULATED.3IONS-SCNC: 9 MMOL/L (ref 3–14)
APPEARANCE UR: CLEAR
BASE EXCESS BLDV CALC-SCNC: 4.2 MMOL/L
BILIRUB UR QL STRIP: NEGATIVE
BUN SERPL-MCNC: 15 MG/DL (ref 7–30)
CALCIUM SERPL-MCNC: 9.6 MG/DL (ref 8.5–10.1)
CHLORIDE SERPL-SCNC: 104 MMOL/L (ref 94–109)
CO2 SERPL-SCNC: 28 MMOL/L (ref 20–32)
COLOR UR AUTO: ABNORMAL
CREAT SERPL-MCNC: 0.84 MG/DL (ref 0.52–1.04)
ERYTHROCYTE [DISTWIDTH] IN BLOOD BY AUTOMATED COUNT: 12.8 % (ref 10–15)
GFR SERPL CREATININE-BSD FRML MDRD: 65 ML/MIN/1.7M2
GLUCOSE SERPL-MCNC: 109 MG/DL (ref 70–99)
GLUCOSE UR STRIP-MCNC: NEGATIVE MG/DL
HCO3 BLDV-SCNC: 30 MMOL/L (ref 21–28)
HCT VFR BLD AUTO: 38.7 % (ref 35–47)
HGB BLD-MCNC: 12.3 G/DL (ref 11.7–15.7)
HGB UR QL STRIP: NEGATIVE
KETONES UR STRIP-MCNC: NEGATIVE MG/DL
LEUKOCYTE ESTERASE UR QL STRIP: NEGATIVE
MCH RBC QN AUTO: 29.7 PG (ref 26.5–33)
MCHC RBC AUTO-ENTMCNC: 31.8 G/DL (ref 31.5–36.5)
MCV RBC AUTO: 94 FL (ref 78–100)
MUCOUS THREADS #/AREA URNS LPF: PRESENT /LPF
NITRATE UR QL: NEGATIVE
O2/TOTAL GAS SETTING VFR VENT: ABNORMAL %
OXYHGB MFR BLDV: 38 %
PCO2 BLDV: 49 MM HG (ref 40–50)
PH BLDV: 7.39 PH (ref 7.32–7.43)
PH UR STRIP: 6 PH (ref 5–7)
PLATELET # BLD AUTO: 248 10E9/L (ref 150–450)
PO2 BLDV: 23 MM HG (ref 25–47)
POTASSIUM SERPL-SCNC: 3.7 MMOL/L (ref 3.4–5.3)
RBC # BLD AUTO: 4.14 10E12/L (ref 3.8–5.2)
RBC #/AREA URNS AUTO: 0 /HPF (ref 0–2)
SODIUM SERPL-SCNC: 141 MMOL/L (ref 133–144)
SOURCE: ABNORMAL
SP GR UR STRIP: 1 (ref 1–1.03)
TROPONIN I SERPL-MCNC: <0.015 UG/L (ref 0–0.04)
UROBILINOGEN UR STRIP-MCNC: 0 MG/DL (ref 0–2)
WBC # BLD AUTO: 8 10E9/L (ref 4–11)
WBC #/AREA URNS AUTO: 1 /HPF (ref 0–5)

## 2018-08-14 PROCEDURE — 93005 ELECTROCARDIOGRAM TRACING: CPT

## 2018-08-14 PROCEDURE — 25000128 H RX IP 250 OP 636: Performed by: EMERGENCY MEDICINE

## 2018-08-14 PROCEDURE — 85027 COMPLETE CBC AUTOMATED: CPT | Performed by: EMERGENCY MEDICINE

## 2018-08-14 PROCEDURE — 81001 URINALYSIS AUTO W/SCOPE: CPT | Performed by: EMERGENCY MEDICINE

## 2018-08-14 PROCEDURE — 96360 HYDRATION IV INFUSION INIT: CPT

## 2018-08-14 PROCEDURE — 84484 ASSAY OF TROPONIN QUANT: CPT | Performed by: EMERGENCY MEDICINE

## 2018-08-14 PROCEDURE — 99285 EMERGENCY DEPT VISIT HI MDM: CPT | Mod: 25

## 2018-08-14 PROCEDURE — 82805 BLOOD GASES W/O2 SATURATION: CPT | Performed by: EMERGENCY MEDICINE

## 2018-08-14 PROCEDURE — 80048 BASIC METABOLIC PNL TOTAL CA: CPT | Performed by: EMERGENCY MEDICINE

## 2018-08-14 PROCEDURE — 71046 X-RAY EXAM CHEST 2 VIEWS: CPT

## 2018-08-14 RX ORDER — IPRATROPIUM BROMIDE AND ALBUTEROL SULFATE 2.5; .5 MG/3ML; MG/3ML
6 SOLUTION RESPIRATORY (INHALATION) ONCE
Status: DISCONTINUED | OUTPATIENT
Start: 2018-08-14 | End: 2018-08-14 | Stop reason: HOSPADM

## 2018-08-14 RX ADMIN — SODIUM CHLORIDE 500 ML: 9 INJECTION, SOLUTION INTRAVENOUS at 14:46

## 2018-08-14 ASSESSMENT — ENCOUNTER SYMPTOMS
WEAKNESS: 1
FEVER: 0
SHORTNESS OF BREATH: 1
DIZZINESS: 1

## 2018-08-14 NOTE — ED PROVIDER NOTES
History     Chief Complaint:  High blood pressure and high pulse rate    HPI   Giselle Chino is a 83 year old female with a history of aortic valve disorder and aneurysm of the thoracic aorta, who presents with her  to the ED for the evaluation of high blood pressure and high pulse rate. The patient reports waking up this morning with higher vitals than usual, prompting her to the ED. The patient notes that her blood pressure is supposed to stay under 110 because of her aortic valve disorder, and was concerned because it was lower than this this morning. The patient comes from [facility].  The patient also notes that she has been experiencing shortness of breath, dizziness, and general weakness. The patient denies cough and fever.    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 No pertinent surgical history.    Family History:    CAD  Aneurysm  Heart Failure  Bone Cancer  Mental Illness    Social History:  Smoking status: Former Smoker, Quit Date: 8/21/2008  Alcohol use: No  Marital Status:   [2]     Review of Systems   Constitutional: Negative for fever.   Respiratory: Positive for shortness of breath.    Neurological: Positive for dizziness and weakness.   All other systems reviewed and are negative.    Physical Exam   Patient Vitals for the past 24 hrs:   BP Temp Temp src Pulse Heart Rate Resp SpO2   08/14/18 1315 119/60 - - - 96 26 95 %   08/14/18 1300 137/65 - - - - 9 97 %   08/14/18 1257 137/67 98.1  F (36.7  C) Oral 95 - 20 99 %   08/14/18 1255 - -  - - - (!) 46 98 %   08/14/18 1253 137/67 - - - - - (!) 87 %       Physical Exam  Constitutional: Patient is sitting up right in bed.  HENT: Moist oral mucosa.   Eyes: Grossly normal vision. Pupils are equal and round. Extraocular movements intact.  Sclera clear and without icterus. Conjunctiva without pallor.  Cardiovascular: Normal rate. Systolic murmer right upper sternal border. 2+ radial pulses. Normal capillary refill.  Respiratory: Effort normal. Clear breath sounds bilaterally. Lungs are clear.  Gastrointestinal: Soft. No distension. No tenderness to palpation. No rebound or guarding.  Neurologic: Alert and awake. Coordinated movement of extremities. Speech normal.  Skin: No diaphoresis, rashes, ecchymoses, or lesions.  Musculoskeletal: No lower extremity edema. No gross deformity. No joint swelling.  Psychiatric: Appropriate affect. Behavior is normal. Intact recent and remote memory. Linear thought process.    Emergency Department Course   ECG (13:00:16):  Rate 95 bpm. MT interval 198. QRS duration 88. QT/QTc 388/487. P-R-T axes 40 -33 47. Normal sinus rhythm. Left axis deviation. Moderate voltage criteria for LVH, may be normal variant. Abnormal ECG. Interpreted at 1330 by Denzel Ramirez MD.    Imaging:  Radiographic findings were communicated with the patient and family who voiced understanding of the findings.  XR Chest 2 Views  IMPRESSION: Interval near complete resolution of previously seen  linear and ill-defined opacities in the lateral left lung base. The  lungs are currently relatively clear. Cardiomegaly without change.  Tortuous thoracic aorta. Mild thoracic kyphosis.  As read by Radiology.    Laboratory:  UA: Mucous Present, o/w Negative    CBC: WNL (WBC 8.0, HGB 12.3, )  BMP: Glucose 109 (H), WNL (Creatinine 0.84)  Blood gas venous and oxyhgb: PO2 23 (L), Bicarbonate Venous 30 (H)  Troponin (1349): <0.015    Interventions:  1526, NS 1L IV Bolus    Emergency Department Course:  Past  medical records, nursing notes, and vitals reviewed.  1252: I performed an exam of the patient and obtained history, as documented above.     IV inserted and blood drawn.    The patient was sent for a chest x ray while in the emergency department, findings above.    1437: I rechecked the patient. Explained findings to patient and spouse.    1546: I rechecked the patient. Explained findings to patient and spouse. Patient discharged home with instructions regarding supportive care, medications, and reasons to return. The importance of close follow-up was reviewed.     Impression & Plan    Medical Decision Making:  Patient presents with concerns of low blood pressure and high heart rate from her home.  She was noted to have normal blood pressure in the emergency department today, and heart rate in the 90s to low 100s.  She has some symptoms of dizziness, but no other concerning symptoms at this time.  An ECG was performed and is unchanged from prior.  Laboratory studies were drawn and show no evidence of cardiac myonecrosis, electrolyte abnormalities, anemia.  Chest x-ray was unremarkable given her dyspnea.  She does not have any concerning symptoms for pulmonary embolus or infection.  She does have a history of COPD, and was offered a DuoNeb but declined at this time. Felt that this was OK given normal Spo2 and absence of wheezing. She was given a 500 cc bolus of normal saline and a meal.  She was able to ambulate with a steady gait and provided urinalysis without any evidence of infection.  She stated that after the IV fluids and food that she felt better and would like to go home.  She was instructed to follow-up closely with her primary care provider and to return to the emergency department if any of her symptoms worsen.  She does have availability of treatment for her COPD at home.  The patient was discharged from the emergency department in improved condition.    Diagnosis:    ICD-10-CM    1. Dizziness R42         Disposition:  discharged to home    Carlton Ramirez  8/14/2018   [facility] EMERGENCY DEPARTMENT  Scribe Disclosure:  I, Carlton Pereaharris, am serving as a scribe at 12:52 PM on 8/14/2018 to document services personally performed by eDnzel Ramirez MD based on my observations and the provider's statements to me.        Denzel Ramirez MD  08/14/18 3054

## 2018-08-14 NOTE — ED TRIAGE NOTES
"ABC's intact.  Alert and oriented x4.    Pt states she was referred to ED due to \"low blood pressures\" of 110 systolic. Pt denies any other symptoms at this time.  Pt does states she gets dizzy when standing.    "

## 2018-08-14 NOTE — ED AVS SNAPSHOT
[facility] Emergency Department    [address]    [facility] [address]    Phone:  [phone]    Fax:  [phone]                                       Giselle Chino   MRN: [id number]    Department:  [facility] Emergency Department   Date of Visit:  8/14/2018           After Visit Summary Signature Page     I have received my discharge instructions, and my questions have been answered. I have discussed any challenges I see with this plan with the nurse or doctor.    ..........................................................................................................................................  Patient/Patient Representative Signature      ..........................................................................................................................................  Patient Representative Print Name and Relationship to Patient    ..................................................               ................................................  Date                                            Time    ..........................................................................................................................................  Reviewed by Signature/Title    ...................................................              ..............................................  Date                                                            Time

## 2018-08-14 NOTE — ED AVS SNAPSHOT
[facility] Emergency Department    [address]    [facility] [address]    Phone:  [phone]    Fax:  [phone]                                       Giselle Chino   MRN: [id number]    Department:  [facility] Emergency Department   Date of Visit:  8/14/2018           Patient Information     Date Of Birth          1935        Your diagnoses for this visit were:     Dizziness        You were seen by Denzel Ramirez MD.      Follow-up Information     Follow up with Shoaib Mills DO. Schedule an appointment as soon as possible for a visit in 2 days.    Specialty:  Family Practice    Contact information:    [facility]  [address]  [facility] [address] [phone]          Discharge Instructions       Discharge Instructions  Dizziness (Lightheaded)  Today you were seen for dizziness.  Dizziness can be caused by many things and it can be very difficult to determine the cause of dizziness.  At this time, your provider has found no signs that your dizziness is due to a serious or life-threatening condition. However, sometimes there is a serious problem that does not show up right away, and it is important for you to follow up with your regular provider as instructed.  Generally, every Emergency Department visit should have a follow-up clinic visit with either a primary or a specialty clinic/provider. Please follow-up as instructed by your emergency provider today.      Return to the Emergency Department if:      You pass out (fainting or falling out), especially during exercise.      You develop chest pain, chest pressure or difficulty breathing.    Your feel an irregular heartbeat.    You have excessive vaginal bleeding, or blood in your stool or vomit (throw up).    You have a high fever.    Your symptoms get worse or more frequent.    If when you begin to feel dizzy or lightheaded, it is important to sit down or lay down immediately  to prevent injury from falling.  If you were given a prescription for medicine here today, be sure to read all of the information (including the package insert) that comes with your prescription.  This will include important information about the medicine, its side effects, and any warnings that you need to know about.  The pharmacist who fills the prescription can provide more information and answer questions you may have about the medicine.  If you have questions or concerns that the pharmacist cannot address, please call or return to the Emergency Department.   Remember that you can always come back to the Emergency Department if you are not able to see your regular provider in the amount of time listed above, if you get any new symptoms, or if there is anything that worries you.    24 Hour Appointment Hotline       To make an appointment at any [facility], call [phone] ([phone]). If you don't have a family doctor or clinic, we will help you find one. [facility] clinics are conveniently located to serve the needs of you and your family.             Review of your medicines      Our records show that you are taking the medicines listed below. If these are incorrect, please call your family doctor or clinic.        Dose / Directions Last dose taken    amLODIPine 5 MG tablet   Commonly known as:  NORVASC   Dose:  5 mg   Quantity:  90 tablet        Take 1 tablet (5 mg) by mouth daily   Refills:  3        atorvastatin 20 MG tablet   Commonly known as:  LIPITOR   Dose:  20 mg   Quantity:  90 tablet        Take 1 tablet (20 mg) by mouth daily   Refills:  3        hydrALAZINE 25 MG tablet   Commonly known as:  APRESOLINE   Dose:  25 mg        Take 1 tablet (25 mg) by mouth 3 times daily   Refills:  0        * Ipratropium-Albuterol  MCG/ACT inhaler   Commonly known as:  COMBIVENT RESPIMAT   Dose:  1 puff   Quantity:  1 Inhaler        Inhale 1 puff into the lungs 4 times daily Not to exceed 6 doses  per day.   Refills:  0        * ipratropium - albuterol 0.5 mg/2.5 mg/3 mL 0.5-2.5 (3) MG/3ML neb solution   Commonly known as:  DUONEB   Dose:  1 vial        Take 1 vial (3 mLs) by nebulization every 6 hours as needed for shortness of breath / dyspnea or wheezing   Refills:  0        levothyroxine 112 MCG tablet   Commonly known as:  SYNTHROID/LEVOTHROID   Dose:  112 mcg        Take 112 mcg by mouth daily   Refills:  0        losartan 100 MG tablet   Commonly known as:  COZAAR   Dose:  100 mg   Quantity:  30 tablet        Take 100 mg by mouth daily   Refills:  1        metoprolol succinate 25 MG 24 hr tablet   Commonly known as:  TOPROL-XL   Dose:  25 mg   Quantity:  90 tablet        Take 1 tablet (25 mg) by mouth daily   Refills:  3        oxybutynin 10 MG 24 hr tablet   Commonly known as:  DITROPAN-XL   Dose:  10 mg        Take 10 mg by mouth daily   Refills:  0        potassium chloride SA 10 MEQ CR tablet   Commonly known as:  K-DUR/KLOR-CON M   Dose:  10 mEq   Quantity:  270 tablet        Take 1 tablet (10 mEq) by mouth 3 times daily   Refills:  3        predniSONE 20 MG tablet   Commonly known as:  DELTASONE   Dose:  20 mg   Quantity:  3 tablet        Take 1 tablet (20 mg) by mouth daily   Refills:  0        torsemide 10 MG tablet   Commonly known as:  DEMADEX   Dose:  10 mg   Quantity:  90 tablet        Take 1 tablet (10 mg) by mouth daily   Refills:  3        * Notice:  This list has 2 medication(s) that are the same as other medications prescribed for you. Read the directions carefully, and ask your doctor or other care provider to review them with you.            Procedures and tests performed during your visit     Basic metabolic panel (BMP)    Blood gas venous and oxyhgb    CBC (platelets, no diff)    EKG 12 lead    Troponin I (now)    UA with Microscopic    XR Chest 2 Views      Orders Needing Specimen Collection     None      Pending Results     Date and Time Order Name Status Description    8/14/2018  1625 EKG 12 lead Preliminary     8/14/2018 1327 XR Chest 2 Views Preliminary             Pending Culture Results     No orders found from 8/12/2018 to 8/15/2018.            Pending Results Instructions     If you had any lab results that were not finalized at the time of your Discharge, you can call the ED Lab Result RN at [phone]. You will be contacted by this team for any positive Lab results or changes in treatment. The nurses are available 7 days a week from 10A to 6:30P.  You can leave a message 24 hours per day and they will return your call.        Test Results From Your Hospital Stay        8/14/2018  2:02 PM      Component Results     Component Value Ref Range & Units Status    WBC 8.0 4.0 - 11.0 10e9/L Final    RBC Count 4.14 3.8 - 5.2 10e12/L Final    Hemoglobin 12.3 11.7 - 15.7 g/dL Final    Hematocrit 38.7 35.0 - 47.0 % Final    MCV 94 78 - 100 fl Final    MCH 29.7 26.5 - 33.0 pg Final    MCHC 31.8 31.5 - 36.5 g/dL Final    RDW 12.8 10.0 - 15.0 % Final    Platelet Count 248 150 - 450 10e9/L Final         8/14/2018  2:15 PM      Component Results     Component Value Ref Range & Units Status    Sodium 141 133 - 144 mmol/L Final    Potassium 3.7 3.4 - 5.3 mmol/L Final    Chloride 104 94 - 109 mmol/L Final    Carbon Dioxide 28 20 - 32 mmol/L Final    Anion Gap 9 3 - 14 mmol/L Final    Glucose 109 (H) 70 - 99 mg/dL Final    Urea Nitrogen 15 7 - 30 mg/dL Final    Creatinine 0.84 0.52 - 1.04 mg/dL Final    GFR Estimate 65 >60 mL/min/1.7m2 Final    Non  GFR Calc    GFR Estimate If Black 78 >60 mL/min/1.7m2 Final    African American GFR Calc    Calcium 9.6 8.5 - 10.1 mg/dL Final         8/14/2018  2:09 PM      Component Results     Component Value Ref Range & Units Status    Ph Venous 7.39 7.32 - 7.43 pH Final    PCO2 Venous 49 40 - 50 mm Hg Final    PO2 Venous 23 (L) 25 - 47 mm Hg Final    Bicarbonate Venous 30 (H) 21 - 28 mmol/L Final    FIO2 ra  Final    Oxyhemoglobin Venous 38 % Final     Base Excess Venous 4.2 mmol/L Final    Abnormal Result, Ref range: -7.7 to 1.9         8/14/2018  2:15 PM      Component Results     Component Value Ref Range & Units Status    Troponin I ES <0.015 0.000 - 0.045 ug/L Final    The 99th percentile for upper reference range is 0.045 ug/L.  Troponin values   in the range of 0.045 - 0.120 ug/L may be associated with risks of adverse   clinical events.           8/14/2018  2:24 PM      Narrative     CHEST TWO VIEWS August 14, 2018 2:15 PM     HISTORY: Dyspnea.    COMPARISON: 7/2/2018.        Impression     IMPRESSION: Interval near complete resolution of previously seen  linear and ill-defined opacities in the lateral left lung base. The  lungs are currently relatively clear. Cardiomegaly without change.  Tortuous thoracic aorta. Mild thoracic kyphosis.         8/14/2018  2:23 PM      Component Results     Component Value Ref Range & Units Status    Color Urine Straw  Final    Appearance Urine Clear  Final    Glucose Urine Negative NEG^Negative mg/dL Final    Bilirubin Urine Negative NEG^Negative Final    Ketones Urine Negative NEG^Negative mg/dL Final    Specific Gravity Urine 1.004 1.003 - 1.035 Final    Blood Urine Negative NEG^Negative Final    pH Urine 6.0 5.0 - 7.0 pH Final    Protein Albumin Urine Negative NEG^Negative mg/dL Final    Urobilinogen mg/dL 0.0 0.0 - 2.0 mg/dL Final    Nitrite Urine Negative NEG^Negative Final    Leukocyte Esterase Urine Negative NEG^Negative Final    Source Midstream Urine  Final    WBC Urine 1 0 - 5 /HPF Final    RBC Urine 0 0 - 2 /HPF Final    Mucous Urine Present (A) NEG^Negative /LPF Final                Clinical Quality Measure: Blood Pressure Screening     Your blood pressure was checked while you were in the emergency department today. The last reading we obtained was  BP: 125/62 . Please read the guidelines below about what these numbers mean and what you should do about them.  If your systolic blood pressure (the top number)  is less than 120 and your diastolic blood pressure (the bottom number) is less than 80, then your blood pressure is normal. There is nothing more that you need to do about it.  If your systolic blood pressure (the top number) is 120-139 or your diastolic blood pressure (the bottom number) is 80-89, your blood pressure may be higher than it should be. You should have your blood pressure rechecked within a year by a primary care provider.  If your systolic blood pressure (the top number) is 140 or greater or your diastolic blood pressure (the bottom number) is 90 or greater, you may have high blood pressure. High blood pressure is treatable, but if left untreated over time it can put you at risk for heart attack, stroke, or kidney failure. You should have your blood pressure rechecked by a primary care provider within the next 4 weeks.  If your provider in the emergency department today gave you specific instructions to follow-up with your doctor or provider even sooner than that, you should follow that instruction and not wait for up to 4 weeks for your follow-up visit.        Thank you for choosing [facility]       Thank you for choosing [facility] for your care. Our goal is always to provide you with excellent care. Hearing back from our patients is one way we can continue to improve our services. Please take a few minutes to complete the written survey that you may receive in the mail after you visit with us. Thank you!        Care EveryWhere ID     This is your Care EveryWhere ID. This could be used by other organizations to access your [facility] medical records  [id number]        Equal Access to Services     DHAVAL SALMON : Hadii maris Nolan, waaxda luqadaha, qaybta kaalmada adegabrielayada, liza burton . So [facility] [phone].    ATENCIÓN: Si habla español, tiene a sneed disposición servicios gratuitos de asistencia lingüística. Llame al [phone].    We comply with applicable  federal civil rights laws and [place] laws. We do not discriminate on the basis of race, color, national origin, age, disability, sex, sexual orientation, or gender identity.            After Visit Summary       This is your record. Keep this with you and show to your community pharmacist(s) and doctor(s) at your next visit.

## 2018-08-15 LAB — INTERPRETATION ECG - MUSE: NORMAL

## 2018-11-15 ENCOUNTER — OFFICE VISIT (OUTPATIENT)
Dept: CARDIOLOGY | Facility: CLINIC | Age: 83
End: 2018-11-15
Attending: INTERNAL MEDICINE
Payer: COMMERCIAL

## 2018-11-15 VITALS
DIASTOLIC BLOOD PRESSURE: 54 MMHG | BODY MASS INDEX: 25.88 KG/M2 | WEIGHT: 151.6 LBS | HEIGHT: 64 IN | SYSTOLIC BLOOD PRESSURE: 118 MMHG | HEART RATE: 83 BPM

## 2018-11-15 DIAGNOSIS — I71.20 THORACIC AORTIC ANEURYSM WITHOUT RUPTURE (H): ICD-10-CM

## 2018-11-15 DIAGNOSIS — I35.1 NONRHEUMATIC AORTIC VALVE INSUFFICIENCY: ICD-10-CM

## 2018-11-15 DIAGNOSIS — J44.1 COPD EXACERBATION (H): ICD-10-CM

## 2018-11-15 DIAGNOSIS — I10 BENIGN ESSENTIAL HYPERTENSION: ICD-10-CM

## 2018-11-15 LAB
ANION GAP SERPL CALCULATED.3IONS-SCNC: 6 MMOL/L (ref 3–14)
BUN SERPL-MCNC: 24 MG/DL (ref 7–30)
CALCIUM SERPL-MCNC: 10 MG/DL (ref 8.5–10.1)
CHLORIDE SERPL-SCNC: 104 MMOL/L (ref 94–109)
CO2 SERPL-SCNC: 29 MMOL/L (ref 20–32)
CREAT SERPL-MCNC: 0.93 MG/DL (ref 0.52–1.04)
GFR SERPL CREATININE-BSD FRML MDRD: 58 ML/MIN/1.7M2
GLUCOSE SERPL-MCNC: 108 MG/DL (ref 70–99)
POTASSIUM SERPL-SCNC: 4.4 MMOL/L (ref 3.4–5.3)
SODIUM SERPL-SCNC: 139 MMOL/L (ref 133–144)

## 2018-11-15 PROCEDURE — 36415 COLL VENOUS BLD VENIPUNCTURE: CPT | Performed by: INTERNAL MEDICINE

## 2018-11-15 PROCEDURE — 80048 BASIC METABOLIC PNL TOTAL CA: CPT | Performed by: INTERNAL MEDICINE

## 2018-11-15 PROCEDURE — 99214 OFFICE O/P EST MOD 30 MIN: CPT | Performed by: INTERNAL MEDICINE

## 2018-11-15 NOTE — MR AVS SNAPSHOT
"              After Visit Summary   11/15/2018    Giselle Chino    MRN: [id number]           Patient Information     Date Of Birth          1935        Visit Information        Provider Department      11/15/2018 9:30 AM Tyler Machado MD [facility]        Today's Diagnoses     Benign essential hypertension        Thoracic aortic aneurysm without rupture (H)        Nonrheumatic aortic valve insufficiency        COPD exacerbation (H)           Follow-ups after your visit        Additional Services     Follow-Up with Cardiac Advanced Practice Provider                 Future tests that were ordered for you today     Open Future Orders        Priority Expected Expires Ordered    Follow-Up with Cardiac Advanced Practice Provider Routine 5/14/2019 11/15/2019 11/15/2018            Who to contact     If you have questions or need follow up information about today's clinic visit or your schedule please contact [facility] directly at [phone].  Normal or non-critical lab and imaging results will be communicated to you by MyChart, letter or phone within 4 business days after the clinic has received the results. If you do not hear from us within 7 days, please contact the clinic through MyChart or phone. If you have a critical or abnormal lab result, we will notify you by phone as soon as possible.  Submit refill requests through [employer] or call your pharmacy and they will forward the refill request to us. Please allow 3 business days for your refill to be completed.          Additional Information About Your Visit        Care EveryWhere ID     This is your Care EveryWhere ID. This could be used by other organizations to access your [place] medical records  [id number]        Your Vitals Were     Pulse Height Breastfeeding? BMI (Body Mass Index)          83 1.626 m (5' 4\") No 26.02 kg/m2         Blood Pressure from Last 3 " Encounters:   11/15/18 118/54   08/14/18 103/66   07/06/18 (!) 0/0    Weight from Last 3 Encounters:   11/15/18 68.8 kg (151 lb 9.6 oz)   07/06/18 68.7 kg (151 lb 9 oz)   05/23/18 71.5 kg (157 lb 9.6 oz)              We Performed the Following     Follow-Up with Cardiologist          Where to get your medicines      These medications were sent to [facility]/pharmacy #[id number] - [place], [place] - [address] [facility]  [address], [facility] [address]     Phone:  [phone]     Ipratropium-Albuterol  MCG/ACT inhaler          Primary Care Provider Office Phone # Fax #    Shoaib Mills  [phone]       [facility] [address] [facility] [address]        Equal Access to Services     DHAVAL SALMON : Hadii maris arenaso Soravinder, waaxda luqadaha, qaybta kaalmada adegabrielayada, liza burtno . So [facility] [phone].    ATENCIÓN: Si vinhla español, tiene a sneed disposición servicios gratuitos de asistencia lingüística. Llame al [phone].    We comply with applicable federal civil rights laws and [place] laws. We do not discriminate on the basis of race, color, national origin, age, disability, sex, sexual orientation, or gender identity.            Thank you!     Thank you for choosing [facility]  for your care. Our goal is always to provide you with excellent care. Hearing back from our patients is one way we can continue to improve our services. Please take a few minutes to complete the written survey that you may receive in the mail after your visit with us. Thank you!             Your Updated Medication List - Protect others around you: Learn how to safely use, store and throw away your medicines at www.disposemymeds.org.          This list is accurate as of 11/15/18 10:17 AM.  Always use your most recent med list.                   Brand Name Dispense Instructions for use Diagnosis    amLODIPine 5 MG  tablet    NORVASC    90 tablet    Take 1 tablet (5 mg) by mouth daily    HTN (hypertension)       atorvastatin 20 MG tablet    LIPITOR    90 tablet    Take 1 tablet (20 mg) by mouth daily    Hyperlipidemia LDL goal <100       hydrALAZINE 25 MG tablet    APRESOLINE     Take 1 tablet (25 mg) by mouth 3 times daily    Hypertensive urgency       * ipratropium - albuterol 0.5 mg/2.5 mg/3 mL 0.5-2.5 (3) MG/3ML neb solution    DUONEB     Take 1 vial (3 mLs) by nebulization every 6 hours as needed for shortness of breath / dyspnea or wheezing    COPD exacerbation (H)       * Ipratropium-Albuterol  MCG/ACT inhaler    COMBIVENT RESPIMAT    1 Inhaler    Inhale 1 puff into the lungs 4 times daily Not to exceed 6 doses per day.    COPD exacerbation (H)       levothyroxine 112 MCG tablet    SYNTHROID/LEVOTHROID     Take 112 mcg by mouth daily        losartan 100 MG tablet    COZAAR    30 tablet    Take 100 mg by mouth daily        metoprolol succinate 25 MG 24 hr tablet    TOPROL-XL    90 tablet    Take 1 tablet (25 mg) by mouth daily    Thoracic aortic aneurysm without rupture (H)       oxybutynin 10 MG 24 hr tablet    DITROPAN-XL     Take 10 mg by mouth daily        potassium chloride SA 10 MEQ CR tablet    K-DUR/KLOR-CON M    270 tablet    Take 1 tablet (10 mEq) by mouth 3 times daily    Benign essential hypertension       predniSONE 20 MG tablet    DELTASONE    3 tablet    Take 1 tablet (20 mg) by mouth daily    COPD exacerbation (H)       torsemide 10 MG tablet    DEMADEX    90 tablet    Take 1 tablet (10 mg) by mouth daily    HTN (hypertension)       TYLENOL 8 HOUR PO           * Notice:  This list has 2 medication(s) that are the same as other medications prescribed for you. Read the directions carefully, and ask your doctor or other care provider to review them with you.

## 2018-11-15 NOTE — PROGRESS NOTES
"Service Date: 11/15/2018      PHYSICIANS:  Shoaib Mills DO, Pam Mulligan MD      HISTORY OF PRESENT ILLNESS:  I had the pleasure of following up on our mutual patient, Giselle Chino.  I met her many years ago, and I have outlined her history, but she was in very bad condition when I first met her.  Most of it was psychosocial plus medical.  That was back in 2013.  She was severely hypothyroid.  She had stopped all of her medicines.  She had severe aorta disease.  Since that time, she is now on her medicines.  She looks and acts much brighter.  She has a personality now as opposed to frail and almost comatose.  The issue that has come up is her aorta.  She has severely dilated aortic aneurysm at 78 mm ascending and it is over 40 mm in the descending by the diaphragm.  She had been seen years ago by a surgeon who was willing to proceed forward.  I did not think she was a good surgical candidate.  The issue came back again and she saw Dr. Pam Mulligan and had a full evaluation this summer.  However, I do not see that we have \"completed the loop\" about what to do next.  My assumption is that surgery is not indicated and I currently have a call out to Dr. Pam Mulligan to have him formally tell us if he wants to proceed with surgery or not.  The patient at this point is now thinking she does not want surgery even though she was pushing for it before.  Of note, in addition to the fact that the aorta is slowly getting worse, her pulmonary function tests were quite poor.  She had rather significant COPD.  Her FEV1 was 0.76.  She had reduced DLCO with a good bronchodilator response.  Regarding that, she has the home nebulizer but does not have the handheld, so I have renewed her Combivent, but I have asked her to see Dr. Mills for that.  She does note some lightheadedness.  I told her I am doing that on purpose.  I am purposely keeping her blood pressure on the low end as a way of medically treating both her " dilated ascending aortic aneurysm and her aortic valve insufficiency.  I told her if it becomes too problematic, I can back down the medicines, perhaps drop the Cozaar from 100 down to 50 and perhaps drop the hydralazine from 25 t.i.d. down to 10-20 t.i.d.  Now that she knows why I am running it low and why she is dizzy, she states that she is satisfied with how she is doing.      PHYSICAL EXAMINATION:  On her exam today, she has a few crackles in the right base but no wheezing.  Her heart again has the classic AS/AI murmur.  She has water hammer pulses but not de Musset's, etc. I will have her come back in 6 months for a routine visit.  We do not need another echo probably until next year.  She is full code, although I am not sure if that makes a lot of sense if the impending problem is likely aortic rupture; they should not try to resuscitate her and perhaps on another visit or perhaps Dr. Mills may want to bring that up with her.  I did not bring that episode up yet because I still do not know if we have planned on surgical correction or not.  Again, I would way in and say that she is probably not an ideal surgical candidate.  It is not just the ascending aorta, but it is also the descending aorta in the thorax cavity.  Her pulmonary function tests are problematic.  Her aortic valve is leaking and that would be additional surgery at the same time and I am not sure her overall rehabilitation potential is excellent, even though clinically she is so much brighter and doing so much better than before.      Thank you for allowing me to see Ms. Chino.  Today's visit was 30 minutes, greater than greater than 50% counseling.      cc:   Shoaib Mills DO   [facility]   [address] [place] [address]      Pam Mulligan MD   [facility]   [address]   [place] [address]         TY WHITE MD             D: 11/15/2018   T: 11/15/2018   MT: al       Name:     INDIANA NYE   MRN:      [id number]        Account:      [id number]   :      1935           Service Date: 11/15/2018      Document: [id number]
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
## 2018-11-15 NOTE — LETTER
"11/15/2018      Shoaib Mills DO  [facility] [address]  [facility] [address]      RE: Giselle Chino       Dear Colleague,    I had the pleasure of seeing Giselle Chino in the [facility].    Service Date: 11/15/2018      PHYSICIANS:  Shoaib Mills DO, Pam Mulligan MD      HISTORY OF PRESENT ILLNESS:  I had the pleasure of following up on our mutual patient, Giselle Chino.  I met her many years ago, and I have outlined her history, but she was in very bad condition when I first met her.  Most of it was psychosocial plus medical.  That was back in 2013.  She was severely hypothyroid.  She had stopped all of her medicines.  She had severe aorta disease.  Since that time, she is now on her medicines.  She looks and acts much brighter.  She has a personality now as opposed to frail and almost comatose.  The issue that has come up is her aorta.  She has severely dilated aortic aneurysm at 78 mm ascending and it is over 40 mm in the descending by the diaphragm.  She had been seen years ago by a surgeon who was willing to proceed forward.  I did not think she was a good surgical candidate.  The issue came back again and she saw Dr. Pam Mulligan and had a full evaluation this summer.  However, I do not see that we have \"completed the loop\" about what to do next.  My assumption is that surgery is not indicated and I currently have a call out to Dr. Pam Mulligan to have him formally tell us if he wants to proceed with surgery or not.  The patient at this point is now thinking she does not want surgery even though she was pushing for it before.  Of note, in addition to the fact that the aorta is slowly getting worse, her pulmonary function tests were quite poor.  She had rather significant COPD.  Her FEV1 was 0.76.  She had reduced DLCO with a good bronchodilator response.  Regarding that, she has the home nebulizer but does not have the " handheld, so I have renewed her Combivent, but I have asked her to see Dr. Mills for that.  She does note some lightheadedness.  I told her I am doing that on purpose.  I am purposely keeping her blood pressure on the low end as a way of medically treating both her dilated ascending aortic aneurysm and her aortic valve insufficiency.  I told her if it becomes too problematic, I can back down the medicines, perhaps drop the Cozaar from 100 down to 50 and perhaps drop the hydralazine from 25 t.i.d. down to 10-20 t.i.d.  Now that she knows why I am running it low and why she is dizzy, she states that she is satisfied with how she is doing.      PHYSICAL EXAMINATION:  On her exam today, she has a few crackles in the right base but no wheezing.  Her heart again has the classic AS/AI murmur.  She has water hammer pulses but not de Musset's, etc. I will have her come back in 6 months for a routine visit.  We do not need another echo probably until next year.  She is full code, although I am not sure if that makes a lot of sense if the impending problem is likely aortic rupture; they should not try to resuscitate her and perhaps on another visit or perhaps Dr. Mills may want to bring that up with her.  I did not bring that episode up yet because I still do not know if we have planned on surgical correction or not.  Again, I would way in and say that she is probably not an ideal surgical candidate.  It is not just the ascending aorta, but it is also the descending aorta in the thorax cavity.  Her pulmonary function tests are problematic.  Her aortic valve is leaking and that would be additional surgery at the same time and I am not sure her overall rehabilitation potential is excellent, even though clinically she is so much brighter and doing so much better than before.      Thank you for allowing me to see Ms. Chino.  Today's visit was 30 minutes, greater than greater than 50% counseling.      cc:   Shoaib Mills,  DO   [facility]   [address] [place] [address]      Pam Mulligan MD   [facility]   [address]   [place] [address]         TY WHITE MD             D: 11/15/2018   T: 11/15/2018   MT: al      Name:     INDIANA NYE   MRN:      [id number]        Account:      [id number]   :      1935           Service Date: 11/15/2018      Document: [id number]         Outpatient Encounter Prescriptions as of 11/15/2018   Medication Sig Dispense Refill     Acetaminophen (TYLENOL 8 HOUR PO)        amLODIPine (NORVASC) 5 MG tablet Take 1 tablet (5 mg) by mouth daily 90 tablet 3     atorvastatin (LIPITOR) 20 MG tablet Take 1 tablet (20 mg) by mouth daily 90 tablet 3     hydrALAZINE (APRESOLINE) 25 MG tablet Take 1 tablet (25 mg) by mouth 3 times daily  0     ipratropium - albuterol 0.5 mg/2.5 mg/3 mL (DUONEB) 0.5-2.5 (3) MG/3ML neb solution Take 1 vial (3 mLs) by nebulization every 6 hours as needed for shortness of breath / dyspnea or wheezing  0     Ipratropium-Albuterol (COMBIVENT RESPIMAT)  MCG/ACT inhaler Inhale 1 puff into the lungs 4 times daily Not to exceed 6 doses per day. 1 Inhaler 1     levothyroxine (SYNTHROID/LEVOTHROID) 112 MCG tablet Take 112 mcg by mouth daily       losartan (COZAAR) 100 MG tablet Take 100 mg by mouth daily  30 tablet 1     metoprolol succinate (TOPROL-XL) 25 MG 24 hr tablet Take 1 tablet (25 mg) by mouth daily 90 tablet 3     oxybutynin (DITROPAN-XL) 10 MG 24 hr tablet Take 10 mg by mouth daily       potassium chloride SA (K-DUR/KLOR-CON M) 10 MEQ CR tablet Take 1 tablet (10 mEq) by mouth 3 times daily 270 tablet 3     torsemide (DEMADEX) 10 MG tablet Take 1 tablet (10 mg) by mouth daily 90 tablet 3     predniSONE (DELTASONE) 20 MG tablet Take 1 tablet (20 mg) by mouth daily (Patient not taking: Reported on 11/15/2018) 3 tablet 0     [DISCONTINUED] Ipratropium-Albuterol (COMBIVENT RESPIMAT)  MCG/ACT  inhaler Inhale 1 puff into the lungs 4 times daily Not to exceed 6 doses per day. (Patient not taking: Reported on 11/15/2018) 1 Inhaler 0     No facility-administered encounter medications on file as of 11/15/2018.        Again, thank you for allowing me to participate in the care of your patient.      Sincerely,    Tyler Machado MD     [facility]

## 2018-11-16 ENCOUNTER — TELEPHONE (OUTPATIENT)
Dept: OTHER | Facility: CLINIC | Age: 83
End: 2018-11-16

## 2018-11-16 NOTE — TELEPHONE ENCOUNTER
I called patient at the request of Dr. Machado and Dr. Mulligan. After her appointment with Dr. Machado yesterday she is comfortable with her decision to not undergo Ascending Aortic Aneurysm Repair. She is very grateful for the information she was given in helping her come to her decision. Will communicate her decision to Dr. Machado.

## 2018-12-19 DIAGNOSIS — I10 HTN (HYPERTENSION): ICD-10-CM

## 2018-12-19 RX ORDER — TORSEMIDE 10 MG/1
10 TABLET ORAL DAILY
Qty: 90 TABLET | Refills: 3 | Status: SHIPPED | OUTPATIENT
Start: 2018-12-19 | End: 2020-01-09

## 2019-04-01 DIAGNOSIS — I16.0 HYPERTENSIVE URGENCY: ICD-10-CM

## 2019-04-01 RX ORDER — HYDRALAZINE HYDROCHLORIDE 25 MG/1
25 TABLET, FILM COATED ORAL 3 TIMES DAILY
Qty: 270 TABLET | Refills: 0 | Status: SHIPPED | OUTPATIENT
Start: 2019-04-01 | End: 2019-07-05

## 2019-04-02 ENCOUNTER — TRANSFERRED RECORDS (OUTPATIENT)
Dept: HEALTH INFORMATION MANAGEMENT | Facility: CLINIC | Age: 84
End: 2019-04-02

## 2019-04-02 LAB
ALBUMIN SERPL-MCNC: 4.1 G/DL (ref 3.4–5)
ALP SERPL-CCNC: 132 U/L (ref 0–116)
ALT SERPL-CCNC: 21 U/L (ref 0–78)
ANION GAP SERPL CALCULATED.3IONS-SCNC: ABNORMAL MMOL/L
AST SERPL-CCNC: 15 U/L (ref 0–37)
BASOPHILS NFR BLD AUTO: ABNORMAL %
BILIRUB SERPL-MCNC: 0.59 MG/DL (ref 0.2–1)
BUN SERPL-MCNC: 13 MG/DL (ref 7–18)
CALCIUM SERPL-MCNC: 10.1 MG/DL (ref 8.5–10.1)
CHLORIDE SERPLBLD-SCNC: 102 MMOL/L (ref 98–107)
CHOLEST SERPL-MCNC: 141 MG/DL (ref 0–200)
CO2 SERPL-SCNC: ABNORMAL MMOL/L
CREAT SERPL-MCNC: 0.85 MG/DL (ref 0.6–1.3)
EOSINOPHIL NFR BLD AUTO: ABNORMAL %
ERYTHROCYTE [DISTWIDTH] IN BLOOD BY AUTOMATED COUNT: 13.3 % (ref 11.5–14.5)
GFR SERPL CREATININE-BSD FRML MDRD: ABNORMAL ML/MIN/1.73M2
GLUCOSE SERPL-MCNC: 89 MG/DL (ref 70–99)
HCT VFR BLD AUTO: 41.7 % (ref 36–45)
HDLC SERPL-MCNC: 51 MG/DL (ref 40–96)
HEMOGLOBIN: 13 G/DL (ref 12–15.5)
LDLC SERPL CALC-MCNC: 89.6 MG/DL (ref 0–130)
LYMPHOCYTES NFR BLD AUTO: ABNORMAL %
MCH RBC QN AUTO: 27.1 PG (ref 26.5–33)
MCHC RBC AUTO-ENTMCNC: 31.2 G/DL (ref 31.5–36.5)
MCV RBC AUTO: 87.1 FL (ref 78–99)
MONOCYTES NFR BLD AUTO: ABNORMAL %
NEUTROPHILS NFR BLD AUTO: ABNORMAL %
NONHDLC SERPL-MCNC: NORMAL MG/DL
PLATELET COUNT - QUEST: 276 10^9/L (ref 150–430)
POTASSIUM SERPL-SCNC: 4.1 MMOL/L (ref 3.5–5.1)
PROT SERPL-MCNC: 7.8 G/DL (ref 6.4–8.2)
RBC # BLD AUTO: 4.79 10^12/L (ref 4–5.2)
SODIUM SERPL-SCNC: 142 MMOL/L (ref 136–145)
TRIGL SERPL-MCNC: 102 MG/DL (ref 30–200)
TSH SERPL-ACNC: 0.87 MCU/ML (ref 0.36–3.74)
WBC # BLD AUTO: 7 10^9/L (ref 4–10)

## 2019-05-16 ENCOUNTER — DOCUMENTATION ONLY (OUTPATIENT)
Dept: CARDIOLOGY | Facility: CLINIC | Age: 84
End: 2019-05-16

## 2019-05-17 ENCOUNTER — OFFICE VISIT (OUTPATIENT)
Dept: CARDIOLOGY | Facility: CLINIC | Age: 84
End: 2019-05-17
Attending: INTERNAL MEDICINE
Payer: COMMERCIAL

## 2019-05-17 VITALS
HEART RATE: 79 BPM | HEIGHT: 64 IN | SYSTOLIC BLOOD PRESSURE: 126 MMHG | DIASTOLIC BLOOD PRESSURE: 52 MMHG | WEIGHT: 143 LBS | BODY MASS INDEX: 24.41 KG/M2 | OXYGEN SATURATION: 97 %

## 2019-05-17 DIAGNOSIS — I10 BENIGN ESSENTIAL HYPERTENSION: ICD-10-CM

## 2019-05-17 DIAGNOSIS — I71.20 THORACIC AORTIC ANEURYSM WITHOUT RUPTURE (H): Primary | ICD-10-CM

## 2019-05-17 DIAGNOSIS — I35.1 NONRHEUMATIC AORTIC VALVE INSUFFICIENCY: ICD-10-CM

## 2019-05-17 PROCEDURE — 99214 OFFICE O/P EST MOD 30 MIN: CPT | Performed by: NURSE PRACTITIONER

## 2019-05-17 RX ORDER — AMLODIPINE BESYLATE 5 MG/1
10 TABLET ORAL AT BEDTIME
Qty: 180 TABLET | Refills: 3 | Status: SHIPPED | OUTPATIENT
Start: 2019-05-17 | End: 2020-06-22

## 2019-05-17 ASSESSMENT — MIFFLIN-ST. JEOR: SCORE: 1098.64
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
## 2019-05-17 NOTE — PATIENT INSTRUCTIONS
Take amlodipine 2 tablets daily at bedtime instead of one pill twice daily starting Saturday 5-18    See us in 6 months with an echo
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
## 2019-06-28 DIAGNOSIS — I71.20 THORACIC AORTIC ANEURYSM WITHOUT RUPTURE (H): ICD-10-CM

## 2019-06-28 RX ORDER — METOPROLOL SUCCINATE 25 MG/1
25 TABLET, EXTENDED RELEASE ORAL DAILY
Qty: 90 TABLET | Refills: 1 | Status: SHIPPED | OUTPATIENT
Start: 2019-06-28 | End: 2019-10-02

## 2019-07-05 DIAGNOSIS — I16.0 HYPERTENSIVE URGENCY: ICD-10-CM

## 2019-07-05 RX ORDER — HYDRALAZINE HYDROCHLORIDE 25 MG/1
25 TABLET, FILM COATED ORAL 3 TIMES DAILY
Qty: 270 TABLET | Refills: 3 | Status: SHIPPED | OUTPATIENT
Start: 2019-07-05 | End: 2020-04-24

## 2019-10-02 DIAGNOSIS — I71.20 THORACIC AORTIC ANEURYSM WITHOUT RUPTURE (H): ICD-10-CM

## 2019-10-02 RX ORDER — METOPROLOL SUCCINATE 25 MG/1
25 TABLET, EXTENDED RELEASE ORAL DAILY
Qty: 90 TABLET | Refills: 0 | Status: SHIPPED | OUTPATIENT
Start: 2019-10-02 | End: 2020-01-15

## 2019-11-11 ENCOUNTER — CARE COORDINATION (OUTPATIENT)
Dept: CARDIOLOGY | Facility: CLINIC | Age: 84
End: 2019-11-11

## 2019-11-11 ENCOUNTER — APPOINTMENT (OUTPATIENT)
Dept: CT IMAGING | Facility: CLINIC | Age: 84
End: 2019-11-11
Attending: EMERGENCY MEDICINE
Payer: COMMERCIAL

## 2019-11-11 ENCOUNTER — HOSPITAL ENCOUNTER (OUTPATIENT)
Dept: CARDIOLOGY | Facility: CLINIC | Age: 84
Discharge: HOME | End: 2019-11-11
Attending: NURSE PRACTITIONER | Admitting: NURSE PRACTITIONER
Payer: COMMERCIAL

## 2019-11-11 ENCOUNTER — HOSPITAL ENCOUNTER (EMERGENCY)
Facility: CLINIC | Age: 84
Discharge: HOME | End: 2019-11-11
Attending: EMERGENCY MEDICINE | Admitting: EMERGENCY MEDICINE
Payer: COMMERCIAL

## 2019-11-11 VITALS
HEART RATE: 57 BPM | OXYGEN SATURATION: 98 % | SYSTOLIC BLOOD PRESSURE: 143 MMHG | DIASTOLIC BLOOD PRESSURE: 54 MMHG | RESPIRATION RATE: 18 BRPM | TEMPERATURE: 97.9 F

## 2019-11-11 DIAGNOSIS — M62.81 GENERALIZED MUSCLE WEAKNESS: ICD-10-CM

## 2019-11-11 DIAGNOSIS — I71.20 THORACIC AORTIC ANEURYSM WITHOUT RUPTURE (H): ICD-10-CM

## 2019-11-11 DIAGNOSIS — I71.21 ASCENDING AORTIC ANEURYSM (H): ICD-10-CM

## 2019-11-11 DIAGNOSIS — I10 BENIGN ESSENTIAL HYPERTENSION: ICD-10-CM

## 2019-11-11 DIAGNOSIS — I10 BENIGN ESSENTIAL HYPERTENSION: Primary | ICD-10-CM

## 2019-11-11 LAB
ABO + RH BLD: NORMAL
ABO + RH BLD: NORMAL
ALBUMIN SERPL-MCNC: 4 G/DL (ref 3.4–5)
ALP SERPL-CCNC: 93 U/L (ref 40–150)
ALT SERPL W P-5'-P-CCNC: 17 U/L (ref 0–50)
ANION GAP SERPL CALCULATED.3IONS-SCNC: 5 MMOL/L (ref 3–14)
ANION GAP SERPL CALCULATED.3IONS-SCNC: 5 MMOL/L (ref 3–14)
APTT PPP: 40 SEC (ref 22–37)
AST SERPL W P-5'-P-CCNC: 15 U/L (ref 0–45)
BASOPHILS # BLD AUTO: 0.1 10E9/L (ref 0–0.2)
BASOPHILS NFR BLD AUTO: 1 %
BILIRUB SERPL-MCNC: 0.7 MG/DL (ref 0.2–1.3)
BLD GP AB SCN SERPL QL: NORMAL
BLOOD BANK CMNT PATIENT-IMP: NORMAL
BUN SERPL-MCNC: 19 MG/DL (ref 7–30)
BUN SERPL-MCNC: 20 MG/DL (ref 7–30)
CALCIUM SERPL-MCNC: 9.8 MG/DL (ref 8.5–10.1)
CALCIUM SERPL-MCNC: 9.9 MG/DL (ref 8.5–10.1)
CHLORIDE SERPL-SCNC: 106 MMOL/L (ref 94–109)
CHLORIDE SERPL-SCNC: 107 MMOL/L (ref 94–109)
CO2 SERPL-SCNC: 28 MMOL/L (ref 20–32)
CO2 SERPL-SCNC: 29 MMOL/L (ref 20–32)
CREAT BLD-MCNC: 0.9 MG/DL (ref 0.52–1.04)
CREAT SERPL-MCNC: 0.85 MG/DL (ref 0.52–1.04)
CREAT SERPL-MCNC: 0.9 MG/DL (ref 0.52–1.04)
DIFFERENTIAL METHOD BLD: NORMAL
EOSINOPHIL # BLD AUTO: 0.2 10E9/L (ref 0–0.7)
EOSINOPHIL NFR BLD AUTO: 2.7 %
ERYTHROCYTE [DISTWIDTH] IN BLOOD BY AUTOMATED COUNT: 13.5 % (ref 10–15)
GFR SERPL CREATININE-BSD FRML MDRD: 60 ML/MIN/{1.73_M2}
GFR SERPL CREATININE-BSD FRML MDRD: 60 ML/MIN/{1.73_M2}
GFR SERPL CREATININE-BSD FRML MDRD: 63 ML/MIN/{1.73_M2}
GLUCOSE SERPL-MCNC: 105 MG/DL (ref 70–99)
GLUCOSE SERPL-MCNC: 99 MG/DL (ref 70–99)
HCT VFR BLD AUTO: 39.2 % (ref 35–47)
HGB BLD-MCNC: 12.4 G/DL (ref 11.7–15.7)
IMM GRANULOCYTES # BLD: 0 10E9/L (ref 0–0.4)
IMM GRANULOCYTES NFR BLD: 0.3 %
INR PPP: 1.02 (ref 0.86–1.14)
LYMPHOCYTES # BLD AUTO: 1.8 10E9/L (ref 0.8–5.3)
LYMPHOCYTES NFR BLD AUTO: 22.8 %
MCH RBC QN AUTO: 28.1 PG (ref 26.5–33)
MCHC RBC AUTO-ENTMCNC: 31.6 G/DL (ref 31.5–36.5)
MCV RBC AUTO: 89 FL (ref 78–100)
MONOCYTES # BLD AUTO: 0.9 10E9/L (ref 0–1.3)
MONOCYTES NFR BLD AUTO: 11 %
NEUTROPHILS # BLD AUTO: 4.9 10E9/L (ref 1.6–8.3)
NEUTROPHILS NFR BLD AUTO: 62.2 %
NRBC # BLD AUTO: 0 10*3/UL
NRBC BLD AUTO-RTO: 0 /100
PLATELET # BLD AUTO: 250 10E9/L (ref 150–450)
POTASSIUM SERPL-SCNC: 4.1 MMOL/L (ref 3.4–5.3)
POTASSIUM SERPL-SCNC: 4.2 MMOL/L (ref 3.4–5.3)
PROT SERPL-MCNC: 7.7 G/DL (ref 6.8–8.8)
RBC # BLD AUTO: 4.42 10E12/L (ref 3.8–5.2)
SODIUM SERPL-SCNC: 140 MMOL/L (ref 133–144)
SODIUM SERPL-SCNC: 140 MMOL/L (ref 133–144)
SPECIMEN EXP DATE BLD: NORMAL
WBC # BLD AUTO: 7.8 10E9/L (ref 4–11)

## 2019-11-11 PROCEDURE — 86850 RBC ANTIBODY SCREEN: CPT | Performed by: EMERGENCY MEDICINE

## 2019-11-11 PROCEDURE — 36415 COLL VENOUS BLD VENIPUNCTURE: CPT | Performed by: NURSE PRACTITIONER

## 2019-11-11 PROCEDURE — 80053 COMPREHEN METABOLIC PANEL: CPT | Performed by: EMERGENCY MEDICINE

## 2019-11-11 PROCEDURE — 93005 ELECTROCARDIOGRAM TRACING: CPT

## 2019-11-11 PROCEDURE — 86901 BLOOD TYPING SEROLOGIC RH(D): CPT | Performed by: EMERGENCY MEDICINE

## 2019-11-11 PROCEDURE — 85025 COMPLETE CBC W/AUTO DIFF WBC: CPT | Performed by: EMERGENCY MEDICINE

## 2019-11-11 PROCEDURE — 93306 TTE W/DOPPLER COMPLETE: CPT | Mod: 26 | Performed by: INTERNAL MEDICINE

## 2019-11-11 PROCEDURE — 99285 EMERGENCY DEPT VISIT HI MDM: CPT | Mod: 25

## 2019-11-11 PROCEDURE — 82565 ASSAY OF CREATININE: CPT

## 2019-11-11 PROCEDURE — 71260 CT THORAX DX C+: CPT

## 2019-11-11 PROCEDURE — 85730 THROMBOPLASTIN TIME PARTIAL: CPT | Performed by: EMERGENCY MEDICINE

## 2019-11-11 PROCEDURE — 93306 TTE W/DOPPLER COMPLETE: CPT

## 2019-11-11 PROCEDURE — 25000128 H RX IP 250 OP 636: Performed by: EMERGENCY MEDICINE

## 2019-11-11 PROCEDURE — 74177 CT ABD & PELVIS W/CONTRAST: CPT

## 2019-11-11 PROCEDURE — 85610 PROTHROMBIN TIME: CPT | Performed by: EMERGENCY MEDICINE

## 2019-11-11 PROCEDURE — 86900 BLOOD TYPING SEROLOGIC ABO: CPT | Performed by: EMERGENCY MEDICINE

## 2019-11-11 PROCEDURE — 80048 BASIC METABOLIC PNL TOTAL CA: CPT | Performed by: NURSE PRACTITIONER

## 2019-11-11 RX ORDER — IOPAMIDOL 755 MG/ML
80 INJECTION, SOLUTION INTRAVASCULAR ONCE
Status: COMPLETED | OUTPATIENT
Start: 2019-11-11 | End: 2019-11-11

## 2019-11-11 RX ADMIN — IOPAMIDOL 80 ML: 755 INJECTION, SOLUTION INTRAVENOUS at 11:54

## 2019-11-11 ASSESSMENT — ENCOUNTER SYMPTOMS
BACK PAIN: 0
WEAKNESS: 1
DIZZINESS: 1
ARTHRALGIAS: 1

## 2019-11-11 NOTE — PROGRESS NOTES
Get her in to see Carter 12-18 and then change her to losartan 50mg daily and call in rx    I did not know she went to ER so as long as dissection is ruled out which it is I would not have Kaya do anything    Just move up her OV as this is when she needed to be seen anyway      Thanks
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
## 2019-11-11 NOTE — PROGRESS NOTES
"Called pt to review BMP and Echo results from today when pt states she has been noticing progressively worsening dizziness recently.  She states dizziness sometimes makes it difficult for her to do her daily activities. She reports dizziness can be present when she is sitting or standing, not just when she gets up from sitting to standing. She cannot say for sure when worsening dizziness started, but has been going on \"for awhile\".     She confirms she is taking:  -Amlodipine 10mg at bedtime  -Toprol 25mg every morning  -torsemide 10mg every morning    She reports she knows she is supposed to take hydralazine 25mg TID, but she sometimes just takes it BID if she is feeling more dizzy.  She also reports she knows she is supposed to take losartan 50mg daily; however, she has a 100mg tablet that she is too lazy to cut so she usually just takes 100mg every other day.    Per chart review, pt saw MAURICIO Contreras most recently on 5/17/19 and \"OV note states:  3.  Aortic insufficiency-moderately severe.    -She has no congestive heart failure.    -Should she develop any edema, we could try a low-dose diuretic but would need to be cautious given her intermittent dizzyness and already wide pulse pressure  -I will try to have her take both Amlodipine tablets at bedtime=10mg daily  -repeat echo in 6 months to assess aortic valve and ascending aorta  -I will defer to Dr. Machado to determine if/when repeat CT scan needs to be done.  It has been a pleasure seeing Giselle Chino in follow up today; we will see her back in 6 months with an echocardiogram. She will contact me if her dizzyness persists.\"    Discussed with pt that will review with MAURICIO Contreras and call back with recommendations- she agrees with this plan.    CORINNE Kamara 4:05 PM 11/11/2019      "

## 2019-11-11 NOTE — ED TRIAGE NOTES
Patient sent to ED from cardiology clinic. Patient was today having an echo as part of an evaluation for ongoing dizziness and weakness x 1 month. Staff reports echo showed concern for possible aortic aneurysm and so was sent to ED for further eval and imaging.

## 2019-11-11 NOTE — ED AVS SNAPSHOT
[facility] Emergency Department  [address]  [facility] [address]  Phone:  [phone]  Fax:  [phone]                                    Giselle Chino   MRN: [id number]    Department:  [facility] Emergency Department   Date of Visit:  11/11/2019           After Visit Summary Signature Page    I have received my discharge instructions, and my questions have been answered. I have discussed any challenges I see with this plan with the nurse or doctor.    ..........................................................................................................................................  Patient/Patient Representative Signature      ..........................................................................................................................................  Patient Representative Print Name and Relationship to Patient    ..................................................               ................................................  Date                                   Time    ..........................................................................................................................................  Reviewed by Signature/Title    ...................................................              ..............................................  Date                                               Time          22EPIC Rev 08/18

## 2019-11-11 NOTE — PROGRESS NOTES
Reviewed with MAURICIO Contreras who states she meant to see if pt can see Dr. Machado on 11/18.  Was informed by Dr. Machado's nurse that there is not an available spot for pt to be seen on 11/18.  Discussed again with MAURICIO Contreras who recommends pt see DOD if dizziness is a lot worse, otherwise offer pt OV with MAURICIO Contreras on 12/6/19.   Will plan to call pt tomorrow with recommendations.     CORINNE Kamara 4:42 PM 11/11/2019

## 2019-11-12 LAB — INTERPRETATION ECG - MUSE: NORMAL

## 2019-11-12 NOTE — PROGRESS NOTES
Called pt on her cell phone, no answer and mailbox is not set up so unable to leave VM.  Called pt on her 's cell phone, no answer and mailbox is full so unable to leave VM.    CORINNE Kamara 3:10 PM 11/12/2019    ________________________________________  Called pt on her cell phone, no answer and mailbox is not set up so unable to leave VM.    Will attempt to call pt again tomorrow.    CORINNE Kamara 4:10 PM 11/12/2019

## 2019-11-13 RX ORDER — LOSARTAN POTASSIUM 50 MG/1
50 TABLET ORAL DAILY
Qty: 90 TABLET | Refills: 3 | Status: SHIPPED | OUTPATIENT
Start: 2019-11-13 | End: 2020-01-15

## 2019-11-13 NOTE — PROGRESS NOTES
Called and spoke with pt.  Reviewed recommendations to stop taking losartain 100mg EOD and instead will send in Rx for losartan 50mg tabs for her to take one tab daily- she verbalized understanding and agrees with this plan.  Also reviewed recommendation to have OV with MAURICIO Contreras on 12/6/19 and also keep OV with Dr. Machado on 1/15/20. Pt agrees to sooner OV with MAURICIO Contreras on 12/6/19 at 12:30pm in [place] and to keep OV with Dr. Machado in Jan. 2020- appt reminder letter mailed. Advised pt to call sooner if dizziness worsens for DOD appt or go to ED for evaluation if she has syncope or pre-syncope.  She verbalized understanding and agrees with this plan.     CORINNE Kamara 4:53 PM 11/13/2019

## 2019-11-21 ENCOUNTER — TRANSFERRED RECORDS (OUTPATIENT)
Dept: HEALTH INFORMATION MANAGEMENT | Facility: CLINIC | Age: 84
End: 2019-11-21

## 2019-12-06 ENCOUNTER — OFFICE VISIT (OUTPATIENT)
Dept: CARDIOLOGY | Facility: CLINIC | Age: 84
End: 2019-12-06
Payer: COMMERCIAL

## 2019-12-06 VITALS
BODY MASS INDEX: 25.78 KG/M2 | WEIGHT: 151 LBS | HEIGHT: 64 IN | HEART RATE: 74 BPM | SYSTOLIC BLOOD PRESSURE: 122 MMHG | DIASTOLIC BLOOD PRESSURE: 68 MMHG

## 2019-12-06 DIAGNOSIS — I10 BENIGN ESSENTIAL HYPERTENSION: ICD-10-CM

## 2019-12-06 DIAGNOSIS — I35.9 AORTIC VALVE DISORDER: ICD-10-CM

## 2019-12-06 DIAGNOSIS — J44.1 COPD EXACERBATION (H): ICD-10-CM

## 2019-12-06 DIAGNOSIS — I71.20 THORACIC AORTIC ANEURYSM WITHOUT RUPTURE (H): Primary | ICD-10-CM

## 2019-12-06 PROCEDURE — 99214 OFFICE O/P EST MOD 30 MIN: CPT | Performed by: NURSE PRACTITIONER

## 2019-12-06 RX ORDER — POTASSIUM CHLORIDE 1500 MG/1
20 TABLET, EXTENDED RELEASE ORAL DAILY
Qty: 90 TABLET | Refills: 3 | COMMUNITY
Start: 2019-12-06 | End: 2023-01-01

## 2019-12-06 RX ORDER — PREDNISONE 20 MG/1
TABLET ORAL
Qty: 3 TABLET | Refills: 0 | Status: SHIPPED | OUTPATIENT
Start: 2019-12-06 | End: 2022-06-21

## 2019-12-06 ASSESSMENT — MIFFLIN-ST. JEOR: SCORE: 1129.93

## 2019-12-06 NOTE — PATIENT INSTRUCTIONS
No changes today    You can do PT but no lifting of over 5-8 pounds with more reps    Work on balance and gentle strengthening    See Dr. Machado in January
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
## 2020-01-09 DIAGNOSIS — I10 HTN (HYPERTENSION): ICD-10-CM

## 2020-01-09 RX ORDER — TORSEMIDE 10 MG/1
10 TABLET ORAL DAILY
Qty: 90 TABLET | Refills: 3 | Status: SHIPPED | OUTPATIENT
Start: 2020-01-09 | End: 2021-01-04

## 2020-01-15 ENCOUNTER — OFFICE VISIT (OUTPATIENT)
Dept: CARDIOLOGY | Facility: CLINIC | Age: 85
End: 2020-01-15
Payer: COMMERCIAL

## 2020-01-15 VITALS
BODY MASS INDEX: 25.86 KG/M2 | HEIGHT: 64 IN | WEIGHT: 151.5 LBS | DIASTOLIC BLOOD PRESSURE: 56 MMHG | SYSTOLIC BLOOD PRESSURE: 108 MMHG | HEART RATE: 84 BPM

## 2020-01-15 DIAGNOSIS — I10 BENIGN ESSENTIAL HYPERTENSION: ICD-10-CM

## 2020-01-15 DIAGNOSIS — I71.20 THORACIC AORTIC ANEURYSM WITHOUT RUPTURE (H): ICD-10-CM

## 2020-01-15 DIAGNOSIS — E78.5 HYPERLIPIDEMIA LDL GOAL <100: ICD-10-CM

## 2020-01-15 PROCEDURE — 99214 OFFICE O/P EST MOD 30 MIN: CPT | Performed by: INTERNAL MEDICINE

## 2020-01-15 RX ORDER — ATORVASTATIN CALCIUM 20 MG/1
20 TABLET, FILM COATED ORAL DAILY
Qty: 90 TABLET | Refills: 3 | Status: SHIPPED | OUTPATIENT
Start: 2020-01-15 | End: 2021-04-07

## 2020-01-15 RX ORDER — METOPROLOL SUCCINATE 25 MG/1
25 TABLET, EXTENDED RELEASE ORAL DAILY
Qty: 90 TABLET | Refills: 3 | Status: SHIPPED | OUTPATIENT
Start: 2020-01-15 | End: 2021-04-07

## 2020-01-15 RX ORDER — ACETAMINOPHEN 500 MG
500-1000 TABLET ORAL DAILY
COMMUNITY

## 2020-01-15 ASSESSMENT — MIFFLIN-ST. JEOR: SCORE: 1132.2
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
## 2020-01-15 NOTE — LETTER
1/15/2020      Shoaib Mills DO  [facility] [address]  [facility] [id number]      RE: Giselle Chino       Dear Colleague,    I had the pleasure of seeing Giselle Chino in the [facility].    Service Date: 01/15/2020      PRIMARY CARE PHYSICIAN:  Shoaib Mills DO      HISTORY OF PRESENT ILLNESS:  I had the pleasure of following up on our mutual patient, Giselle Chino.  As you know, she is an 84-year-old woman.  As you remember, I met her many, many years ago when she was nearly moribund; it turned out it was psychosocial and medical.  She must have had severe psychologic issues.  She stopped all of her medicines including her thyroid medicine.  She was severely hypothyroid and noncommunicative.  When we realized it was all primarily from her hypothyroid and psychologic, she was treated with you and others, and she made a remarkable improvement and she is really back to being a very functional person.  She has severe dilated aorta and this has been a critical issue for her.  When we first met her, she was in absolutely no condition to ever consider aortic surgery.  She was completely frail and essentially comatose when I met her.  Now is again a functioning human being.  She was seen by several cardiovascular surgeons.  This is a severely dilated aorta.  A few of them thought that she was a very poor candidate for aortic surgery and a poor candidate for rehabilitation.  As she slowly got better, we did have her see some other surgeons, who are willing to take her to surgery, but all agreed that this is going to be a very high risk surgery and eventually the patient felt that it was too risky and so it is elected to treat her medically.  I have been keeping her blood pressure very low to try to prevent further aortic dilatation.  There was a slight scare in 11/2019 when her echo was read.  They again identified LV enlarged, normal ejection  fraction; however, given the degree of aortic insufficiency, a normal EF is actually probably a little bit low.  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  Her aorta was 78 mm, which was actually relatively stable.  They thought they saw a linear tear versus artifact and she was sent to the emergency room.  In the emergency room, they did a CAT scan which confirmed that the aorta was 78 mm.  There was no dissection and really the CAT scan was unchanged from the one the year before.  The echo was also otherwise unchanged in terms of the degree of aortic insufficiency and LV size and function.  She has rather significant COPD with an FEV1 of 0.76 and reduced DLCO and that was also figured into the reason that she was considered to be a poor surgical candidate.  I actually think she made the right decision because it has been years since I have been taking care of her and her aorta has been relatively stable.  She does note some dizziness.  It is a little unusual because it occurs sitting or standing; it is not classically orthostatic, which I would expect it was from the medicines.  At one point we did do a heart monitor.  She had a bradycardic rhythm.  We actually stopped her beta blocker and then restarted at a lower dose.  She is currently on 25 mg a day.      Today, her heart rate runs between 57 and 84.  I checked her blood pressure, both sitting and standing.  Her systolics are pretty consistently around 108, but her diastolics have a very wide pulse pressure because of this massively dilated aorta and the aortic insufficiency.  Her sitting diastolic was in the 50s and her standing diastolic was in the 30s.  It is possible she is dizzy from this.  Also it turns out she may or may not have taken her metoprolol today.  I am going to stop her losartan completely on the off chance that this dizziness is related to low blood pressure and a low pulse pressure.  We will have her come back in about 9 days.  We will check her blood pressure and  pulse, both sitting and standing.  If her blood pressure really is low with a wide pulse pressure, that means her mean arterial pressure is quite low and that might be why she is dizzy and we might contemplate either cutting the diuretic in half since she is not retaining fluid or cutting the amlodipine from 10 down to 5, which is probably the more ideal.  We will also make sure her heart rate is okay because again she had a history of bradycardia on beta blockers, and it is not entirely clear to me if she actually took her beta blocker today or not, and I want to make sure her heart rate does not get too low.  All the other blood work that was done in the hospital was quite stable.  She again talked to me about moving forward with surgery or not, and she does not want to and I have to agree with her.  She has done exceedingly well on medical therapy over a very long time span, dating back to , so we are now 7 years out on medical therapy.      Total consult time was 25 minutes, all counseling.      Ty White MD      cc:   Shoaib Mills DO   [facility] [place]   [address] [place] [address]         TY WHITE MD             D: 01/15/2020   T: 01/15/2020   MT: al      Name:     INDIANA NYE   MRN:      [id number]        Account:      [id number]   :      1935           Service Date: 01/15/2020      Document: [id number]         Outpatient Encounter Medications as of 1/15/2020   Medication Sig Dispense Refill     Acetaminophen (TYLENOL 8 HOUR PO) Take by mouth as needed        acetaminophen (TYLENOL) 500 MG tablet Take 500-1,000 mg by mouth every 6 hours as needed for mild pain       amLODIPine (NORVASC) 5 MG tablet Take 2 tablets (10 mg) by mouth At Bedtime 180 tablet 3     atorvastatin (LIPITOR) 20 MG tablet Take 1 tablet (20 mg) by mouth daily 90 tablet 3     hydrALAZINE (APRESOLINE) 25 MG tablet Take 1 tablet (25 mg) by mouth 3 times daily 270 tablet 3      ipratropium - albuterol 0.5 mg/2.5 mg/3 mL (DUONEB) 0.5-2.5 (3) MG/3ML neb solution Take 1 vial (3 mLs) by nebulization every 6 hours as needed for shortness of breath / dyspnea or wheezing  0     Ipratropium-Albuterol (COMBIVENT RESPIMAT)  MCG/ACT inhaler Inhale 1 puff into the lungs 4 times daily Not to exceed 6 doses per day. 1 Inhaler 1     levothyroxine (SYNTHROID/LEVOTHROID) 112 MCG tablet Take 112.5 mcg by mouth daily        metoprolol succinate ER (TOPROL-XL) 25 MG 24 hr tablet Take 1 tablet (25 mg) by mouth daily 90 tablet 3     oxybutynin (DITROPAN-XL) 10 MG 24 hr tablet Take 10 mg by mouth daily       potassium chloride ER (K-DUR/KLOR-CON M) 20 MEQ CR tablet Take 1 tablet (20 mEq) by mouth daily 90 tablet 3     torsemide (DEMADEX) 10 MG tablet Take 1 tablet (10 mg) by mouth daily 90 tablet 3     predniSONE (DELTASONE) 20 MG tablet Short term for mosquito bites--not currently taking (Patient not taking: Reported on 1/15/2020) 3 tablet 0     [DISCONTINUED] atorvastatin (LIPITOR) 20 MG tablet Take 1 tablet (20 mg) by mouth daily 90 tablet 3     [DISCONTINUED] losartan (COZAAR) 50 MG tablet Take 1 tablet (50 mg) by mouth daily 90 tablet 3     [DISCONTINUED] metoprolol succinate ER (TOPROL-XL) 25 MG 24 hr tablet Take 1 tablet (25 mg) by mouth daily 90 tablet 0     No facility-administered encounter medications on file as of 1/15/2020.      Again, thank you for allowing me to participate in the care of your patient.      Sincerely,    Tyler Machado MD     [facility]
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
## 2020-01-15 NOTE — PROGRESS NOTES
Service Date: 01/15/2020      PRIMARY CARE PHYSICIAN:  Shoaib Mills DO      HISTORY OF PRESENT ILLNESS:  I had the pleasure of following up on our mutual patient, Giselle Chino.  As you know, she is an 84-year-old woman.  As you remember, I met her many, many years ago when she was nearly moribund; it turned out it was psychosocial and medical.  She must have had severe psychologic issues.  She stopped all of her medicines including her thyroid medicine.  She was severely hypothyroid and noncommunicative.  When we realized it was all primarily from her hypothyroid and psychologic, she was treated with you and others, and she made a remarkable improvement and she is really back to being a very functional person.  She has severe dilated aorta and this has been a critical issue for her.  When we first met her, she was in absolutely no condition to ever consider aortic surgery.  She was completely frail and essentially comatose when I met her.  Now is again a functioning human being.  She was seen by several cardiovascular surgeons.  This is a severely dilated aorta.  A few of them thought that she was a very poor candidate for aortic surgery and a poor candidate for rehabilitation.  As she slowly got better, we did have her see some other surgeons, who are willing to take her to surgery, but all agreed that this is going to be a very high risk surgery and eventually the patient felt that it was too risky and so it is elected to treat her medically.  I have been keeping her blood pressure very low to try to prevent further aortic dilatation.  There was a slight scare in 11/2019 when her echo was read.  They again identified LV enlarged, normal ejection fraction; however, given the degree of aortic insufficiency, a normal EF is actually probably a little bit low.  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  Her aorta was 78 mm, which was actually relatively stable.  They thought they saw a linear tear versus artifact and she was sent to the  emergency room.  In the emergency room, they did a CAT scan which confirmed that the aorta was 78 mm.  There was no dissection and really the CAT scan was unchanged from the one the year before.  The echo was also otherwise unchanged in terms of the degree of aortic insufficiency and LV size and function.  She has rather significant COPD with an FEV1 of 0.76 and reduced DLCO and that was also figured into the reason that she was considered to be a poor surgical candidate.  I actually think she made the right decision because it has been years since I have been taking care of her and her aorta has been relatively stable.  She does note some dizziness.  It is a little unusual because it occurs sitting or standing; it is not classically orthostatic, which I would expect it was from the medicines.  At one point we did do a heart monitor.  She had a bradycardic rhythm.  We actually stopped her beta blocker and then restarted at a lower dose.  She is currently on 25 mg a day.      Today, her heart rate runs between 57 and 84.  I checked her blood pressure, both sitting and standing.  Her systolics are pretty consistently around 108, but her diastolics have a very wide pulse pressure because of this massively dilated aorta and the aortic insufficiency.  Her sitting diastolic was in the 50s and her standing diastolic was in the 30s.  It is possible she is dizzy from this.  Also it turns out she may or may not have taken her metoprolol today.  I am going to stop her losartan completely on the off chance that this dizziness is related to low blood pressure and a low pulse pressure.  We will have her come back in about 9 days.  We will check her blood pressure and pulse, both sitting and standing.  If her blood pressure really is low with a wide pulse pressure, that means her mean arterial pressure is quite low and that might be why she is dizzy and we might contemplate either cutting the diuretic in half since she is not  retaining fluid or cutting the amlodipine from 10 down to 5, which is probably the more ideal.  We will also make sure her heart rate is okay because again she had a history of bradycardia on beta blockers, and it is not entirely clear to me if she actually took her beta blocker today or not, and I want to make sure her heart rate does not get too low.  All the other blood work that was done in the hospital was quite stable.  She again talked to me about moving forward with surgery or not, and she does not want to and I have to agree with her.  She has done exceedingly well on medical therapy over a very long time span, dating back to , so we are now 7 years out on medical therapy.      Total consult time was 25 minutes, all counseling.      Ty White MD      cc:   Shoaib Mills [facility]   [address] [place] [address]         TY WHITE MD             D: 01/15/2020   T: 01/15/2020   MT: al      Name:     INDIANA NYE   MRN:      [id number]        Account:      [id number]   :      1935           Service Date: 01/15/2020      Document: [id number]

## 2020-01-24 ENCOUNTER — OFFICE VISIT (OUTPATIENT)
Dept: CARDIOLOGY | Facility: CLINIC | Age: 85
End: 2020-01-24
Attending: INTERNAL MEDICINE
Payer: COMMERCIAL

## 2020-01-24 VITALS
DIASTOLIC BLOOD PRESSURE: 50 MMHG | WEIGHT: 149.8 LBS | HEART RATE: 74 BPM | BODY MASS INDEX: 25.57 KG/M2 | HEIGHT: 64 IN | SYSTOLIC BLOOD PRESSURE: 140 MMHG

## 2020-01-24 DIAGNOSIS — I71.20 THORACIC AORTIC ANEURYSM WITHOUT RUPTURE (H): ICD-10-CM

## 2020-01-24 DIAGNOSIS — I10 BENIGN ESSENTIAL HYPERTENSION: Primary | ICD-10-CM

## 2020-01-24 PROCEDURE — 99214 OFFICE O/P EST MOD 30 MIN: CPT | Performed by: NURSE PRACTITIONER

## 2020-01-24 RX ORDER — AZITHROMYCIN 250 MG/1
250 TABLET, FILM COATED ORAL DAILY
COMMUNITY
End: 2021-07-28

## 2020-01-24 RX ORDER — BENZONATATE 100 MG/1
100 CAPSULE ORAL 3 TIMES DAILY
COMMUNITY
End: 2022-06-21

## 2020-01-24 ASSESSMENT — MIFFLIN-ST. JEOR: SCORE: 1124.49
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
## 2020-04-24 ENCOUNTER — VIRTUAL VISIT (OUTPATIENT)
Dept: CARDIOLOGY | Facility: CLINIC | Age: 85
End: 2020-04-24
Attending: NURSE PRACTITIONER
Payer: COMMERCIAL

## 2020-04-24 VITALS
SYSTOLIC BLOOD PRESSURE: 128 MMHG | DIASTOLIC BLOOD PRESSURE: 59 MMHG | HEART RATE: 89 BPM | WEIGHT: 150 LBS | HEIGHT: 64 IN | BODY MASS INDEX: 25.61 KG/M2

## 2020-04-24 DIAGNOSIS — I16.0 HYPERTENSIVE URGENCY: ICD-10-CM

## 2020-04-24 DIAGNOSIS — I10 BENIGN ESSENTIAL HYPERTENSION: ICD-10-CM

## 2020-04-24 PROCEDURE — 99213 OFFICE O/P EST LOW 20 MIN: CPT | Mod: 95 | Performed by: NURSE PRACTITIONER

## 2020-04-24 RX ORDER — HYDRALAZINE HYDROCHLORIDE 25 MG/1
TABLET, FILM COATED ORAL
Qty: 270 TABLET | Refills: 3 | Status: SHIPPED | OUTPATIENT
Start: 2020-04-24 | End: 2021-05-05

## 2020-04-24 ASSESSMENT — MIFFLIN-ST. JEOR: SCORE: 1125.4
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
## 2020-04-24 NOTE — PATIENT INSTRUCTIONS
Phone visit 5-22 at 1050am    Increase hydralaizne to 25mg am (one pill) 25mg mid-day (one pill); 50mg at bedtime (2 pills)

## 2020-05-04 ENCOUNTER — COMMUNICATION - HEALTHEAST (OUTPATIENT)
Dept: SCHEDULING | Facility: CLINIC | Age: 85
End: 2020-05-04

## 2020-05-22 ENCOUNTER — VIRTUAL VISIT (OUTPATIENT)
Dept: CARDIOLOGY | Facility: CLINIC | Age: 85
End: 2020-05-22
Payer: COMMERCIAL

## 2020-05-22 VITALS
BODY MASS INDEX: 25.61 KG/M2 | HEIGHT: 64 IN | SYSTOLIC BLOOD PRESSURE: 132 MMHG | WEIGHT: 150 LBS | DIASTOLIC BLOOD PRESSURE: 82 MMHG | HEART RATE: 76 BPM

## 2020-05-22 DIAGNOSIS — I10 BENIGN ESSENTIAL HYPERTENSION: ICD-10-CM

## 2020-05-22 PROCEDURE — 99213 OFFICE O/P EST LOW 20 MIN: CPT | Mod: 95 | Performed by: NURSE PRACTITIONER

## 2020-05-22 ASSESSMENT — MIFFLIN-ST. JEOR: SCORE: 1125.4
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
## 2020-06-22 DIAGNOSIS — I10 BENIGN ESSENTIAL HYPERTENSION: ICD-10-CM

## 2020-06-22 RX ORDER — AMLODIPINE BESYLATE 5 MG/1
10 TABLET ORAL AT BEDTIME
Qty: 180 TABLET | Refills: 0 | Status: SHIPPED | OUTPATIENT
Start: 2020-06-22 | End: 2020-12-17

## 2020-12-17 DIAGNOSIS — I10 BENIGN ESSENTIAL HYPERTENSION: ICD-10-CM

## 2020-12-17 RX ORDER — AMLODIPINE BESYLATE 5 MG/1
10 TABLET ORAL AT BEDTIME
Qty: 180 TABLET | Refills: 0 | Status: SHIPPED | OUTPATIENT
Start: 2020-12-17 | End: 2021-03-10

## 2021-01-04 DIAGNOSIS — I10 HTN (HYPERTENSION): ICD-10-CM

## 2021-01-04 RX ORDER — TORSEMIDE 10 MG/1
10 TABLET ORAL DAILY
Qty: 90 TABLET | Refills: 0 | Status: SHIPPED | OUTPATIENT
Start: 2021-01-04 | End: 2021-04-09

## 2021-02-19 ENCOUNTER — DOCUMENTATION ONLY (OUTPATIENT)
Dept: CARDIOLOGY | Facility: CLINIC | Age: 86
End: 2021-02-19

## 2021-02-19 DIAGNOSIS — I35.1 NONRHEUMATIC AORTIC VALVE INSUFFICIENCY: Primary | ICD-10-CM

## 2021-02-19 DIAGNOSIS — E78.5 HYPERLIPIDEMIA LDL GOAL <100: ICD-10-CM

## 2021-02-19 NOTE — PROGRESS NOTES
Can you call her    She canceled her visit today and now it is set up as a phone visit    She was to see me last august and did not  I put in my note then that she is due to see Jarred now with echo but orders did not get put in as I thought I was going to see her back which did not happen    Can you call her and tell her I think she should see him and have echo to check aortic valve    They now set up a phone visit with me which we can keep but we do need to get echo and visit with him    If phone visit not needed we can cancel--check with her    Orders in

## 2021-02-24 NOTE — PROGRESS NOTES
Called and spoke with pt and discussed that MAURICIO Contreras reviewed her chart after appt cancellation last week and noticed that she is due for an echo and a visit with Dr. Machado. Discussed that pt can keep phone visit with MAURICIO Contreras on 3/12/21 if she would like to but she also will need an echo and visit with Dr. Machado. Pt states she would prefer to cancel the phone visit on 3/12 and just get echo and visit with Dr. Machado scheduled instead.     Pt agreed to fasting labs at 9:30am and echo at 8:30am on 3/16/21 and pt agreed to in clinic visit with Dr. Machado on 3/19/21 at 8:00am.     CORINNE Kamara 3:05 PM 2/24/2021

## 2021-02-25 ENCOUNTER — DOCUMENTATION ONLY (OUTPATIENT)
Dept: CARDIOLOGY | Facility: CLINIC | Age: 86
End: 2021-02-25

## 2021-02-25 NOTE — PROGRESS NOTES
Assisted pt in getting scheduled for a COVID-19 vaccine on 2/27/21 at 9:25am.   CORINNE Kamara 10:03 AM 2/25/2021

## 2021-02-27 ENCOUNTER — IMMUNIZATION (OUTPATIENT)
Dept: NURSING | Facility: CLINIC | Age: 86
End: 2021-02-27
Payer: COMMERCIAL

## 2021-02-27 PROCEDURE — 0011A PR COVID VAC MODERNA 100 MCG/0.5 ML IM: CPT

## 2021-02-27 PROCEDURE — 91301 PR COVID VAC MODERNA 100 MCG/0.5 ML IM: CPT

## 2021-03-10 DIAGNOSIS — I10 BENIGN ESSENTIAL HYPERTENSION: ICD-10-CM

## 2021-03-10 RX ORDER — AMLODIPINE BESYLATE 5 MG/1
10 TABLET ORAL AT BEDTIME
Qty: 180 TABLET | Refills: 0 | Status: SHIPPED | OUTPATIENT
Start: 2021-03-10 | End: 2021-04-20

## 2021-03-13 ENCOUNTER — HEALTH MAINTENANCE LETTER (OUTPATIENT)
Age: 86
End: 2021-03-13

## 2021-03-16 ENCOUNTER — HOSPITAL ENCOUNTER (OUTPATIENT)
Dept: CARDIOLOGY | Facility: CLINIC | Age: 86
Discharge: HOME | End: 2021-03-16
Attending: NURSE PRACTITIONER | Admitting: NURSE PRACTITIONER
Payer: COMMERCIAL

## 2021-03-16 DIAGNOSIS — E78.5 HYPERLIPIDEMIA LDL GOAL <100: ICD-10-CM

## 2021-03-16 DIAGNOSIS — I35.1 NONRHEUMATIC AORTIC VALVE INSUFFICIENCY: ICD-10-CM

## 2021-03-16 LAB
ALT SERPL W P-5'-P-CCNC: 23 U/L (ref 0–50)
ANION GAP SERPL CALCULATED.3IONS-SCNC: 2 MMOL/L (ref 3–14)
BUN SERPL-MCNC: 23 MG/DL (ref 7–30)
CALCIUM SERPL-MCNC: 9.9 MG/DL (ref 8.5–10.1)
CHLORIDE SERPL-SCNC: 109 MMOL/L (ref 94–109)
CHOLEST SERPL-MCNC: 147 MG/DL
CO2 SERPL-SCNC: 31 MMOL/L (ref 20–32)
CREAT SERPL-MCNC: 0.84 MG/DL (ref 0.52–1.04)
GFR SERPL CREATININE-BSD FRML MDRD: 64 ML/MIN/{1.73_M2}
GLUCOSE SERPL-MCNC: 98 MG/DL (ref 70–99)
HDLC SERPL-MCNC: 64 MG/DL
LDLC SERPL CALC-MCNC: 61 MG/DL
NONHDLC SERPL-MCNC: 83 MG/DL
POTASSIUM SERPL-SCNC: 4 MMOL/L (ref 3.4–5.3)
SODIUM SERPL-SCNC: 142 MMOL/L (ref 133–144)
TRIGL SERPL-MCNC: 112 MG/DL

## 2021-03-16 PROCEDURE — 93306 TTE W/DOPPLER COMPLETE: CPT

## 2021-03-16 PROCEDURE — 80061 LIPID PANEL: CPT | Performed by: NURSE PRACTITIONER

## 2021-03-16 PROCEDURE — 80048 BASIC METABOLIC PNL TOTAL CA: CPT | Performed by: NURSE PRACTITIONER

## 2021-03-16 PROCEDURE — 36415 COLL VENOUS BLD VENIPUNCTURE: CPT | Performed by: NURSE PRACTITIONER

## 2021-03-16 PROCEDURE — 93306 TTE W/DOPPLER COMPLETE: CPT | Mod: 26 | Performed by: INTERNAL MEDICINE

## 2021-03-16 PROCEDURE — 84460 ALANINE AMINO (ALT) (SGPT): CPT | Performed by: NURSE PRACTITIONER

## 2021-03-19 ENCOUNTER — OFFICE VISIT (OUTPATIENT)
Dept: CARDIOLOGY | Facility: CLINIC | Age: 86
End: 2021-03-19
Attending: NURSE PRACTITIONER
Payer: COMMERCIAL

## 2021-03-19 ENCOUNTER — HOSPITAL ENCOUNTER (OUTPATIENT)
Dept: CARDIOLOGY | Facility: CLINIC | Age: 86
Discharge: HOME | End: 2021-03-19
Attending: INTERNAL MEDICINE | Admitting: INTERNAL MEDICINE
Payer: COMMERCIAL

## 2021-03-19 VITALS
SYSTOLIC BLOOD PRESSURE: 121 MMHG | HEIGHT: 64 IN | BODY MASS INDEX: 24.52 KG/M2 | HEART RATE: 68 BPM | WEIGHT: 143.6 LBS

## 2021-03-19 DIAGNOSIS — I35.1 NONRHEUMATIC AORTIC VALVE INSUFFICIENCY: ICD-10-CM

## 2021-03-19 DIAGNOSIS — I71.20 THORACIC AORTIC ANEURYSM WITHOUT RUPTURE (H): Primary | ICD-10-CM

## 2021-03-19 DIAGNOSIS — I49.9 CARDIAC ARRHYTHMIA, UNSPECIFIED CARDIAC ARRHYTHMIA TYPE: ICD-10-CM

## 2021-03-19 DIAGNOSIS — I71.20 THORACIC AORTIC ANEURYSM WITHOUT RUPTURE (H): ICD-10-CM

## 2021-03-19 PROCEDURE — 93248 EXT ECG>7D<15D REV&INTERPJ: CPT | Performed by: INTERNAL MEDICINE

## 2021-03-19 PROCEDURE — 93242 EXT ECG>48HR<7D RECORDING: CPT

## 2021-03-19 PROCEDURE — 99215 OFFICE O/P EST HI 40 MIN: CPT | Mod: 25 | Performed by: INTERNAL MEDICINE

## 2021-03-19 PROCEDURE — 93000 ELECTROCARDIOGRAM COMPLETE: CPT | Mod: XE | Performed by: INTERNAL MEDICINE

## 2021-03-19 SDOH — HEALTH STABILITY: MENTAL HEALTH: HOW OFTEN DO YOU HAVE 6 OR MORE DRINKS ON ONE OCCASION?: NOT ASKED

## 2021-03-19 SDOH — HEALTH STABILITY: MENTAL HEALTH: HOW OFTEN DO YOU HAVE A DRINK CONTAINING ALCOHOL?: NOT ASKED

## 2021-03-19 SDOH — HEALTH STABILITY: MENTAL HEALTH: HOW MANY STANDARD DRINKS CONTAINING ALCOHOL DO YOU HAVE ON A TYPICAL DAY?: NOT ASKED

## 2021-03-19 ASSESSMENT — MIFFLIN-ST. JEOR: SCORE: 1091.37
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
## 2021-03-19 NOTE — PROGRESS NOTES
Service Date: 03/19/2021      HISTORY OF PRESENT ILLNESS:  Shoaib Mills DO      HISTORY OF PRESENT ILLNESS:  Giselle Chino returns for followup.  She is an 85-year-old woman.  This is an important note.  What started out as a regular office visit became much more complex.  This patient's history has been reviewed before.  When I met her, she was in extremis condition years ago.  She was massively hypothyroid and almost catatonic.  She has a huge ascending aortic aneurysm.  She has been seen by multiple cardiovascular surgeons.  Most of the time, she has been turned down, but eventually after some psychiatric care and replacing her thyroid, she became a much more functional person and we had revisited the issue of aortic surgery and she was actually accepted for surgery, understanding that she was still high risk for rehabilitation.  We elected not to and, in fact, she has done very well with medical therapy.  Her aorta is now 79 mm ascending, but it is enlarged in the arch and the descending aorta also.  She has really only grown 2-4 mm on various studies, echo and CAT scan, over the last 5-6 years.  Her aortic valve insufficiency is slowly getting worse.  It is probably close to 3+ now.  Despite this, her LV size and function has remained normal.  She is having bouts of dizziness, which I assume were due to the wide pulse pressure, both from noncompliant arteries and worsening aortic valve insufficiency, and indeed today her blood pressure sitting was basically in the 140s/60, but standing, her blood pressure is 121/0.  She has classic water hammer pulses, etc., from her aortic insufficiency.  What was disconcerting is a couple things.  One was she went into some type of tachyarrhythmia while I was listening to her heart standing and then it would go back to normal.  It did this a couple of times and she was questionably more dizzy with this, but she was really not clear that she was particularly dizzy, even  though dizziness was one of her chief complaints on today's visit.  By the time I was able get an EKG and a prolonged several-minute rhythm strip, she remained in sinus rhythm.      When she was sitting in the chair and I was talking to her, she seemed much more functional, to the point we are talking about perhaps revisiting an office visit with Dr. Mulligan to see if maybe she is a surgical candidate to replace her aortic valve and ascending aorta.  However, when I went to do the rhythm strip and get her to the bench, etc., it became quite clear she is exceedingly frail.  She cannot stand without help.  She does use a walker.  She was frail just stepping up on the stepstool to the exam table and certainly coming down.  Our concern has been what is her rehabilitation potential for such a huge surgery and again, as I mentioned, I met her several years ago when she was in extremis condition and with medical therapy, she has actually survived years now with this.      I cannot help but wonder if the dizziness now is a combination of orthostasis plus this wide pulse pressure with a diastolic pressure of 0, so her mean arterial pressure is probably low and now I am wondering about this potential tachyarrhythmia.  At this point, she states that she is at her usual health.  She continues to mention dizziness as one of her main complaints - not vertigo, although she reports separate episodes of vertigo, but again today she thought this was a good day, even though I reported the vitals as above.  I am going to have her wear a Zio Patch for a week and then we will come back and revisit and again I was all prepared to send her to Dr. Mulligan to retalk about aortic valve and aorta surgery, but I am still concerned her rehabilitation potential is very poor.  Once again also I reviewed the labs from [facility] and no doubt they are going to be calling her soon, but she is chemically hypothyroid again.  Her TSH is about 30, so  we know that this tachyarrhythmia is not from hyperthyroid.      I reviewed her lipids and electrolytes with us.  It turned out [place] had done them the week before, so she really did not need them twice, but they looked quite good.  The big standout lab, of course, is the hypothyroidism.  Once again, I will have to have [facility] reassess her thyroid medication.  We do not want to overtreat her systolic hypertension because her wide pulse pressure makes her mean arterial pressure lower.  When I looked at the echo myself with her, I thought there was actually only a trivial amount of retrograde flow in the descending aorta, so perhaps it is more of moderate AI, probably not severe, but this aorta is slowly progressing.  We will revisit this issue after I see if she has an SVT or not.  That might be where her dizziness is coming from, since it is seemingly episodic, even though she told me it was worse standing on occasion.  This ended up being a 1-hour visit.      Tyler White MD      cc:   Shoaib Mills, [facility]   [address] [place] [address]      Pam Mulligan MD   [facility]   [address] [facility] [address]   [place] [address]         TYLER WHITE MD             D: 2021   T: 2021   MT: al      Name:     [place] NYE   MRN:      [id number]        Account:      [id number]   :      1935           Service Date: 2021      Document: [id number]

## 2021-03-27 ENCOUNTER — IMMUNIZATION (OUTPATIENT)
Dept: NURSING | Facility: CLINIC | Age: 86
End: 2021-03-27
Attending: INTERNAL MEDICINE
Payer: COMMERCIAL

## 2021-03-27 PROCEDURE — 91301 PR COVID VAC MODERNA 100 MCG/0.5 ML IM: CPT

## 2021-03-27 PROCEDURE — 0012A PR COVID VAC MODERNA 100 MCG/0.5 ML IM: CPT

## 2021-04-07 DIAGNOSIS — E78.5 HYPERLIPIDEMIA LDL GOAL <100: ICD-10-CM

## 2021-04-07 DIAGNOSIS — I71.20 THORACIC AORTIC ANEURYSM WITHOUT RUPTURE (H): ICD-10-CM

## 2021-04-07 DIAGNOSIS — I10 BENIGN ESSENTIAL HYPERTENSION: ICD-10-CM

## 2021-04-07 DIAGNOSIS — I16.0 HYPERTENSIVE URGENCY: ICD-10-CM

## 2021-04-07 RX ORDER — ATORVASTATIN CALCIUM 20 MG/1
20 TABLET, FILM COATED ORAL DAILY
Qty: 90 TABLET | Refills: 3 | Status: SHIPPED | OUTPATIENT
Start: 2021-04-07 | End: 2022-01-24

## 2021-04-07 RX ORDER — METOPROLOL SUCCINATE 25 MG/1
25 TABLET, EXTENDED RELEASE ORAL DAILY
Qty: 90 TABLET | Refills: 3 | Status: SHIPPED | OUTPATIENT
Start: 2021-04-07 | End: 2021-04-20

## 2021-04-09 DIAGNOSIS — I10 HTN (HYPERTENSION): ICD-10-CM

## 2021-04-09 RX ORDER — TORSEMIDE 10 MG/1
10 TABLET ORAL DAILY
Qty: 90 TABLET | Refills: 3 | Status: SHIPPED | OUTPATIENT
Start: 2021-04-09 | End: 2022-02-17

## 2021-04-20 ENCOUNTER — OFFICE VISIT (OUTPATIENT)
Dept: CARDIOLOGY | Facility: CLINIC | Age: 86
End: 2021-04-20
Attending: INTERNAL MEDICINE
Payer: COMMERCIAL

## 2021-04-20 VITALS
SYSTOLIC BLOOD PRESSURE: 138 MMHG | HEART RATE: 86 BPM | WEIGHT: 140 LBS | BODY MASS INDEX: 23.9 KG/M2 | HEIGHT: 64 IN | DIASTOLIC BLOOD PRESSURE: 61 MMHG

## 2021-04-20 DIAGNOSIS — I10 BENIGN ESSENTIAL HYPERTENSION: ICD-10-CM

## 2021-04-20 DIAGNOSIS — I49.9 CARDIAC ARRHYTHMIA, UNSPECIFIED CARDIAC ARRHYTHMIA TYPE: ICD-10-CM

## 2021-04-20 DIAGNOSIS — I71.20 THORACIC AORTIC ANEURYSM WITHOUT RUPTURE (H): ICD-10-CM

## 2021-04-20 DIAGNOSIS — I35.1 NONRHEUMATIC AORTIC VALVE INSUFFICIENCY: ICD-10-CM

## 2021-04-20 PROCEDURE — 99214 OFFICE O/P EST MOD 30 MIN: CPT | Performed by: NURSE PRACTITIONER

## 2021-04-20 RX ORDER — METOPROLOL SUCCINATE 25 MG/1
25 TABLET, EXTENDED RELEASE ORAL 2 TIMES DAILY
Qty: 180 TABLET | Refills: 3 | Status: SHIPPED | OUTPATIENT
Start: 2021-04-20 | End: 2021-08-31

## 2021-04-20 RX ORDER — AMLODIPINE BESYLATE 5 MG/1
5 TABLET ORAL AT BEDTIME
Qty: 180 TABLET | Refills: 3 | Status: SHIPPED | OUTPATIENT
Start: 2021-04-20 | End: 2021-07-28

## 2021-04-20 ASSESSMENT — MIFFLIN-ST. JEOR: SCORE: 1075.04

## 2021-04-20 NOTE — LETTER
"4/20/2021    Shoaib Mills DO  [facility] [address]  [facility] [id number]    RE: Giselle Chino       Dear Colleague,    I had the pleasure of seeing Giselle Chino in the [facility].      Cardiology Clinic Progress Note    Service Date: April 20, 2021    Primary Cardiology team: Dr. Machado and Avery Lambert, APRN, CNP      Reason for Visit: Follow up for lightheadedness, review of event monitor findings    HPI:   I had the pleasure of meeting Ms. Desai \"Emma\" Adenike Chino in the clinic today. She is a delightful 85 year old female with a past medical history notable for COPD, large ascending aortic aneurysm, moderately severe aortic insufficiency, moderate aortic stenosis, hypertension, and hypothyroidism. She has followed with Dr. Machado and Avery Lambert, NP for her cardiology care.    She has a very large ascending aortic aneurysm most recently measuring at 79 mm on her most recent echocardiogram on 3/16/2021. She has met with several cardiovascular surgeons, previously Dr. Coreas and more recently with Dr. Mulligan, to discuss consideration for repair of her aortic aneurysm as well as aortic valve repair/replacement. She has been felt to be a high risk surgical candidate given her advanced age now at 85. However, given the very large size of the aneurysm, her risk of rupture, aortic dissection and sudden death is also very high and she understands this. She has declined surgery in the past due to concerns regarding the recovery and rehabilitation process and has opted to continue with medical management with close monitoring and treatment of her hypertension.      Fortunately, she has done reasonably well with this approach and her aortic dimensions have really only expanded 2 to 4 mm on various studies between echocardiograms and CT scans over the last 5 to 6 years. However, her aortic valve " insufficiency has slowly been getting worse now into the moderately severe (3+) range on her echocardiogram last month. She has also noted issues with fairly frequent dizziness/lightheadedness over the past couple of months. She was evaluated by Dr. Machado in the clinic on 3/19/2021 and there was some concern for the possibility of a tachyarrhythmia on auscultation on exam at the visit and concerned that this could possibly be contributing to her symptoms. This was unable to be captured on EKG. A 7 day event monitor was recommended for further evaluation of a possible underlying tachyarrhythmia.  This was worn from 3/19 to 3/27/2021 showing predominantly sinus rhythm with a first-degree AV block with around 200 brief episodes of paroxysmal supraventricular tachycardia, although she was only mildly tachycardic during most of these episodes with heart rates primarily around 100 to 120 bpm with the longest episode lasting only around 26 seconds. Rare PVCs and PACs were also noted.    Today, Emma presents to the clinic in follow up of her recent event monitor findings. She tells me that she has continued to have intermittent episodes of dizziness and lightheadedness which come on without much of a recognizable pattern. She says this will sometimes occur when she has just sitting down monitoring at home for example. Fortunately, she has not had any falls, presyncopal, or syncopal episodes. She otherwise denies symptoms of chest pain or palpitations. She notes that she has mild chronic exertional dyspnea with her history of COPD, but feels that this has been stable of late without any significant appreciable worsening of her symptoms. She otherwise denies orthopnea, PND, or lower extremity edema. Her weight has been fairly stable without any significant weight gain of late.    ASSESSMENT AND PLAN:  1. Large ascending aortic aneurysm  - Maximum dimension of 79 mm on most recent TTE 3/16/2021 as outlined above.  - She has  previously met with cardiovascular surgery and has declined intervention given concerns about the lengthy recovery process and rehabilitation and her high risk status given her advanced age. Dr. Machado as previously mentioned setting her up for another follow-up visit with Dr. Mulligan to revisit this given evidence of slow progression of her aortic insufficiency. This was discussed with the patient in detail today and she was agreeable to referral back to Dr. Mulligan to discuss the option of surgery further.    2. Moderately severe aortic insufficiency and moderate aortic stenosis  - TTE last month showing worsening AI from 2-3+ to 3+ range and a mean gradient across aortic valve of 25 mmhg, GIANNA 1.3 cm , and aortic valve peak velocity 3.6 m/sec.  - Suspect her symptoms of episodic dizziness/lightheadedness could be at least in part related to her wide pulse pressure and the gradual progression of her aortic insufficiency given the event monitor findings showing only brief runs of SVT. EF remains preserved and she has not had signs or symptoms of acute decompensated CHF.    3. Paroxysmal supraventricular tachycardia  - Recent 7-day event monitor showing fairly frequent brief runs of SVT with rates around 100 to 120 bpm and a longest episode lasting only around 26 seconds.  - It is possible that his previous episodes of PSVT could be contributing to her symptoms of dizziness given that they seem to be episodic and have fairly random onset, however, on her entirely convinced that episode was brief as 26 seconds with modest tachycardia would be contributing to as noticeable symptoms as she has had.  - Recommend she try increasing the dose of her metoprolol succinate slightly from 25 mg once daily to 25 mg twice daily to see if this might help suppress some of the episodes of SVT and help with her symptoms.    4. Hypertension  - Blood pressure is mildly elevated in the clinic today at 138/61, but has reportedly been  well controlled on her checks at home.  - She has had a wide pulse pressure noted with her aortic disease so we will have to be careful to not overtreat her systolic hypertension and push her mean arterial pressure too low. Recommend she decrease the dose of her amlodipine from 10 mg once daily to 5 mg once daily to allow room for the higher dose of metoprolol succinate at 25 mg twice daily as outlined above. Reviewed to continue to monitor blood pressure closely at home and call the clinic if it begins to run consistently above 130/85 so that further adjustments can be made as needed.    5. Recent worsening lightheadedness/dizziness  - Etiology not entirely clear. Differential is fairly broad, but could be secondary in part to brief episodes of PSVT with her worsening aortic insufficiency also likely a factor.    Thank you for the opportunity to participate in this pleasant patient's care. Recommend she visit with Dr. Mulligan with CV surgery to discuss possible aortic aneurysm repair and aortic valve replacement. We will otherwise plan to have her see Dr. Machado in follow up in about 3 months.  I encouraged her to call the clinic with any questions or concerns in the meantime, and we would be happy to see her sooner if needed.    IRMA Addison, CNP   Nurse Practitioner  [facility]  Pager: [phone]  Text Page  (8am - 5pm, M-F)    Orders this Visit:  Orders Placed This Encounter   Procedures     CARDIOVASCULAR SURGERY REFERRAL     Follow-Up with Cardiologist     Orders Placed This Encounter   Medications     metoprolol succinate ER (TOPROL-XL) 25 MG 24 hr tablet     Sig: Take 1 tablet (25 mg) by mouth 2 times daily     Dispense:  180 tablet     Refill:  3     amLODIPine (NORVASC) 5 MG tablet     Sig: Take 1 tablet (5 mg) by mouth At Bedtime     Dispense:  180 tablet     Refill:  3     Medications Discontinued During This Encounter   Medication Reason     amLODIPine (NORVASC) 5 MG tablet       metoprolol succinate ER (TOPROL-XL) 25 MG 24 hr tablet      Encounter Diagnoses   Name Primary?     Nonrheumatic aortic valve insufficiency      Thoracic aortic aneurysm without rupture (H)      Cardiac arrhythmia, unspecified cardiac arrhythmia type      Benign essential hypertension      CURRENT MEDICATIONS:  Current Outpatient Medications   Medication Sig Dispense Refill     Acetaminophen (TYLENOL 8 HOUR PO) Take by mouth as needed        acetaminophen (TYLENOL) 500 MG tablet Take 500-1,000 mg by mouth every 6 hours as needed for mild pain       amLODIPine (NORVASC) 5 MG tablet Take 1 tablet (5 mg) by mouth At Bedtime 180 tablet 3     atorvastatin (LIPITOR) 20 MG tablet Take 1 tablet (20 mg) by mouth daily 90 tablet 3     benzonatate (TESSALON) 100 MG capsule Take 100 mg by mouth 3 times daily       hydrALAZINE (APRESOLINE) 25 MG tablet One pill am; one pill mid day; two pills at night 270 tablet 3     ipratropium - albuterol 0.5 mg/2.5 mg/3 mL (DUONEB) 0.5-2.5 (3) MG/3ML neb solution Take 1 vial (3 mLs) by nebulization every 6 hours as needed for shortness of breath / dyspnea or wheezing  0     Ipratropium-Albuterol (COMBIVENT RESPIMAT)  MCG/ACT inhaler Inhale 1 puff into the lungs 4 times daily Not to exceed 6 doses per day. 1 Inhaler 1     levothyroxine (SYNTHROID/LEVOTHROID) 112 MCG tablet Take 50 mcg by mouth daily        metoprolol succinate ER (TOPROL-XL) 25 MG 24 hr tablet Take 1 tablet (25 mg) by mouth 2 times daily 180 tablet 3     oxybutynin (DITROPAN-XL) 10 MG 24 hr tablet Take 10 mg by mouth daily       potassium chloride ER (K-DUR/KLOR-CON M) 20 MEQ CR tablet Take 1 tablet (20 mEq) by mouth daily 90 tablet 3     torsemide (DEMADEX) 10 MG tablet Take 1 tablet (10 mg) by mouth daily 90 tablet 3     azithromycin (ZITHROMAX) 250 MG tablet Take 250 mg by mouth daily Take 2 tablets by mouth today. Then 1 tablet daily for 4 days       predniSONE (DELTASONE) 20 MG tablet Short term for mosquito  bites--not currently taking (Patient not taking: Reported on 1/15/2020) 3 tablet 0     ALLERGIES  Allergies   Allergen Reactions     Amoxicillin Rash     Piperacillin-Tazobactam In D5w Rash     PAST MEDICAL, SURGICAL, FAMILY HISTORY:  History was reviewed and updated as needed, see medical record.    SOCIAL HISTORY:  Social History     Socioeconomic History     Marital status:      Spouse name: Not on file     Number of children: Not on file     Years of education: Not on file     Highest education level: Not on file   Occupational History     Not on file   Social Needs     Financial resource strain: Not on file     Food insecurity     Worry: Not on file     Inability: Not on file     Transportation needs     Medical: Not on file     Non-medical: Not on file   Tobacco Use     Smoking status: Former Smoker     Packs/day: 1.00     Years: 30.00     Pack years: 30.00     Types: Cigarettes     Quit date: 2008     Years since quittin.6     Smokeless tobacco: Never Used   Substance and Sexual Activity     Alcohol use: Not Currently     Alcohol/week: 5.8 standard drinks     Types: 7 Shots of liquor per week     Drug use: No     Sexual activity: Not on file   Lifestyle     Physical activity     Days per week: Not on file     Minutes per session: Not on file     Stress: Not on file   Relationships     Social connections     Talks on phone: Not on file     Gets together: Not on file     Attends Mandaen service: Not on file     Active member of club or organization: Not on file     Attends meetings of clubs or organizations: Not on file     Relationship status: Not on file     Intimate partner violence     Fear of current or ex partner: Not on file     Emotionally abused: Not on file     Physically abused: Not on file     Forced sexual activity: Not on file   Other Topics Concern      Service Not Asked     Blood Transfusions Not Asked     Caffeine Concern No     Comment: 0-2 pops daily - decaf      "Occupational Exposure Not Asked     Hobby Hazards Not Asked     Sleep Concern No     Stress Concern Not Asked     Weight Concern Not Asked     Special Diet No     Comment: really careful with salt     Back Care Not Asked     Exercise No     Comment: walking around house, outside and at the mall, 10 mins at a time     Bike Helmet Not Asked     Seat Belt Not Asked     Self-Exams Not Asked   Social History Narrative     Not on file     Review of Systems:  Skin:  Negative     Eyes:  Positive for glasses;visual blurring  ENT:  Negative    Respiratory:  Positive for dyspnea on exertion  Cardiovascular:    lightheadedness;dizziness;Positive for  Gastroenterology: Negative    Genitourinary:  Negative    Musculoskeletal:  Positive for arthritis  Neurologic:  Positive for numbness or tingling of hands;numbness or tingling of feet  Psychiatric:  Positive for anxiety  Heme/Lymph/Imm:  Positive for allergies  Endocrine:  Positive for thyroid disorder     Physical Exam:  Vitals: /61   Pulse 86   Ht 1.626 m (5' 4\")   Wt 63.5 kg (140 lb)   BMI 24.03 kg/m     Wt Readings from Last 4 Encounters:   04/20/21 63.5 kg (140 lb)   03/19/21 65.1 kg (143 lb 9.6 oz)   05/22/20 68 kg (150 lb)   04/24/20 68 kg (150 lb)     CONSTITUTIONAL: Appears her stated age, well nourished, and in no acute distress.  HEENT: Pupils equal, round. Sclerae nonicteric.    NECK: Supple, no masses or JVD appreciated.   C/V: Regular rate and rhythm, grade 3/6 early diastolic decrescendo and 2/6 systolic ejection murmur noted. No rub or gallop.   RESP: Respirations are unlabored. Lungs clear to auscultation with diminished airflow throughout.  GI: Abdomen soft, non-tender, non-distended.  EXTREM: No clubbing, cyanosis, or lower extremity edema bilaterally.   NEURO: Alert and oriented, cooperative. Gait steady. No gross focal deficits.   PSYCH: Affect appropriate, mildly anxious. Mentation normal. Responds to questions appropriately.  SKIN: Warm and dry. "     Recent Lab Results:  LIPID RESULTS:  Lab Results   Component Value Date    CHOL 147 03/16/2021    HDL 64 03/16/2021    LDL 61 03/16/2021    TRIG 112 03/16/2021    CHOLHDLRATIO 3.2 11/17/2015    CHOLHDLRATIO 3.2 06/17/2014     LIVER ENZYME RESULTS:  Lab Results   Component Value Date    AST 15 11/11/2019    ALT 23 03/16/2021     CBC RESULTS:  Lab Results   Component Value Date    WBC 7.8 11/11/2019    RBC 4.42 11/11/2019    HGB 12.4 11/11/2019    HCT 39.2 11/11/2019    MCV 89 11/11/2019    MCH 28.1 11/11/2019    MCHC 31.6 11/11/2019    RDW 13.5 11/11/2019     11/11/2019     BMP RESULTS:  Lab Results   Component Value Date     03/16/2021    POTASSIUM 4.0 03/16/2021    CHLORIDE 109 03/16/2021    CO2 31 03/16/2021    ANIONGAP 2 (L) 03/16/2021    GLC 98 03/16/2021    BUN 23 03/16/2021    CR 0.84 03/16/2021    GFRESTIMATED 64 03/16/2021    GFRESTBLACK 75 03/16/2021    KRISTEL 9.9 03/16/2021      INR RESULTS:  Lab Results   Component Value Date    INR 1.02 11/11/2019    INR 1.04 07/02/2018     This note was completed in part using Dragon voice recognition software. Although reviewed after completion, some word and grammatical errors may occur.    Thank you for allowing me to participate in the care of your patient.      Sincerely,     Gaurav Barrios NP     [facility]    cc:   Tyler Machado MD  [address]  [place] [id number]

## 2021-04-20 NOTE — PATIENT INSTRUCTIONS
Thank you for your visit with the [facility] today.    Today's plan:   1. Increase the dose of metoprolol from 25 mg once daily to 25 mg twice daily.  2. Decrease the dose of amlodipine 10 mg to 5 mg once daily.  3. Check in with the Cardiovascular surgery team to discuss possibly proceeding with surgery to repair your aortic aneurysm.   4. Follow up with Dr. Machado in about 3 months.     If you have questions or concerns, please call my nurse team at [phone].    Scheduling phone number: [phone]    It was a pleasure seeing you today!     IRMA Addison, CNP  Nurse Practitioner  [facility]  April 20, 2021  ________________________________________________________

## 2021-04-20 NOTE — PROGRESS NOTES
"  Cardiology Clinic Progress Note    Service Date: April 20, 2021    Primary Cardiology team: Dr. Machado and Avery Lambert, IRMA, CNP      Reason for Visit: Follow up for lightheadedness, review of event monitor findings    HPI:   I had the pleasure of meeting Ms. Desai \"Emma\" Adenike Chino in the clinic today. She is a delightful 85 year old female with a past medical history notable for a large ascending aortic aneurysm, moderately severe aortic insufficiency, moderate aortic stenosis, hypertension, and hypothyroidism. She has followed with Dr. Machado for her cardiology care.    She has a very large ascending aortic aneurysm most recently measuring at 79 mm on her most recent echocardiogram on 3/16/2021. She has met with several cardiovascular surgeons, previously Dr. Coreas and more recently with Dr. Mulligan to discuss consideration for repair of her aortic aneurysm as well as aortic valve repair/replacement. She has been felt to be a high risk surgical candidate given her advanced age now at 85. However, given the very large size of the aneurysm, her risk of rupture, aortic dissection and sudden death is also very high and she understands this. She has declined surgery in the past due to concerns regarding the recovery and rehabilitation process and has opted to continue with medical management with close monitoring and treatment of her hypertension.      Fortunately, she has done reasonably well with this approach and her aortic dimensions have really only expanded 2 to 4 mm on various studies between echocardiograms and CT scans over the last 5 to 6 years. However, her aortic valve insufficiency has slowly been getting worse now into the moderately severe (3+) range on her echocardiogram last month. She has also noted issues with fairly frequent dizziness/lightheadedness over the past couple of months. She was evaluated by Dr. Machado in the clinic on 3/19/2021 and there was some concern for the " possibility of a tachyarrhythmia on auscultation on exam at the visit and concerned that this could possibly be contributing to her symptoms. This was unable to be captured on EKG. A 7 day event monitor was recommended for further evaluation of a possible underlying tachyarrhythmia.  This was worn from 3/19 to 3/27/2021 showing predominantly sinus rhythm with a first-degree AV block with around 200 brief episodes of paroxysmal supraventricular tachycardia, although she was only mildly tachycardic during most of these episodes with heart rates primarily around 100 to 120 bpm with the longest episode lasting only around 26 seconds. Rare PVCs and PACs were also noted.    Today, Emma presents to the clinic in follow up of her recent event monitor findings. She tells me that she has continued to have intermittent episodes of dizziness and lightheadedness which come on without much of a recognizable pattern. She says this will sometimes occur when she has just sitting down monitoring at home for example.  She has not had any falls, presyncopal, or syncopal episodes. She otherwise denies symptoms of chest pain or palpitations. *** shortness of breath, or dyspnea on exertion. she *** orthopnea, PND, or lower extremity edema.     ASSESSMENT AND PLAN:  1. Large ascending aortic aneurysm  - ***    2. Moderately severe aortic insufficiency and moderate aortic stenosis  - ***    3. Paroxysmal supraventricular tachycardia  - ***    4. Hypertension  - ***    5. Recent worsening lightheadedness/dizziness    Thank you for the opportunity to participate in this pleasant patient's care. Recommend she visit with Dr. Mulligan with CV surgery to discuss possible aortic aneurysm repair and aortic valve replacement. We will otherwise plan to have her see Dr. Machado in follow up in about 3 months.  I encouraged her to call the clinic with any questions or concerns in the meantime, and we would be happy to see her sooner if needed.    Ramin  RIMA Barrios, CNP   Nurse Practitioner  [facility]  Pager: [phone]  Text Page  (8am - 5pm, M-F)    Orders this Visit:  No orders of the defined types were placed in this encounter.    No orders of the defined types were placed in this encounter.    There are no discontinued medications.  Encounter Diagnoses   Name Primary?     Nonrheumatic aortic valve insufficiency      Thoracic aortic aneurysm without rupture (H)      Cardiac arrhythmia, unspecified cardiac arrhythmia type        CURRENT MEDICATIONS:  Current Outpatient Medications   Medication Sig Dispense Refill     Acetaminophen (TYLENOL 8 HOUR PO) Take by mouth as needed        acetaminophen (TYLENOL) 500 MG tablet Take 500-1,000 mg by mouth every 6 hours as needed for mild pain       amLODIPine (NORVASC) 5 MG tablet Take 2 tablets (10 mg) by mouth At Bedtime 180 tablet 0     atorvastatin (LIPITOR) 20 MG tablet Take 1 tablet (20 mg) by mouth daily 90 tablet 3     benzonatate (TESSALON) 100 MG capsule Take 100 mg by mouth 3 times daily       hydrALAZINE (APRESOLINE) 25 MG tablet One pill am; one pill mid day; two pills at night 270 tablet 3     ipratropium - albuterol 0.5 mg/2.5 mg/3 mL (DUONEB) 0.5-2.5 (3) MG/3ML neb solution Take 1 vial (3 mLs) by nebulization every 6 hours as needed for shortness of breath / dyspnea or wheezing  0     Ipratropium-Albuterol (COMBIVENT RESPIMAT)  MCG/ACT inhaler Inhale 1 puff into the lungs 4 times daily Not to exceed 6 doses per day. 1 Inhaler 1     levothyroxine (SYNTHROID/LEVOTHROID) 112 MCG tablet Take 50 mcg by mouth daily        metoprolol succinate ER (TOPROL-XL) 25 MG 24 hr tablet Take 1 tablet (25 mg) by mouth daily 90 tablet 3     oxybutynin (DITROPAN-XL) 10 MG 24 hr tablet Take 10 mg by mouth daily       potassium chloride ER (K-DUR/KLOR-CON M) 20 MEQ CR tablet Take 1 tablet (20 mEq) by mouth daily 90 tablet 3     torsemide (DEMADEX) 10 MG tablet Take 1 tablet (10 mg) by mouth daily 90  tablet 3     azithromycin (ZITHROMAX) 250 MG tablet Take 250 mg by mouth daily Take 2 tablets by mouth today. Then 1 tablet daily for 4 days       predniSONE (DELTASONE) 20 MG tablet Short term for mosquito bites--not currently taking (Patient not taking: Reported on 1/15/2020) 3 tablet 0     ALLERGIES  Allergies   Allergen Reactions     Amoxicillin Rash     Piperacillin-Tazobactam In D5w Rash     PAST MEDICAL, SURGICAL, FAMILY HISTORY:  History was reviewed and updated as needed, see medical record.    SOCIAL HISTORY:  Social History     Socioeconomic History     Marital status:      Spouse name: Not on file     Number of children: Not on file     Years of education: Not on file     Highest education level: Not on file   Occupational History     Not on file   Social Needs     Financial resource strain: Not on file     Food insecurity     Worry: Not on file     Inability: Not on file     Transportation needs     Medical: Not on file     Non-medical: Not on file   Tobacco Use     Smoking status: Former Smoker     Packs/day: 1.00     Years: 30.00     Pack years: 30.00     Types: Cigarettes     Quit date: 2008     Years since quittin.6     Smokeless tobacco: Never Used   Substance and Sexual Activity     Alcohol use: Not Currently     Alcohol/week: 5.8 standard drinks     Types: 7 Shots of liquor per week     Drug use: No     Sexual activity: Not on file   Lifestyle     Physical activity     Days per week: Not on file     Minutes per session: Not on file     Stress: Not on file   Relationships     Social connections     Talks on phone: Not on file     Gets together: Not on file     Attends Taoist service: Not on file     Active member of club or organization: Not on file     Attends meetings of clubs or organizations: Not on file     Relationship status: Not on file     Intimate partner violence     Fear of current or ex partner: Not on file     Emotionally abused: Not on file     Physically abused:  "Not on file     Forced sexual activity: Not on file   Other Topics Concern      Service Not Asked     Blood Transfusions Not Asked     Caffeine Concern No     Comment: 0-2 pops daily - decaf     Occupational Exposure Not Asked     Hobby Hazards Not Asked     Sleep Concern No     Stress Concern Not Asked     Weight Concern Not Asked     Special Diet No     Comment: really careful with salt     Back Care Not Asked     Exercise No     Comment: walking around house, outside and at the mall, 10 mins at a time     Bike Helmet Not Asked     Seat Belt Not Asked     Self-Exams Not Asked   Social History Narrative     Not on file     Review of Systems:  Skin:  Negative     Eyes:  Positive for glasses;visual blurring  ENT:  Negative    Respiratory:  Positive for dyspnea on exertion  Cardiovascular:    lightheadedness;dizziness;Positive for  Gastroenterology: Negative    Genitourinary:  Negative    Musculoskeletal:  Positive for arthritis  Neurologic:  Positive for numbness or tingling of hands;numbness or tingling of feet  Psychiatric:  Positive for anxiety  Heme/Lymph/Imm:  Positive for allergies  Endocrine:  Positive for thyroid disorder     Physical Exam:  Vitals: /61   Pulse 86   Ht 1.626 m (5' 4\")   Wt 63.5 kg (140 lb)   BMI 24.03 kg/m     Wt Readings from Last 4 Encounters:   04/20/21 63.5 kg (140 lb)   03/19/21 65.1 kg (143 lb 9.6 oz)   05/22/20 68 kg (150 lb)   04/24/20 68 kg (150 lb)     CONSTITUTIONAL: Appears her stated age, well nourished, and in no acute distress.  HEENT: Pupils equal, round. Sclerae nonicteric.    NECK: Supple, no masses appreciated. Carotid pulses full and equal with no bruit bilaterally. *** JVD.  C/V:  Regular rate and rhythm, normal S1 and S2, no S3 or S4, no murmur, rub or gallop. ***  RESP: Respirations are unlabored. Lungs are clear to auscultation bilaterally without wheezing, rales, or rhonchi.  GI: Abdomen soft, non-tender, non-distended.  EXTREM: *** No clubbing, " cyanosis, or lower extremity edema bilaterally.   NEURO: Alert and oriented, cooperative. Gait steady. No gross focal deficits.   PSYCH: Affect appropriate. Mentation normal. Responds to questions appropriately.  SKIN: Warm and dry. No apparent rashes or bruising. ***    Recent Lab Results:  LIPID RESULTS:  Lab Results   Component Value Date    CHOL 147 03/16/2021    HDL 64 03/16/2021    LDL 61 03/16/2021    TRIG 112 03/16/2021    CHOLHDLRATIO 3.2 11/17/2015    CHOLHDLRATIO 3.2 06/17/2014     LIVER ENZYME RESULTS:  Lab Results   Component Value Date    AST 15 11/11/2019    ALT 23 03/16/2021     CBC RESULTS:  Lab Results   Component Value Date    WBC 7.8 11/11/2019    RBC 4.42 11/11/2019    HGB 12.4 11/11/2019    HCT 39.2 11/11/2019    MCV 89 11/11/2019    MCH 28.1 11/11/2019    MCHC 31.6 11/11/2019    RDW 13.5 11/11/2019     11/11/2019     BMP RESULTS:  Lab Results   Component Value Date     03/16/2021    POTASSIUM 4.0 03/16/2021    CHLORIDE 109 03/16/2021    CO2 31 03/16/2021    ANIONGAP 2 (L) 03/16/2021    GLC 98 03/16/2021    BUN 23 03/16/2021    CR 0.84 03/16/2021    GFRESTIMATED 64 03/16/2021    GFRESTBLACK 75 03/16/2021    KRISTEL 9.9 03/16/2021     INR RESULTS:  Lab Results   Component Value Date    INR 1.02 11/11/2019    INR 1.04 07/02/2018     CC  Tyler Machado MD  [id number]  [place] [phone]    This note was completed in part using Dragon voice recognition software. Although reviewed after completion, some word and grammatical errors may occur.

## 2021-04-20 NOTE — PROGRESS NOTES
"  Cardiology Clinic Progress Note    Service Date: April 20, 2021    Primary Cardiology team: Dr. Machado and Avery Lambert, APRN, CNP      Reason for Visit: Follow up for lightheadedness, review of event monitor findings    HPI:   I had the pleasure of meeting Ms. Desai \"Emma\" Adenike Chino in the clinic today. She is a delightful 85 year old female with a past medical history notable for COPD, large ascending aortic aneurysm, moderately severe aortic insufficiency, moderate aortic stenosis, hypertension, and hypothyroidism. She has followed with Dr. Machado and Avery Lambert, NP for her cardiology care.    She has a very large ascending aortic aneurysm most recently measuring at 79 mm on her most recent echocardiogram on 3/16/2021. She has met with several cardiovascular surgeons, previously Dr. Coreas and more recently with Dr. Mulligan, to discuss consideration for repair of her aortic aneurysm as well as aortic valve repair/replacement. She has been felt to be a high risk surgical candidate given her advanced age now at 85. However, given the very large size of the aneurysm, her risk of rupture, aortic dissection and sudden death is also very high and she understands this. She has declined surgery in the past due to concerns regarding the recovery and rehabilitation process and has opted to continue with medical management with close monitoring and treatment of her hypertension.      Fortunately, she has done reasonably well with this approach and her aortic dimensions have really only expanded 2 to 4 mm on various studies between echocardiograms and CT scans over the last 5 to 6 years. However, her aortic valve insufficiency has slowly been getting worse now into the moderately severe (3+) range on her echocardiogram last month. She has also noted issues with fairly frequent dizziness/lightheadedness over the past couple of months. She was evaluated by Dr. Machado in the clinic on 3/19/2021 and there " was some concern for the possibility of a tachyarrhythmia on auscultation on exam at the visit and concerned that this could possibly be contributing to her symptoms. This was unable to be captured on EKG. A 7 day event monitor was recommended for further evaluation of a possible underlying tachyarrhythmia.  This was worn from 3/19 to 3/27/2021 showing predominantly sinus rhythm with a first-degree AV block with around 200 brief episodes of paroxysmal supraventricular tachycardia, although she was only mildly tachycardic during most of these episodes with heart rates primarily around 100 to 120 bpm with the longest episode lasting only around 26 seconds. Rare PVCs and PACs were also noted.    Today, Emma presents to the clinic in follow up of her recent event monitor findings. She tells me that she has continued to have intermittent episodes of dizziness and lightheadedness which come on without much of a recognizable pattern. She says this will sometimes occur when she has just sitting down monitoring at home for example. Fortunately, she has not had any falls, presyncopal, or syncopal episodes. She otherwise denies symptoms of chest pain or palpitations. She notes that she has mild chronic exertional dyspnea with her history of COPD, but feels that this has been stable of late without any significant appreciable worsening of her symptoms. She otherwise denies orthopnea, PND, or lower extremity edema. Her weight has been fairly stable without any significant weight gain of late.    ASSESSMENT AND PLAN:  1. Large ascending aortic aneurysm  - Maximum dimension of 79 mm on most recent TTE 3/16/2021 as outlined above.  - She has previously met with cardiovascular surgery and has declined intervention given concerns about the lengthy recovery process and rehabilitation and her high risk status given her advanced age. Dr. Machado as previously mentioned setting her up for another follow-up visit with Dr. Mulligan to  revisit this given evidence of slow progression of her aortic insufficiency. This was discussed with the patient in detail today and she was agreeable to referral back to Dr. Mulligan to discuss the option of surgery further.    2. Moderately severe aortic insufficiency and moderate aortic stenosis  - TTE last month showing worsening AI from 2-3+ to 3+ range and a mean gradient across aortic valve of 25 mmhg, GIANNA 1.3 cm , and aortic valve peak velocity 3.6 m/sec.  - Suspect her symptoms of episodic dizziness/lightheadedness could be at least in part related to her wide pulse pressure and the gradual progression of her aortic insufficiency given the event monitor findings showing only brief runs of SVT. EF remains preserved and she has not had signs or symptoms of acute decompensated CHF.    3. Paroxysmal supraventricular tachycardia  - Recent 7-day event monitor showing fairly frequent brief runs of SVT with rates around 100 to 120 bpm and a longest episode lasting only around 26 seconds.  - It is possible that his previous episodes of PSVT could be contributing to her symptoms of dizziness given that they seem to be episodic and have fairly random onset, however, on her entirely convinced that episode was brief as 26 seconds with modest tachycardia would be contributing to as noticeable symptoms as she has had.  - Recommend she try increasing the dose of her metoprolol succinate slightly from 25 mg once daily to 25 mg twice daily to see if this might help suppress some of the episodes of SVT and help with her symptoms.    4. Hypertension  - Blood pressure is mildly elevated in the clinic today at 138/61, but has reportedly been well controlled on her checks at home.  - She has had a wide pulse pressure noted with her aortic disease so we will have to be careful to not overtreat her systolic hypertension and push her mean arterial pressure too low. Recommend she decrease the dose of her amlodipine from 10 mg once  daily to 5 mg once daily to allow room for the higher dose of metoprolol succinate at 25 mg twice daily as outlined above. Reviewed to continue to monitor blood pressure closely at home and call the clinic if it begins to run consistently above 130/85 so that further adjustments can be made as needed.    5. Recent worsening lightheadedness/dizziness  - Etiology not entirely clear. Differential is fairly broad, but could be secondary in part to brief episodes of PSVT with her worsening aortic insufficiency also likely a factor.    Thank you for the opportunity to participate in this pleasant patient's care. Recommend she visit with Dr. Mulligan with CV surgery to discuss possible aortic aneurysm repair and aortic valve replacement. We will otherwise plan to have her see Dr. Machado in follow up in about 3 months.  I encouraged her to call the clinic with any questions or concerns in the meantime, and we would be happy to see her sooner if needed.    IRMA Addison, CNP   Nurse Practitioner  [facility]  Pager: [phone]  Text Page  (8am - 5pm, M-F)    Orders this Visit:  Orders Placed This Encounter   Procedures     CARDIOVASCULAR SURGERY REFERRAL     Follow-Up with Cardiologist     Orders Placed This Encounter   Medications     metoprolol succinate ER (TOPROL-XL) 25 MG 24 hr tablet     Sig: Take 1 tablet (25 mg) by mouth 2 times daily     Dispense:  180 tablet     Refill:  3     amLODIPine (NORVASC) 5 MG tablet     Sig: Take 1 tablet (5 mg) by mouth At Bedtime     Dispense:  180 tablet     Refill:  3     Medications Discontinued During This Encounter   Medication Reason     amLODIPine (NORVASC) 5 MG tablet      metoprolol succinate ER (TOPROL-XL) 25 MG 24 hr tablet      Encounter Diagnoses   Name Primary?     Nonrheumatic aortic valve insufficiency      Thoracic aortic aneurysm without rupture (H)      Cardiac arrhythmia, unspecified cardiac arrhythmia type      Benign essential hypertension       CURRENT MEDICATIONS:  Current Outpatient Medications   Medication Sig Dispense Refill     Acetaminophen (TYLENOL 8 HOUR PO) Take by mouth as needed        acetaminophen (TYLENOL) 500 MG tablet Take 500-1,000 mg by mouth every 6 hours as needed for mild pain       amLODIPine (NORVASC) 5 MG tablet Take 1 tablet (5 mg) by mouth At Bedtime 180 tablet 3     atorvastatin (LIPITOR) 20 MG tablet Take 1 tablet (20 mg) by mouth daily 90 tablet 3     benzonatate (TESSALON) 100 MG capsule Take 100 mg by mouth 3 times daily       hydrALAZINE (APRESOLINE) 25 MG tablet One pill am; one pill mid day; two pills at night 270 tablet 3     ipratropium - albuterol 0.5 mg/2.5 mg/3 mL (DUONEB) 0.5-2.5 (3) MG/3ML neb solution Take 1 vial (3 mLs) by nebulization every 6 hours as needed for shortness of breath / dyspnea or wheezing  0     Ipratropium-Albuterol (COMBIVENT RESPIMAT)  MCG/ACT inhaler Inhale 1 puff into the lungs 4 times daily Not to exceed 6 doses per day. 1 Inhaler 1     levothyroxine (SYNTHROID/LEVOTHROID) 112 MCG tablet Take 50 mcg by mouth daily        metoprolol succinate ER (TOPROL-XL) 25 MG 24 hr tablet Take 1 tablet (25 mg) by mouth 2 times daily 180 tablet 3     oxybutynin (DITROPAN-XL) 10 MG 24 hr tablet Take 10 mg by mouth daily       potassium chloride ER (K-DUR/KLOR-CON M) 20 MEQ CR tablet Take 1 tablet (20 mEq) by mouth daily 90 tablet 3     torsemide (DEMADEX) 10 MG tablet Take 1 tablet (10 mg) by mouth daily 90 tablet 3     azithromycin (ZITHROMAX) 250 MG tablet Take 250 mg by mouth daily Take 2 tablets by mouth today. Then 1 tablet daily for 4 days       predniSONE (DELTASONE) 20 MG tablet Short term for mosquito bites--not currently taking (Patient not taking: Reported on 1/15/2020) 3 tablet 0     ALLERGIES  Allergies   Allergen Reactions     Amoxicillin Rash     Piperacillin-Tazobactam In D5w Rash     PAST MEDICAL, SURGICAL, FAMILY HISTORY:  History was reviewed and updated as needed, see medical  record.    SOCIAL HISTORY:  Social History     Socioeconomic History     Marital status:      Spouse name: Not on file     Number of children: Not on file     Years of education: Not on file     Highest education level: Not on file   Occupational History     Not on file   Social Needs     Financial resource strain: Not on file     Food insecurity     Worry: Not on file     Inability: Not on file     Transportation needs     Medical: Not on file     Non-medical: Not on file   Tobacco Use     Smoking status: Former Smoker     Packs/day: 1.00     Years: 30.00     Pack years: 30.00     Types: Cigarettes     Quit date: 2008     Years since quittin.6     Smokeless tobacco: Never Used   Substance and Sexual Activity     Alcohol use: Not Currently     Alcohol/week: 5.8 standard drinks     Types: 7 Shots of liquor per week     Drug use: No     Sexual activity: Not on file   Lifestyle     Physical activity     Days per week: Not on file     Minutes per session: Not on file     Stress: Not on file   Relationships     Social connections     Talks on phone: Not on file     Gets together: Not on file     Attends [employer] service: Not on file     Active member of club or organization: Not on file     Attends meetings of clubs or organizations: Not on file     Relationship status: Not on file     Intimate partner violence     Fear of current or ex partner: Not on file     Emotionally abused: Not on file     Physically abused: Not on file     Forced sexual activity: Not on file   Other Topics Concern      Service Not Asked     Blood Transfusions Not Asked     Caffeine Concern No     Comment: 0-2 pops daily - decaf     Occupational Exposure Not Asked     Hobby Hazards Not Asked     Sleep Concern No     Stress Concern Not Asked     Weight Concern Not Asked     Special Diet No     Comment: really careful with salt     Back Care Not Asked     Exercise No     Comment: walking around house, outside and at the  "mall, 10 mins at a time     Bike Helmet Not Asked     Seat Belt Not Asked     Self-Exams Not Asked   Social History Narrative     Not on file     Review of Systems:  Skin:  Negative     Eyes:  Positive for glasses;visual blurring  ENT:  Negative    Respiratory:  Positive for dyspnea on exertion  Cardiovascular:    lightheadedness;dizziness;Positive for  Gastroenterology: Negative    Genitourinary:  Negative    Musculoskeletal:  Positive for arthritis  Neurologic:  Positive for numbness or tingling of hands;numbness or tingling of feet  Psychiatric:  Positive for anxiety  Heme/Lymph/Imm:  Positive for allergies  Endocrine:  Positive for thyroid disorder     Physical Exam:  Vitals: /61   Pulse 86   Ht 1.626 m (5' 4\")   Wt 63.5 kg (140 lb)   BMI 24.03 kg/m     Wt Readings from Last 4 Encounters:   04/20/21 63.5 kg (140 lb)   03/19/21 65.1 kg (143 lb 9.6 oz)   05/22/20 68 kg (150 lb)   04/24/20 68 kg (150 lb)     CONSTITUTIONAL: Appears her stated age, well nourished, and in no acute distress.  HEENT: Pupils equal, round. Sclerae nonicteric.    NECK: Supple, no masses or JVD appreciated.   C/V: Regular rate and rhythm, grade 3/6 early diastolic decrescendo and 2/6 systolic ejection murmur noted. No rub or gallop.   RESP: Respirations are unlabored. Lungs clear to auscultation with diminished airflow throughout.  GI: Abdomen soft, non-tender, non-distended.  EXTREM: No clubbing, cyanosis, or lower extremity edema bilaterally.   NEURO: Alert and oriented, cooperative. Gait steady. No gross focal deficits.   PSYCH: Affect appropriate, mildly anxious. Mentation normal. Responds to questions appropriately.  SKIN: Warm and dry.     Recent Lab Results:  LIPID RESULTS:  Lab Results   Component Value Date    CHOL 147 03/16/2021    HDL 64 03/16/2021    LDL 61 03/16/2021    TRIG 112 03/16/2021    CHOLHDLRATIO 3.2 11/17/2015    CHOLHDLRATIO 3.2 06/17/2014     LIVER ENZYME RESULTS:  Lab Results   Component Value Date    " AST 15 11/11/2019    ALT 23 03/16/2021     CBC RESULTS:  Lab Results   Component Value Date    WBC 7.8 11/11/2019    RBC 4.42 11/11/2019    HGB 12.4 11/11/2019    HCT 39.2 11/11/2019    MCV 89 11/11/2019    MCH 28.1 11/11/2019    MCHC 31.6 11/11/2019    RDW 13.5 11/11/2019     11/11/2019     BMP RESULTS:  Lab Results   Component Value Date     03/16/2021    POTASSIUM 4.0 03/16/2021    CHLORIDE 109 03/16/2021    CO2 31 03/16/2021    ANIONGAP 2 (L) 03/16/2021    GLC 98 03/16/2021    BUN 23 03/16/2021    CR 0.84 03/16/2021    GFRESTIMATED 64 03/16/2021    GFRESTBLACK 75 03/16/2021    KRISTEL 9.9 03/16/2021      INR RESULTS:  Lab Results   Component Value Date    INR 1.02 11/11/2019    INR 1.04 07/02/2018     CC  Tyler Machado MD  [id number]  [place] [id number]    This note was completed in part using Dragon voice recognition software. Although reviewed after completion, some word and grammatical errors may occur.

## 2021-05-05 DIAGNOSIS — I16.0 HYPERTENSIVE URGENCY: ICD-10-CM

## 2021-05-05 RX ORDER — HYDRALAZINE HYDROCHLORIDE 25 MG/1
TABLET, FILM COATED ORAL
Qty: 360 TABLET | Refills: 3 | Status: SHIPPED | OUTPATIENT
Start: 2021-05-05 | End: 2021-07-28

## 2021-06-07 NOTE — TELEPHONE ENCOUNTER
Pt is calling in for information about Covid testing, and symptoms. Pt reports shortness of breath, dry cough, and body aches. Pt does have heart and lung conditions, so is short of breath as a baseline.  Pt denies fever, headache or sore throat. Pt was advised to go to [email or web address] but does not have a computer.   Care advice given, use of humidified air, increasing fluids, and Tylenol for body aches, and per protocol pt should be able to treat this at home. Advised pt to call back if symptoms worsen. Pt is an [facility] patient and heard they are testing. Explained that we do not know details about [facility] doing about testing. [facility] nurse line number given to patient. Pt verbalized understanding.       COVID 19 Nurse Triage Plan/Patient Instructions    Please be aware that novel coronavirus (COVID-19) may be circulating in the community. If you develop symptoms such as fever, cough, or SOB or if you have concerns about the presence of another infection including coronavirus (COVID-19), please contact your health care provider or visit [email or web address].     Disposition/Instructions    Patient to have an [facility] Visit with a provider (Preferred option). Follow System Ambulatory Workflow for COVID 19. It is recommended that you setup an [facility] Visit with one of our virtual providers.  To do this follow these instructions:    1. Go to the website [email or web address]  2. Create an account (you will need your insurance information)  3. Start a new visit  4. Choose your diagnosis (e.g. COVID19)  5. Fill out the information about your symptoms  6. A provider will reach out to you by text, phone call or video visit based on your request    Call Back If: Your symptoms worsen before you are able to complete your [facility] Visit with a provider.    Thank you for limiting contact with others, wearing a simple mask to cover your cough, practice good hand hygiene habits and accessing our virtual services where possible to limit the spread of  this virus.    For more information about COVID19 and options for caring for yourself at home, please visit the CDC website at https://www.cdc.gov/coronavirus/2019-ncov/about/steps-when-sick.html  For more options for care at [facility], please visit our website at [email or web address]    For more information, please use the [place] Department of Health COVID-19 Website: [email or web address]  [place] Department of Health ([facility]) COVID-19 Hotlines (Interpreters available):      Health questions: Phone Number: [phone] or [phone] and Hours: 7 a.m. to 7 p.m.    Schools and  questions: Phone Number: [phone] or [phone] and Hours 7 a.m. to 7 p.m.      Og Fatima RN Care Connection Triage/Medication Refill    Reason for Disposition    Cold with no complications    Protocols used: COMMON COLD-A-AH

## 2021-07-28 ENCOUNTER — OFFICE VISIT (OUTPATIENT)
Dept: CARDIOLOGY | Facility: CLINIC | Age: 86
End: 2021-07-28
Attending: NURSE PRACTITIONER
Payer: COMMERCIAL

## 2021-07-28 VITALS
HEIGHT: 64 IN | WEIGHT: 138.7 LBS | SYSTOLIC BLOOD PRESSURE: 138 MMHG | BODY MASS INDEX: 23.68 KG/M2 | DIASTOLIC BLOOD PRESSURE: 50 MMHG | OXYGEN SATURATION: 96 % | HEART RATE: 85 BPM

## 2021-07-28 DIAGNOSIS — I71.20 THORACIC AORTIC ANEURYSM WITHOUT RUPTURE (H): ICD-10-CM

## 2021-07-28 DIAGNOSIS — I95.1 ORTHOSTATIC HYPOTENSION: Primary | ICD-10-CM

## 2021-07-28 DIAGNOSIS — I16.0 HYPERTENSIVE URGENCY: ICD-10-CM

## 2021-07-28 DIAGNOSIS — I35.1 NONRHEUMATIC AORTIC VALVE INSUFFICIENCY: ICD-10-CM

## 2021-07-28 DIAGNOSIS — I49.9 CARDIAC ARRHYTHMIA, UNSPECIFIED CARDIAC ARRHYTHMIA TYPE: ICD-10-CM

## 2021-07-28 DIAGNOSIS — I10 BENIGN ESSENTIAL HYPERTENSION: ICD-10-CM

## 2021-07-28 PROCEDURE — 99214 OFFICE O/P EST MOD 30 MIN: CPT | Performed by: INTERNAL MEDICINE

## 2021-07-28 RX ORDER — AMLODIPINE BESYLATE 5 MG/1
2.5 TABLET ORAL AT BEDTIME
Qty: 180 TABLET | Refills: 3 | COMMUNITY
Start: 2021-07-28 | End: 2021-08-31

## 2021-07-28 RX ORDER — HYDRALAZINE HYDROCHLORIDE 25 MG/1
TABLET, FILM COATED ORAL
Qty: 360 TABLET | Refills: 3 | COMMUNITY
Start: 2021-07-28 | End: 2022-04-11

## 2021-07-28 ASSESSMENT — MIFFLIN-ST. JEOR: SCORE: 1064.14
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
## 2021-07-28 NOTE — PROGRESS NOTES
Service Date: 07/28/2021    HISTORY OF PRESENT ILLNESS:  Giselle Chino returns for followup.  She is a delightful 86-year-old woman.  Please see my very detailed social and medical note dated 03/19/2021.  Briefly, I have known this woman for years.  She was in extremis critical condition when I met her, both because of medical problems and in particular psych problems.  She had stopped all of her medicines.  She was severely hypothyroid.  She was nonfunctional.  She has a severe dilated aorta and significant aortic valvular insufficiency.  Our question has always been should we take her to surgery.  Initially, she was so almost catatonic when I met her that it was out of the question.  She has seen several surgeons in the past and our concern has been her survivability of the surgery and her rehab potential.  She is of the opinion now that she does not want surgery.  She is 86 years old.  She walks with a walker.  I think rehab is a real potential problem.  In terms of her psychiatric, she has become one of our favorite, most bright, functional patients compared to when I met her, when she was literally almost catatonic.    She is having dizzy spells, which we cannot correlate with body position.  She wore an event monitor and she would have very brief short runs of slow PSVT.  There was no correlation with that.  I have documented a precipitous decrease in blood pressure when she stands up.  In fact, her diastolic dropped to 0 when she stood up.  This is almost certainly a wide pulse pressure because of her noncompliant arteries for severe aortic root enlargement, which is over 76 mm, and her aortic valve insufficiency.  We have managed her with medical therapy and her aorta has only had a few millimeters increase in size over the many years.    She has not had syncopal events and again I cannot correlate the lightheadedness with body positions.  But I am going to go ahead and decrease his hydralazine from 1 pill  in the morning and afternoon and 2 in the evening to 1 pill 3 times a day.  I am going to cut the amlodipine from 5 mg down to 2.5 at bedtime.  I am going to let her systolic blood pressures run a little higher because she has a wide pulse pressure with the AI and the dilated aorta.  We will see if this translates into less lightheadedness when she comes back in about a month.  I again had a long talk with her, if she wanted to consider surgery, but I have been of the opinion that she is really too frail and I actually think we made the right decision years ago by not sending her to surgery because she lived a good life since that time and she is still doing well clinically.  In fact, now, unfortunately, her  has Alzheimer's and is going to be moved to a nursing home.  Her son is still active.  Her son used to come with her to all of her visits, but she has been doing so well, I have not seen him for a few years.    She also wondered about her lung function.  She had pulmonary function tests, the last of which was 2018, when we were thinking about doing surgery.  Her FEV1 is only 40% of predicted.  Her DLCO about 67% of predicted.    PHYSICAL EXAMINATION:  On exam today, her lungs are completely clear with no crackles, no wheezing.  She has no peripheral edema.  so I think there is no point in repeating the pulmonary function test.  It is not going to change anything we do.  She does have an inhaler, which she takes as needed and she is not particularly short of breath, but she only walks with a walker very slowly.    PLAN:  We will see her back in about 4 weeks.  I have asked her to keep a blood pressure log as I now decreased her medicines and when she comes back in 3-4 weeks, we will check her blood pressure, both sitting and standing, and we will inquire if that translated into any less dizziness.  Again, we are going to allow the systolic blood pressure to go up a little higher than normal to offset the  drop in the mean arterial pressure when she stands up.    Today's visit was 23 minutes.    Tyler Machado MD    cc:  Shoaib Mills MD  [facility]  [address] [place] [address]    Pam Mulligan MD  [facility]  [address] [place] [address]    Tyler Machado MD        D: 2021   T: 2021   MT: al    Name:     INDIANA NYE ARGELIA  MRN:      [id number]        Account:      [id number]   :      1935           Service Date: 2021       Document: [id number]

## 2021-08-31 ENCOUNTER — OFFICE VISIT (OUTPATIENT)
Dept: CARDIOLOGY | Facility: CLINIC | Age: 86
End: 2021-08-31
Attending: INTERNAL MEDICINE
Payer: COMMERCIAL

## 2021-08-31 VITALS
BODY MASS INDEX: 23.51 KG/M2 | HEIGHT: 64 IN | HEART RATE: 82 BPM | SYSTOLIC BLOOD PRESSURE: 138 MMHG | WEIGHT: 137.7 LBS | OXYGEN SATURATION: 96 % | DIASTOLIC BLOOD PRESSURE: 50 MMHG

## 2021-08-31 DIAGNOSIS — I95.1 ORTHOSTATIC HYPOTENSION: ICD-10-CM

## 2021-08-31 DIAGNOSIS — I10 BENIGN ESSENTIAL HYPERTENSION: ICD-10-CM

## 2021-08-31 DIAGNOSIS — I49.9 CARDIAC ARRHYTHMIA, UNSPECIFIED CARDIAC ARRHYTHMIA TYPE: ICD-10-CM

## 2021-08-31 DIAGNOSIS — I35.1 NONRHEUMATIC AORTIC VALVE INSUFFICIENCY: ICD-10-CM

## 2021-08-31 DIAGNOSIS — I71.20 THORACIC AORTIC ANEURYSM WITHOUT RUPTURE (H): ICD-10-CM

## 2021-08-31 PROCEDURE — 99214 OFFICE O/P EST MOD 30 MIN: CPT | Performed by: NURSE PRACTITIONER

## 2021-08-31 RX ORDER — METOPROLOL SUCCINATE 25 MG/1
25 TABLET, EXTENDED RELEASE ORAL 2 TIMES DAILY
Qty: 180 TABLET | Refills: 3 | Status: ON HOLD | OUTPATIENT
Start: 2021-08-31 | End: 2022-06-22

## 2021-08-31 RX ORDER — LEVOTHYROXINE SODIUM 100 UG/1
100 TABLET ORAL DAILY
COMMUNITY
Start: 2021-06-02

## 2021-08-31 ASSESSMENT — MIFFLIN-ST. JEOR: SCORE: 1059.6

## 2021-08-31 NOTE — PATIENT INSTRUCTIONS
Thank you for your visit with the [facility] today.    Today's plan:   1. Stop taking amlodipine.  2. Start taking metoprolol succinate 25 mg twice daily rather than once daily.   3. Continue to monitor the blood pressure periodically at home and call the clinic if it begins to run consistently over 140/85.  4. Follow up with Dr. Machado in about 3 months.  5. Follow up with the palliative care team to help discuss your goals of care going forward.  6. Start checking your weights daily and try to stick to a low sodium diet (under 2,000 mg/day).  7. Call the clinic if you have signs concerning for fluid retention, including weight gain of over 3 pounds in 1 night or over 5 pounds in 1 week, worsening shortness of breath, or worsening swelling in the legs or abdomen.     If you have questions or concerns, please do not hesitate to call my nurse, Florecita, at [phone].     Scheduling phone number: [phone]    It was a pleasure seeing you today!     IRMA Addison, CNP  Nurse Practitioner  [facility]  August 31, 2021  ________________________________________________________

## 2021-08-31 NOTE — LETTER
"8/31/2021    Shoaib Mills DO  [facility] [address] [address]  [facility] [id number]    RE: Giselle Chino       Dear Colleague,    I had the pleasure of seeing Giselle Chino in the [facility].      Cardiology Clinic Progress Note    Service Date: August 31, 2021    Primary Cardiology team: Dr. Machado and Avery Lambert, APRN, CNP      Reason for Visit: Follow up for lightheadedness    HPI:   I had the pleasure of meeting Ms. Desai \"Emma\" Adenike Chino in the clinic today. She is a delightful 85 year old female with a past medical history notable for COPD, large ascending aortic aneurysm, moderately severe aortic insufficiency, moderate aortic stenosis, hypertension, and hypothyroidism. She has followed with Dr. Machado and Avery Lambert, NP for her cardiology care.    She has a very large ascending aortic aneurysm most recently measuring at 79 mm on her most recent echocardiogram on 3/16/2021. She has met with several cardiovascular surgeons, previously Dr. Coreas and more recently with Dr. Mulligan, to discuss consideration for repair of her aortic aneurysm as well as aortic valve repair/replacement. She has been felt to be a high risk surgical candidate given her advanced age now at 85. However, given the very large size of the aneurysm, her risk of rupture, aortic dissection and sudden death is also very high and she understands this. She has declined surgery in the past due to concerns regarding the recovery and rehabilitation process and has opted to continue with medical management with close monitoring and treatment of her hypertension.      Fortunately, she has done reasonably well with this approach and her aortic dimensions have really only expanded 2 to 4 mm on various studies between echocardiograms and CT scans over the last 5 to 6 years. However, her aortic valve insufficiency has slowly been " getting worse now into the moderately severe (3+) range on her echocardiogram last month. She has also noted issues with fairly frequent dizziness/lightheadedness over the past couple of months. She was evaluated by Dr. Machado in the clinic on 3/19/2021 and there was some concern for the possibility of a tachyarrhythmia on auscultation on exam at the visit and concerned that this could possibly be contributing to her symptoms. This was unable to be captured on EKG. A 7 day event monitor was recommended for further evaluation of a possible underlying tachyarrhythmia. This was worn from 3/19 to 3/27/2021 showing predominantly sinus rhythm with a first-degree AV block with around 200 brief episodes of paroxysmal supraventricular tachycardia, although she was only mildly tachycardic during most of these episodes with heart rates primarily around 100 to 120 bpm with the longest episode lasting only around 26 seconds. Rare PVCs and PACs were also noted.    The patient met with Dr. Machado in the clinic about a month ago on 07/28/2021 with continued concerns with lightheadedness. He noted a drop in her blood pressure when getting up from sitting to standing, and so he suggested decreasing the dose of her hydralazine to 25 mg 3 times daily and decreasing amlodipine from 5 mg to 2.5 mg daily.     Today, Emma presents to the clinic in follow up of her recent medication changes. She tells me that she continues to feels the same without significant changes or improvement in the intermittent episodes of dizziness and lightheadedness. Fortunately, she has not had any falls, presyncopal, or syncopal episodes. She otherwise denies symptoms of chest pain or palpitations. She typically will sit or lay down if the symptoms occur and they gradually improve within a few minutes. She notes that she has mild chronic exertional dyspnea, but feels that this has been stable of late without any significant appreciable worsening of her  symptoms. She otherwise denies orthopnea, PND, or lower extremity edema. Her weight has been fairly stable without any significant weight gain of late. Sadly, her  passed away last week in hospice care with advanced dementia. She was a bit tearful and has been understandably quite saddened by this, but she has a good support system nearby with her two daughters and son in law who she is close with. I gave her my condolences.     ASSESSMENT AND PLAN:  1. Large ascending aortic aneurysm  - Maximum dimension of 79 mm on most recent TTE 3/16/2021 as outlined above.  - She has met with cardiovascular surgery and has declined intervention given concerns about the lengthy recovery process and rehabilitation and her high risk status given her advanced age.     2. Moderately severe aortic insufficiency and moderate aortic stenosis  - TTE in March 2021 showing worsening AI from 2-3+ to 3+ range and a mean gradient across aortic valve of 25 mmhg, GIANNA 1.3 cm , and aortic valve peak velocity 3.6 m/sec.  - Suspect her symptoms of episodic dizziness/lightheadedness are likely related to her wide pulse pressure and the gradual progression of her aortic insufficiency given recent event monitor showing only brief runs of SVT. EF remains preserved and she has not had signs or symptoms of acute decompensated CHF.  - We discussed the natural progression of valvular disease. Recommend she meet with palliative care to discuss goals of care and end of life care should her valve disease continue to progress or she develop more unstable heart failure.     3. Paroxysmal supraventricular tachycardia  - Recent 7-day event monitor showing fairly frequent brief runs of SVT with rates around 100 to 120 bpm and a longest episode lasting only around 26 seconds.    4. Hypertension  - Blood pressure is mildly elevated in the clinic today at 138/50, but MAP is WNL with the low diastolic reading in the setting of her aortic valve disease. BP has  been intermittently higher in her checks at home up to the 140s to 160s systolic at times since decreasing her amlodipine and hydralazine. She also notes that she has continued on only 25 mg once daily of metoprolol rather than twice daily as I had recommended when I saw her back in April.  - Recommend she stop amlodipine as this is not typically recommended in the setting of AAA and increase metoprolol from 25 mg once daily to BID.   - She has had a wide pulse pressure noted with her aortic disease so we will have to be careful to not over treat her systolic hypertension and push her mean arterial pressure too low.    5. Recent worsening lightheadedness/dizziness  - Likely secondary in part to her aortic insufficiency with a possible component of mild orthostatic hypotension/wide pulse pressure as discussed above.    Thank you for the opportunity to participate in this pleasant patient's care. I will otherwise plan to have her see Dr. Machado in routine follow up in about 3 months.  I encouraged her to call the clinic with any questions or concerns in the meantime, and we would be happy to see her sooner if needed.    35 total minutes was spent today including chart review, precharting, history and exam, post visit documentation, and reviewing studies as outlined above.     IRMA Addison, CNP   Nurse Practitioner  [facility]  Pager: [phone]  Text Page  (8am - 5pm, M-F)    Orders this Visit:  No orders of the defined types were placed in this encounter.    Orders Placed This Encounter   Medications     levothyroxine (SYNTHROID/LEVOTHROID) 100 MCG tablet     Sig: Take 100 mcg by mouth     Medications Discontinued During This Encounter   Medication Reason     levothyroxine (SYNTHROID/LEVOTHROID) 112 MCG tablet Dose adjustment     Encounter Diagnoses   Name Primary?     Nonrheumatic aortic valve insufficiency      Thoracic aortic aneurysm without rupture (H)      Cardiac arrhythmia,  unspecified cardiac arrhythmia type      Benign essential hypertension      Orthostatic hypotension      CURRENT MEDICATIONS:  Current Outpatient Medications   Medication Sig Dispense Refill     Acetaminophen (TYLENOL 8 HOUR PO) Take by mouth as needed        acetaminophen (TYLENOL) 500 MG tablet Take 500-1,000 mg by mouth every 6 hours as needed for mild pain       amLODIPine (NORVASC) 5 MG tablet Take 0.5 tablets (2.5 mg) by mouth At Bedtime 180 tablet 3     atorvastatin (LIPITOR) 20 MG tablet Take 1 tablet (20 mg) by mouth daily 90 tablet 3     hydrALAZINE (APRESOLINE) 25 MG tablet One pill three times a day 360 tablet 3     ipratropium - albuterol 0.5 mg/2.5 mg/3 mL (DUONEB) 0.5-2.5 (3) MG/3ML neb solution Take 1 vial (3 mLs) by nebulization every 6 hours as needed for shortness of breath / dyspnea or wheezing  0     Ipratropium-Albuterol (COMBIVENT RESPIMAT)  MCG/ACT inhaler Inhale 1 puff into the lungs 4 times daily Not to exceed 6 doses per day. 1 Inhaler 1     levothyroxine (SYNTHROID/LEVOTHROID) 100 MCG tablet Take 100 mcg by mouth       metoprolol succinate ER (TOPROL-XL) 25 MG 24 hr tablet Take 1 tablet (25 mg) by mouth 2 times daily (Patient taking differently: Take 25 mg by mouth daily ) 180 tablet 3     oxybutynin (DITROPAN-XL) 10 MG 24 hr tablet Take 10 mg by mouth daily       potassium chloride ER (K-DUR/KLOR-CON M) 20 MEQ CR tablet Take 1 tablet (20 mEq) by mouth daily 90 tablet 3     torsemide (DEMADEX) 10 MG tablet Take 1 tablet (10 mg) by mouth daily 90 tablet 3     benzonatate (TESSALON) 100 MG capsule Take 100 mg by mouth 3 times daily       predniSONE (DELTASONE) 20 MG tablet Short term for mosquito bites--not currently taking (Patient not taking: Reported on 1/15/2020) 3 tablet 0     ALLERGIES  Allergies   Allergen Reactions     Amoxicillin Rash     Piperacillin-Tazobactam In D5w Rash     PAST MEDICAL, SURGICAL, FAMILY HISTORY:  History was reviewed and updated as needed, see medical  record.    SOCIAL HISTORY:  Social History     Socioeconomic History     Marital status:      Spouse name: Not on file     Number of children: Not on file     Years of education: Not on file     Highest education level: Not on file   Occupational History     Not on file   Tobacco Use     Smoking status: Former Smoker     Packs/day: 1.00     Years: 30.00     Pack years: 30.00     Types: Cigarettes     Quit date: 2008     Years since quittin.0     Smokeless tobacco: Never Used   Substance and Sexual Activity     Alcohol use: Not Currently     Alcohol/week: 5.8 standard drinks     Types: 7 Shots of liquor per week     Drug use: No     Sexual activity: Not on file   Other Topics Concern      Service Not Asked     Blood Transfusions Not Asked     Caffeine Concern No     Comment: 0-2 pops daily - decaf     Occupational Exposure Not Asked     Hobby Hazards Not Asked     Sleep Concern No     Stress Concern Not Asked     Weight Concern Not Asked     Special Diet No     Comment: really careful with salt     Back Care Not Asked     Exercise No     Comment: walking around house, outside and at the mall, 10 mins at a time     Bike Helmet Not Asked     Seat Belt Not Asked     Self-Exams Not Asked   Social History Narrative     Not on file     Social Determinants of Health     Financial Resource Strain:      Difficulty of Paying Living Expenses:    Food Insecurity:      Worried About Running Out of Food in the Last Year:      Ran Out of Food in the Last Year:    Transportation Needs:      Lack of Transportation (Medical):      Lack of Transportation (Non-Medical):    Physical Activity:      Days of Exercise per Week:      Minutes of Exercise per Session:    Stress:      Feeling of Stress :    Social Connections:      Frequency of Communication with Friends and Family:      Frequency of Social Gatherings with Friends and Family:      Attends [employer] Services:      Active Member of Clubs or Organizations:   "    Attends Club or Organization Meetings:      Marital Status:    Intimate Partner Violence:      Fear of Current or Ex-Partner:      Emotionally Abused:      Physically Abused:      Sexually Abused:      Review of Systems:  Skin:  Negative     Eyes:  Positive for glasses  ENT:  Negative    Respiratory:  Positive for dyspnea on exertion  Cardiovascular:    palpitations;Positive for;lightheadedness;dizziness  Gastroenterology: Negative    Genitourinary:  Negative    Musculoskeletal:  Negative    Neurologic:  Negative    Psychiatric:  Negative    Heme/Lymph/Imm:  Negative    Endocrine:  Positive for thyroid disorder     Physical Exam:  Vitals: /50 (BP Location: Right arm, Patient Position: Standing, Cuff Size: Adult Regular)   Pulse 82   Ht 1.626 m (5' 4\")   Wt 62.5 kg (137 lb 11.2 oz)   LMP  (LMP Unknown)   SpO2 96%   Breastfeeding No   BMI 23.64 kg/m     Wt Readings from Last 4 Encounters:   08/31/21 62.5 kg (137 lb 11.2 oz)   07/28/21 62.9 kg (138 lb 11.2 oz)   04/20/21 63.5 kg (140 lb)   03/19/21 65.1 kg (143 lb 9.6 oz)     CONSTITUTIONAL: Appears her stated age, well nourished, and in no acute distress.  HEENT: Pupils equal, round. Sclerae nonicteric.    NECK: Supple, no masses or JVD appreciated.   C/V: Regular rate and rhythm, grade 3/6 early diastolic decrescendo and 2/6 systolic ejection murmur noted. No rub or gallop.   RESP: Respirations are unlabored. Lungs clear to auscultation with diminished airflow throughout.  GI: Abdomen soft, non-tender, non-distended.  EXTREM: No clubbing, cyanosis, or lower extremity edema bilaterally.   NEURO: Alert and oriented, cooperative. Gait steady. No gross focal deficits.   PSYCH: Affect appropriate. Mentation normal. Responds to questions appropriately.  SKIN: Warm and dry.     Recent Lab Results reviewed today:  LIPID RESULTS:  Lab Results   Component Value Date    CHOL 147 03/16/2021    HDL 64 03/16/2021    LDL 61 03/16/2021    TRIG 112 03/16/2021    " CHOLHDLRATIO 3.2 11/17/2015    CHOLHDLRATIO 3.2 06/17/2014     LIVER ENZYME RESULTS:  Lab Results   Component Value Date    AST 15 11/11/2019    ALT 23 03/16/2021     CBC RESULTS:  Lab Results   Component Value Date    WBC 7.8 11/11/2019    RBC 4.42 11/11/2019    HGB 12.4 11/11/2019    HCT 39.2 11/11/2019    MCV 89 11/11/2019    MCH 28.1 11/11/2019    MCHC 31.6 11/11/2019    RDW 13.5 11/11/2019     11/11/2019     BMP RESULTS:  Lab Results   Component Value Date     03/16/2021    POTASSIUM 4.0 03/16/2021    CHLORIDE 109 03/16/2021    CO2 31 03/16/2021    ANIONGAP 2 (L) 03/16/2021    GLC 98 03/16/2021    BUN 23 03/16/2021    CR 0.84 03/16/2021    GFRESTIMATED 64 03/16/2021    GFRESTBLACK 75 03/16/2021    KRISTEL 9.9 03/16/2021      INR RESULTS:  Lab Results   Component Value Date    INR 1.02 11/11/2019    INR 1.04 07/02/2018     CC  Tyler Machado MD  [address]  [place] [id number]    This note was completed in part using Dragon voice recognition software. Although reviewed after completion, some word and grammatical errors may occur.      Thank you for allowing me to participate in the care of your patient.      Sincerely,     Gaurav Barrios NP     [facility]  cc:   Tyler Machado MD  [address]  [place] [phone]

## 2021-08-31 NOTE — PROGRESS NOTES
"  Cardiology Clinic Progress Note    Service Date: August 31, 2021    Primary Cardiology team: Dr. Machado and Avery Lambert, APRN, CNP      Reason for Visit: Follow up for lightheadedness    HPI:   I had the pleasure of meeting Ms. Desai \"Emma\" Adenike Chino in the clinic today. She is a delightful 85 year old female with a past medical history notable for COPD, large ascending aortic aneurysm, moderately severe aortic insufficiency, moderate aortic stenosis, hypertension, and hypothyroidism. She has followed with Dr. Machado and Avery Lambert, NP for her cardiology care.    She has a very large ascending aortic aneurysm most recently measuring at 79 mm on her most recent echocardiogram on 3/16/2021. She has met with several cardiovascular surgeons, previously Dr. Coreas and more recently with Dr. Mulligan, to discuss consideration for repair of her aortic aneurysm as well as aortic valve repair/replacement. She has been felt to be a high risk surgical candidate given her advanced age now at 85. However, given the very large size of the aneurysm, her risk of rupture, aortic dissection and sudden death is also very high and she understands this. She has declined surgery in the past due to concerns regarding the recovery and rehabilitation process and has opted to continue with medical management with close monitoring and treatment of her hypertension.      Fortunately, she has done reasonably well with this approach and her aortic dimensions have really only expanded 2 to 4 mm on various studies between echocardiograms and CT scans over the last 5 to 6 years. However, her aortic valve insufficiency has slowly been getting worse now into the moderately severe (3+) range on her echocardiogram last month. She has also noted issues with fairly frequent dizziness/lightheadedness over the past couple of months. She was evaluated by Dr. Machado in the clinic on 3/19/2021 and there was some concern for the " possibility of a tachyarrhythmia on auscultation on exam at the visit and concerned that this could possibly be contributing to her symptoms. This was unable to be captured on EKG. A 7 day event monitor was recommended for further evaluation of a possible underlying tachyarrhythmia. This was worn from 3/19 to 3/27/2021 showing predominantly sinus rhythm with a first-degree AV block with around 200 brief episodes of paroxysmal supraventricular tachycardia, although she was only mildly tachycardic during most of these episodes with heart rates primarily around 100 to 120 bpm with the longest episode lasting only around 26 seconds. Rare PVCs and PACs were also noted.    The patient met with Dr. Machado in the clinic about a month ago on 07/28/2021 with continued concerns with lightheadedness. He noted a drop in her blood pressure when getting up from sitting to standing, and so he suggested decreasing the dose of her hydralazine to 25 mg 3 times daily and decreasing amlodipine from 5 mg to 2.5 mg daily.     Today, Emma presents to the clinic in follow up of her recent medication changes. She tells me that she continues to feels the same without significant changes or improvement in the intermittent episodes of dizziness and lightheadedness. Fortunately, she has not had any falls, presyncopal, or syncopal episodes. She otherwise denies symptoms of chest pain or palpitations. She typically will sit or lay down if the symptoms occur and they gradually improve within a few minutes. She notes that she has mild chronic exertional dyspnea, but feels that this has been stable of late without any significant appreciable worsening of her symptoms. She otherwise denies orthopnea, PND, or lower extremity edema. Her weight has been fairly stable without any significant weight gain of late. Sadly, her  passed away last week in hospice care with advanced dementia. She was a bit tearful and has been understandably quite  saddened by this, but she has a good support system nearby with her two daughters and son in law who she is close with. I gave her my condolences.     ASSESSMENT AND PLAN:  1. Large ascending aortic aneurysm  - Maximum dimension of 79 mm on most recent TTE 3/16/2021 as outlined above.  - She has met with cardiovascular surgery and has declined intervention given concerns about the lengthy recovery process and rehabilitation and her high risk status given her advanced age.     2. Moderately severe aortic insufficiency and moderate aortic stenosis  - TTE in March 2021 showing worsening AI from 2-3+ to 3+ range and a mean gradient across aortic valve of 25 mmhg, GIANNA 1.3 cm , and aortic valve peak velocity 3.6 m/sec.  - Suspect her symptoms of episodic dizziness/lightheadedness are likely related to her wide pulse pressure and the gradual progression of her aortic insufficiency given recent event monitor showing only brief runs of SVT. EF remains preserved and she has not had signs or symptoms of acute decompensated CHF.  - We discussed the natural progression of valvular disease. Recommend she meet with palliative care to discuss goals of care and end of life care should her valve disease continue to progress or she develop more unstable heart failure.     3. Paroxysmal supraventricular tachycardia  - Recent 7-day event monitor showing fairly frequent brief runs of SVT with rates around 100 to 120 bpm and a longest episode lasting only around 26 seconds.    4. Hypertension  - Blood pressure is mildly elevated in the clinic today at 138/50, but MAP is WNL with the low diastolic reading in the setting of her aortic valve disease. BP has been intermittently higher in her checks at home up to the 140s to 160s systolic at times since decreasing her amlodipine and hydralazine. She also notes that she has continued on only 25 mg once daily of metoprolol rather than twice daily as I had recommended when I saw her back in  April.  - Recommend she stop amlodipine as this is not typically recommended in the setting of AAA and increase metoprolol from 25 mg once daily to BID.   - She has had a wide pulse pressure noted with her aortic disease so we will have to be careful to not over treat her systolic hypertension and push her mean arterial pressure too low.    5. Recent worsening lightheadedness/dizziness  - Likely secondary in part to her aortic insufficiency with a possible component of mild orthostatic hypotension/wide pulse pressure as discussed above.    Thank you for the opportunity to participate in this pleasant patient's care. I will otherwise plan to have her see Dr. Machado in routine follow up in about 3 months.  I encouraged her to call the clinic with any questions or concerns in the meantime, and we would be happy to see her sooner if needed.    35 total minutes was spent today including chart review, precharting, history and exam, post visit documentation, and reviewing studies as outlined above.     IRMA Addison, CNP   Nurse Practitioner  [facility]  Pager: [phone]  Text Page  (8am - 5pm, M-F)    Orders this Visit:  No orders of the defined types were placed in this encounter.    Orders Placed This Encounter   Medications     levothyroxine (SYNTHROID/LEVOTHROID) 100 MCG tablet     Sig: Take 100 mcg by mouth     Medications Discontinued During This Encounter   Medication Reason     levothyroxine (SYNTHROID/LEVOTHROID) 112 MCG tablet Dose adjustment     Encounter Diagnoses   Name Primary?     Nonrheumatic aortic valve insufficiency      Thoracic aortic aneurysm without rupture (H)      Cardiac arrhythmia, unspecified cardiac arrhythmia type      Benign essential hypertension      Orthostatic hypotension      CURRENT MEDICATIONS:  Current Outpatient Medications   Medication Sig Dispense Refill     Acetaminophen (TYLENOL 8 HOUR PO) Take by mouth as needed        acetaminophen (TYLENOL) 500 MG  tablet Take 500-1,000 mg by mouth every 6 hours as needed for mild pain       amLODIPine (NORVASC) 5 MG tablet Take 0.5 tablets (2.5 mg) by mouth At Bedtime 180 tablet 3     atorvastatin (LIPITOR) 20 MG tablet Take 1 tablet (20 mg) by mouth daily 90 tablet 3     hydrALAZINE (APRESOLINE) 25 MG tablet One pill three times a day 360 tablet 3     ipratropium - albuterol 0.5 mg/2.5 mg/3 mL (DUONEB) 0.5-2.5 (3) MG/3ML neb solution Take 1 vial (3 mLs) by nebulization every 6 hours as needed for shortness of breath / dyspnea or wheezing  0     Ipratropium-Albuterol (COMBIVENT RESPIMAT)  MCG/ACT inhaler Inhale 1 puff into the lungs 4 times daily Not to exceed 6 doses per day. 1 Inhaler 1     levothyroxine (SYNTHROID/LEVOTHROID) 100 MCG tablet Take 100 mcg by mouth       metoprolol succinate ER (TOPROL-XL) 25 MG 24 hr tablet Take 1 tablet (25 mg) by mouth 2 times daily (Patient taking differently: Take 25 mg by mouth daily ) 180 tablet 3     oxybutynin (DITROPAN-XL) 10 MG 24 hr tablet Take 10 mg by mouth daily       potassium chloride ER (K-DUR/KLOR-CON M) 20 MEQ CR tablet Take 1 tablet (20 mEq) by mouth daily 90 tablet 3     torsemide (DEMADEX) 10 MG tablet Take 1 tablet (10 mg) by mouth daily 90 tablet 3     benzonatate (TESSALON) 100 MG capsule Take 100 mg by mouth 3 times daily       predniSONE (DELTASONE) 20 MG tablet Short term for mosquito bites--not currently taking (Patient not taking: Reported on 1/15/2020) 3 tablet 0     ALLERGIES  Allergies   Allergen Reactions     Amoxicillin Rash     Piperacillin-Tazobactam In D5w Rash     PAST MEDICAL, SURGICAL, FAMILY HISTORY:  History was reviewed and updated as needed, see medical record.    SOCIAL HISTORY:  Social History     Socioeconomic History     Marital status:      Spouse name: Not on file     Number of children: Not on file     Years of education: Not on file     Highest education level: Not on file   Occupational History     Not on file   Tobacco Use      Smoking status: Former Smoker     Packs/day: 1.00     Years: 30.00     Pack years: 30.00     Types: Cigarettes     Quit date: 2008     Years since quittin.0     Smokeless tobacco: Never Used   Substance and Sexual Activity     Alcohol use: Not Currently     Alcohol/week: 5.8 standard drinks     Types: 7 Shots of liquor per week     Drug use: No     Sexual activity: Not on file   Other Topics Concern      Service Not Asked     Blood Transfusions Not Asked     Caffeine Concern No     Comment: 0-2 pops daily - decaf     Occupational Exposure Not Asked     Hobby Hazards Not Asked     Sleep Concern No     Stress Concern Not Asked     Weight Concern Not Asked     Special Diet No     Comment: really careful with salt     Back Care Not Asked     Exercise No     Comment: walking around house, outside and at the mall, 10 mins at a time     Bike Helmet Not Asked     Seat Belt Not Asked     Self-Exams Not Asked   Social History Narrative     Not on file     Social Determinants of Health     Financial Resource Strain:      Difficulty of Paying Living Expenses:    Food Insecurity:      Worried About Running Out of Food in the Last Year:      Ran Out of Food in the Last Year:    Transportation Needs:      Lack of Transportation (Medical):      Lack of Transportation (Non-Medical):    Physical Activity:      Days of Exercise per Week:      Minutes of Exercise per Session:    Stress:      Feeling of Stress :    Social Connections:      Frequency of Communication with Friends and Family:      Frequency of Social Gatherings with Friends and Family:      Attends [employer] Services:      Active Member of Clubs or Organizations:      Attends Club or Organization Meetings:      Marital Status:    Intimate Partner Violence:      Fear of Current or Ex-Partner:      Emotionally Abused:      Physically Abused:      Sexually Abused:      Review of Systems:  Skin:  Negative     Eyes:  Positive for glasses  ENT:  Negative   "  Respiratory:  Positive for dyspnea on exertion  Cardiovascular:    palpitations;Positive for;lightheadedness;dizziness  Gastroenterology: Negative    Genitourinary:  Negative    Musculoskeletal:  Negative    Neurologic:  Negative    Psychiatric:  Negative    Heme/Lymph/Imm:  Negative    Endocrine:  Positive for thyroid disorder     Physical Exam:  Vitals: /50 (BP Location: Right arm, Patient Position: Standing, Cuff Size: Adult Regular)   Pulse 82   Ht 1.626 m (5' 4\")   Wt 62.5 kg (137 lb 11.2 oz)   LMP  (LMP Unknown)   SpO2 96%   Breastfeeding No   BMI 23.64 kg/m     Wt Readings from Last 4 Encounters:   08/31/21 62.5 kg (137 lb 11.2 oz)   07/28/21 62.9 kg (138 lb 11.2 oz)   04/20/21 63.5 kg (140 lb)   03/19/21 65.1 kg (143 lb 9.6 oz)     CONSTITUTIONAL: Appears her stated age, well nourished, and in no acute distress.  HEENT: Pupils equal, round. Sclerae nonicteric.    NECK: Supple, no masses or JVD appreciated.   C/V: Regular rate and rhythm, grade 3/6 early diastolic decrescendo and 2/6 systolic ejection murmur noted. No rub or gallop.   RESP: Respirations are unlabored. Lungs clear to auscultation with diminished airflow throughout.  GI: Abdomen soft, non-tender, non-distended.  EXTREM: No clubbing, cyanosis, or lower extremity edema bilaterally.   NEURO: Alert and oriented, cooperative. Gait steady. No gross focal deficits.   PSYCH: Affect appropriate. Mentation normal. Responds to questions appropriately.  SKIN: Warm and dry.     Recent Lab Results reviewed today:  LIPID RESULTS:  Lab Results   Component Value Date    CHOL 147 03/16/2021    HDL 64 03/16/2021    LDL 61 03/16/2021    TRIG 112 03/16/2021    CHOLHDLRATIO 3.2 11/17/2015    CHOLHDLRATIO 3.2 06/17/2014     LIVER ENZYME RESULTS:  Lab Results   Component Value Date    AST 15 11/11/2019    ALT 23 03/16/2021     CBC RESULTS:  Lab Results   Component Value Date    WBC 7.8 11/11/2019    RBC 4.42 11/11/2019    HGB 12.4 11/11/2019    HCT 39.2 " 11/11/2019    MCV 89 11/11/2019    MCH 28.1 11/11/2019    MCHC 31.6 11/11/2019    RDW 13.5 11/11/2019     11/11/2019     BMP RESULTS:  Lab Results   Component Value Date     03/16/2021    POTASSIUM 4.0 03/16/2021    CHLORIDE 109 03/16/2021    CO2 31 03/16/2021    ANIONGAP 2 (L) 03/16/2021    GLC 98 03/16/2021    BUN 23 03/16/2021    CR 0.84 03/16/2021    GFRESTIMATED 64 03/16/2021    GFRESTBLACK 75 03/16/2021    KRISTEL 9.9 03/16/2021      INR RESULTS:  Lab Results   Component Value Date    INR 1.02 11/11/2019    INR 1.04 07/02/2018     CC  Tyler Machado MD  [id number]  [place] [id number]    This note was completed in part using Dragon voice recognition software. Although reviewed after completion, some word and grammatical errors may occur.

## 2021-09-27 ENCOUNTER — DOCUMENTATION ONLY (OUTPATIENT)
Dept: OTHER | Facility: CLINIC | Age: 86
End: 2021-09-27

## 2021-10-23 ENCOUNTER — HEALTH MAINTENANCE LETTER (OUTPATIENT)
Age: 86
End: 2021-10-23

## 2021-12-09 ENCOUNTER — OFFICE VISIT (OUTPATIENT)
Dept: CARDIOLOGY | Facility: CLINIC | Age: 86
End: 2021-12-09
Attending: NURSE PRACTITIONER
Payer: COMMERCIAL

## 2021-12-09 VITALS
HEIGHT: 64 IN | WEIGHT: 137 LBS | DIASTOLIC BLOOD PRESSURE: 54 MMHG | HEART RATE: 77 BPM | OXYGEN SATURATION: 94 % | SYSTOLIC BLOOD PRESSURE: 142 MMHG | BODY MASS INDEX: 23.39 KG/M2

## 2021-12-09 DIAGNOSIS — I10 BENIGN ESSENTIAL HYPERTENSION: ICD-10-CM

## 2021-12-09 DIAGNOSIS — I71.20 THORACIC AORTIC ANEURYSM WITHOUT RUPTURE (H): ICD-10-CM

## 2021-12-09 DIAGNOSIS — I49.9 CARDIAC ARRHYTHMIA, UNSPECIFIED CARDIAC ARRHYTHMIA TYPE: ICD-10-CM

## 2021-12-09 DIAGNOSIS — I35.0 NONRHEUMATIC AORTIC VALVE STENOSIS: Primary | ICD-10-CM

## 2021-12-09 DIAGNOSIS — I95.1 ORTHOSTATIC HYPOTENSION: ICD-10-CM

## 2021-12-09 DIAGNOSIS — I35.1 NONRHEUMATIC AORTIC VALVE INSUFFICIENCY: ICD-10-CM

## 2021-12-09 PROCEDURE — 99213 OFFICE O/P EST LOW 20 MIN: CPT | Performed by: INTERNAL MEDICINE

## 2021-12-09 RX ORDER — TRAZODONE HYDROCHLORIDE 50 MG/1
50 TABLET, FILM COATED ORAL
COMMUNITY
Start: 2021-09-16

## 2021-12-09 ASSESSMENT — MIFFLIN-ST. JEOR: SCORE: 1056.43
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
## 2021-12-09 NOTE — PROGRESS NOTES
Service Date: 12/09/2021    HISTORY PRESENT ILLNESS:  Giselle Chino returns for followup.  She is an 86-year-old woman.  Again, I still remember the day I met her.  She was literally catatonic.  She had been in poor health, both physical and mental.  She has made a remarkable improvement.  Please see my copious notes about her history.  The last detailed one was 07/28/2021, but basically she has had her thyroid treated, which helped tremendously and also her mental status and psychiatric issues improved.  She is a very happy, very pleasant, bright person.  Unfortunately, her  passed away after being in a nursing home with Alzheimer's and of course, now, she has little to do because her daily activities were to visit him in the nursing home before he passed away.      Cardiac wise, she has a critically enlarged ascending aortic aneurysm and aortic valve disease.  There have been numerous visits back and forth about surgery versus no surgery because of concern about age, rehab potential and high risk surgery she and I and the surgeon have elected to go with ongoing medical therapy.  We have tried to keep her blood pressure low to keep the aorta from the expanding further and we probably developed some orthostatic hypotension.  At a previous visit we dropped down the amlodipine and now stopped it and I lowered the hydralazine dose and with this, I was able to increase the beta blocker dose - both because beta blockers have better data for preventing aortic aneurysm, but also she was having brief runs of PSVT on her heart monitor.  By dropping the other medicines she is no longer orthostatic and that gave us room to increase the beta blocker.  She states she feels well.  She occasionally will notice palpitations for a few seconds, but no real syncope.  Notes no significant dizziness.      OBJECTIVE:  In the sitting position, Today, blood pressure 142/54 with heart rate is 74.  In the standing blood pressure  144/65 with a heart rate 79.  Generally speaking, she feels well.      ASSESSMENT AND PLAN:  I am going to have her come back in 9 months for a BMP, lipid panel and an echocardiogram.  The echo is really just monitor her progression because we know we are not going to act on any other findings in terms of intervention and I will see her at that point.  My nurse practitioner, Ramin Barrios, suggested a discussion regarding palliative care and future code status, etc.  I do not know how far that progressed, but it really is a good idea that she have that discussion.  Her son, who I have met in the past, has been critical to her care and is not in the office visit today.    Today's visit was 28 minutes, all counseling and to review the echo and her medications.    Tyler Machado MD   cc:   Shoaib Mills [facility]  [address] [place], [place], [phone]    Tyler Machado MD        D: 2021   T: 2021   MT: sapphire    Name:     INDIANA NYE  MRN:      [id number]        Account:      [id number]   :      1935           Service Date: 2021       Document: [id number]
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
## 2022-01-24 DIAGNOSIS — E78.5 HYPERLIPIDEMIA LDL GOAL <100: ICD-10-CM

## 2022-01-24 RX ORDER — ATORVASTATIN CALCIUM 20 MG/1
20 TABLET, FILM COATED ORAL DAILY
Qty: 90 TABLET | Refills: 2 | Status: SHIPPED | OUTPATIENT
Start: 2022-01-24 | End: 2022-10-07

## 2022-02-17 DIAGNOSIS — I10 HTN (HYPERTENSION): ICD-10-CM

## 2022-02-17 RX ORDER — TORSEMIDE 10 MG/1
10 TABLET ORAL DAILY
Qty: 90 TABLET | Refills: 2 | Status: SHIPPED | OUTPATIENT
Start: 2022-02-17 | End: 2022-11-09

## 2022-04-09 ENCOUNTER — HEALTH MAINTENANCE LETTER (OUTPATIENT)
Age: 87
End: 2022-04-09

## 2022-04-11 DIAGNOSIS — I16.0 HYPERTENSIVE URGENCY: ICD-10-CM

## 2022-04-11 RX ORDER — HYDRALAZINE HYDROCHLORIDE 25 MG/1
TABLET, FILM COATED ORAL
Qty: 360 TABLET | Refills: 2 | Status: ON HOLD | OUTPATIENT
Start: 2022-04-11 | End: 2022-06-22

## 2022-04-11 NOTE — PROGRESS NOTES
Refill for Hydralazine Pt seen 12/2021 by provider labs done previous to that OV stable. LEONARDO Castillo RN

## 2022-06-20 ENCOUNTER — APPOINTMENT (OUTPATIENT)
Dept: GENERAL RADIOLOGY | Facility: CLINIC | Age: 87
End: 2022-06-20
Attending: EMERGENCY MEDICINE
Payer: COMMERCIAL

## 2022-06-20 ENCOUNTER — HOSPITAL ENCOUNTER (OUTPATIENT)
Facility: CLINIC | Age: 87
Setting detail: OBSERVATION
Discharge: HOME | End: 2022-06-22
Attending: EMERGENCY MEDICINE | Admitting: STUDENT IN AN ORGANIZED HEALTH CARE EDUCATION/TRAINING PROGRAM
Payer: COMMERCIAL

## 2022-06-20 DIAGNOSIS — I16.0 HYPERTENSIVE URGENCY: ICD-10-CM

## 2022-06-20 DIAGNOSIS — R06.02 SHORTNESS OF BREATH: ICD-10-CM

## 2022-06-20 DIAGNOSIS — M62.81 GENERALIZED MUSCLE WEAKNESS: ICD-10-CM

## 2022-06-20 DIAGNOSIS — R53.81 PHYSICAL DECONDITIONING: Primary | ICD-10-CM

## 2022-06-20 DIAGNOSIS — I77.810 AORTIC ROOT DILATATION (H): ICD-10-CM

## 2022-06-20 DIAGNOSIS — I35.9 AORTIC VALVE DISORDER: ICD-10-CM

## 2022-06-20 DIAGNOSIS — I71.20 THORACIC AORTIC ANEURYSM WITHOUT RUPTURE (H): ICD-10-CM

## 2022-06-20 LAB
ALBUMIN UR-MCNC: NEGATIVE MG/DL
ANION GAP SERPL CALCULATED.3IONS-SCNC: 4 MMOL/L (ref 3–14)
APPEARANCE UR: CLEAR
BACTERIA #/AREA URNS HPF: ABNORMAL /HPF
BASE EXCESS BLDV CALC-SCNC: 5.7 MMOL/L (ref -7.7–1.9)
BASOPHILS # BLD AUTO: 0.1 10E3/UL (ref 0–0.2)
BASOPHILS NFR BLD AUTO: 1 %
BILIRUB UR QL STRIP: NEGATIVE
BUN SERPL-MCNC: 14 MG/DL (ref 7–30)
CALCIUM SERPL-MCNC: 9.5 MG/DL (ref 8.5–10.1)
CHLORIDE BLD-SCNC: 105 MMOL/L (ref 94–109)
CO2 SERPL-SCNC: 29 MMOL/L (ref 20–32)
COLOR UR AUTO: ABNORMAL
CREAT SERPL-MCNC: 0.65 MG/DL (ref 0.52–1.04)
EOSINOPHIL # BLD AUTO: 0.3 10E3/UL (ref 0–0.7)
EOSINOPHIL NFR BLD AUTO: 5 %
ERYTHROCYTE [DISTWIDTH] IN BLOOD BY AUTOMATED COUNT: 13.9 % (ref 10–15)
FLUAV RNA SPEC QL NAA+PROBE: NEGATIVE
FLUBV RNA RESP QL NAA+PROBE: NEGATIVE
GFR SERPL CREATININE-BSD FRML MDRD: 86 ML/MIN/1.73M2
GLUCOSE BLD-MCNC: 85 MG/DL (ref 70–99)
GLUCOSE UR STRIP-MCNC: NEGATIVE MG/DL
HCO3 BLDV-SCNC: 31 MMOL/L (ref 21–28)
HCT VFR BLD AUTO: 37.1 % (ref 35–47)
HGB BLD-MCNC: 11.5 G/DL (ref 11.7–15.7)
HGB UR QL STRIP: NEGATIVE
HOLD SPECIMEN: NORMAL
IMM GRANULOCYTES # BLD: 0 10E3/UL
IMM GRANULOCYTES NFR BLD: 0 %
KETONES UR STRIP-MCNC: NEGATIVE MG/DL
LEUKOCYTE ESTERASE UR QL STRIP: NEGATIVE
LYMPHOCYTES # BLD AUTO: 1.6 10E3/UL (ref 0.8–5.3)
LYMPHOCYTES NFR BLD AUTO: 25 %
MCH RBC QN AUTO: 27.6 PG (ref 26.5–33)
MCHC RBC AUTO-ENTMCNC: 31 G/DL (ref 31.5–36.5)
MCV RBC AUTO: 89 FL (ref 78–100)
MONOCYTES # BLD AUTO: 0.8 10E3/UL (ref 0–1.3)
MONOCYTES NFR BLD AUTO: 12 %
NEUTROPHILS # BLD AUTO: 3.6 10E3/UL (ref 1.6–8.3)
NEUTROPHILS NFR BLD AUTO: 57 %
NITRATE UR QL: NEGATIVE
NRBC # BLD AUTO: 0 10E3/UL
NRBC BLD AUTO-RTO: 0 /100
NT-PROBNP SERPL-MCNC: 962 PG/ML (ref 0–1800)
O2/TOTAL GAS SETTING VFR VENT: 21 %
OXYHGB MFR BLDV: 50 % (ref 70–75)
PCO2 BLDV: 50 MM HG (ref 40–50)
PH BLDV: 7.41 [PH] (ref 7.32–7.43)
PH UR STRIP: 6.5 [PH] (ref 5–7)
PLATELET # BLD AUTO: 191 10E3/UL (ref 150–450)
PO2 BLDV: 27 MM HG (ref 25–47)
POTASSIUM BLD-SCNC: 4.2 MMOL/L (ref 3.4–5.3)
RBC # BLD AUTO: 4.17 10E6/UL (ref 3.8–5.2)
RBC URINE: <1 /HPF
RSV RNA SPEC NAA+PROBE: NEGATIVE
SARS-COV-2 RNA RESP QL NAA+PROBE: NEGATIVE
SODIUM SERPL-SCNC: 138 MMOL/L (ref 133–144)
SP GR UR STRIP: 1.01 (ref 1–1.03)
SQUAMOUS EPITHELIAL: <1 /HPF
TROPONIN I SERPL HS-MCNC: 13 NG/L
UROBILINOGEN UR STRIP-MCNC: NORMAL MG/DL
WBC # BLD AUTO: 6.4 10E3/UL (ref 4–11)
WBC URINE: 1 /HPF

## 2022-06-20 PROCEDURE — 85025 COMPLETE CBC W/AUTO DIFF WBC: CPT | Performed by: EMERGENCY MEDICINE

## 2022-06-20 PROCEDURE — 99219 PR INITIAL OBSERVATION CARE,LEVEL II: CPT | Performed by: STUDENT IN AN ORGANIZED HEALTH CARE EDUCATION/TRAINING PROGRAM

## 2022-06-20 PROCEDURE — 250N000009 HC RX 250

## 2022-06-20 PROCEDURE — 82805 BLOOD GASES W/O2 SATURATION: CPT | Performed by: EMERGENCY MEDICINE

## 2022-06-20 PROCEDURE — C9803 HOPD COVID-19 SPEC COLLECT: HCPCS

## 2022-06-20 PROCEDURE — 36415 COLL VENOUS BLD VENIPUNCTURE: CPT | Performed by: EMERGENCY MEDICINE

## 2022-06-20 PROCEDURE — G0378 HOSPITAL OBSERVATION PER HR: HCPCS

## 2022-06-20 PROCEDURE — 93005 ELECTROCARDIOGRAM TRACING: CPT

## 2022-06-20 PROCEDURE — 83880 ASSAY OF NATRIURETIC PEPTIDE: CPT | Performed by: EMERGENCY MEDICINE

## 2022-06-20 PROCEDURE — 71046 X-RAY EXAM CHEST 2 VIEWS: CPT

## 2022-06-20 PROCEDURE — 87637 SARSCOV2&INF A&B&RSV AMP PRB: CPT | Performed by: EMERGENCY MEDICINE

## 2022-06-20 PROCEDURE — 250N000009 HC RX 250: Performed by: EMERGENCY MEDICINE

## 2022-06-20 PROCEDURE — 81001 URINALYSIS AUTO W/SCOPE: CPT | Performed by: EMERGENCY MEDICINE

## 2022-06-20 PROCEDURE — 94640 AIRWAY INHALATION TREATMENT: CPT

## 2022-06-20 PROCEDURE — 99285 EMERGENCY DEPT VISIT HI MDM: CPT | Mod: CS,25

## 2022-06-20 PROCEDURE — 84484 ASSAY OF TROPONIN QUANT: CPT | Performed by: EMERGENCY MEDICINE

## 2022-06-20 PROCEDURE — 82310 ASSAY OF CALCIUM: CPT | Performed by: EMERGENCY MEDICINE

## 2022-06-20 RX ORDER — IPRATROPIUM BROMIDE AND ALBUTEROL SULFATE 2.5; .5 MG/3ML; MG/3ML
3 SOLUTION RESPIRATORY (INHALATION) 4 TIMES DAILY
Status: DISCONTINUED | OUTPATIENT
Start: 2022-06-21 | End: 2022-06-21

## 2022-06-20 RX ORDER — IPRATROPIUM BROMIDE AND ALBUTEROL SULFATE 2.5; .5 MG/3ML; MG/3ML
3 SOLUTION RESPIRATORY (INHALATION) ONCE
Status: COMPLETED | OUTPATIENT
Start: 2022-06-20 | End: 2022-06-20

## 2022-06-20 RX ORDER — IPRATROPIUM BROMIDE AND ALBUTEROL SULFATE 2.5; .5 MG/3ML; MG/3ML
SOLUTION RESPIRATORY (INHALATION)
Status: COMPLETED
Start: 2022-06-20 | End: 2022-06-20

## 2022-06-20 RX ADMIN — IPRATROPIUM BROMIDE AND ALBUTEROL SULFATE: .5; 3 SOLUTION RESPIRATORY (INHALATION) at 18:33

## 2022-06-20 RX ADMIN — IPRATROPIUM BROMIDE AND ALBUTEROL SULFATE 3 ML: 2.5; .5 SOLUTION RESPIRATORY (INHALATION) at 19:33

## 2022-06-20 NOTE — ED TRIAGE NOTES
Pt presents with SOB since about 0400 with a cough that has been ongoing for the past 2 weeks. Pt has hx of COPD and is not on supplemental oxygen at home. Pt also endorses epigastric discomfort. She is A&O X 4. Birthday is tomorrow. Pt is tachypneic. One duo neb administered with some relief of SOB. Pt states the air in her face makes her feel better.      Triage Assessment     Row Name 06/20/22 [id number]       Triage Assessment (Adult)    Airway WDL WDL       Respiratory WDL    Respiratory WDL X;rhythm/pattern    Rhythm/Pattern, Respiratory tachypneic       Skin Circulation/Temperature WDL    Skin Circulation/Temperature WDL WDL       Cardiac WDL    Cardiac WDL X;rhythm    Cardiac Rhythm bradycardic       Peripheral/Neurovascular WDL    Peripheral Neurovascular WDL WDL       Cognitive/Neuro/Behavioral WDL    Cognitive/Neuro/Behavioral WDL WDL

## 2022-06-21 ENCOUNTER — APPOINTMENT (OUTPATIENT)
Dept: CARDIOLOGY | Facility: CLINIC | Age: 87
End: 2022-06-21
Attending: STUDENT IN AN ORGANIZED HEALTH CARE EDUCATION/TRAINING PROGRAM
Payer: COMMERCIAL

## 2022-06-21 ENCOUNTER — APPOINTMENT (OUTPATIENT)
Dept: OCCUPATIONAL THERAPY | Facility: CLINIC | Age: 87
End: 2022-06-21
Attending: STUDENT IN AN ORGANIZED HEALTH CARE EDUCATION/TRAINING PROGRAM
Payer: COMMERCIAL

## 2022-06-21 ENCOUNTER — APPOINTMENT (OUTPATIENT)
Dept: PHYSICAL THERAPY | Facility: CLINIC | Age: 87
End: 2022-06-21
Attending: STUDENT IN AN ORGANIZED HEALTH CARE EDUCATION/TRAINING PROGRAM
Payer: COMMERCIAL

## 2022-06-21 LAB
ANION GAP SERPL CALCULATED.3IONS-SCNC: 3 MMOL/L (ref 3–14)
ATRIAL RATE - MUSE: 58 BPM
BUN SERPL-MCNC: 12 MG/DL (ref 7–30)
CALCIUM SERPL-MCNC: 10.2 MG/DL (ref 8.5–10.1)
CHLORIDE BLD-SCNC: 106 MMOL/L (ref 94–109)
CO2 SERPL-SCNC: 30 MMOL/L (ref 20–32)
CREAT SERPL-MCNC: 0.72 MG/DL (ref 0.52–1.04)
DIASTOLIC BLOOD PRESSURE - MUSE: NORMAL MMHG
ERYTHROCYTE [DISTWIDTH] IN BLOOD BY AUTOMATED COUNT: 13.6 % (ref 10–15)
GFR SERPL CREATININE-BSD FRML MDRD: 81 ML/MIN/1.73M2
GLUCOSE BLD-MCNC: 100 MG/DL (ref 70–99)
HCT VFR BLD AUTO: 35.4 % (ref 35–47)
HGB BLD-MCNC: 11.2 G/DL (ref 11.7–15.7)
INTERPRETATION ECG - MUSE: NORMAL
LVEF ECHO: NORMAL
MCH RBC QN AUTO: 28.2 PG (ref 26.5–33)
MCHC RBC AUTO-ENTMCNC: 31.6 G/DL (ref 31.5–36.5)
MCV RBC AUTO: 89 FL (ref 78–100)
P AXIS - MUSE: 52 DEGREES
PLATELET # BLD AUTO: 186 10E3/UL (ref 150–450)
POTASSIUM BLD-SCNC: 4 MMOL/L (ref 3.4–5.3)
PR INTERVAL - MUSE: 202 MS
QRS DURATION - MUSE: 90 MS
QT - MUSE: 452 MS
QTC - MUSE: 443 MS
R AXIS - MUSE: -37 DEGREES
RBC # BLD AUTO: 3.97 10E6/UL (ref 3.8–5.2)
SODIUM SERPL-SCNC: 139 MMOL/L (ref 133–144)
SYSTOLIC BLOOD PRESSURE - MUSE: NORMAL MMHG
T AXIS - MUSE: 63 DEGREES
TROPONIN I SERPL HS-MCNC: 17 NG/L
VENTRICULAR RATE- MUSE: 58 BPM
WBC # BLD AUTO: 7.5 10E3/UL (ref 4–11)

## 2022-06-21 PROCEDURE — 85027 COMPLETE CBC AUTOMATED: CPT | Performed by: STUDENT IN AN ORGANIZED HEALTH CARE EDUCATION/TRAINING PROGRAM

## 2022-06-21 PROCEDURE — 250N000009 HC RX 250: Performed by: STUDENT IN AN ORGANIZED HEALTH CARE EDUCATION/TRAINING PROGRAM

## 2022-06-21 PROCEDURE — 84484 ASSAY OF TROPONIN QUANT: CPT | Performed by: HOSPITALIST

## 2022-06-21 PROCEDURE — G0378 HOSPITAL OBSERVATION PER HR: HCPCS

## 2022-06-21 PROCEDURE — 999N000157 HC STATISTIC RCP TIME EA 10 MIN

## 2022-06-21 PROCEDURE — 99225 PR SUBSEQUENT OBSERVATION CARE,LEVEL II: CPT | Performed by: HOSPITALIST

## 2022-06-21 PROCEDURE — 97165 OT EVAL LOW COMPLEX 30 MIN: CPT | Mod: GO

## 2022-06-21 PROCEDURE — 97535 SELF CARE MNGMENT TRAINING: CPT | Mod: GO

## 2022-06-21 PROCEDURE — 96372 THER/PROPH/DIAG INJ SC/IM: CPT | Performed by: HOSPITALIST

## 2022-06-21 PROCEDURE — 97161 PT EVAL LOW COMPLEX 20 MIN: CPT | Mod: GP

## 2022-06-21 PROCEDURE — 93306 TTE W/DOPPLER COMPLETE: CPT

## 2022-06-21 PROCEDURE — 250N000013 HC RX MED GY IP 250 OP 250 PS 637

## 2022-06-21 PROCEDURE — 93306 TTE W/DOPPLER COMPLETE: CPT | Mod: 26 | Performed by: INTERNAL MEDICINE

## 2022-06-21 PROCEDURE — 36415 COLL VENOUS BLD VENIPUNCTURE: CPT | Performed by: HOSPITALIST

## 2022-06-21 PROCEDURE — 94640 AIRWAY INHALATION TREATMENT: CPT

## 2022-06-21 PROCEDURE — 94640 AIRWAY INHALATION TREATMENT: CPT | Mod: 76

## 2022-06-21 PROCEDURE — 36415 COLL VENOUS BLD VENIPUNCTURE: CPT | Performed by: STUDENT IN AN ORGANIZED HEALTH CARE EDUCATION/TRAINING PROGRAM

## 2022-06-21 PROCEDURE — 250N000013 HC RX MED GY IP 250 OP 250 PS 637: Performed by: HOSPITALIST

## 2022-06-21 PROCEDURE — 80048 BASIC METABOLIC PNL TOTAL CA: CPT | Performed by: STUDENT IN AN ORGANIZED HEALTH CARE EDUCATION/TRAINING PROGRAM

## 2022-06-21 PROCEDURE — 250N000011 HC RX IP 250 OP 636: Performed by: HOSPITALIST

## 2022-06-21 RX ORDER — ONDANSETRON 4 MG/1
4 TABLET, ORALLY DISINTEGRATING ORAL EVERY 6 HOURS PRN
Status: DISCONTINUED | OUTPATIENT
Start: 2022-06-21 | End: 2022-06-22 | Stop reason: HOSPADM

## 2022-06-21 RX ORDER — IPRATROPIUM BROMIDE AND ALBUTEROL SULFATE 2.5; .5 MG/3ML; MG/3ML
3 SOLUTION RESPIRATORY (INHALATION)
Status: DISCONTINUED | OUTPATIENT
Start: 2022-06-21 | End: 2022-06-22 | Stop reason: HOSPADM

## 2022-06-21 RX ORDER — TRAZODONE HYDROCHLORIDE 50 MG/1
50 TABLET, FILM COATED ORAL ONCE
Status: DISCONTINUED | OUTPATIENT
Start: 2022-06-21 | End: 2022-06-22 | Stop reason: HOSPADM

## 2022-06-21 RX ORDER — TORSEMIDE 10 MG/1
10 TABLET ORAL DAILY
Status: DISCONTINUED | OUTPATIENT
Start: 2022-06-21 | End: 2022-06-22 | Stop reason: HOSPADM

## 2022-06-21 RX ORDER — METOPROLOL SUCCINATE 25 MG/1
25 TABLET, EXTENDED RELEASE ORAL 2 TIMES DAILY
Status: DISCONTINUED | OUTPATIENT
Start: 2022-06-21 | End: 2022-06-22 | Stop reason: HOSPADM

## 2022-06-21 RX ORDER — ACETAMINOPHEN 500 MG
500-1000 TABLET ORAL EVERY 6 HOURS PRN
Status: DISCONTINUED | OUTPATIENT
Start: 2022-06-21 | End: 2022-06-22 | Stop reason: HOSPADM

## 2022-06-21 RX ORDER — ATORVASTATIN CALCIUM 20 MG/1
20 TABLET, FILM COATED ORAL DAILY
Status: DISCONTINUED | OUTPATIENT
Start: 2022-06-21 | End: 2022-06-22 | Stop reason: HOSPADM

## 2022-06-21 RX ORDER — POLYETHYLENE GLYCOL 3350 17 G/17G
17 POWDER, FOR SOLUTION ORAL DAILY PRN
Status: ON HOLD | COMMUNITY
End: 2023-01-01

## 2022-06-21 RX ORDER — HYDRALAZINE HYDROCHLORIDE 25 MG/1
25 TABLET, FILM COATED ORAL 3 TIMES DAILY
Status: DISCONTINUED | OUTPATIENT
Start: 2022-06-21 | End: 2022-06-22 | Stop reason: HOSPADM

## 2022-06-21 RX ORDER — OXYBUTYNIN CHLORIDE 5 MG/1
10 TABLET, EXTENDED RELEASE ORAL DAILY
Status: DISCONTINUED | OUTPATIENT
Start: 2022-06-21 | End: 2022-06-22 | Stop reason: HOSPADM

## 2022-06-21 RX ORDER — ENOXAPARIN SODIUM 100 MG/ML
40 INJECTION SUBCUTANEOUS EVERY 24 HOURS
Status: DISCONTINUED | OUTPATIENT
Start: 2022-06-21 | End: 2022-06-22 | Stop reason: HOSPADM

## 2022-06-21 RX ORDER — LEVOTHYROXINE SODIUM 100 UG/1
100 TABLET ORAL
Status: DISCONTINUED | OUTPATIENT
Start: 2022-06-21 | End: 2022-06-22 | Stop reason: HOSPADM

## 2022-06-21 RX ORDER — ONDANSETRON 2 MG/ML
4 INJECTION INTRAMUSCULAR; INTRAVENOUS EVERY 6 HOURS PRN
Status: DISCONTINUED | OUTPATIENT
Start: 2022-06-21 | End: 2022-06-22 | Stop reason: HOSPADM

## 2022-06-21 RX ADMIN — IPRATROPIUM BROMIDE AND ALBUTEROL SULFATE 3 ML: 2.5; .5 SOLUTION RESPIRATORY (INHALATION) at 12:01

## 2022-06-21 RX ADMIN — IPRATROPIUM BROMIDE AND ALBUTEROL SULFATE 3 ML: 2.5; .5 SOLUTION RESPIRATORY (INHALATION) at 16:39

## 2022-06-21 RX ADMIN — IPRATROPIUM BROMIDE AND ALBUTEROL SULFATE 3 ML: 2.5; .5 SOLUTION RESPIRATORY (INHALATION) at 07:47

## 2022-06-21 RX ADMIN — Medication 1 MG: at 03:51

## 2022-06-21 RX ADMIN — IPRATROPIUM BROMIDE AND ALBUTEROL SULFATE 3 ML: 2.5; .5 SOLUTION RESPIRATORY (INHALATION) at 21:04

## 2022-06-21 RX ADMIN — HYDRALAZINE HYDROCHLORIDE 25 MG: 25 TABLET, FILM COATED ORAL at 19:58

## 2022-06-21 RX ADMIN — HYDRALAZINE HYDROCHLORIDE 25 MG: 25 TABLET, FILM COATED ORAL at 14:01

## 2022-06-21 RX ADMIN — LEVOTHYROXINE SODIUM 100 MCG: 0.1 TABLET ORAL at 11:48

## 2022-06-21 RX ADMIN — OXYBUTYNIN CHLORIDE 10 MG: 5 TABLET, EXTENDED RELEASE ORAL at 11:48

## 2022-06-21 RX ADMIN — ATORVASTATIN CALCIUM 20 MG: 20 TABLET, FILM COATED ORAL at 11:48

## 2022-06-21 RX ADMIN — METOPROLOL SUCCINATE 25 MG: 25 TABLET, EXTENDED RELEASE ORAL at 19:58

## 2022-06-21 RX ADMIN — ENOXAPARIN SODIUM 40 MG: 40 INJECTION SUBCUTANEOUS at 13:58

## 2022-06-21 ASSESSMENT — ACTIVITIES OF DAILY LIVING (ADL): DEPENDENT_IADLS:: MEAL PREPARATION

## 2022-06-21 NOTE — ED NOTES
RN noticed pts HR and O2 were low. Rn checked on pt and repositioned sensor, pts vitals stable. Pt requested sleep aid, RN administered PRN melatonin. Pt given a warm blanket and repositioned for comfort.
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
## 2022-06-21 NOTE — PROGRESS NOTES
Occupational Therapy Discharge Summary    Reason for therapy discharge:    All goals and outcomes met, no further needs identified.    Progress towards therapy goal(s). See goals on Care Plan in [facility] electronic health record for goal details.  Goals met    Therapy recommendation(s):    Continued therapy is recommended.  Rationale/Recommendations:  pt with limited activity tolerance but able to complete self care and ADL this date as PLOF. rec discharge to home with with HH OT as taxing effort required in order to leave the home. rec continued assists for med mgmt, grocery shopping, rec additional resources from social work for cleaning and eventual transition to [facility]. rec call light/fall pendant info also for home safety.

## 2022-06-21 NOTE — PHARMACY-ADMISSION MEDICATION HISTORY
"Admission medication history interview status for this patient is complete. See [facility] admission navigator for allergy information, prior to admission medications and immunization status.     Medication history interview done, indicate source(s): Patient  Medication history resources (including written lists, pill bottles, clinic record):Kristina, Care Everywhere  Pharmacy: [facility] for routine fills    Changes made to PTA medication list:  Added: miralax, preservision  Changed: tylenol 8 hour PRN --> tylenol arthritis CR daily, added frequency to levothyroxine dose, added dose/directions to trazodone  Reported as Not Taking: none  Removed: benzonatate 100 mg TID (no fill history, , added to PTA med list , patient confirmed no longer taking), combivent respimat 1 puff 4x daily (Rx from 2018, , patient confirmed only using neb), prednisone 20 mg tablet \"short term for mosquito bites/no dose\" -  Rx, not taking per patient    Actions taken by pharmacist (provider contacted, etc):None     Additional medication history information:Patient is a reliable historian. Recently outdated fill for famotidine 20 mg 22 #90ds #180, patient states not taking, not added to PTA med list. Patient states taking \"several kinds\" of PRN bowel medications, but only confirmed miralax in interview. States sometimes she'll take prune juice instead, denies use of Metamucil or suggested tablets but did not specify what alternates she does use.     Medication reconciliation/reorder completed by provider prior to medication history?  Y   (Y/N)     Prior to Admission medications    Medication Sig Last Dose Taking? Auth Provider Long Term End Date   Acetaminophen (TYLENOL 8 HOUR ARTHRITIS PAIN PO) Take 1 tablet by mouth daily 2022 at AM Yes Unknown, Entered By History     acetaminophen (TYLENOL) 500 MG tablet Take 500-1,000 mg by mouth every 6 hours as needed for mild pain 2022 at PM Yes Reported, " Patient     atorvastatin (LIPITOR) 20 MG tablet Take 1 tablet (20 mg) by mouth daily 6/20/2022 at AM Yes Tyler Machado MD Yes    hydrALAZINE (APRESOLINE) 25 MG tablet One pill three times a day 6/20/2022 at AM Yes Tyler Machado MD Yes    ipratropium - albuterol 0.5 mg/2.5 mg/3 mL (DUONEB) 0.5-2.5 (3) MG/3ML neb solution Take 1 vial (3 mLs) by nebulization every 6 hours as needed for shortness of breath / dyspnea or wheezing Past Month at Unknown time Yes Valeriano Beck MD Yes    levothyroxine (SYNTHROID/LEVOTHROID) 100 MCG tablet Take 100 mcg by mouth daily 6/20/2022 at AM Yes Reported, Patient No    metoprolol succinate ER (TOPROL-XL) 25 MG 24 hr tablet Take 1 tablet (25 mg) by mouth 2 times daily 6/19/2022 at PM Yes Gaurav Barrios, NP Yes    Multiple Vitamins-Minerals (PRESERVISION AREDS PO) Take 1 tablet by mouth 2 times daily 6/19/2022 at PM Yes Unknown, Entered By History     oxybutynin (DITROPAN-XL) 10 MG 24 hr tablet Take 10 mg by mouth daily 6/20/2022 at AM Yes Reported, Patient No    polyethylene glycol (MIRALAX) 17 GM/Dose powder Take 17 g by mouth daily as needed for constipation Past Month at 2 weeks ago Yes Unknown, Entered By History     potassium chloride ER (K-DUR/KLOR-CON M) 20 MEQ CR tablet Take 1 tablet (20 mEq) by mouth daily 6/20/2022 at AM Yes Hilda Lambert, APRN CNP     torsemide (DEMADEX) 10 MG tablet Take 1 tablet (10 mg) by mouth daily 6/20/2022 at AM Yes Tyler Machado MD Yes    traZODone (DESYREL) 50 MG tablet Take 50 mg by mouth At Bedtime 6/19/2022 at PM Yes Reported, Patient Yes        Kelsie Watson, PharmD
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
## 2022-06-21 NOTE — PLAN OF CARE
ROOM # 204-2    Living Situation (if not independent, order SW consult): HOME  Facility name:  : [facility]    Activity level at baseline: INDEPENDENT with walker   Activity level on admit: SBA with walker     Who will be transporting you at discharge: UNKNOWN AT THIS TIME     Patient registered to observation; given Patient Bill of Rights; given the opportunity to ask questions about observation status and their plan of care. Patient has been oriented to the observation room, bathroom and call light is in place. Discussed discharge goals and expectations with patient/family.

## 2022-06-21 NOTE — PLAN OF CARE
PRIMARY DIAGNOSIS: SHORTNESS OF BREATH  OUTPATIENT/OBSERVATION GOALS TO BE MET BEFORE DISCHARGE:  ADLs back to baseline: Yes    Activity and level of assistance: Up with 1 assist    Pain status: Pain free.    Return to near baseline physical activity: Yes     Discharge Planner Nurse   Safe discharge environment identified: No  Barriers to discharge: Yes       Entered by: Farida Perez RN 06/21/2022 1:42 PM     Please review provider order for any additional goals. Nurse to notify provider when observation goals have been met and patient is ready for discharge.    Patient up with 1 assist to the bathroom. 2 liters of oxygen via nasal cannula - will attempt to wean. Left IV site saline locked. Patient reports sadness and feeling down lately, because her  passed away in 2021. Goal is to discharge to a TCU.     *Patient's valuables are in security, rest of her belongings are in her closet. Please ask this writer or the patient for the four digit code*

## 2022-06-21 NOTE — PROGRESS NOTES
[facility]    Medicine Progress Note - Hospitalist Service    Date of Admission:  2022    Assessment & Plan         Giselle Chino is a 86 year old f w/PMH significant for moderate aortic stenosis, aortic root dilation, COPD, hypertension, hyperlipidemia who presented to [facility] with acute shortness of breath.  She received DuoNeb's with improvement, but upon evaluation in the emergency department there were concerns about her ability to care for herself at home.  She has been registered to observation for potential placement.    Generalized Weakness  -Patient was noted to not be able to ambulate more than about 10 feet without becoming so weak that she needed to sit down.  There was significant concerns about the patient's ability to care for self, as she lives at home alone since her   1 year ago.    -await PT/OT evals, anticipate placement    Acute Dyspnea w/hypoxia, chest pressure  -Presented with dyspnea, which improved with nebs  -No active wheezing. CXR without opacity. BNP WNL and clinically not overloaded appearing. Admission EKG with bradycardia, non ischemic changes unchanged from previous  -is on 2L NC but sat it high 90's so may just be at her baseline and recommend she keep sat 90-94% with her COPD so order placed to wean off  -unclear if poorly controlled HTN contributing, home meds resumed including torsemide, BB.   -trops negative but cont to trend and awaiting ECHO    COPD  -doesn't appear to be in acute exacerbation although her dyspnea apparently initially improved after nebs  -Cont scheduled nebs, no wheezing so no steroids    Moderate Aortic Stenosis  Aortic Root Dilation  -On TTE 3/16/2021. CXR with tortuous aortic root. Patient reports previously being considered for open valve replacement, but was later told she was not a candidate due to her COPD.   -awaiting TTE, Will obtain TTE to see if potentially progressive stenosis may be  leading to her weakness    Hx HTN, DLD  -home meds resumed as above, on statin as well now resumed     Diet: Regular Diet Adult    DVT Prophylaxis: Enoxaparin (Lovenox) SQ  Ulrich Catheter: Not present  Central Lines: None  Cardiac Monitoring: None  Code Status:   full code     The patient's care was discussed with the Bedside Nurse.    Betito Harris DO  Hospitalist Service  [facility]  Securely message with the Vocera Web Console (learn more here)  Text page via [employer] Paging/Directory   ______________________________________________________________________    Interval History   Still feeling sob with a pressure sensation over her chest, not reproducible. On small 2L but satting well    Data reviewed today: I reviewed all medications, new labs and imaging results over the last 24 hours.    Physical Exam   Vital Signs: Temp: 98  F (36.7  C) Temp src: Oral BP: 101/53 Pulse: 75   Resp: 15 SpO2: 97 % O2 Device: Nasal cannula Oxygen Delivery: 2 LPM  Weight: 0 lbs 0 oz  Constitutional: awake, alert, cooperative and frail appearing  Eyes: pupils equal, round and reactive to light and conjunctiva normal  ENT: normocepalic, without obvious abnormality, atramatic  Respiratory: no tachynpnia, good air exchange and decreased breath sounds at bases  Cardiovascular: regular rate and rhythm, no murmur noted and +1 pedal edema bilaterally  GI: normal bowel sounds, soft and non-distended  Skin: no bruising or bleeding  Neurologic: alert, oriented x4, no focal deficits but generally weak    Data   Recent Labs   Lab 06/21/22  0643 06/20/22  1837   WBC 7.5 6.4   HGB 11.2* 11.5*   MCV 89 89    191    138   POTASSIUM 4.0 4.2   CHLORIDE 106 105   CO2 30 29   BUN 12 14   CR 0.72 0.65   ANIONGAP 3 4   KRISTEL 10.2* 9.5   * 85     Recent Results (from the past 24 hour(s))   XR Chest 2 Views    Narrative    EXAM: XR CHEST 2 VW  LOCATION: [facility]  DATE/TIME: 6/20/2022 8:00  PM    INDICATION: short of breath  COMPARISON: 2018      Impression    IMPRESSION: Markedly enlarged heart. Tortuous atherosclerotic aorta. Moderate hiatal hernia. No pneumothorax or pleural effusion. No acute osseous abnormality.   Echocardiogram Complete   Result Value    LVEF  55-60%    [facility]    [id number]  [id number]  [id number]  [id number]^NAM^ISABELL     [facility]  Echocardiography Laboratory  [address] [place] [address]     Name: [place] NYE  MRN: [id number]  : 1935  Study Date: 2022 09:14 AM  Age: 87 yrs  Gender: Female  Patient Location: [facility]  Reason For Study: Aortic Valve Disorder  Ordering Physician: ISABELL BROWNING  Performed By: Jacy Corley     BSA: 1.7 m2  Height: 64 in  Weight: 137 lb  HR: 82  BP: 174/66 mmHg  ______________________________________________________________________________  Procedure  Complete Portable Echo Adult.  ______________________________________________________________________________  Interpretation Summary     Moderate valvular aortic stenosis.  There is clearly an error in calculation of the aortic valve area. The mean  gradient is very similar to previously (22 Vs 24 mmHg) and the calculated  aortic valve area at that time was 1.4 cms2. The visual appearance of the  aortic valve is keeping with moderate aortic stenosis.  The ascending aorta is Severely dilated.  The ascending aorta is larger than previously.  Mild aortic root dilatation.  There is moderate (2+) aortic regurgitation.  The study was technically difficult. The study was technically limited.  ______________________________________________________________________________  Left Ventricle  The left ventricle is normal in size. There is mild to moderate concentric  left ventricular hypertrophy. Grade I or early diastolic dysfunction.  Diastolic Doppler findings (E/E' ratio and/or other parameters) suggest left  ventricular filling pressures are indeterminate. The  visual ejection fraction  is 55-60%. Septal motion is consistent with conduction abnormality. Regional  wall motion abnormalities cannot be excluded due to limited visualization.  There was no true apical 4 chamber view as the aorta was present in these  views and so images of the anterolateral wall were seen.     Right Ventricle  The right ventricle is normal size. The right ventricular systolic function is  normal.     Atria  Normal left atrial size. The right atrium is mildly dilated. There is no color  Doppler evidence of an atrial shunt.     Mitral Valve  The mitral valve leaflets appear thickened, but open well. There is moderate  mitral annular calcification. There is trace mitral regurgitation.     Tricuspid Valve  There is trace tricuspid regurgitation.     Aortic Valve  The aortic valve is trileaflet. Thickened aortic valve leaflets. There is  moderate (2+) aortic regurgitation. Moderate valvular aortic stenosis. There  is clearly an error in calculation of the aortic valve area. The mean gradient  is very similar to previously (22 Vs 24 mmHg) and the calculated aortic valve  area at that time was 1.4 cms2. The visual appearance of the aortic valve is  keeping with moderate aortic stenosis. The peak AoV pressure gradient is 43.0  mmHg. The mean AoV pressure gradient is 22.1 mmHg.     Pulmonic Valve  The pulmonic valve is not well visualized. There is no pulmonic valvular  regurgitation. Normal pulmonic valve velocity.     Vessels  Mild aortic root dilatation. The ascending aorta is Severely dilated. IVC  diameter <2.1 cm collapsing >50% with sniff suggests a normal RA pressure of 3  mmHg.     Pericardium  There is no pericardial effusion.     Rhythm  Sinus rhythm was noted. The patient exhibited frequent PACs.  ______________________________________________________________________________  MMode/2D Measurements & Calculations     IVSd: 1.7 cm  LVIDd: 4.4 cm  LVIDs: 3.2 cm  LVPWd: 1.3 cm  FS: 26.4 %  LV  mass(C)d: 276.8 grams  LV mass(C)dI: 166.2 grams/m2  Ao root diam: 4.3 cm  LA dimension: 3.3 cm  asc Aorta Diam: 8.3 cm  LA/Ao: 0.77  LVOT diam: 2.1 cm  LVOT area: 3.6 cm2  LA Volume (BP): 45.7 ml  LA Volume Index (BP): 27.4 ml/m2  RWT: 0.61     Doppler Measurements & Calculations  MV E max martell: 59.6 cm/sec  MV A max martell: 135.6 cm/sec  MV E/A: 0.44  MV max P.8 mmHg  MV mean P.2 mmHg  MV V2 VTI: 25.6 cm  MVA(VTI): 4.6 cm2  MV P1/2t max martell: 88.6 cm/sec  MV P1/2t: 40.6 msec  MVA(P1/2t): 5.4 cm2  MV dec slope: 638.9 cm/sec2  MV dec time: 0.32 sec  Ao V2 max: 324.8 cm/sec  Ao max P.0 mmHg  Ao V2 mean: 211.2 cm/sec  Ao mean P.1 mmHg  Ao V2 VTI: 56.3 cm  GIANNA(I,D): 2.1 cm2  GIANNA(V,D): 1.7 cm2  AI P1/2t: 354.4 msec  LV V1 max P.7 mmHg  LV V1 max: 155.7 cm/sec  LV V1 VTI: 32.9 cm  SV(LVOT): 118.0 ml  SI(LVOT): 70.8 ml/m2  PA acc time: 0.07 sec  AV Martell Ratio (DI): 0.48  GIANNA Index (cm2/m2): 1.3  E/E' avg: 10.1  Lateral E/e': 7.9  Medial E/e': 12.2     ______________________________________________________________________________  Report approved by: Brayan Mckeon 2022 11:56 AM           Medications       atorvastatin  20 mg Oral Daily     enoxaparin ANTICOAGULANT  40 mg Subcutaneous Q24H     hydrALAZINE  25 mg Oral TID     ipratropium - albuterol 0.5 mg/2.5 mg/3 mL  3 mL Nebulization 4x daily     ipratropium - albuterol 0.5 mg/2.5 mg/3 mL  3 mL Nebulization 4x Daily     levothyroxine  100 mcg Oral QAM AC     metoprolol succinate ER  25 mg Oral BID     oxybutynin ER  10 mg Oral Daily     torsemide  10 mg Oral Daily     traZODone  50 mg Oral Once
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
## 2022-06-21 NOTE — PROGRESS NOTES
06/21/22 1523   Quick Adds   Type of Visit Initial Occupational Therapy Evaluation   Living Environment   People in Home alone   Current Living Arrangements house   Living Environment Comments split level house. walk in shower and tub shower with seat   Self-Care   Usual Activity Tolerance moderate   Current Activity Tolerance fair   Equipment Currently Used at Home walker, rolling;shower chair;grab bar, toilet;grab bar, tub/shower   Fall history within last six months no   Activity/Exercise/Self-Care Comment uses FWW for mobility. shower chair for bathing. mod I for extended time and rest breaks with all activity and self care   Instrumental Activities of Daily Living (IADL)   IADL Comments pt reports she does not drive. she has family that bring her to the grocery store, pharmacy to  medications. reports her daughter showed her how to properly organize meds in pill box. has Meals on Wheels. otherwise indp   General Information   Onset of Illness/Injury or Date of Surgery 06/20/22   Referring Physician Marlon Brito MD   Patient/Family Therapy Goal Statement (OT) to go home   Additional Occupational Profile Info/Pertinent History of Current Problem Giselle Chino is a 86 year old f w/PMH significant for moderate aortic stenosis, aortic root dilation, COPD, hypertension, hyperlipidemia who presented to [facility] with acute shortness of breath.   General Observations and Info agreeable to OT   Cognitive Status Examination   Orientation Status orientation to person, place and time   Cognitive Status Comments no deficits identified. aware of deficits and limited tolerance. able to state names of medications and routines   Sensory   Sensory Quick Adds No deficits were identified   Pain Assessment   Patient Currently in Pain No   Integumentary/Edema   Integumentary/Edema Comments WFL   Posture   Posture Comments WFL   Range of Motion Comprehensive   Comment, General Range of Motion WFL   Strength  Comprehensive (MMT)   Comment, General Manual Muscle Testing (MMT) Assessment WFL   Bed Mobility   Bed Mobility No deficits identified   Activities of Daily Living   BADL Assessment/Intervention upper body dressing;lower body dressing;toileting   Upper Body Dressing Assessment/Training   Hansen Level (Upper Body Dressing) independent   Lower Body Dressing Assessment/Training   Hansen Level (Lower Body Dressing) modified independence   Toileting   Comment, (Toileting) extended time, use of grab bar   Hansen Level (Toileting) modified independence   Clinical Impression   Criteria for Skilled Therapeutic Interventions Met (OT) Yes, treatment indicated   OT Diagnosis decreased tolerance for ADL   OT Problem List-Impairments impacting ADL problems related to;activity tolerance impaired   Assessment of Occupational Performance 1-3 Performance Deficits   Identified Performance Deficits bathing, dressing, IADL   Planned Therapy Interventions (OT) ADL retraining;IADL retraining   Clinical Decision Making Complexity (OT) low complexity   Risk & Benefits of therapy have been explained evaluation/treatment results reviewed;care plan/treatment goals reviewed;risks/benefits reviewed;current/potential barriers reviewed;participants voiced agreement with care plan;participants included;patient   Clinical Impression Comments decreased function in ADL warrants skilled IP OT tx   OT Discharge Planning   OT Discharge Recommendation (DC Rec) home with home care occupational therapy   OT Rationale for DC Rec education necessary for pt safe discharge, intervention warranted. pt with limited activity tolerance but able to complete self care and ADL this date as PLOF. rec discharge to home with with  OT as taxing effort required in order to leave the home. rec continued assists for med mgmt, grocery shopping, rec additional resources from social work for cleaning and eventual transition to [facility]. rec call light/fall  pendant info also for home safety   OT Brief overview of current status able to complete ADL/self care with extended time and largly mod I. limited activity tolerance   Therapy Certification   Start of Care Date 06/21/22   Certification date from 06/21/22   Certification date to 06/21/22   Medical Diagnosis shortness of breath   Total Evaluation Time (Minutes)   Total Evaluation Time (Minutes) 15   OT Goals   Therapy Frequency (OT) One time eval and treatment   OT Predicted Duration/Target Date for Goal Attainment 06/21/22   OT Goals OT Goal 1   OT: Goal 1 Pt will verbalize 3 EC/safety techniques for home discharge

## 2022-06-21 NOTE — PROGRESS NOTES
[facility]      OUTPATIENT OCCUPATIONAL THERAPY  EVALUATION  PLAN OF TREATMENT FOR OUTPATIENT REHABILITATION  (COMPLETE FOR INITIAL CLAIMS ONLY)  Patient's Last Name, First Name, M.I.  YOB: 1935  Giselle Chino                          Provider's Name  [facility] Medical Record No.  [id number]                               Onset Date:  06/20/22   Start of Care Date:  06/21/22     Type:     ___PT   _X_OT   ___SLP Medical Diagnosis:  shortness of breath                        OT Diagnosis:  decreased tolerance for ADL   Visits from SOC:  1   _________________________________________________________________________________  Plan of Treatment/Functional Goals    Planned Interventions: ADL retraining, IADL retraining   Goals: See Occupational Therapy Goals on Care Plan in [employer] electronic health record.    Therapy Frequency: One time eval and treatment  Predicted Duration of Therapy Intervention: 06/21/22  _________________________________________________________________________________    I CERTIFY THE NEED FOR THESE SERVICES FURNISHED UNDER        THIS PLAN OF TREATMENT AND WHILE UNDER MY CARE     (Physician co-signature of this document indicates review and certification of the therapy plan).              Certification date from: 06/21/22, Certification date to: 06/21/22    Referring Physician: Marlon Brito MD            Initial Assessment        See Occupational Therapy evaluation dated 06/21/22 in Epic electronic health record.

## 2022-06-21 NOTE — PROGRESS NOTES
PRIMARY DIAGNOSIS: GENERALIZED WEAKNESS/SHORTNESS OF BREATH    OUTPATIENT/OBSERVATION GOALS TO BE MET BEFORE DISCHARGE  1. Orthostatic performed: N/A    2. Tolerating PO medications: Yes    3. Return to near baseline physical activity: No    4. Cleared for discharge by consultants (if involved): No    Discharge Planner Nurse   Safe discharge environment identified: No  Barriers to discharge: Yes       Entered by: Sabrina Balderas RN 06/21/2022 6:55 AM   Pt A&O x 4. VSS except BP elevated. Breathing improved with neb. Melatonin given for sleep. On regular diet  Please review provider order for any additional goals.   Nurse to notify provider when observation goals have been met and patient is ready for discharge.
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
## 2022-06-21 NOTE — PLAN OF CARE
[facility]      OUTPATIENT PHYSICAL THERAPY EVALUATION  PLAN OF TREATMENT FOR OUTPATIENT REHABILITATION  (COMPLETE FOR INITIAL CLAIMS ONLY)  Patient's Last Name, First Name, M.I.  YOB: 1935  Giselle Chino                        Provider's Name  [facility] Medical Record No.  [id number]                               Onset Date:  06/20/22   Start of Care Date:      6/21/22   Type:     _X_PT   ___OT   ___SLP Medical Diagnosis:                        Shortness of breath, decreased ability to care for self   PT Diagnosis:  Impaired functional mobility   Visits from SOC:  1   _________________________________________________________________________________  Plan of Treatment/Functional Goals    Planned Interventions:       Goals: See Physical Therapy Goals on Care Plan in [facility] electronic health record.    Therapy Frequency:    Predicted Duration of Therapy Intervention:    _________________________________________________________________________________    I CERTIFY THE NEED FOR THESE SERVICES FURNISHED UNDER        THIS PLAN OF TREATMENT AND WHILE UNDER MY CARE     (Physician co-signature of this document indicates review and certification of the therapy plan).                 ,      Referring Physician: Marlon Brito MD            Initial Assessment        See Physical Therapy evaluation dated   in Epic electronic health record.

## 2022-06-21 NOTE — PROGRESS NOTES
Care Management Follow Up    Length of Stay (days): 0    Expected Discharge Date: 06/22/2022     Concerns to be Addressed: all concerns addressed in this encounter     Patient plan of care discussed at interdisciplinary rounds: yes    Anticipated Discharge Disposition: Home Care     Anticipated Discharge Services: None  Anticipated Discharge DME: None    Patient/family educated on Medicare website which has current facility and service quality ratings: no  Education Provided on the Discharge Plan:    Patient/Family in Agreement with the Plan: yes        Additional Information:  SW met with patient to discuss discharge recommendations. Patient is agreeable to home PT, OT, & a HHA at discharge. She declined the offer for a RN or SW. SW discussed home health choices & patient was agreeable to a referral being made to [facility]. SW faxed referral on this date & spoke with [facility] liaison who voiced they can review the referral & if unable to accept can make additional referrals for home health.     Patient shared she may have family that would be able to give her a ride home or she may need a cab ride arranged. She is unsure at this time.     SW provided information on the Senior Linkage Line & Senior Housing Directory which has information on assisted livings & adult day programs. Patient voiced appreciation for information & denied any further needs at this time.       JANIE Mejias    [facility] updated they are able to accept the referral for home PT, OT, & a HHA.     JANIE Mejias  [facility]  6/21/2022  4:05 PM

## 2022-06-22 VITALS
SYSTOLIC BLOOD PRESSURE: 115 MMHG | TEMPERATURE: 97 F | WEIGHT: 139.4 LBS | OXYGEN SATURATION: 94 % | HEART RATE: 74 BPM | RESPIRATION RATE: 18 BRPM | BODY MASS INDEX: 23.93 KG/M2 | DIASTOLIC BLOOD PRESSURE: 58 MMHG

## 2022-06-22 LAB
ANION GAP SERPL CALCULATED.3IONS-SCNC: 4 MMOL/L (ref 3–14)
BUN SERPL-MCNC: 13 MG/DL (ref 7–30)
CALCIUM SERPL-MCNC: 9.4 MG/DL (ref 8.5–10.1)
CHLORIDE BLD-SCNC: 107 MMOL/L (ref 94–109)
CO2 SERPL-SCNC: 28 MMOL/L (ref 20–32)
CREAT SERPL-MCNC: 0.68 MG/DL (ref 0.52–1.04)
ERYTHROCYTE [DISTWIDTH] IN BLOOD BY AUTOMATED COUNT: 13.7 % (ref 10–15)
GFR SERPL CREATININE-BSD FRML MDRD: 85 ML/MIN/1.73M2
GLUCOSE BLD-MCNC: 95 MG/DL (ref 70–99)
HCT VFR BLD AUTO: 33.1 % (ref 35–47)
HGB BLD-MCNC: 10.2 G/DL (ref 11.7–15.7)
MCH RBC QN AUTO: 27.1 PG (ref 26.5–33)
MCHC RBC AUTO-ENTMCNC: 30.8 G/DL (ref 31.5–36.5)
MCV RBC AUTO: 88 FL (ref 78–100)
PLATELET # BLD AUTO: 188 10E3/UL (ref 150–450)
POTASSIUM BLD-SCNC: 3.8 MMOL/L (ref 3.4–5.3)
RBC # BLD AUTO: 3.76 10E6/UL (ref 3.8–5.2)
SODIUM SERPL-SCNC: 139 MMOL/L (ref 133–144)
TROPONIN I SERPL HS-MCNC: 17 NG/L
TROPONIN I SERPL HS-MCNC: 17 NG/L
WBC # BLD AUTO: 5.6 10E3/UL (ref 4–11)

## 2022-06-22 PROCEDURE — 99217 PR OBSERVATION CARE DISCHARGE: CPT | Performed by: NURSE PRACTITIONER

## 2022-06-22 PROCEDURE — 94640 AIRWAY INHALATION TREATMENT: CPT | Mod: 76

## 2022-06-22 PROCEDURE — 250N000013 HC RX MED GY IP 250 OP 250 PS 637: Performed by: HOSPITALIST

## 2022-06-22 PROCEDURE — 84484 ASSAY OF TROPONIN QUANT: CPT | Mod: 91 | Performed by: HOSPITALIST

## 2022-06-22 PROCEDURE — 36415 COLL VENOUS BLD VENIPUNCTURE: CPT | Performed by: HOSPITALIST

## 2022-06-22 PROCEDURE — 85027 COMPLETE CBC AUTOMATED: CPT | Performed by: HOSPITALIST

## 2022-06-22 PROCEDURE — 250N000009 HC RX 250: Performed by: STUDENT IN AN ORGANIZED HEALTH CARE EDUCATION/TRAINING PROGRAM

## 2022-06-22 PROCEDURE — G0378 HOSPITAL OBSERVATION PER HR: HCPCS

## 2022-06-22 PROCEDURE — 999N000157 HC STATISTIC RCP TIME EA 10 MIN

## 2022-06-22 PROCEDURE — 80048 BASIC METABOLIC PNL TOTAL CA: CPT | Performed by: HOSPITALIST

## 2022-06-22 RX ORDER — HYDRALAZINE HYDROCHLORIDE 25 MG/1
TABLET, FILM COATED ORAL
Qty: 360 TABLET | Refills: 2 | Status: ON HOLD | OUTPATIENT
Start: 2022-06-22 | End: 2023-01-29

## 2022-06-22 RX ORDER — METOPROLOL SUCCINATE 25 MG/1
25 TABLET, EXTENDED RELEASE ORAL 2 TIMES DAILY
Qty: 180 TABLET | Refills: 3 | Status: SHIPPED | OUTPATIENT
Start: 2022-06-22 | End: 2022-10-07

## 2022-06-22 RX ADMIN — LEVOTHYROXINE SODIUM 100 MCG: 0.1 TABLET ORAL at 08:09

## 2022-06-22 RX ADMIN — IPRATROPIUM BROMIDE AND ALBUTEROL SULFATE 3 ML: 2.5; .5 SOLUTION RESPIRATORY (INHALATION) at 08:56

## 2022-06-22 RX ADMIN — OXYBUTYNIN CHLORIDE 10 MG: 5 TABLET, EXTENDED RELEASE ORAL at 08:09

## 2022-06-22 RX ADMIN — METOPROLOL SUCCINATE 25 MG: 25 TABLET, EXTENDED RELEASE ORAL at 08:09

## 2022-06-22 RX ADMIN — TORSEMIDE 10 MG: 10 TABLET ORAL at 08:09

## 2022-06-22 RX ADMIN — HYDRALAZINE HYDROCHLORIDE 25 MG: 25 TABLET, FILM COATED ORAL at 08:09

## 2022-06-22 RX ADMIN — ATORVASTATIN CALCIUM 20 MG: 20 TABLET, FILM COATED ORAL at 08:10

## 2022-06-22 NOTE — PLAN OF CARE
PRIMARY DIAGNOSIS: SOB/Generalized weakness  OUTPATIENT/OBSERVATION GOALS TO BE MET BEFORE DISCHARGE:  Dyspnea improved and O2 sats >88% on RA or back to baseline O2 levels: Yes   SpO2: 95 %, O2 Device: None (Room air)    Tolerating oral abx or appropriate plans made outpatient infusion:  NA    Vitals signs normal or return to baseline: Yes    Short term supplemental O2 needed with activity at home: No    Tolerate oral intake to maintain hydration: Yes    Return to near baseline physical activity: No    Discharge Planner Nurse   Safe discharge environment identified: Yes  Barriers to discharge: Yes       Entered by: Yvonne Jesus RN 06/22/2022 7:11 AM  Pt AO x4. VSS on RA. LS diminished and coarse, BS active. Pt up with SBA. Pt tolerating regular diet. IV SL. Pt denies pain. Pt slept through most of the night.   BP (!) 155/49 (BP Location: Left arm)   Pulse 59   Temp 97.9  F (36.6  C) (Oral)   Resp 18   Wt 63.2 kg (139 lb 6.4 oz)   LMP  (LMP Unknown)   SpO2 95%   BMI 23.93 kg/m    Please review provider order for any additional goals.   Nurse to notify provider when observation goals have been met and patient is ready for discharge.

## 2022-06-22 NOTE — PLAN OF CARE
PRIMARY DIAGNOSIS: SOB/Generalized weakness  OUTPATIENT/OBSERVATION GOALS TO BE MET BEFORE DISCHARGE:  Dyspnea improved and O2 sats >88% on RA or back to baseline O2 levels: Yes   SpO2: 93 %, O2 Device: None (Room air)    Tolerating oral abx or appropriate plans made outpatient infusion:  N/A    Vitals signs normal or return to baseline: Yes    Short term supplemental O2 needed with activity at home: No    Tolerate oral intake to maintain hydration: Yes    Return to near baseline physical activity: Yes    Discharge Planner Nurse   Safe discharge environment identified: Yes  Barriers to discharge: No       Entered by: An Duenas RN      A&O x 4. SBA. Lives alone. PT/OT signed off. Pt to discharge with home PT/OT/HHA. Pt thinks family could provide ride home and or may need a cab ride set up. Pt currently resting and watching TV.  Please review provider order for any additional goals.   Nurse to notify provider when observation goals have been met and patient is ready for discharge.

## 2022-06-22 NOTE — PLAN OF CARE
PRIMARY DIAGNOSIS: GENERALIZED WEAKNESS/SOB     OUTPATIENT/OBSERVATION GOALS TO BE MET BEFORE DISCHARGE  1. Orthostatic performed: No    2. Tolerating PO medications: Yes    3. Return to near baseline physical activity: Yes    4. Cleared for discharge by consultants (if involved): Yes    Discharge Planner Nurse   Safe discharge environment identified: Yes  Barriers to discharge: No - hopeful for discharge today. Pt interested in going home. MD and HUNTER notified. Setting up home health       Entered by: Ramin Downing 06/22/2022 9:33 AM     Please review provider order for any additional goals.   Nurse to notify provider when observation goals have been met and patient is ready for discharge.    Blood pressure (!) 140/60, pulse 63, temperature 97.9  F (36.6  C), temperature source Oral, resp. rate 16, weight 63.2 kg (139 lb 6.4 oz), SpO2 96 %, not currently breastfeeding.

## 2022-06-22 NOTE — DISCHARGE INSTRUCTIONS
Your home care referral was sent to [facility]  If you haven't heard from them within the next 24-48 hours,  Please call them at [phone]

## 2022-06-22 NOTE — DISCHARGE SUMMARY
[facility]  Hospitalist Discharge Summary      Date of Admission:  2022  Date of Discharge:  2022  Discharging Provider: IRMA Millan CNP  Discharge Service: Hospitalist Service    Discharge Diagnoses:  Generalized weakness  Acute dyspnea with transient hypoxia/chest pressure  COPD  Moderate aortic stenosis   Aortic root dilation  HTN  HLD      Follow-ups Needed After Discharge       Unresulted Labs Ordered in the Past 30 Days of this Admission     No orders found from 2022 to 2022.      These results will be followed up by NA    Discharge Disposition   Admited to home care:   Agency: [facility]  Discharged to home  Condition at discharge: Stable      Hospital Course   Giselle Chino is a 86 year old f w/PMH significant for moderate aortic stenosis, aortic root dilation, COPD, hypertension, hyperlipidemia who presented to [facility] with acute shortness of breath.  She received DuoNeb's with improvement, but upon evaluation in the emergency department there were concerns about her ability to care for herself at home.  She has been registered to observation for potential placement.     Generalized Weakness  -Patient was noted to not be able to ambulate more than about 10 feet without becoming so weak that she needed to sit down.  There was significant concerns about the patient's ability to care for self, as she lives at home alone since her   1 year ago.  She was seen by therapy and opted to be discharged to home with [facility].     Acute Dyspnea w/hypoxia, chest pressure  -Presented with dyspnea, which improved with nebs  -No active wheezing. CXR without opacity. BNP WNL and clinically not overloaded appearing. Admission EKG with bradycardia, non ischemic changes unchanged from previous  -Weaned oxygen off.  Her home medications including torsemide and BB. Troponins were detectable but normal.   -unclear if poorly controlled HTN contributing,  home meds resumed including torsemide, BB. Repeat echo similar to previous.  Known aortic stenosis and ascending aorta noted to be larger than previous.  We discussed referral back to Cardiology for consideration of TAVR.  No medication dose changes.      COPD doesn't appear to be in acute exacerbation although her dyspnea apparently initially improved after nebs  -Cont scheduled nebs, no wheezing so no steroids     Moderate Aortic Stenosis  Aortic Root Dilation  -On TTE 3/16/2021. CXR with tortuous aortic root. Patient reports previously being considered for open valve replacement, but was later told she was not a candidate due to her COPD. Discussed repeat evaluation for possible TAVR.       Hx HTN, DLD stable -- home medications were resumed.  No medication dosage changes.     Consultations This Hospital Stay   PHYSICAL THERAPY ADULT IP CONSULT  OCCUPATIONAL THERAPY ADULT IP CONSULT  CARE MANAGEMENT / SOCIAL WORK IP CONSULT    Code Status   Prior    Time Spent on this Encounter   I, IRMA Millan CNP, personally saw the patient today and spent greater than 30 minutes discharging this patient.       IRMA Millan CNP  [facility] OBSERVATION DEPT  [address] [place] [address]  Phone: [phone]  ______________________________________________________________________    Physical Exam   Vital Signs:                    Weight: 139 lbs 6.4 oz  Constitutional: awake, alert, cooperative and frail appearing  Eyes: pupils equal, round and reactive to light and conjunctiva normal  ENT: normocepalic, without obvious abnormality, atramatic  Respiratory: no tachynpnia, good air exchange and decreased breath sounds at bases  Cardiovascular: regular rate and rhythm, no murmur noted and +1 pedal edema bilaterally  GI: normal bowel sounds, soft and non-distended  Skin: no bruising or bleeding  Neurologic: alert, oriented x4, no focal deficits but generally weak          Primary  Care Physician   Shoaib Mills    Discharge Orders      Home Care Referral      Follow-Up with Cardiology ADA Heart Failure Discharge      Reason for your hospital stay    Generalized weakness  Dyspnea (shortness of breath) and hypoxia (low oxygen saturation) in the setting of known aortic stenosis.   COPD  Moderate aortic stenosis and aortic root dilation  High blood pressure and elevated lipids     Follow-up and recommended labs and tests     Follow up with primary care provider, Shoaib Mills, within 7 days for hospital follow- up.  The following labs/tests are recommended: BMP.     Activity    Your activity upon discharge: activity as tolerated     Monitor and record    Weigh yourself every morning  Take your blood pressure/pulse daily and record.  Bring your list of weights and blood pressure/pulse recordings with you to clinic at time of follow up.     When to contact your care team    Contact your care team If symptoms get worse, If increased shortness of breath, If waking up at night with difficulty breathing, If unable to lie down for sleep due to symptoms, If weight gain of 2 pounds a day for 2 days in a row OR 5 pounds in 1 week., Increased swelling in your ankles or legs, and Dizziness or lightheadedness     Discharge Follow Up - with Primary Care clinic within 3-5 days (RN to schedule prior to d/c for HE/[facility] primary care     Add follow up information to the AVS prior to printing     Diet    Follow this diet upon discharge: Orders Placed This Encounter      Regular Diet Adult.       Significant Results and Procedures   Most Recent 3 CBC's:Recent Labs   Lab Test 06/22/22  0553 06/21/22  0643 06/20/22  1837   WBC 5.6 7.5 6.4   HGB 10.2* 11.2* 11.5*   MCV 88 89 89    186 191     Most Recent 3 BMP's:Recent Labs   Lab Test 06/22/22  0553 06/21/22  0643 06/20/22  1837    139 138   POTASSIUM 3.8 4.0 4.2   CHLORIDE 107 106 105   CO2 28 30 29   BUN 13 12 14   CR 0.68 0.72 0.65   ANIONGAP  4 3 4   KRISTEL 9.4 10.2* 9.5   GLC 95 100* 85     Most Recent 3 BNP's:Recent Labs   Lab Test 22  1837 17  1025   NTBNPI 962 486     Most Recent D-dimer:No lab results found.  Most Recent Anemia Panel:Recent Labs   Lab Test 22  0553 18  1349 07/10/18  1000   WBC 5.6   < > 11.2*   HGB 10.2*   < > 13.9   HCT 33.1*   < > 42.7   MCV 88   < > 93      < > 237   B12  --   --  581    < > = values in this interval not displayed.     Troponin I (HS) -- 13-17  Results for orders placed or performed during the hospital encounter of 22   XR Chest 2 Views    Narrative    EXAM: XR CHEST 2 VW  LOCATION: [facility]  DATE/TIME: 2022 8:00 PM    INDICATION: short of breath  COMPARISON: 2018      Impression    IMPRESSION: Markedly enlarged heart. Tortuous atherosclerotic aorta. Moderate hiatal hernia. No pneumothorax or pleural effusion. No acute osseous abnormality.   Echocardiogram Complete     Value    LVEF  55-60%    Narrative    [id number]  [id number]  [id number]  [id number]^NAM^ISABELL     [facility]  Echocardiography Laboratory  [address] [place] [address]     Name: INDIANA NYE  MRN: [id number]  : 1935  Study Date: 2022 09:14 AM  Age: 87 yrs  Gender: Female  Patient Location: [facility]  Reason For Study: Aortic Valve Disorder  Ordering Physician: ISABELL BROWNING  Performed By: Jacy Corley     BSA: 1.7 m2  Height: 64 in  Weight: 137 lb  HR: 82  BP: 174/66 mmHg  ______________________________________________________________________________  Procedure  Complete Portable Echo Adult.  ______________________________________________________________________________  Interpretation Summary     Moderate valvular aortic stenosis.  There is clearly an error in calculation of the aortic valve area. The mean  gradient is very similar to previously (22 Vs 24 mmHg) and the calculated  aortic valve area at that time was 1.4 cms2. The visual  appearance of the  aortic valve is keeping with moderate aortic stenosis.  The ascending aorta is Severely dilated.  The ascending aorta is larger than previously.  Mild aortic root dilatation.  There is moderate (2+) aortic regurgitation.  The study was technically difficult. The study was technically limited.  ______________________________________________________________________________  Left Ventricle  The left ventricle is normal in size. There is mild to moderate concentric  left ventricular hypertrophy. Grade I or early diastolic dysfunction.  Diastolic Doppler findings (E/E' ratio and/or other parameters) suggest left  ventricular filling pressures are indeterminate. The visual ejection fraction  is 55-60%. Septal motion is consistent with conduction abnormality. Regional  wall motion abnormalities cannot be excluded due to limited visualization.  There was no true apical 4 chamber view as the aorta was present in these  views and so images of the anterolateral wall were seen.     Right Ventricle  The right ventricle is normal size. The right ventricular systolic function is  normal.     Atria  Normal left atrial size. The right atrium is mildly dilated. There is no color  Doppler evidence of an atrial shunt.     Mitral Valve  The mitral valve leaflets appear thickened, but open well. There is moderate  mitral annular calcification. There is trace mitral regurgitation.     Tricuspid Valve  There is trace tricuspid regurgitation.     Aortic Valve  The aortic valve is trileaflet. Thickened aortic valve leaflets. There is  moderate (2+) aortic regurgitation. Moderate valvular aortic stenosis. There  is clearly an error in calculation of the aortic valve area. The mean gradient  is very similar to previously (22 Vs 24 mmHg) and the calculated aortic valve  area at that time was 1.4 cms2. The visual appearance of the aortic valve is  keeping with moderate aortic stenosis. The peak AoV pressure gradient is  43.0  mmHg. The mean AoV pressure gradient is 22.1 mmHg.     Pulmonic Valve  The pulmonic valve is not well visualized. There is no pulmonic valvular  regurgitation. Normal pulmonic valve velocity.     Vessels  Mild aortic root dilatation. The ascending aorta is Severely dilated. IVC  diameter <2.1 cm collapsing >50% with sniff suggests a normal RA pressure of 3  mmHg.     Pericardium  There is no pericardial effusion.     Rhythm  Sinus rhythm was noted. The patient exhibited frequent PACs.  ______________________________________________________________________________  MMode/2D Measurements & Calculations     IVSd: 1.7 cm  LVIDd: 4.4 cm  LVIDs: 3.2 cm  LVPWd: 1.3 cm  FS: 26.4 %  LV mass(C)d: 276.8 grams  LV mass(C)dI: 166.2 grams/m2  Ao root diam: 4.3 cm  LA dimension: 3.3 cm  asc Aorta Diam: 8.3 cm  LA/Ao: 0.77  LVOT diam: 2.1 cm  LVOT area: 3.6 cm2  LA Volume (BP): 45.7 ml  LA Volume Index (BP): 27.4 ml/m2  RWT: 0.61     Doppler Measurements & Calculations  MV E max martell: 59.6 cm/sec  MV A max martell: 135.6 cm/sec  MV E/A: 0.44  MV max P.8 mmHg  MV mean P.2 mmHg  MV V2 VTI: 25.6 cm  MVA(VTI): 4.6 cm2  MV P1/2t max martell: 88.6 cm/sec  MV P1/2t: 40.6 msec  MVA(P1/2t): 5.4 cm2  MV dec slope: 638.9 cm/sec2  MV dec time: 0.32 sec  Ao V2 max: 324.8 cm/sec  Ao max P.0 mmHg  Ao V2 mean: 211.2 cm/sec  Ao mean P.1 mmHg  Ao V2 VTI: 56.3 cm  GIANNA(I,D): 2.1 cm2  GIANNA(V,D): 1.7 cm2  AI P1/2t: 354.4 msec  LV V1 max P.7 mmHg  LV V1 max: 155.7 cm/sec  LV V1 VTI: 32.9 cm  SV(LVOT): 118.0 ml  SI(LVOT): 70.8 ml/m2  PA acc time: 0.07 sec  AV Martell Ratio (DI): 0.48  GIANNA Index (cm2/m2): 1.3  E/E' avg: 10.1  Lateral E/e': 7.9  Medial E/e': 12.2     ______________________________________________________________________________  Report approved by: Brayan Mckeon 2022 11:56 AM               Discharge Medications   Discharge Medication List as of 2022 11:38 AM      CONTINUE these medications which have  CHANGED    Details   hydrALAZINE (APRESOLINE) 25 MG tablet One pill three times a day, Disp-360 tablet, R-2, E-PrescribeHold for SBP < 110 and/or HR < 60.      metoprolol succinate ER (TOPROL XL) 25 MG 24 hr tablet Take 1 tablet (25 mg) by mouth 2 times daily, Disp-180 tablet, R-3, E-PrescribeHold for SBP <90 and/or HR < 55.         CONTINUE these medications which have NOT CHANGED    Details   acetaminophen (TYLENOL) 500 MG tablet Take 500-1,000 mg by mouth every 6 hours as needed for mild pain, Historical      atorvastatin (LIPITOR) 20 MG tablet Take 1 tablet (20 mg) by mouth daily, Disp-90 tablet, R-2, E-Prescribe      ipratropium - albuterol 0.5 mg/2.5 mg/3 mL (DUONEB) 0.5-2.5 (3) MG/3ML neb solution Take 1 vial (3 mLs) by nebulization every 6 hours as needed for shortness of breath / dyspnea or wheezing, R-0, Transitional      levothyroxine (SYNTHROID/LEVOTHROID) 100 MCG tablet Take 100 mcg by mouth daily, Historical      Multiple Vitamins-Minerals (PRESERVISION AREDS PO) Take 1 tablet by mouth 2 times daily, Historical      oxybutynin (DITROPAN-XL) 10 MG 24 hr tablet Take 10 mg by mouth daily, Historical      polyethylene glycol (MIRALAX) 17 GM/Dose powder Take 17 g by mouth daily as needed for constipation, Historical      potassium chloride ER (K-DUR/KLOR-CON M) 20 MEQ CR tablet Take 1 tablet (20 mEq) by mouth daily, Disp-90 tablet, R-3, Historical      torsemide (DEMADEX) 10 MG tablet Take 1 tablet (10 mg) by mouth daily, Disp-90 tablet, R-2, E-Prescribe      traZODone (DESYREL) 50 MG tablet Take 50 mg by mouth At Bedtime, Historical         STOP taking these medications       Acetaminophen (TYLENOL 8 HOUR ARTHRITIS PAIN PO) Comments:   Reason for Stopping:             Allergies   Allergies   Allergen Reactions     Amoxicillin Rash     Piperacillin-Tazobactam In D5w Rash

## 2022-06-22 NOTE — PLAN OF CARE
PRIMARY DIAGNOSIS: SOB/Generalized weakness  OUTPATIENT/OBSERVATION GOALS TO BE MET BEFORE DISCHARGE:  Dyspnea improved and O2 sats >88% on RA or back to baseline O2 levels: Yes   SpO2: 95 %, O2 Device: None (Room air)    Tolerating oral abx or appropriate plans made outpatient infusion:  NA    Vitals signs normal or return to baseline: Yes    Short term supplemental O2 needed with activity at home: No    Tolerate oral intake to maintain hydration: Yes    Return to near baseline physical activity: No    Discharge Planner Nurse   Safe discharge environment identified: Yes  Barriers to discharge: Yes       Entered by: Yvonne Jesus RN 06/22/2022 7:06 AM  Pt AO x4. VSS on RA. LS diminished and coarse, BS active. Pt up with A1. Pt tolerating regular diet. IV SL. Pt denies pain. Pt slept through most of the night.   Please review provider order for any additional goals.   Nurse to notify provider when observation goals have been met and patient is ready for discharge.

## 2022-06-22 NOTE — PROGRESS NOTES
Care Management Discharge Note    Discharge Date: 06/22/2022       Discharge Disposition: Home Care    Discharge Services: Home PT, OT, home health aid.    Discharge DME: None    Discharge Transportation: Friend    Private pay costs discussed: Not applicable    Patient/family educated on Medicare website which has current facility and service quality ratings: no    Education Provided on the Discharge Plan:  yes  Persons Notified of Discharge Plans: Pt, homecare, nurse  Patient/Family in Agreement with the Plan: yes    Handoff Referral Completed: No    Additional Information:    Met with pt at bedside to confirm discharge plans. Pt's friend will provide transportation at 1300 and will get picked up out front. Pt will receive homecare services upon discharge with [facility].     JEFFERY Gilbert

## 2022-06-22 NOTE — PLAN OF CARE
Patient's After Visit Summary was reviewed with patient    Patient verbalized understanding of After Visit Summary, recommended follow up and was given an opportunity to ask questions.   Discharge medications sent home with patient/family: YES   Discharged with other: friend picked up pt out front    Blood pressure 115/58, pulse 74, temperature 97  F (36.1  C), temperature source Oral, resp. rate 18, weight 63.2 kg (139 lb 6.4 oz), SpO2 94 %, not currently breastfeeding.    PIV removed. Questions answered. Belongings sent. W/C ride down to front. Home with homecares

## 2022-10-07 DIAGNOSIS — I71.20 THORACIC AORTIC ANEURYSM WITHOUT RUPTURE (H): ICD-10-CM

## 2022-10-07 DIAGNOSIS — E78.5 HYPERLIPIDEMIA LDL GOAL <100: ICD-10-CM

## 2022-10-07 RX ORDER — METOPROLOL SUCCINATE 25 MG/1
25 TABLET, EXTENDED RELEASE ORAL 2 TIMES DAILY
Qty: 180 TABLET | Refills: 0 | Status: SHIPPED | OUTPATIENT
Start: 2022-10-07 | End: 2022-12-14

## 2022-10-07 RX ORDER — ATORVASTATIN CALCIUM 20 MG/1
20 TABLET, FILM COATED ORAL DAILY
Qty: 90 TABLET | Refills: 0 | Status: SHIPPED | OUTPATIENT
Start: 2022-10-07 | End: 2023-02-07

## 2022-10-09 ENCOUNTER — HEALTH MAINTENANCE LETTER (OUTPATIENT)
Age: 87
End: 2022-10-09

## 2022-11-09 DIAGNOSIS — I10 HTN (HYPERTENSION): ICD-10-CM

## 2022-11-09 RX ORDER — TORSEMIDE 10 MG/1
10 TABLET ORAL DAILY
Qty: 90 TABLET | Refills: 0 | Status: SHIPPED | OUTPATIENT
Start: 2022-11-09 | End: 2023-03-31

## 2022-11-11 ENCOUNTER — HOSPITAL ENCOUNTER (OUTPATIENT)
Dept: CARDIOLOGY | Facility: CLINIC | Age: 87
Discharge: HOME | End: 2022-11-11
Attending: INTERNAL MEDICINE | Admitting: INTERNAL MEDICINE
Payer: COMMERCIAL

## 2022-11-11 ENCOUNTER — LAB (OUTPATIENT)
Dept: LAB | Facility: CLINIC | Age: 87
End: 2022-11-11
Payer: COMMERCIAL

## 2022-11-11 DIAGNOSIS — I49.9 CARDIAC ARRHYTHMIA, UNSPECIFIED CARDIAC ARRHYTHMIA TYPE: ICD-10-CM

## 2022-11-11 DIAGNOSIS — I71.20 THORACIC AORTIC ANEURYSM WITHOUT RUPTURE (H): ICD-10-CM

## 2022-11-11 DIAGNOSIS — I35.1 NONRHEUMATIC AORTIC VALVE INSUFFICIENCY: ICD-10-CM

## 2022-11-11 DIAGNOSIS — I95.1 ORTHOSTATIC HYPOTENSION: ICD-10-CM

## 2022-11-11 DIAGNOSIS — I35.0 NONRHEUMATIC AORTIC VALVE STENOSIS: ICD-10-CM

## 2022-11-11 DIAGNOSIS — I10 BENIGN ESSENTIAL HYPERTENSION: ICD-10-CM

## 2022-11-11 LAB
ANION GAP SERPL CALCULATED.3IONS-SCNC: 9 MMOL/L (ref 7–15)
BUN SERPL-MCNC: 20.5 MG/DL (ref 8–23)
CALCIUM SERPL-MCNC: 10.5 MG/DL (ref 8.8–10.2)
CHLORIDE SERPL-SCNC: 101 MMOL/L (ref 98–107)
CHOLEST SERPL-MCNC: 130 MG/DL
CREAT SERPL-MCNC: 0.72 MG/DL (ref 0.51–0.95)
DEPRECATED HCO3 PLAS-SCNC: 30 MMOL/L (ref 22–29)
GFR SERPL CREATININE-BSD FRML MDRD: 81 ML/MIN/1.73M2
GLUCOSE SERPL-MCNC: 104 MG/DL (ref 70–99)
HDLC SERPL-MCNC: 50 MG/DL
LDLC SERPL CALC-MCNC: 53 MG/DL
LVEF ECHO: NORMAL
NONHDLC SERPL-MCNC: 80 MG/DL
POTASSIUM SERPL-SCNC: 4 MMOL/L (ref 3.4–5.3)
SODIUM SERPL-SCNC: 140 MMOL/L (ref 136–145)
TRIGL SERPL-MCNC: 136 MG/DL

## 2022-11-11 PROCEDURE — 80061 LIPID PANEL: CPT | Performed by: INTERNAL MEDICINE

## 2022-11-11 PROCEDURE — 93306 TTE W/DOPPLER COMPLETE: CPT | Mod: 26 | Performed by: INTERNAL MEDICINE

## 2022-11-11 PROCEDURE — 80048 BASIC METABOLIC PNL TOTAL CA: CPT | Performed by: INTERNAL MEDICINE

## 2022-11-11 PROCEDURE — 93306 TTE W/DOPPLER COMPLETE: CPT

## 2022-11-11 PROCEDURE — 36415 COLL VENOUS BLD VENIPUNCTURE: CPT | Performed by: INTERNAL MEDICINE

## 2022-11-18 ENCOUNTER — VIRTUAL VISIT (OUTPATIENT)
Dept: CARDIOLOGY | Facility: CLINIC | Age: 87
End: 2022-11-18
Attending: INTERNAL MEDICINE
Payer: COMMERCIAL

## 2022-11-18 DIAGNOSIS — I10 BENIGN ESSENTIAL HYPERTENSION: ICD-10-CM

## 2022-11-18 DIAGNOSIS — I35.0 NONRHEUMATIC AORTIC VALVE STENOSIS: ICD-10-CM

## 2022-11-18 DIAGNOSIS — I49.9 CARDIAC ARRHYTHMIA, UNSPECIFIED CARDIAC ARRHYTHMIA TYPE: ICD-10-CM

## 2022-11-18 DIAGNOSIS — I35.1 NONRHEUMATIC AORTIC VALVE INSUFFICIENCY: ICD-10-CM

## 2022-11-18 DIAGNOSIS — I95.1 ORTHOSTATIC HYPOTENSION: ICD-10-CM

## 2022-11-18 DIAGNOSIS — I71.20 THORACIC AORTIC ANEURYSM WITHOUT RUPTURE, UNSPECIFIED PART (H): Primary | ICD-10-CM

## 2022-11-18 PROCEDURE — 99215 OFFICE O/P EST HI 40 MIN: CPT | Mod: 95 | Performed by: INTERNAL MEDICINE

## 2022-11-18 NOTE — PROGRESS NOTES
Service Date: 11/18/2022    TELEPHONE VISIT    I had a phone conversation with Giselle Chino.  She was not able to make the office visit.  One of the key things is she is contemplating possible gallbladder surgery with Dr. Saez, and I know that there is grave concern, as there should be.  I am going to briefly outline the visit.  Again, this is a phone visit, so obviously no physical exam.    To reiterate her history, I have been following her for many years.  I still remember when I met her, she was literally catatonic and her son was present.  She had severe psychiatric issues and had stopped all of her medications including her thyroid pills.  It was literally noncommunicative from severe hypothyroid, etc.  Eventually, when I realized what the problem was, Dr. Mills and her doctors got her back up and now she has become a functional person, pleasant to talk to and fully understanding. The problem is she has a massive ascending aortic aneurysm, perhaps the largest I have seen.  We have gone back and forth between many heart surgeons about repairing it and everyone was concerned about her frailty, her ability to recover and such a huge surgery.  This has been going on for years.  Initially, it was about 7 cm.  Her newest echo, if it is accurate, measures 8.5 cm, so it has unfortunately grown over the many years I have known her.  Her newest echo was reviewed today, and again, as I mentioned, it lists her ascending aorta at 8.5 cm, which represents slow steady growth from prior echoes.  The LV ejection fraction is normal.  The RV ejection fraction is normal.  The aortic valve shows moderate AI and moderate AS, mean gradient of 25 mmHg.  There is no pericardial effusion.  She apparently has had a longstanding history of cholelithiasis, but now I understand she is having intermittent pain and the question is could she go to surgery.  My one concern is I do not know what her longevity is.  This is an expanding  aorta and I have no doubt at some point, unfortunately, it is going to rupture, which will be a terminal event.  I have no way to predict when that is going to occur.  I do not know what her total longevity is, but if she is severely symptomatic with the gallstones, one could consider laparoscopic cholecystectomy. As far as I know, she has had no previous surgeries in her abdomen, so scarring will not be a problem. If she were to go to a laparoscopic cholecystectomy, I would suggest that she meet separately with the anesthesiologist first so they understand what they are facing and exquisite attention to blood pressure will be the key.  They would want to continue her beta blocker throughout the case and, if necessary, use IV medications to keep her blood pressure controlled, perhaps shorter acting drug such as Nipride that they can turn on and off and hopefully she will survive.  The aortic valve is abnormal but I do not think necessarily fluids status from aortic valve stenosis and insufficiency will be the bigger problem. I am more concerned about the blood pressure and any strain on her aorta.  We did review her electrolytes and lipids.  They are in order so no changes are there. She had a TSH earlier this year. It was normal at 1.37.  Interestingly, last year for some reason, the TSH was quite high at 30, so I do not know if there was a change in her medications or if she had self-discontinued her medications again.  From a cardiac standpoint, there is nothing I am going to be able to do at this point that I have not already done to change her projected prognosis.  If the anesthesiologist wants to call us.  I will be happy to talk to him or her.      This was a telephone visit from 1:42 to 2 p.m.     Tyler Machado MD    cc:  Shoaib Mills MD  [facility]  [address] [place]  [address]    Avery Saez MD  [facility]  [address] [place] [place]  [address]        Tyler Machado MD        D: 2022   T: 2022   MT: wilman    Name:     INDIANA NYE  MRN:      [id number]        Account:      [id number]   :      1935           Service Date: 2022       Document: [id number]
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
## 2022-12-14 DIAGNOSIS — I71.20 THORACIC AORTIC ANEURYSM WITHOUT RUPTURE (H): ICD-10-CM

## 2022-12-14 RX ORDER — METOPROLOL SUCCINATE 25 MG/1
25 TABLET, EXTENDED RELEASE ORAL 2 TIMES DAILY
Qty: 180 TABLET | Refills: 3 | Status: SHIPPED | OUTPATIENT
Start: 2022-12-14 | End: 2024-01-01

## 2023-01-01 ENCOUNTER — PATIENT OUTREACH (OUTPATIENT)
Dept: CARE COORDINATION | Facility: CLINIC | Age: 88
End: 2023-01-01
Payer: COMMERCIAL

## 2023-01-01 ENCOUNTER — APPOINTMENT (OUTPATIENT)
Dept: PHYSICAL THERAPY | Facility: CLINIC | Age: 88
DRG: 190 | End: 2023-01-01
Payer: COMMERCIAL

## 2023-01-01 ENCOUNTER — APPOINTMENT (OUTPATIENT)
Dept: CARDIOLOGY | Facility: CLINIC | Age: 88
End: 2023-01-01
Attending: EMERGENCY MEDICINE
Payer: COMMERCIAL

## 2023-01-01 ENCOUNTER — APPOINTMENT (OUTPATIENT)
Dept: GENERAL RADIOLOGY | Facility: CLINIC | Age: 88
DRG: 190 | End: 2023-01-01
Attending: EMERGENCY MEDICINE
Payer: COMMERCIAL

## 2023-01-01 ENCOUNTER — DOCUMENTATION ONLY (OUTPATIENT)
Dept: OTHER | Facility: CLINIC | Age: 88
End: 2023-01-01
Payer: COMMERCIAL

## 2023-01-01 ENCOUNTER — MEDICAL CORRESPONDENCE (OUTPATIENT)
Dept: HEALTH INFORMATION MANAGEMENT | Facility: CLINIC | Age: 88
End: 2023-01-01
Payer: COMMERCIAL

## 2023-01-01 ENCOUNTER — APPOINTMENT (OUTPATIENT)
Dept: ULTRASOUND IMAGING | Facility: CLINIC | Age: 88
DRG: 190 | End: 2023-01-01
Attending: EMERGENCY MEDICINE
Payer: COMMERCIAL

## 2023-01-01 ENCOUNTER — HOSPITAL ENCOUNTER (OUTPATIENT)
Facility: CLINIC | Age: 88
Setting detail: OBSERVATION
Discharge: HOME | End: 2023-10-20
Attending: EMERGENCY MEDICINE | Admitting: STUDENT IN AN ORGANIZED HEALTH CARE EDUCATION/TRAINING PROGRAM
Payer: COMMERCIAL

## 2023-01-01 ENCOUNTER — APPOINTMENT (OUTPATIENT)
Dept: PHYSICAL THERAPY | Facility: CLINIC | Age: 88
DRG: 190 | End: 2023-01-01
Attending: INTERNAL MEDICINE
Payer: COMMERCIAL

## 2023-01-01 ENCOUNTER — NURSE TRIAGE (OUTPATIENT)
Dept: NURSING | Facility: CLINIC | Age: 88
End: 2023-01-01
Payer: COMMERCIAL

## 2023-01-01 ENCOUNTER — TELEPHONE (OUTPATIENT)
Dept: EMERGENCY MEDICINE | Facility: CLINIC | Age: 88
End: 2023-01-01
Payer: COMMERCIAL

## 2023-01-01 ENCOUNTER — APPOINTMENT (OUTPATIENT)
Dept: OCCUPATIONAL THERAPY | Facility: CLINIC | Age: 88
DRG: 190 | End: 2023-01-01
Attending: INTERNAL MEDICINE
Payer: COMMERCIAL

## 2023-01-01 ENCOUNTER — APPOINTMENT (OUTPATIENT)
Dept: CT IMAGING | Facility: CLINIC | Age: 88
End: 2023-01-01
Attending: EMERGENCY MEDICINE
Payer: COMMERCIAL

## 2023-01-01 ENCOUNTER — HOSPITAL ENCOUNTER (INPATIENT)
Facility: CLINIC | Age: 88
LOS: 3 days | Discharge: HOME HEALTH | DRG: 190 | End: 2023-11-17
Attending: EMERGENCY MEDICINE | Admitting: INTERNAL MEDICINE
Payer: COMMERCIAL

## 2023-01-01 ENCOUNTER — TELEPHONE (OUTPATIENT)
Dept: CARDIOLOGY | Facility: CLINIC | Age: 88
End: 2023-01-01

## 2023-01-01 ENCOUNTER — APPOINTMENT (OUTPATIENT)
Dept: GENERAL RADIOLOGY | Facility: CLINIC | Age: 88
End: 2023-01-01
Attending: EMERGENCY MEDICINE
Payer: COMMERCIAL

## 2023-01-01 ENCOUNTER — OFFICE VISIT (OUTPATIENT)
Dept: CARDIOLOGY | Facility: CLINIC | Age: 88
End: 2023-01-01
Payer: COMMERCIAL

## 2023-01-01 ENCOUNTER — APPOINTMENT (OUTPATIENT)
Dept: OCCUPATIONAL THERAPY | Facility: CLINIC | Age: 88
DRG: 190 | End: 2023-01-01
Payer: COMMERCIAL

## 2023-01-01 ENCOUNTER — TELEPHONE (OUTPATIENT)
Dept: CARDIOLOGY | Facility: CLINIC | Age: 88
End: 2023-01-01
Payer: COMMERCIAL

## 2023-01-01 VITALS
BODY MASS INDEX: 18.23 KG/M2 | SYSTOLIC BLOOD PRESSURE: 158 MMHG | RESPIRATION RATE: 18 BRPM | WEIGHT: 106.2 LBS | DIASTOLIC BLOOD PRESSURE: 45 MMHG | TEMPERATURE: 97.6 F | OXYGEN SATURATION: 94 % | HEART RATE: 72 BPM

## 2023-01-01 VITALS
DIASTOLIC BLOOD PRESSURE: 60 MMHG | HEART RATE: 66 BPM | SYSTOLIC BLOOD PRESSURE: 150 MMHG | HEIGHT: 64 IN | OXYGEN SATURATION: 95 % | BODY MASS INDEX: 17.86 KG/M2 | WEIGHT: 104.6 LBS

## 2023-01-01 VITALS
HEIGHT: 64 IN | DIASTOLIC BLOOD PRESSURE: 58 MMHG | SYSTOLIC BLOOD PRESSURE: 138 MMHG | WEIGHT: 110 LBS | OXYGEN SATURATION: 98 % | BODY MASS INDEX: 18.78 KG/M2 | HEART RATE: 62 BPM

## 2023-01-01 VITALS
RESPIRATION RATE: 14 BRPM | BODY MASS INDEX: 18.44 KG/M2 | HEART RATE: 89 BPM | DIASTOLIC BLOOD PRESSURE: 58 MMHG | TEMPERATURE: 98 F | OXYGEN SATURATION: 94 % | SYSTOLIC BLOOD PRESSURE: 122 MMHG | WEIGHT: 107.4 LBS

## 2023-01-01 DIAGNOSIS — I35.9 AORTIC VALVE DISORDER: Primary | ICD-10-CM

## 2023-01-01 DIAGNOSIS — I10 BENIGN ESSENTIAL HYPERTENSION: ICD-10-CM

## 2023-01-01 DIAGNOSIS — I21.4 NON-STEMI (NON-ST ELEVATED MYOCARDIAL INFARCTION) (H): ICD-10-CM

## 2023-01-01 DIAGNOSIS — I71.21 ANEURYSM OF ASCENDING AORTA WITHOUT RUPTURE (H): ICD-10-CM

## 2023-01-01 DIAGNOSIS — J96.01 ACUTE RESPIRATORY FAILURE WITH HYPOXIA (H): ICD-10-CM

## 2023-01-01 DIAGNOSIS — I10 ESSENTIAL HYPERTENSION: ICD-10-CM

## 2023-01-01 DIAGNOSIS — R06.00 ACUTE DYSPNEA: ICD-10-CM

## 2023-01-01 DIAGNOSIS — J44.1 CHRONIC OBSTRUCTIVE PULMONARY DISEASE WITH ACUTE EXACERBATION (H): Primary | ICD-10-CM

## 2023-01-01 DIAGNOSIS — I50.32 CHRONIC DIASTOLIC CHF (CONGESTIVE HEART FAILURE) (H): ICD-10-CM

## 2023-01-01 DIAGNOSIS — R00.2 PALPITATIONS: Primary | ICD-10-CM

## 2023-01-01 DIAGNOSIS — J44.1 COPD EXACERBATION (H): ICD-10-CM

## 2023-01-01 DIAGNOSIS — J96.21 ACUTE ON CHRONIC RESPIRATORY FAILURE WITH HYPOXIA (H): ICD-10-CM

## 2023-01-01 DIAGNOSIS — I49.1 PREMATURE ATRIAL COMPLEXES: ICD-10-CM

## 2023-01-01 DIAGNOSIS — I35.1 AORTIC VALVE INSUFFICIENCY, ETIOLOGY OF CARDIAC VALVE DISEASE UNSPECIFIED: ICD-10-CM

## 2023-01-01 DIAGNOSIS — I50.9 ACUTE CONGESTIVE HEART FAILURE, UNSPECIFIED HEART FAILURE TYPE (H): ICD-10-CM

## 2023-01-01 DIAGNOSIS — R06.00 ACUTE DYSPNEA: Primary | ICD-10-CM

## 2023-01-01 DIAGNOSIS — R00.2 PALPITATIONS: ICD-10-CM

## 2023-01-01 DIAGNOSIS — M62.81 GENERALIZED MUSCLE WEAKNESS: ICD-10-CM

## 2023-01-01 DIAGNOSIS — K59.00 CONSTIPATION, UNSPECIFIED CONSTIPATION TYPE: ICD-10-CM

## 2023-01-01 LAB
ABO/RH(D): NORMAL
ALBUMIN SERPL BCG-MCNC: 4.2 G/DL (ref 3.5–5.2)
ALBUMIN SERPL BCG-MCNC: 4.5 G/DL (ref 3.5–5.2)
ALBUMIN UR-MCNC: NEGATIVE MG/DL
ALP SERPL-CCNC: 101 U/L (ref 35–104)
ALP SERPL-CCNC: 107 U/L (ref 35–104)
ALT SERPL W P-5'-P-CCNC: 21 U/L (ref 0–50)
ALT SERPL W P-5'-P-CCNC: 23 U/L (ref 0–50)
ANION GAP SERPL CALCULATED.3IONS-SCNC: 10 MMOL/L (ref 7–15)
ANION GAP SERPL CALCULATED.3IONS-SCNC: 7 MMOL/L (ref 7–15)
ANION GAP SERPL CALCULATED.3IONS-SCNC: 8 MMOL/L (ref 7–15)
ANTIBODY SCREEN: NEGATIVE
APPEARANCE UR: CLEAR
AST SERPL W P-5'-P-CCNC: 16 U/L (ref 0–45)
AST SERPL W P-5'-P-CCNC: 23 U/L (ref 0–45)
ATRIAL RATE - MUSE: 115 BPM
ATRIAL RATE - MUSE: 88 BPM
ATRIAL RATE - MUSE: NORMAL BPM
BACTERIA BLD CULT: NO GROWTH
BACTERIA BLD CULT: NO GROWTH
BASO+EOS+MONOS # BLD AUTO: ABNORMAL 10*3/UL
BASO+EOS+MONOS NFR BLD AUTO: ABNORMAL %
BASOPHILS # BLD AUTO: 0.1 10E3/UL (ref 0–0.2)
BASOPHILS # BLD AUTO: 0.1 10E3/UL (ref 0–0.2)
BASOPHILS NFR BLD AUTO: 1 %
BASOPHILS NFR BLD AUTO: 1 %
BILIRUB SERPL-MCNC: 0.6 MG/DL
BILIRUB SERPL-MCNC: 0.6 MG/DL
BILIRUB UR QL STRIP: NEGATIVE
BUN SERPL-MCNC: 19.2 MG/DL (ref 8–23)
BUN SERPL-MCNC: 20.2 MG/DL (ref 8–23)
BUN SERPL-MCNC: 20.6 MG/DL (ref 8–23)
BUN SERPL-MCNC: 31.1 MG/DL (ref 8–23)
BUN SERPL-MCNC: 40.1 MG/DL (ref 8–23)
CALCIUM SERPL-MCNC: 10 MG/DL (ref 8.8–10.2)
CALCIUM SERPL-MCNC: 10 MG/DL (ref 8.8–10.2)
CALCIUM SERPL-MCNC: 10.3 MG/DL (ref 8.8–10.2)
CALCIUM SERPL-MCNC: 10.4 MG/DL (ref 8.8–10.2)
CALCIUM SERPL-MCNC: 9.9 MG/DL (ref 8.8–10.2)
CHLORIDE SERPL-SCNC: 100 MMOL/L (ref 98–107)
CHLORIDE SERPL-SCNC: 101 MMOL/L (ref 98–107)
CHLORIDE SERPL-SCNC: 98 MMOL/L (ref 98–107)
COLOR UR AUTO: ABNORMAL
CREAT SERPL-MCNC: 0.72 MG/DL (ref 0.51–0.95)
CREAT SERPL-MCNC: 0.72 MG/DL (ref 0.51–0.95)
CREAT SERPL-MCNC: 0.73 MG/DL (ref 0.51–0.95)
CREAT SERPL-MCNC: 0.75 MG/DL (ref 0.51–0.95)
CREAT SERPL-MCNC: 0.9 MG/DL (ref 0.51–0.95)
DEPRECATED HCO3 PLAS-SCNC: 28 MMOL/L (ref 22–29)
DEPRECATED HCO3 PLAS-SCNC: 33 MMOL/L (ref 22–29)
DEPRECATED HCO3 PLAS-SCNC: 34 MMOL/L (ref 22–29)
DEPRECATED HCO3 PLAS-SCNC: 34 MMOL/L (ref 22–29)
DEPRECATED HCO3 PLAS-SCNC: 35 MMOL/L (ref 22–29)
DIASTOLIC BLOOD PRESSURE - MUSE: NORMAL MMHG
EGFRCR SERPLBLD CKD-EPI 2021: 62 ML/MIN/1.73M2
EGFRCR SERPLBLD CKD-EPI 2021: 77 ML/MIN/1.73M2
EGFRCR SERPLBLD CKD-EPI 2021: 80 ML/MIN/1.73M2
EGFRCR SERPLBLD CKD-EPI 2021: 81 ML/MIN/1.73M2
EGFRCR SERPLBLD CKD-EPI 2021: 81 ML/MIN/1.73M2
EOSINOPHIL # BLD AUTO: 0.2 10E3/UL (ref 0–0.7)
EOSINOPHIL # BLD AUTO: 0.2 10E3/UL (ref 0–0.7)
EOSINOPHIL NFR BLD AUTO: 2 %
EOSINOPHIL NFR BLD AUTO: 3 %
ERYTHROCYTE [DISTWIDTH] IN BLOOD BY AUTOMATED COUNT: 13.7 % (ref 10–15)
ERYTHROCYTE [DISTWIDTH] IN BLOOD BY AUTOMATED COUNT: 14 % (ref 10–15)
ERYTHROCYTE [DISTWIDTH] IN BLOOD BY AUTOMATED COUNT: 14.2 % (ref 10–15)
ERYTHROCYTE [DISTWIDTH] IN BLOOD BY AUTOMATED COUNT: 14.6 % (ref 10–15)
FLUAV RNA SPEC QL NAA+PROBE: NEGATIVE
FLUAV RNA SPEC QL NAA+PROBE: NEGATIVE
FLUBV RNA RESP QL NAA+PROBE: NEGATIVE
FLUBV RNA RESP QL NAA+PROBE: NEGATIVE
GLUCOSE SERPL-MCNC: 101 MG/DL (ref 70–99)
GLUCOSE SERPL-MCNC: 119 MG/DL (ref 70–99)
GLUCOSE SERPL-MCNC: 132 MG/DL (ref 70–99)
GLUCOSE SERPL-MCNC: 140 MG/DL (ref 70–99)
GLUCOSE SERPL-MCNC: 150 MG/DL (ref 70–99)
GLUCOSE UR STRIP-MCNC: NEGATIVE MG/DL
HCO3 BLDV-SCNC: 36 MMOL/L (ref 21–28)
HCT VFR BLD AUTO: 35.7 % (ref 35–47)
HCT VFR BLD AUTO: 37.6 % (ref 35–47)
HCT VFR BLD AUTO: 38.6 % (ref 35–47)
HCT VFR BLD AUTO: 40.9 % (ref 35–47)
HGB BLD-MCNC: 10.9 G/DL (ref 11.7–15.7)
HGB BLD-MCNC: 11.6 G/DL (ref 11.7–15.7)
HGB BLD-MCNC: 11.7 G/DL (ref 11.7–15.7)
HGB BLD-MCNC: 12.3 G/DL (ref 11.7–15.7)
HGB UR QL STRIP: NEGATIVE
IMM GRANULOCYTES # BLD: 0 10E3/UL
IMM GRANULOCYTES # BLD: 0 10E3/UL
IMM GRANULOCYTES NFR BLD: 0 %
IMM GRANULOCYTES NFR BLD: 0 %
INTERPRETATION ECG - MUSE: NORMAL
KETONES UR STRIP-MCNC: NEGATIVE MG/DL
LACTATE BLD-SCNC: 0.8 MMOL/L
LEUKOCYTE ESTERASE UR QL STRIP: ABNORMAL
LIPASE SERPL-CCNC: 47 U/L (ref 13–60)
LVEF ECHO: NORMAL
LYMPHOCYTES # BLD AUTO: 1 10E3/UL (ref 0.8–5.3)
LYMPHOCYTES # BLD AUTO: 1.3 10E3/UL (ref 0.8–5.3)
LYMPHOCYTES NFR BLD AUTO: 13 %
LYMPHOCYTES NFR BLD AUTO: 21 %
MAGNESIUM SERPL-MCNC: 2.3 MG/DL (ref 1.7–2.3)
MAGNESIUM SERPL-MCNC: 2.3 MG/DL (ref 1.7–2.3)
MCH RBC QN AUTO: 27.4 PG (ref 26.5–33)
MCH RBC QN AUTO: 27.6 PG (ref 26.5–33)
MCH RBC QN AUTO: 27.7 PG (ref 26.5–33)
MCH RBC QN AUTO: 28.1 PG (ref 26.5–33)
MCHC RBC AUTO-ENTMCNC: 30.1 G/DL (ref 31.5–36.5)
MCHC RBC AUTO-ENTMCNC: 30.1 G/DL (ref 31.5–36.5)
MCHC RBC AUTO-ENTMCNC: 30.5 G/DL (ref 31.5–36.5)
MCHC RBC AUTO-ENTMCNC: 31.1 G/DL (ref 31.5–36.5)
MCV RBC AUTO: 90 FL (ref 78–100)
MCV RBC AUTO: 91 FL (ref 78–100)
MCV RBC AUTO: 91 FL (ref 78–100)
MCV RBC AUTO: 92 FL (ref 78–100)
MONOCYTES # BLD AUTO: 0.6 10E3/UL (ref 0–1.3)
MONOCYTES # BLD AUTO: 0.6 10E3/UL (ref 0–1.3)
MONOCYTES NFR BLD AUTO: 8 %
MONOCYTES NFR BLD AUTO: 9 %
NEUTROPHILS # BLD AUTO: 4.1 10E3/UL (ref 1.6–8.3)
NEUTROPHILS # BLD AUTO: 5.5 10E3/UL (ref 1.6–8.3)
NEUTROPHILS NFR BLD AUTO: 66 %
NEUTROPHILS NFR BLD AUTO: 76 %
NITRATE UR QL: NEGATIVE
NRBC # BLD AUTO: 0 10E3/UL
NRBC # BLD AUTO: 0 10E3/UL
NRBC BLD AUTO-RTO: 0 /100
NRBC BLD AUTO-RTO: 0 /100
NT-PROBNP SERPL-MCNC: 1350 PG/ML (ref 0–1800)
NT-PROBNP SERPL-MCNC: 1946 PG/ML (ref 0–1800)
P AXIS - MUSE: 58 DEGREES
P AXIS - MUSE: NORMAL DEGREES
P AXIS - MUSE: NORMAL DEGREES
PCO2 BLDV: 62 MM HG (ref 40–50)
PH BLDV: 7.37 [PH] (ref 7.32–7.43)
PH UR STRIP: 7 [PH] (ref 5–7)
PLATELET # BLD AUTO: 203 10E3/UL (ref 150–450)
PLATELET # BLD AUTO: 204 10E3/UL (ref 150–450)
PLATELET # BLD AUTO: 228 10E3/UL (ref 150–450)
PLATELET # BLD AUTO: 230 10E3/UL (ref 150–450)
PO2 BLDV: 14 MM HG (ref 25–47)
POTASSIUM SERPL-SCNC: 3.5 MMOL/L (ref 3.4–5.3)
POTASSIUM SERPL-SCNC: 3.7 MMOL/L (ref 3.4–5.3)
POTASSIUM SERPL-SCNC: 3.9 MMOL/L (ref 3.4–5.3)
POTASSIUM SERPL-SCNC: 4.1 MMOL/L (ref 3.4–5.3)
POTASSIUM SERPL-SCNC: 4.3 MMOL/L (ref 3.4–5.3)
PR INTERVAL - MUSE: 168 MS
PR INTERVAL - MUSE: 202 MS
PR INTERVAL - MUSE: NORMAL MS
PROCALCITONIN SERPL IA-MCNC: 0.05 NG/ML
PROT SERPL-MCNC: 7.2 G/DL (ref 6.4–8.3)
PROT SERPL-MCNC: 7.8 G/DL (ref 6.4–8.3)
QRS DURATION - MUSE: 88 MS
QRS DURATION - MUSE: 90 MS
QRS DURATION - MUSE: 92 MS
QT - MUSE: 384 MS
QT - MUSE: 390 MS
QT - MUSE: 396 MS
QTC - MUSE: 448 MS
QTC - MUSE: 471 MS
QTC - MUSE: 507 MS
R AXIS - MUSE: -16 DEGREES
R AXIS - MUSE: -8 DEGREES
R AXIS - MUSE: 9 DEGREES
RBC # BLD AUTO: 3.94 10E6/UL (ref 3.8–5.2)
RBC # BLD AUTO: 4.16 10E6/UL (ref 3.8–5.2)
RBC # BLD AUTO: 4.2 10E6/UL (ref 3.8–5.2)
RBC # BLD AUTO: 4.49 10E6/UL (ref 3.8–5.2)
RBC URINE: 1 /HPF
RSV RNA SPEC NAA+PROBE: NEGATIVE
RSV RNA SPEC NAA+PROBE: NEGATIVE
SAO2 % BLDV: 16 % (ref 94–100)
SARS-COV-2 RNA RESP QL NAA+PROBE: NEGATIVE
SARS-COV-2 RNA RESP QL NAA+PROBE: NEGATIVE
SODIUM SERPL-SCNC: 138 MMOL/L (ref 135–145)
SODIUM SERPL-SCNC: 140 MMOL/L (ref 135–145)
SODIUM SERPL-SCNC: 142 MMOL/L (ref 135–145)
SODIUM SERPL-SCNC: 143 MMOL/L (ref 135–145)
SODIUM SERPL-SCNC: 145 MMOL/L (ref 135–145)
SP GR UR STRIP: 1.01 (ref 1–1.03)
SPECIMEN EXPIRATION DATE: NORMAL
SQUAMOUS EPITHELIAL: <1 /HPF
SYSTOLIC BLOOD PRESSURE - MUSE: NORMAL MMHG
T AXIS - MUSE: 108 DEGREES
T AXIS - MUSE: 160 DEGREES
T AXIS - MUSE: 76 DEGREES
TROPONIN T SERPL HS-MCNC: 22 NG/L
TROPONIN T SERPL HS-MCNC: 23 NG/L
TROPONIN T SERPL HS-MCNC: 26 NG/L
UROBILINOGEN UR STRIP-MCNC: NORMAL MG/DL
VENTRICULAR RATE- MUSE: 105 BPM
VENTRICULAR RATE- MUSE: 77 BPM
VENTRICULAR RATE- MUSE: 88 BPM
WBC # BLD AUTO: 4 10E3/UL (ref 4–11)
WBC # BLD AUTO: 4.8 10E3/UL (ref 4–11)
WBC # BLD AUTO: 6.2 10E3/UL (ref 4–11)
WBC # BLD AUTO: 7.3 10E3/UL (ref 4–11)
WBC URINE: 10 /HPF

## 2023-01-01 PROCEDURE — 120N000001 HC R&B MED SURG/OB

## 2023-01-01 PROCEDURE — 93306 TTE W/DOPPLER COMPLETE: CPT

## 2023-01-01 PROCEDURE — 96374 THER/PROPH/DIAG INJ IV PUSH: CPT

## 2023-01-01 PROCEDURE — 999N000157 HC STATISTIC RCP TIME EA 10 MIN

## 2023-01-01 PROCEDURE — 250N000009 HC RX 250: Performed by: INTERNAL MEDICINE

## 2023-01-01 PROCEDURE — 36415 COLL VENOUS BLD VENIPUNCTURE: CPT | Performed by: STUDENT IN AN ORGANIZED HEALTH CARE EDUCATION/TRAINING PROGRAM

## 2023-01-01 PROCEDURE — 99223 1ST HOSP IP/OBS HIGH 75: CPT | Performed by: STUDENT IN AN ORGANIZED HEALTH CARE EDUCATION/TRAINING PROGRAM

## 2023-01-01 PROCEDURE — 94640 AIRWAY INHALATION TREATMENT: CPT | Mod: 76

## 2023-01-01 PROCEDURE — 85027 COMPLETE CBC AUTOMATED: CPT | Performed by: INTERNAL MEDICINE

## 2023-01-01 PROCEDURE — 99215 OFFICE O/P EST HI 40 MIN: CPT | Performed by: INTERNAL MEDICINE

## 2023-01-01 PROCEDURE — 99291 CRITICAL CARE FIRST HOUR: CPT

## 2023-01-01 PROCEDURE — 94640 AIRWAY INHALATION TREATMENT: CPT

## 2023-01-01 PROCEDURE — 76705 ECHO EXAM OF ABDOMEN: CPT

## 2023-01-01 PROCEDURE — 250N000011 HC RX IP 250 OP 636: Performed by: INTERNAL MEDICINE

## 2023-01-01 PROCEDURE — 84145 PROCALCITONIN (PCT): CPT | Performed by: EMERGENCY MEDICINE

## 2023-01-01 PROCEDURE — 87637 SARSCOV2&INF A&B&RSV AMP PRB: CPT | Performed by: EMERGENCY MEDICINE

## 2023-01-01 PROCEDURE — 97530 THERAPEUTIC ACTIVITIES: CPT | Mod: GP

## 2023-01-01 PROCEDURE — 99291 CRITICAL CARE FIRST HOUR: CPT | Mod: 25

## 2023-01-01 PROCEDURE — 999N000156 HC STATISTIC RCP CONSULT EA 30 MIN

## 2023-01-01 PROCEDURE — 97530 THERAPEUTIC ACTIVITIES: CPT | Mod: GP | Performed by: PHYSICAL THERAPIST

## 2023-01-01 PROCEDURE — 97161 PT EVAL LOW COMPLEX 20 MIN: CPT | Mod: GP

## 2023-01-01 PROCEDURE — 97116 GAIT TRAINING THERAPY: CPT | Mod: GP | Performed by: PHYSICAL THERAPIST

## 2023-01-01 PROCEDURE — 80048 BASIC METABOLIC PNL TOTAL CA: CPT | Performed by: INTERNAL MEDICINE

## 2023-01-01 PROCEDURE — 83880 ASSAY OF NATRIURETIC PEPTIDE: CPT | Performed by: EMERGENCY MEDICINE

## 2023-01-01 PROCEDURE — 83735 ASSAY OF MAGNESIUM: CPT | Performed by: EMERGENCY MEDICINE

## 2023-01-01 PROCEDURE — 250N000009 HC RX 250: Performed by: EMERGENCY MEDICINE

## 2023-01-01 PROCEDURE — 93005 ELECTROCARDIOGRAM TRACING: CPT

## 2023-01-01 PROCEDURE — 250N000009 HC RX 250: Performed by: STUDENT IN AN ORGANIZED HEALTH CARE EDUCATION/TRAINING PROGRAM

## 2023-01-01 PROCEDURE — 86901 BLOOD TYPING SEROLOGIC RH(D): CPT | Performed by: EMERGENCY MEDICINE

## 2023-01-01 PROCEDURE — 99233 SBSQ HOSP IP/OBS HIGH 50: CPT | Performed by: INTERNAL MEDICINE

## 2023-01-01 PROCEDURE — 250N000013 HC RX MED GY IP 250 OP 250 PS 637: Performed by: INTERNAL MEDICINE

## 2023-01-01 PROCEDURE — 99232 SBSQ HOSP IP/OBS MODERATE 35: CPT | Performed by: NURSE PRACTITIONER

## 2023-01-01 PROCEDURE — G0378 HOSPITAL OBSERVATION PER HR: HCPCS

## 2023-01-01 PROCEDURE — 250N000013 HC RX MED GY IP 250 OP 250 PS 637: Performed by: STUDENT IN AN ORGANIZED HEALTH CARE EDUCATION/TRAINING PROGRAM

## 2023-01-01 PROCEDURE — 74177 CT ABD & PELVIS W/CONTRAST: CPT

## 2023-01-01 PROCEDURE — 250N000012 HC RX MED GY IP 250 OP 636 PS 637: Performed by: INTERNAL MEDICINE

## 2023-01-01 PROCEDURE — 97165 OT EVAL LOW COMPLEX 30 MIN: CPT | Mod: GO

## 2023-01-01 PROCEDURE — 85014 HEMATOCRIT: CPT | Performed by: STUDENT IN AN ORGANIZED HEALTH CARE EDUCATION/TRAINING PROGRAM

## 2023-01-01 PROCEDURE — 84484 ASSAY OF TROPONIN QUANT: CPT | Performed by: EMERGENCY MEDICINE

## 2023-01-01 PROCEDURE — 99239 HOSP IP/OBS DSCHRG MGMT >30: CPT | Performed by: INTERNAL MEDICINE

## 2023-01-01 PROCEDURE — 85025 COMPLETE CBC W/AUTO DIFF WBC: CPT | Performed by: EMERGENCY MEDICINE

## 2023-01-01 PROCEDURE — 97535 SELF CARE MNGMENT TRAINING: CPT | Mod: GO

## 2023-01-01 PROCEDURE — 99223 1ST HOSP IP/OBS HIGH 75: CPT | Performed by: NURSE PRACTITIONER

## 2023-01-01 PROCEDURE — 82803 BLOOD GASES ANY COMBINATION: CPT

## 2023-01-01 PROCEDURE — 36415 COLL VENOUS BLD VENIPUNCTURE: CPT | Performed by: INTERNAL MEDICINE

## 2023-01-01 PROCEDURE — 82310 ASSAY OF CALCIUM: CPT | Performed by: INTERNAL MEDICINE

## 2023-01-01 PROCEDURE — 83690 ASSAY OF LIPASE: CPT | Performed by: EMERGENCY MEDICINE

## 2023-01-01 PROCEDURE — 99285 EMERGENCY DEPT VISIT HI MDM: CPT | Mod: 25

## 2023-01-01 PROCEDURE — 36415 COLL VENOUS BLD VENIPUNCTURE: CPT | Performed by: EMERGENCY MEDICINE

## 2023-01-01 PROCEDURE — 83735 ASSAY OF MAGNESIUM: CPT | Performed by: INTERNAL MEDICINE

## 2023-01-01 PROCEDURE — 80048 BASIC METABOLIC PNL TOTAL CA: CPT | Performed by: STUDENT IN AN ORGANIZED HEALTH CARE EDUCATION/TRAINING PROGRAM

## 2023-01-01 PROCEDURE — 250N000011 HC RX IP 250 OP 636: Mod: JZ | Performed by: EMERGENCY MEDICINE

## 2023-01-01 PROCEDURE — 81001 URINALYSIS AUTO W/SCOPE: CPT | Performed by: EMERGENCY MEDICINE

## 2023-01-01 PROCEDURE — 80053 COMPREHEN METABOLIC PANEL: CPT | Performed by: EMERGENCY MEDICINE

## 2023-01-01 PROCEDURE — 250N000011 HC RX IP 250 OP 636: Mod: JZ | Performed by: STUDENT IN AN ORGANIZED HEALTH CARE EDUCATION/TRAINING PROGRAM

## 2023-01-01 PROCEDURE — 250N000013 HC RX MED GY IP 250 OP 250 PS 637: Performed by: PHYSICIAN ASSISTANT

## 2023-01-01 PROCEDURE — 99222 1ST HOSP IP/OBS MODERATE 55: CPT | Performed by: NURSE PRACTITIONER

## 2023-01-01 PROCEDURE — 250N000011 HC RX IP 250 OP 636: Performed by: EMERGENCY MEDICINE

## 2023-01-01 PROCEDURE — 87040 BLOOD CULTURE FOR BACTERIA: CPT | Performed by: EMERGENCY MEDICINE

## 2023-01-01 PROCEDURE — 71046 X-RAY EXAM CHEST 2 VIEWS: CPT

## 2023-01-01 PROCEDURE — 250N000011 HC RX IP 250 OP 636: Mod: JZ | Performed by: INTERNAL MEDICINE

## 2023-01-01 PROCEDURE — 99223 1ST HOSP IP/OBS HIGH 75: CPT | Performed by: INTERNAL MEDICINE

## 2023-01-01 PROCEDURE — 250N000012 HC RX MED GY IP 250 OP 636 PS 637: Performed by: STUDENT IN AN ORGANIZED HEALTH CARE EDUCATION/TRAINING PROGRAM

## 2023-01-01 PROCEDURE — 93306 TTE W/DOPPLER COMPLETE: CPT | Mod: 26 | Performed by: INTERNAL MEDICINE

## 2023-01-01 PROCEDURE — 96375 TX/PRO/DX INJ NEW DRUG ADDON: CPT | Mod: XU

## 2023-01-01 PROCEDURE — 71045 X-RAY EXAM CHEST 1 VIEW: CPT

## 2023-01-01 PROCEDURE — 99214 OFFICE O/P EST MOD 30 MIN: CPT | Performed by: INTERNAL MEDICINE

## 2023-01-01 PROCEDURE — 99239 HOSP IP/OBS DSCHRG MGMT >30: CPT | Performed by: PHYSICIAN ASSISTANT

## 2023-01-01 RX ORDER — ONDANSETRON 4 MG/1
4 TABLET, ORALLY DISINTEGRATING ORAL EVERY 6 HOURS PRN
Status: CANCELLED | OUTPATIENT
Start: 2023-01-01

## 2023-01-01 RX ORDER — POLYETHYLENE GLYCOL 3350 17 G/17G
17 POWDER, FOR SOLUTION ORAL DAILY PRN
Status: DISCONTINUED | OUTPATIENT
Start: 2023-01-01 | End: 2023-01-01 | Stop reason: HOSPADM

## 2023-01-01 RX ORDER — ATORVASTATIN CALCIUM 20 MG/1
20 TABLET, FILM COATED ORAL DAILY
Status: DISCONTINUED | OUTPATIENT
Start: 2023-01-01 | End: 2023-01-01 | Stop reason: HOSPADM

## 2023-01-01 RX ORDER — ALBUTEROL SULFATE 0.83 MG/ML
2.5 SOLUTION RESPIRATORY (INHALATION)
Status: DISCONTINUED | OUTPATIENT
Start: 2023-01-01 | End: 2023-01-01 | Stop reason: HOSPADM

## 2023-01-01 RX ORDER — IPRATROPIUM BROMIDE AND ALBUTEROL SULFATE 2.5; .5 MG/3ML; MG/3ML
3 SOLUTION RESPIRATORY (INHALATION) EVERY 4 HOURS PRN
Status: DISCONTINUED | OUTPATIENT
Start: 2023-01-01 | End: 2023-01-01

## 2023-01-01 RX ORDER — ASPIRIN 81 MG/1
81 TABLET, CHEWABLE ORAL DAILY
Status: DISCONTINUED | OUTPATIENT
Start: 2023-01-01 | End: 2023-01-01 | Stop reason: HOSPADM

## 2023-01-01 RX ORDER — AMOXICILLIN 250 MG
2 CAPSULE ORAL 2 TIMES DAILY PRN
Status: CANCELLED | OUTPATIENT
Start: 2023-01-01

## 2023-01-01 RX ORDER — PREDNISONE 20 MG/1
60 TABLET ORAL DAILY
Status: DISCONTINUED | OUTPATIENT
Start: 2023-01-01 | End: 2023-01-01 | Stop reason: HOSPADM

## 2023-01-01 RX ORDER — TORSEMIDE 10 MG/1
10 TABLET ORAL DAILY
Status: DISCONTINUED | OUTPATIENT
Start: 2023-01-01 | End: 2023-01-01 | Stop reason: HOSPADM

## 2023-01-01 RX ORDER — ISOSORBIDE MONONITRATE 30 MG/1
30 TABLET, EXTENDED RELEASE ORAL DAILY
Start: 2023-01-01 | End: 2023-01-01

## 2023-01-01 RX ORDER — METHYLPREDNISOLONE SODIUM SUCCINATE 125 MG/2ML
125 INJECTION, POWDER, LYOPHILIZED, FOR SOLUTION INTRAMUSCULAR; INTRAVENOUS EVERY 24 HOURS
Status: CANCELLED | OUTPATIENT
Start: 2023-01-01

## 2023-01-01 RX ORDER — IPRATROPIUM BROMIDE AND ALBUTEROL SULFATE 2.5; .5 MG/3ML; MG/3ML
3 SOLUTION RESPIRATORY (INHALATION)
Status: CANCELLED | OUTPATIENT
Start: 2023-01-01

## 2023-01-01 RX ORDER — OXYBUTYNIN CHLORIDE 5 MG/1
10 TABLET, EXTENDED RELEASE ORAL DAILY
Status: DISCONTINUED | OUTPATIENT
Start: 2023-01-01 | End: 2023-01-01

## 2023-01-01 RX ORDER — ISOSORBIDE MONONITRATE 30 MG/1
30 TABLET, EXTENDED RELEASE ORAL DAILY
Qty: 100 TABLET | Refills: 7 | Status: CANCELLED | OUTPATIENT
Start: 2023-01-01

## 2023-01-01 RX ORDER — ATORVASTATIN CALCIUM 20 MG/1
20 TABLET, FILM COATED ORAL DAILY
Qty: 100 TABLET | Refills: 6 | Status: CANCELLED | OUTPATIENT
Start: 2023-01-01

## 2023-01-01 RX ORDER — CARBOXYMETHYLCELLULOSE SODIUM 5 MG/ML
1 SOLUTION/ DROPS OPHTHALMIC 2 TIMES DAILY PRN
COMMUNITY

## 2023-01-01 RX ORDER — IPRATROPIUM BROMIDE AND ALBUTEROL SULFATE 2.5; .5 MG/3ML; MG/3ML
3 SOLUTION RESPIRATORY (INHALATION) ONCE
Status: DISCONTINUED | OUTPATIENT
Start: 2023-01-01 | End: 2023-01-01

## 2023-01-01 RX ORDER — AMOXICILLIN 250 MG
1 CAPSULE ORAL DAILY PRN
Qty: 30 TABLET | Refills: 0 | Status: SHIPPED | OUTPATIENT
Start: 2023-01-01

## 2023-01-01 RX ORDER — HYDROXYZINE HYDROCHLORIDE 25 MG/1
25 TABLET, FILM COATED ORAL EVERY 6 HOURS PRN
COMMUNITY

## 2023-01-01 RX ORDER — CARBOXYMETHYLCELLULOSE SODIUM 5 MG/ML
1 SOLUTION/ DROPS OPHTHALMIC 2 TIMES DAILY PRN
Status: DISCONTINUED | OUTPATIENT
Start: 2023-01-01 | End: 2023-01-01 | Stop reason: HOSPADM

## 2023-01-01 RX ORDER — IPRATROPIUM BROMIDE AND ALBUTEROL SULFATE 2.5; .5 MG/3ML; MG/3ML
3 SOLUTION RESPIRATORY (INHALATION)
Status: DISCONTINUED | OUTPATIENT
Start: 2023-01-01 | End: 2023-01-01

## 2023-01-01 RX ORDER — ACETAMINOPHEN 325 MG/1
650 TABLET ORAL EVERY 6 HOURS PRN
Status: CANCELLED | OUTPATIENT
Start: 2023-01-01

## 2023-01-01 RX ORDER — IPRATROPIUM BROMIDE AND ALBUTEROL SULFATE 2.5; .5 MG/3ML; MG/3ML
3 SOLUTION RESPIRATORY (INHALATION) ONCE
Status: COMPLETED | OUTPATIENT
Start: 2023-01-01 | End: 2023-01-01

## 2023-01-01 RX ORDER — FUROSEMIDE 10 MG/ML
20 INJECTION INTRAMUSCULAR; INTRAVENOUS ONCE
Status: COMPLETED | OUTPATIENT
Start: 2023-01-01 | End: 2023-01-01

## 2023-01-01 RX ORDER — ONDANSETRON 4 MG/1
4 TABLET, ORALLY DISINTEGRATING ORAL EVERY 6 HOURS PRN
Status: DISCONTINUED | OUTPATIENT
Start: 2023-01-01 | End: 2023-01-01 | Stop reason: HOSPADM

## 2023-01-01 RX ORDER — ISOSORBIDE MONONITRATE 30 MG/1
30 TABLET, EXTENDED RELEASE ORAL DAILY
COMMUNITY
Start: 2023-01-01

## 2023-01-01 RX ORDER — AMOXICILLIN 250 MG
1 CAPSULE ORAL 2 TIMES DAILY PRN
Status: CANCELLED | OUTPATIENT
Start: 2023-01-01

## 2023-01-01 RX ORDER — AMOXICILLIN 250 MG
1 CAPSULE ORAL 2 TIMES DAILY PRN
Status: DISCONTINUED | OUTPATIENT
Start: 2023-01-01 | End: 2023-01-01 | Stop reason: HOSPADM

## 2023-01-01 RX ORDER — ACETAMINOPHEN 325 MG/1
650 TABLET ORAL ONCE
Status: COMPLETED | OUTPATIENT
Start: 2023-01-01 | End: 2023-01-01

## 2023-01-01 RX ORDER — IPRATROPIUM BROMIDE AND ALBUTEROL SULFATE 2.5; .5 MG/3ML; MG/3ML
3 SOLUTION RESPIRATORY (INHALATION)
Status: DISCONTINUED | OUTPATIENT
Start: 2023-01-01 | End: 2023-01-01 | Stop reason: HOSPADM

## 2023-01-01 RX ORDER — IPRATROPIUM BROMIDE AND ALBUTEROL SULFATE 2.5; .5 MG/3ML; MG/3ML
1 SOLUTION RESPIRATORY (INHALATION) EVERY 6 HOURS PRN
Qty: 90 ML | Refills: 1 | Status: SHIPPED | OUTPATIENT
Start: 2023-01-01

## 2023-01-01 RX ORDER — ACETAMINOPHEN 325 MG/1
650 TABLET ORAL EVERY 4 HOURS PRN
Status: DISCONTINUED | OUTPATIENT
Start: 2023-01-01 | End: 2023-01-01 | Stop reason: HOSPADM

## 2023-01-01 RX ORDER — NALOXONE HYDROCHLORIDE 0.4 MG/ML
0.4 INJECTION, SOLUTION INTRAMUSCULAR; INTRAVENOUS; SUBCUTANEOUS
Status: DISCONTINUED | OUTPATIENT
Start: 2023-01-01 | End: 2023-01-01 | Stop reason: HOSPADM

## 2023-01-01 RX ORDER — CEPHALEXIN 500 MG/1
500 CAPSULE ORAL 2 TIMES DAILY
Status: ON HOLD | COMMUNITY
End: 2023-01-01

## 2023-01-01 RX ORDER — METHYLPREDNISOLONE SODIUM SUCCINATE 125 MG/2ML
125 INJECTION, POWDER, LYOPHILIZED, FOR SOLUTION INTRAMUSCULAR; INTRAVENOUS ONCE
Status: COMPLETED | OUTPATIENT
Start: 2023-01-01 | End: 2023-01-01

## 2023-01-01 RX ORDER — ACETAMINOPHEN 650 MG/1
650 SUPPOSITORY RECTAL EVERY 6 HOURS PRN
Status: DISCONTINUED | OUTPATIENT
Start: 2023-01-01 | End: 2023-01-01 | Stop reason: HOSPADM

## 2023-01-01 RX ORDER — NALOXONE HYDROCHLORIDE 0.4 MG/ML
0.2 INJECTION, SOLUTION INTRAMUSCULAR; INTRAVENOUS; SUBCUTANEOUS
Status: DISCONTINUED | OUTPATIENT
Start: 2023-01-01 | End: 2023-01-01 | Stop reason: HOSPADM

## 2023-01-01 RX ORDER — AMOXICILLIN 250 MG
2 CAPSULE ORAL 2 TIMES DAILY PRN
Status: DISCONTINUED | OUTPATIENT
Start: 2023-01-01 | End: 2023-01-01 | Stop reason: HOSPADM

## 2023-01-01 RX ORDER — ISOSORBIDE MONONITRATE 30 MG/1
30 TABLET, EXTENDED RELEASE ORAL DAILY
Status: DISCONTINUED | OUTPATIENT
Start: 2023-01-01 | End: 2023-01-01 | Stop reason: HOSPADM

## 2023-01-01 RX ORDER — LEVOTHYROXINE SODIUM 100 UG/1
100 TABLET ORAL DAILY
Status: DISCONTINUED | OUTPATIENT
Start: 2023-01-01 | End: 2023-01-01 | Stop reason: HOSPADM

## 2023-01-01 RX ORDER — IOPAMIDOL 755 MG/ML
500 INJECTION, SOLUTION INTRAVASCULAR ONCE
Status: COMPLETED | OUTPATIENT
Start: 2023-01-01 | End: 2023-01-01

## 2023-01-01 RX ORDER — TRAMADOL HYDROCHLORIDE 50 MG/1
50 TABLET ORAL 4 TIMES DAILY
COMMUNITY

## 2023-01-01 RX ORDER — METOPROLOL SUCCINATE 25 MG/1
25 TABLET, EXTENDED RELEASE ORAL 2 TIMES DAILY
Qty: 200 TABLET | Refills: 6 | Status: CANCELLED | OUTPATIENT
Start: 2023-01-01

## 2023-01-01 RX ORDER — ACETAMINOPHEN 650 MG/1
650 SUPPOSITORY RECTAL EVERY 6 HOURS PRN
Status: CANCELLED | OUTPATIENT
Start: 2023-01-01

## 2023-01-01 RX ORDER — ACETAMINOPHEN 325 MG/1
650 TABLET ORAL EVERY 6 HOURS PRN
Status: DISCONTINUED | OUTPATIENT
Start: 2023-01-01 | End: 2023-01-01 | Stop reason: HOSPADM

## 2023-01-01 RX ORDER — CARBOXYMETHYLCELLULOSE SODIUM 5 MG/ML
2 SOLUTION/ DROPS OPHTHALMIC
Status: DISCONTINUED | OUTPATIENT
Start: 2023-01-01 | End: 2023-01-01 | Stop reason: HOSPADM

## 2023-01-01 RX ORDER — FUROSEMIDE 10 MG/ML
40 INJECTION INTRAMUSCULAR; INTRAVENOUS ONCE
Status: DISCONTINUED | OUTPATIENT
Start: 2023-01-01 | End: 2023-01-01

## 2023-01-01 RX ORDER — ONDANSETRON 2 MG/ML
4 INJECTION INTRAMUSCULAR; INTRAVENOUS EVERY 6 HOURS PRN
Status: DISCONTINUED | OUTPATIENT
Start: 2023-01-01 | End: 2023-01-01 | Stop reason: HOSPADM

## 2023-01-01 RX ORDER — PREDNISONE 20 MG/1
TABLET ORAL
Qty: 11 TABLET | Refills: 0 | Status: SHIPPED | OUTPATIENT
Start: 2023-01-01 | End: 2024-01-01

## 2023-01-01 RX ORDER — METHYLPREDNISOLONE SODIUM SUCCINATE 125 MG/2ML
60 INJECTION, POWDER, LYOPHILIZED, FOR SOLUTION INTRAMUSCULAR; INTRAVENOUS ONCE
Status: COMPLETED | OUTPATIENT
Start: 2023-01-01 | End: 2023-01-01

## 2023-01-01 RX ORDER — METOPROLOL SUCCINATE 25 MG/1
25 TABLET, EXTENDED RELEASE ORAL 2 TIMES DAILY
Status: DISCONTINUED | OUTPATIENT
Start: 2023-01-01 | End: 2023-01-01 | Stop reason: HOSPADM

## 2023-01-01 RX ORDER — TRAZODONE HYDROCHLORIDE 50 MG/1
50 TABLET, FILM COATED ORAL AT BEDTIME
Status: DISCONTINUED | OUTPATIENT
Start: 2023-01-01 | End: 2023-01-01 | Stop reason: HOSPADM

## 2023-01-01 RX ORDER — IBUPROFEN 200 MG
200 TABLET ORAL EVERY 6 HOURS PRN
Status: DISCONTINUED | OUTPATIENT
Start: 2023-01-01 | End: 2023-01-01 | Stop reason: HOSPADM

## 2023-01-01 RX ORDER — POLYETHYLENE GLYCOL 3350 17 G/17G
17 POWDER, FOR SOLUTION ORAL DAILY PRN
Qty: 510 G | Refills: 0 | Status: SHIPPED | OUTPATIENT
Start: 2023-01-01

## 2023-01-01 RX ORDER — PREDNISONE 20 MG/1
40 TABLET ORAL DAILY
Status: DISCONTINUED | OUTPATIENT
Start: 2023-01-01 | End: 2023-01-01 | Stop reason: HOSPADM

## 2023-01-01 RX ORDER — FUROSEMIDE 10 MG/ML
60 INJECTION INTRAMUSCULAR; INTRAVENOUS ONCE
Status: COMPLETED | OUTPATIENT
Start: 2023-01-01 | End: 2023-01-01

## 2023-01-01 RX ORDER — ACETAMINOPHEN 325 MG/1
975 TABLET ORAL EVERY 8 HOURS
Status: DISCONTINUED | OUTPATIENT
Start: 2023-01-01 | End: 2023-01-01 | Stop reason: HOSPADM

## 2023-01-01 RX ORDER — ATORVASTATIN CALCIUM 20 MG/1
20 TABLET, FILM COATED ORAL EVERY EVENING
Status: DISCONTINUED | OUTPATIENT
Start: 2023-01-01 | End: 2023-01-01 | Stop reason: HOSPADM

## 2023-01-01 RX ORDER — METHYLPREDNISOLONE SODIUM SUCCINATE 40 MG/ML
40 INJECTION, POWDER, LYOPHILIZED, FOR SOLUTION INTRAMUSCULAR; INTRAVENOUS 2 TIMES DAILY
Status: DISCONTINUED | OUTPATIENT
Start: 2023-01-01 | End: 2023-01-01

## 2023-01-01 RX ORDER — TORSEMIDE 10 MG/1
10 TABLET ORAL DAILY
Qty: 100 TABLET | Refills: 6 | Status: CANCELLED | OUTPATIENT
Start: 2023-01-01

## 2023-01-01 RX ORDER — IBUPROFEN 200 MG
200 TABLET ORAL EVERY 6 HOURS PRN
Status: CANCELLED | OUTPATIENT
Start: 2023-01-01

## 2023-01-01 RX ORDER — POLYETHYLENE GLYCOL 3350 17 G/17G
17 POWDER, FOR SOLUTION ORAL DAILY PRN
Status: CANCELLED | OUTPATIENT
Start: 2023-01-01

## 2023-01-01 RX ORDER — ONDANSETRON 2 MG/ML
4 INJECTION INTRAMUSCULAR; INTRAVENOUS EVERY 6 HOURS PRN
Status: CANCELLED | OUTPATIENT
Start: 2023-01-01

## 2023-01-01 RX ORDER — PREDNISONE 10 MG/1
TABLET ORAL
Qty: 18 TABLET | Refills: 0 | Status: SHIPPED | OUTPATIENT
Start: 2023-01-01 | End: 2023-01-01

## 2023-01-01 RX ADMIN — IPRATROPIUM BROMIDE AND ALBUTEROL SULFATE 3 ML: .5; 3 SOLUTION RESPIRATORY (INHALATION) at 16:39

## 2023-01-01 RX ADMIN — CARBOXYMETHYLCELLULOSE SODIUM 2 DROP: 5 SOLUTION/ DROPS OPHTHALMIC at 16:49

## 2023-01-01 RX ADMIN — IOPAMIDOL 72 ML: 755 INJECTION, SOLUTION INTRAVENOUS at 15:27

## 2023-01-01 RX ADMIN — ACETAMINOPHEN 975 MG: 325 TABLET, FILM COATED ORAL at 13:48

## 2023-01-01 RX ADMIN — TRAZODONE HYDROCHLORIDE 50 MG: 50 TABLET ORAL at 21:50

## 2023-01-01 RX ADMIN — ACETAMINOPHEN 975 MG: 325 TABLET, FILM COATED ORAL at 05:38

## 2023-01-01 RX ADMIN — ACETAMINOPHEN 975 MG: 325 TABLET, FILM COATED ORAL at 21:30

## 2023-01-01 RX ADMIN — TRAZODONE HYDROCHLORIDE 50 MG: 50 TABLET ORAL at 21:46

## 2023-01-01 RX ADMIN — SENNOSIDES AND DOCUSATE SODIUM 1 TABLET: 8.6; 5 TABLET ORAL at 08:16

## 2023-01-01 RX ADMIN — IPRATROPIUM BROMIDE AND ALBUTEROL SULFATE 3 ML: .5; 3 SOLUTION RESPIRATORY (INHALATION) at 19:19

## 2023-01-01 RX ADMIN — IPRATROPIUM BROMIDE AND ALBUTEROL SULFATE 3 ML: .5; 3 SOLUTION RESPIRATORY (INHALATION) at 11:39

## 2023-01-01 RX ADMIN — IPRATROPIUM BROMIDE AND ALBUTEROL SULFATE 3 ML: .5; 3 SOLUTION RESPIRATORY (INHALATION) at 20:00

## 2023-01-01 RX ADMIN — ISOSORBIDE MONONITRATE 30 MG: 30 TABLET, EXTENDED RELEASE ORAL at 08:17

## 2023-01-01 RX ADMIN — ACETAMINOPHEN 975 MG: 325 TABLET, FILM COATED ORAL at 13:13

## 2023-01-01 RX ADMIN — ACETAMINOPHEN 975 MG: 325 TABLET, FILM COATED ORAL at 13:42

## 2023-01-01 RX ADMIN — METHYLPREDNISOLONE SODIUM SUCCINATE 40 MG: 40 INJECTION INTRAMUSCULAR; INTRAVENOUS at 20:19

## 2023-01-01 RX ADMIN — METOPROLOL SUCCINATE 25 MG: 25 TABLET, EXTENDED RELEASE ORAL at 14:49

## 2023-01-01 RX ADMIN — IPRATROPIUM BROMIDE AND ALBUTEROL SULFATE 3 ML: .5; 3 SOLUTION RESPIRATORY (INHALATION) at 15:48

## 2023-01-01 RX ADMIN — ATORVASTATIN CALCIUM 20 MG: 20 TABLET, FILM COATED ORAL at 08:20

## 2023-01-01 RX ADMIN — FUROSEMIDE 60 MG: 10 INJECTION, SOLUTION INTRAMUSCULAR; INTRAVENOUS at 07:40

## 2023-01-01 RX ADMIN — ACETAMINOPHEN 975 MG: 325 TABLET, FILM COATED ORAL at 05:47

## 2023-01-01 RX ADMIN — IPRATROPIUM BROMIDE AND ALBUTEROL SULFATE 3 ML: .5; 3 SOLUTION RESPIRATORY (INHALATION) at 20:10

## 2023-01-01 RX ADMIN — ATORVASTATIN CALCIUM 20 MG: 20 TABLET, FILM COATED ORAL at 20:19

## 2023-01-01 RX ADMIN — IPRATROPIUM BROMIDE AND ALBUTEROL SULFATE 3 ML: .5; 3 SOLUTION RESPIRATORY (INHALATION) at 07:31

## 2023-01-01 RX ADMIN — ASPIRIN 81 MG CHEWABLE TABLET 81 MG: 81 TABLET CHEWABLE at 08:20

## 2023-01-01 RX ADMIN — ATORVASTATIN CALCIUM 20 MG: 20 TABLET, FILM COATED ORAL at 21:40

## 2023-01-01 RX ADMIN — LEVOTHYROXINE SODIUM 100 MCG: 0.1 TABLET ORAL at 09:08

## 2023-01-01 RX ADMIN — TORSEMIDE 10 MG: 10 TABLET ORAL at 08:16

## 2023-01-01 RX ADMIN — IPRATROPIUM BROMIDE AND ALBUTEROL SULFATE 3 ML: .5; 3 SOLUTION RESPIRATORY (INHALATION) at 06:11

## 2023-01-01 RX ADMIN — TORSEMIDE 10 MG: 10 TABLET ORAL at 10:07

## 2023-01-01 RX ADMIN — METHYLPREDNISOLONE SODIUM SUCCINATE 62.5 MG: 125 INJECTION, POWDER, FOR SOLUTION INTRAMUSCULAR; INTRAVENOUS at 18:48

## 2023-01-01 RX ADMIN — LEVOTHYROXINE SODIUM 100 MCG: 0.1 TABLET ORAL at 08:20

## 2023-01-01 RX ADMIN — CARBOXYMETHYLCELLULOSE SODIUM 2 DROP: 5 SOLUTION/ DROPS OPHTHALMIC at 13:53

## 2023-01-01 RX ADMIN — TRAZODONE HYDROCHLORIDE 50 MG: 50 TABLET ORAL at 21:31

## 2023-01-01 RX ADMIN — ACETAMINOPHEN 650 MG: 325 TABLET, FILM COATED ORAL at 21:46

## 2023-01-01 RX ADMIN — ACETAMINOPHEN 975 MG: 325 TABLET, FILM COATED ORAL at 05:24

## 2023-01-01 RX ADMIN — METOPROLOL SUCCINATE 25 MG: 25 TABLET, EXTENDED RELEASE ORAL at 08:43

## 2023-01-01 RX ADMIN — ISOSORBIDE MONONITRATE 30 MG: 30 TABLET, EXTENDED RELEASE ORAL at 09:08

## 2023-01-01 RX ADMIN — CARBOXYMETHYLCELLULOSE SODIUM 2 DROP: 5 SOLUTION/ DROPS OPHTHALMIC at 18:57

## 2023-01-01 RX ADMIN — IPRATROPIUM BROMIDE AND ALBUTEROL SULFATE 3 ML: .5; 3 SOLUTION RESPIRATORY (INHALATION) at 15:20

## 2023-01-01 RX ADMIN — METHYLPREDNISOLONE SODIUM SUCCINATE 40 MG: 40 INJECTION INTRAMUSCULAR; INTRAVENOUS at 20:20

## 2023-01-01 RX ADMIN — LEVOTHYROXINE SODIUM 100 MCG: 0.1 TABLET ORAL at 08:43

## 2023-01-01 RX ADMIN — IPRATROPIUM BROMIDE AND ALBUTEROL SULFATE 3 ML: .5; 3 SOLUTION RESPIRATORY (INHALATION) at 13:41

## 2023-01-01 RX ADMIN — IBUPROFEN 200 MG: 200 TABLET, FILM COATED ORAL at 16:55

## 2023-01-01 RX ADMIN — ISOSORBIDE MONONITRATE 30 MG: 30 TABLET, EXTENDED RELEASE ORAL at 08:43

## 2023-01-01 RX ADMIN — METHYLPREDNISOLONE SODIUM SUCCINATE 125 MG: 125 INJECTION, POWDER, FOR SOLUTION INTRAMUSCULAR; INTRAVENOUS at 06:13

## 2023-01-01 RX ADMIN — IPRATROPIUM BROMIDE AND ALBUTEROL SULFATE 3 ML: .5; 3 SOLUTION RESPIRATORY (INHALATION) at 16:06

## 2023-01-01 RX ADMIN — PREDNISONE 40 MG: 20 TABLET ORAL at 08:20

## 2023-01-01 RX ADMIN — IPRATROPIUM BROMIDE AND ALBUTEROL SULFATE 3 ML: .5; 3 SOLUTION RESPIRATORY (INHALATION) at 07:29

## 2023-01-01 RX ADMIN — ATORVASTATIN CALCIUM 20 MG: 20 TABLET, FILM COATED ORAL at 20:17

## 2023-01-01 RX ADMIN — METHYLPREDNISOLONE SODIUM SUCCINATE 40 MG: 40 INJECTION INTRAMUSCULAR; INTRAVENOUS at 21:40

## 2023-01-01 RX ADMIN — ACETAMINOPHEN 975 MG: 325 TABLET, FILM COATED ORAL at 21:50

## 2023-01-01 RX ADMIN — ASPIRIN 81 MG CHEWABLE TABLET 81 MG: 81 TABLET CHEWABLE at 09:08

## 2023-01-01 RX ADMIN — IPRATROPIUM BROMIDE AND ALBUTEROL SULFATE 3 ML: .5; 3 SOLUTION RESPIRATORY (INHALATION) at 11:43

## 2023-01-01 RX ADMIN — METOPROLOL SUCCINATE 25 MG: 25 TABLET, EXTENDED RELEASE ORAL at 09:08

## 2023-01-01 RX ADMIN — IPRATROPIUM BROMIDE AND ALBUTEROL SULFATE 3 ML: .5; 3 SOLUTION RESPIRATORY (INHALATION) at 07:28

## 2023-01-01 RX ADMIN — METHYLPREDNISOLONE SODIUM SUCCINATE 40 MG: 40 INJECTION INTRAMUSCULAR; INTRAVENOUS at 08:43

## 2023-01-01 RX ADMIN — METOPROLOL SUCCINATE 25 MG: 25 TABLET, EXTENDED RELEASE ORAL at 21:31

## 2023-01-01 RX ADMIN — IPRATROPIUM BROMIDE AND ALBUTEROL SULFATE 3 ML: .5; 3 SOLUTION RESPIRATORY (INHALATION) at 11:30

## 2023-01-01 RX ADMIN — CARBOXYMETHYLCELLULOSE SODIUM 1 DROP: 5 SOLUTION/ DROPS OPHTHALMIC at 21:52

## 2023-01-01 RX ADMIN — FUROSEMIDE 20 MG: 10 INJECTION, SOLUTION INTRAMUSCULAR; INTRAVENOUS at 21:21

## 2023-01-01 RX ADMIN — LEVOTHYROXINE SODIUM 100 MCG: 0.1 TABLET ORAL at 08:16

## 2023-01-01 RX ADMIN — IPRATROPIUM BROMIDE AND ALBUTEROL SULFATE 3 ML: .5; 3 SOLUTION RESPIRATORY (INHALATION) at 08:11

## 2023-01-01 RX ADMIN — ACETAMINOPHEN 975 MG: 325 TABLET, FILM COATED ORAL at 21:39

## 2023-01-01 RX ADMIN — TORSEMIDE 10 MG: 10 TABLET ORAL at 09:08

## 2023-01-01 RX ADMIN — OXYBUTYNIN CHLORIDE 10 MG: 5 TABLET, EXTENDED RELEASE ORAL at 08:20

## 2023-01-01 RX ADMIN — ASPIRIN 81 MG CHEWABLE TABLET 81 MG: 81 TABLET CHEWABLE at 08:16

## 2023-01-01 RX ADMIN — PREDNISONE 60 MG: 20 TABLET ORAL at 08:16

## 2023-01-01 RX ADMIN — METHYLPREDNISOLONE SODIUM SUCCINATE 40 MG: 40 INJECTION INTRAMUSCULAR; INTRAVENOUS at 09:08

## 2023-01-01 RX ADMIN — METOPROLOL SUCCINATE 25 MG: 25 TABLET, EXTENDED RELEASE ORAL at 10:38

## 2023-01-01 RX ADMIN — IPRATROPIUM BROMIDE AND ALBUTEROL SULFATE 3 ML: .5; 3 SOLUTION RESPIRATORY (INHALATION) at 19:58

## 2023-01-01 RX ADMIN — TORSEMIDE 10 MG: 10 TABLET ORAL at 08:43

## 2023-01-01 RX ADMIN — ASPIRIN 81 MG CHEWABLE TABLET 81 MG: 81 TABLET CHEWABLE at 08:43

## 2023-01-01 RX ADMIN — ACETAMINOPHEN 975 MG: 325 TABLET, FILM COATED ORAL at 13:16

## 2023-01-01 RX ADMIN — METOPROLOL SUCCINATE 25 MG: 25 TABLET, EXTENDED RELEASE ORAL at 21:44

## 2023-01-01 ASSESSMENT — ACTIVITIES OF DAILY LIVING (ADL)
ADLS_ACUITY_SCORE: 35
CONCENTRATING,_REMEMBERING_OR_MAKING_DECISIONS_DIFFICULTY: NO
ADLS_ACUITY_SCORE: 28
ADLS_ACUITY_SCORE: 35
ADLS_ACUITY_SCORE: 35
ADLS_ACUITY_SCORE: 37
ADLS_ACUITY_SCORE: 28
TOILETING: 0-->INDEPENDENT
WALKING_OR_CLIMBING_STAIRS: OTHER (SEE COMMENTS)
TOILETING: 0-->INDEPENDENT
VISION_MANAGEMENT: GLASSES
ADLS_ACUITY_SCORE: 32
ADLS_ACUITY_SCORE: 30
ADLS_ACUITY_SCORE: 35
ADLS_ACUITY_SCORE: 35
ADLS_ACUITY_SCORE: 37
ADLS_ACUITY_SCORE: 35
ADLS_ACUITY_SCORE: 30
ADLS_ACUITY_SCORE: 31
DIFFICULTY_COMMUNICATING: NO
FALL_HISTORY_WITHIN_LAST_SIX_MONTHS: YES
ADLS_ACUITY_SCORE: 35
ADLS_ACUITY_SCORE: 35
ADLS_ACUITY_SCORE: 30
ADLS_ACUITY_SCORE: 30
ADLS_ACUITY_SCORE: 32
ADLS_ACUITY_SCORE: 30
ADLS_ACUITY_SCORE: 32
EQUIPMENT_CURRENTLY_USED_AT_HOME: WALKER, STANDARD
ADLS_ACUITY_SCORE: 31
ADLS_ACUITY_SCORE: 32
WEAR_GLASSES_OR_BLIND: YES
ADLS_ACUITY_SCORE: 35
TOILETING_ASSISTANCE: TOILETING DIFFICULTY, REQUIRES EQUIPMENT
ADLS_ACUITY_SCORE: 41
TOILETING_ISSUES: YES
ADLS_ACUITY_SCORE: 28
ADLS_ACUITY_SCORE: 35
ADLS_ACUITY_SCORE: 32
ADLS_ACUITY_SCORE: 31
ADLS_ACUITY_SCORE: 32
ADLS_ACUITY_SCORE: 28
ADLS_ACUITY_SCORE: 30
ADLS_ACUITY_SCORE: 30
DOING_ERRANDS_INDEPENDENTLY_DIFFICULTY: YES
ADLS_ACUITY_SCORE: 31
CHANGE_IN_FUNCTIONAL_STATUS_SINCE_ONSET_OF_CURRENT_ILLNESS/INJURY: NO
ADLS_ACUITY_SCORE: 32
ADLS_ACUITY_SCORE: 35
DRESSING/BATHING_DIFFICULTY: NO
ADLS_ACUITY_SCORE: 35
ADLS_ACUITY_SCORE: 26
ADLS_ACUITY_SCORE: 28
ADLS_ACUITY_SCORE: 32
ADLS_ACUITY_SCORE: 30
ADLS_ACUITY_SCORE: 37
NUMBER_OF_TIMES_PATIENT_HAS_FALLEN_WITHIN_LAST_SIX_MONTHS: 1
ADLS_ACUITY_SCORE: 32
WALKING_OR_CLIMBING_STAIRS_DIFFICULTY: YES
ADLS_ACUITY_SCORE: 35
ADLS_ACUITY_SCORE: 41
ADLS_ACUITY_SCORE: 35
ADLS_ACUITY_SCORE: 31
ADLS_ACUITY_SCORE: 35
DIFFICULTY_EATING/SWALLOWING: NO
ADLS_ACUITY_SCORE: 35
ADLS_ACUITY_SCORE: 30
ADLS_ACUITY_SCORE: 32
ADLS_ACUITY_SCORE: 30
HEARING_DIFFICULTY_OR_DEAF: NO
ADLS_ACUITY_SCORE: 32
ADLS_ACUITY_SCORE: 31

## 2023-01-25 ENCOUNTER — APPOINTMENT (OUTPATIENT)
Dept: CT IMAGING | Facility: CLINIC | Age: 88
DRG: 306 | End: 2023-01-25
Attending: EMERGENCY MEDICINE
Payer: COMMERCIAL

## 2023-01-25 ENCOUNTER — HOSPITAL ENCOUNTER (INPATIENT)
Facility: CLINIC | Age: 88
LOS: 3 days | Discharge: HOME HEALTH | DRG: 306 | End: 2023-01-29
Attending: EMERGENCY MEDICINE | Admitting: INTERNAL MEDICINE
Payer: COMMERCIAL

## 2023-01-25 DIAGNOSIS — I16.0 HYPERTENSIVE URGENCY: ICD-10-CM

## 2023-01-25 DIAGNOSIS — I10 BENIGN ESSENTIAL HYPERTENSION: Primary | ICD-10-CM

## 2023-01-25 DIAGNOSIS — I71.21 ANEURYSM OF ASCENDING AORTA WITHOUT RUPTURE (H): ICD-10-CM

## 2023-01-25 DIAGNOSIS — I35.9 AORTIC VALVE DISORDER: ICD-10-CM

## 2023-01-25 DIAGNOSIS — I21.4 NON-STEMI (NON-ST ELEVATED MYOCARDIAL INFARCTION) (H): ICD-10-CM

## 2023-01-25 DIAGNOSIS — H10.501 BLEPHAROCONJUNCTIVITIS OF RIGHT EYE, UNSPECIFIED BLEPHAROCONJUNCTIVITIS TYPE: ICD-10-CM

## 2023-01-25 DIAGNOSIS — R94.39 ABNORMAL CARDIOVASCULAR STRESS TEST: ICD-10-CM

## 2023-01-25 LAB
ANION GAP SERPL CALCULATED.3IONS-SCNC: 9 MMOL/L (ref 7–15)
BASOPHILS # BLD AUTO: 0.1 10E3/UL (ref 0–0.2)
BASOPHILS NFR BLD AUTO: 1 %
BUN SERPL-MCNC: 16 MG/DL (ref 8–23)
CALCIUM SERPL-MCNC: 9.6 MG/DL (ref 8.8–10.2)
CHLORIDE SERPL-SCNC: 105 MMOL/L (ref 98–107)
CREAT SERPL-MCNC: 0.6 MG/DL (ref 0.51–0.95)
DEPRECATED HCO3 PLAS-SCNC: 26 MMOL/L (ref 22–29)
EOSINOPHIL # BLD AUTO: 0.2 10E3/UL (ref 0–0.7)
EOSINOPHIL NFR BLD AUTO: 3 %
ERYTHROCYTE [DISTWIDTH] IN BLOOD BY AUTOMATED COUNT: 14 % (ref 10–15)
FLUAV RNA SPEC QL NAA+PROBE: NEGATIVE
FLUBV RNA RESP QL NAA+PROBE: NEGATIVE
GFR SERPL CREATININE-BSD FRML MDRD: 87 ML/MIN/1.73M2
GLUCOSE SERPL-MCNC: 97 MG/DL (ref 70–99)
HCT VFR BLD AUTO: 34.9 % (ref 35–47)
HGB BLD-MCNC: 10.7 G/DL (ref 11.7–15.7)
HOLD SPECIMEN: NORMAL
HOLD SPECIMEN: NORMAL
IMM GRANULOCYTES # BLD: 0 10E3/UL
IMM GRANULOCYTES NFR BLD: 0 %
LYMPHOCYTES # BLD AUTO: 1.5 10E3/UL (ref 0.8–5.3)
LYMPHOCYTES NFR BLD AUTO: 22 %
MCH RBC QN AUTO: 27.2 PG (ref 26.5–33)
MCHC RBC AUTO-ENTMCNC: 30.7 G/DL (ref 31.5–36.5)
MCV RBC AUTO: 89 FL (ref 78–100)
MONOCYTES # BLD AUTO: 0.7 10E3/UL (ref 0–1.3)
MONOCYTES NFR BLD AUTO: 11 %
NEUTROPHILS # BLD AUTO: 4.2 10E3/UL (ref 1.6–8.3)
NEUTROPHILS NFR BLD AUTO: 63 %
NRBC # BLD AUTO: 0 10E3/UL
NRBC BLD AUTO-RTO: 0 /100
NT-PROBNP SERPL-MCNC: 1244 PG/ML (ref 0–1800)
PLATELET # BLD AUTO: 208 10E3/UL (ref 150–450)
POTASSIUM SERPL-SCNC: 4.3 MMOL/L (ref 3.4–5.3)
RBC # BLD AUTO: 3.94 10E6/UL (ref 3.8–5.2)
RSV RNA SPEC NAA+PROBE: NEGATIVE
SARS-COV-2 RNA RESP QL NAA+PROBE: NEGATIVE
SODIUM SERPL-SCNC: 140 MMOL/L (ref 136–145)
TROPONIN T SERPL HS-MCNC: 18 NG/L
WBC # BLD AUTO: 6.7 10E3/UL (ref 4–11)

## 2023-01-25 PROCEDURE — 84484 ASSAY OF TROPONIN QUANT: CPT | Performed by: EMERGENCY MEDICINE

## 2023-01-25 PROCEDURE — 96374 THER/PROPH/DIAG INJ IV PUSH: CPT

## 2023-01-25 PROCEDURE — 250N000013 HC RX MED GY IP 250 OP 250 PS 637: Performed by: EMERGENCY MEDICINE

## 2023-01-25 PROCEDURE — 80048 BASIC METABOLIC PNL TOTAL CA: CPT | Performed by: EMERGENCY MEDICINE

## 2023-01-25 PROCEDURE — G0378 HOSPITAL OBSERVATION PER HR: HCPCS

## 2023-01-25 PROCEDURE — 250N000009 HC RX 250: Performed by: EMERGENCY MEDICINE

## 2023-01-25 PROCEDURE — 83880 ASSAY OF NATRIURETIC PEPTIDE: CPT | Performed by: EMERGENCY MEDICINE

## 2023-01-25 PROCEDURE — 36415 COLL VENOUS BLD VENIPUNCTURE: CPT | Performed by: EMERGENCY MEDICINE

## 2023-01-25 PROCEDURE — C9803 HOPD COVID-19 SPEC COLLECT: HCPCS

## 2023-01-25 PROCEDURE — 250N000011 HC RX IP 250 OP 636: Performed by: EMERGENCY MEDICINE

## 2023-01-25 PROCEDURE — 94640 AIRWAY INHALATION TREATMENT: CPT

## 2023-01-25 PROCEDURE — 87637 SARSCOV2&INF A&B&RSV AMP PRB: CPT | Performed by: EMERGENCY MEDICINE

## 2023-01-25 PROCEDURE — 85025 COMPLETE CBC W/AUTO DIFF WBC: CPT | Performed by: EMERGENCY MEDICINE

## 2023-01-25 PROCEDURE — 80061 LIPID PANEL: CPT | Performed by: INTERNAL MEDICINE

## 2023-01-25 PROCEDURE — 74177 CT ABD & PELVIS W/CONTRAST: CPT

## 2023-01-25 PROCEDURE — 99285 EMERGENCY DEPT VISIT HI MDM: CPT | Mod: CS,25

## 2023-01-25 PROCEDURE — 99222 1ST HOSP IP/OBS MODERATE 55: CPT | Performed by: INTERNAL MEDICINE

## 2023-01-25 PROCEDURE — 93005 ELECTROCARDIOGRAM TRACING: CPT

## 2023-01-25 RX ORDER — HYDRALAZINE HYDROCHLORIDE 25 MG/1
25 TABLET, FILM COATED ORAL ONCE
Status: COMPLETED | OUTPATIENT
Start: 2023-01-25 | End: 2023-01-25

## 2023-01-25 RX ORDER — METOPROLOL SUCCINATE 25 MG/1
25 TABLET, EXTENDED RELEASE ORAL ONCE
Status: COMPLETED | OUTPATIENT
Start: 2023-01-25 | End: 2023-01-25

## 2023-01-25 RX ORDER — LANOLIN ALCOHOL/MO/W.PET/CERES
3 CREAM (GRAM) TOPICAL
Status: DISCONTINUED | OUTPATIENT
Start: 2023-01-25 | End: 2023-01-29 | Stop reason: HOSPADM

## 2023-01-25 RX ORDER — NITROGLYCERIN 0.4 MG/1
0.4 TABLET SUBLINGUAL
Status: DISCONTINUED | OUTPATIENT
Start: 2023-01-25 | End: 2023-01-25

## 2023-01-25 RX ORDER — ONDANSETRON 2 MG/ML
4 INJECTION INTRAMUSCULAR; INTRAVENOUS EVERY 6 HOURS PRN
Status: DISCONTINUED | OUTPATIENT
Start: 2023-01-25 | End: 2023-01-29 | Stop reason: HOSPADM

## 2023-01-25 RX ORDER — ASPIRIN 81 MG/1
324 TABLET, CHEWABLE ORAL DAILY
Status: DISCONTINUED | OUTPATIENT
Start: 2023-01-25 | End: 2023-01-29 | Stop reason: HOSPADM

## 2023-01-25 RX ORDER — IOPAMIDOL 755 MG/ML
120 INJECTION, SOLUTION INTRAVASCULAR ONCE
Status: COMPLETED | OUTPATIENT
Start: 2023-01-25 | End: 2023-01-25

## 2023-01-25 RX ORDER — ATORVASTATIN CALCIUM 10 MG/1
20 TABLET, FILM COATED ORAL ONCE
Status: COMPLETED | OUTPATIENT
Start: 2023-01-26 | End: 2023-01-26

## 2023-01-25 RX ORDER — PROCHLORPERAZINE 25 MG
12.5 SUPPOSITORY, RECTAL RECTAL EVERY 12 HOURS PRN
Status: DISCONTINUED | OUTPATIENT
Start: 2023-01-25 | End: 2023-01-29 | Stop reason: HOSPADM

## 2023-01-25 RX ORDER — PROCHLORPERAZINE MALEATE 5 MG
5 TABLET ORAL EVERY 6 HOURS PRN
Status: DISCONTINUED | OUTPATIENT
Start: 2023-01-25 | End: 2023-01-29 | Stop reason: HOSPADM

## 2023-01-25 RX ORDER — NITROGLYCERIN 0.1MG/HR
1 PATCH, TRANSDERMAL 24 HOURS TRANSDERMAL DAILY
Status: DISCONTINUED | OUTPATIENT
Start: 2023-01-25 | End: 2023-01-27

## 2023-01-25 RX ORDER — IPRATROPIUM BROMIDE AND ALBUTEROL SULFATE 2.5; .5 MG/3ML; MG/3ML
3 SOLUTION RESPIRATORY (INHALATION)
Status: DISCONTINUED | OUTPATIENT
Start: 2023-01-25 | End: 2023-01-25

## 2023-01-25 RX ORDER — HYDRALAZINE HYDROCHLORIDE 20 MG/ML
5 INJECTION INTRAMUSCULAR; INTRAVENOUS ONCE
Status: DISCONTINUED | OUTPATIENT
Start: 2023-01-25 | End: 2023-01-25

## 2023-01-25 RX ORDER — ONDANSETRON 4 MG/1
4 TABLET, ORALLY DISINTEGRATING ORAL EVERY 6 HOURS PRN
Status: DISCONTINUED | OUTPATIENT
Start: 2023-01-25 | End: 2023-01-29 | Stop reason: HOSPADM

## 2023-01-25 RX ORDER — DOCUSATE SODIUM 100 MG/1
100 CAPSULE, LIQUID FILLED ORAL 2 TIMES DAILY
Status: DISCONTINUED | OUTPATIENT
Start: 2023-01-26 | End: 2023-01-29 | Stop reason: HOSPADM

## 2023-01-25 RX ORDER — METOPROLOL TARTRATE 25 MG/1
25 TABLET, FILM COATED ORAL 2 TIMES DAILY
Status: COMPLETED | OUTPATIENT
Start: 2023-01-26 | End: 2023-01-26

## 2023-01-25 RX ORDER — DIAZEPAM 2 MG
2 TABLET ORAL EVERY 6 HOURS PRN
Status: DISCONTINUED | OUTPATIENT
Start: 2023-01-25 | End: 2023-01-29 | Stop reason: HOSPADM

## 2023-01-25 RX ORDER — MORPHINE SULFATE 2 MG/ML
2 INJECTION, SOLUTION INTRAMUSCULAR; INTRAVENOUS EVERY 4 HOURS PRN
Status: DISCONTINUED | OUTPATIENT
Start: 2023-01-25 | End: 2023-01-29 | Stop reason: HOSPADM

## 2023-01-25 RX ORDER — HYDRALAZINE HYDROCHLORIDE 20 MG/ML
5 INJECTION INTRAMUSCULAR; INTRAVENOUS ONCE
Status: COMPLETED | OUTPATIENT
Start: 2023-01-25 | End: 2023-01-25

## 2023-01-25 RX ADMIN — METOPROLOL SUCCINATE 25 MG: 25 TABLET, EXTENDED RELEASE ORAL at 23:16

## 2023-01-25 RX ADMIN — HYDRALAZINE HYDROCHLORIDE 25 MG: 25 TABLET, FILM COATED ORAL at 23:16

## 2023-01-25 RX ADMIN — SODIUM CHLORIDE 60 ML: 9 INJECTION, SOLUTION INTRAVENOUS at 20:21

## 2023-01-25 RX ADMIN — ASPIRIN 81 MG CHEWABLE TABLET 324 MG: 81 TABLET CHEWABLE at 23:17

## 2023-01-25 RX ADMIN — IOPAMIDOL 80 ML: 755 INJECTION, SOLUTION INTRAVENOUS at 20:20

## 2023-01-25 RX ADMIN — HYDRALAZINE HYDROCHLORIDE 5 MG: 20 INJECTION INTRAMUSCULAR; INTRAVENOUS at 23:18

## 2023-01-25 RX ADMIN — NITROGLYCERIN 0.4 MG: 0.4 TABLET SUBLINGUAL at 19:37

## 2023-01-25 RX ADMIN — IPRATROPIUM BROMIDE AND ALBUTEROL SULFATE 3 ML: 2.5; .5 SOLUTION RESPIRATORY (INHALATION) at 19:25

## 2023-01-25 ASSESSMENT — ENCOUNTER SYMPTOMS
ARTHRALGIAS: 1
SHORTNESS OF BREATH: 1
NECK PAIN: 1
NERVOUS/ANXIOUS: 1
FATIGUE: 1

## 2023-01-25 ASSESSMENT — ACTIVITIES OF DAILY LIVING (ADL)
ADLS_ACUITY_SCORE: 35

## 2023-01-25 NOTE — ED NOTES
Bed: ED33  Expected date: 1/25/23  Expected time: 4:31 PM  Means of arrival: Ambulance  Comments:  [id number]  [id number]

## 2023-01-26 ENCOUNTER — APPOINTMENT (OUTPATIENT)
Dept: NUCLEAR MEDICINE | Facility: CLINIC | Age: 88
DRG: 306 | End: 2023-01-26
Attending: INTERNAL MEDICINE
Payer: COMMERCIAL

## 2023-01-26 ENCOUNTER — APPOINTMENT (OUTPATIENT)
Dept: CARDIOLOGY | Facility: CLINIC | Age: 88
DRG: 306 | End: 2023-01-26
Attending: INTERNAL MEDICINE
Payer: COMMERCIAL

## 2023-01-26 PROBLEM — I71.21 ANEURYSM OF ASCENDING AORTA WITHOUT RUPTURE (H): Status: ACTIVE | Noted: 2023-01-26

## 2023-01-26 PROBLEM — I16.0 HYPERTENSIVE URGENCY: Status: ACTIVE | Noted: 2023-01-26

## 2023-01-26 PROBLEM — I21.4 NON-STEMI (NON-ST ELEVATED MYOCARDIAL INFARCTION) (H): Status: ACTIVE | Noted: 2023-01-26

## 2023-01-26 LAB
ANION GAP SERPL CALCULATED.3IONS-SCNC: 9 MMOL/L (ref 7–15)
ATRIAL RATE - MUSE: 66 BPM
BUN SERPL-MCNC: 13.3 MG/DL (ref 8–23)
CALCIUM SERPL-MCNC: 9.7 MG/DL (ref 8.8–10.2)
CHLORIDE SERPL-SCNC: 103 MMOL/L (ref 98–107)
CHOLEST SERPL-MCNC: 114 MG/DL
CREAT SERPL-MCNC: 0.53 MG/DL (ref 0.51–0.95)
CV STRESS MAX HR HE: 89
DEPRECATED HCO3 PLAS-SCNC: 26 MMOL/L (ref 22–29)
DIASTOLIC BLOOD PRESSURE - MUSE: NORMAL MMHG
ERYTHROCYTE [DISTWIDTH] IN BLOOD BY AUTOMATED COUNT: 14.1 % (ref 10–15)
GFR SERPL CREATININE-BSD FRML MDRD: 90 ML/MIN/1.73M2
GLUCOSE SERPL-MCNC: 106 MG/DL (ref 70–99)
HCT VFR BLD AUTO: 36.6 % (ref 35–47)
HDLC SERPL-MCNC: 46 MG/DL
HGB BLD-MCNC: 11.2 G/DL (ref 11.7–15.7)
INTERPRETATION ECG - MUSE: NORMAL
LDLC SERPL CALC-MCNC: 49 MG/DL
LVEF ECHO: NORMAL
MCH RBC QN AUTO: 27.6 PG (ref 26.5–33)
MCHC RBC AUTO-ENTMCNC: 30.6 G/DL (ref 31.5–36.5)
MCV RBC AUTO: 90 FL (ref 78–100)
NONHDLC SERPL-MCNC: 68 MG/DL
NUC STRESS EJECTION FRACTION: 57 %
P AXIS - MUSE: 48 DEGREES
PLATELET # BLD AUTO: 197 10E3/UL (ref 150–450)
POTASSIUM SERPL-SCNC: 4.4 MMOL/L (ref 3.4–5.3)
PR INTERVAL - MUSE: 194 MS
QRS DURATION - MUSE: 104 MS
QT - MUSE: 436 MS
QTC - MUSE: 457 MS
R AXIS - MUSE: -22 DEGREES
RATE PRESSURE PRODUCT: NORMAL
RBC # BLD AUTO: 4.06 10E6/UL (ref 3.8–5.2)
SODIUM SERPL-SCNC: 138 MMOL/L (ref 136–145)
STRESS ECHO BASELINE DIASTOLIC HE: 63
STRESS ECHO BASELINE HR: 66 BPM
STRESS ECHO BASELINE SYSTOLIC BP: 174
STRESS ECHO CALCULATED PERCENT HR: 66 %
STRESS ECHO LAST STRESS DIASTOLIC BP: 65
STRESS ECHO LAST STRESS SYSTOLIC BP: 148
STRESS ECHO TARGET HR: 135
SYSTOLIC BLOOD PRESSURE - MUSE: NORMAL MMHG
T AXIS - MUSE: 73 DEGREES
TRIGL SERPL-MCNC: 93 MG/DL
TROPONIN T SERPL HS-MCNC: 20 NG/L
TROPONIN T SERPL HS-MCNC: 21 NG/L
VENTRICULAR RATE- MUSE: 66 BPM
WBC # BLD AUTO: 8.7 10E3/UL (ref 4–11)

## 2023-01-26 PROCEDURE — 80048 BASIC METABOLIC PNL TOTAL CA: CPT | Performed by: INTERNAL MEDICINE

## 2023-01-26 PROCEDURE — 93016 CV STRESS TEST SUPVJ ONLY: CPT | Performed by: INTERNAL MEDICINE

## 2023-01-26 PROCEDURE — 93306 TTE W/DOPPLER COMPLETE: CPT | Mod: 26 | Performed by: INTERNAL MEDICINE

## 2023-01-26 PROCEDURE — 250N000009 HC RX 250: Performed by: INTERNAL MEDICINE

## 2023-01-26 PROCEDURE — 78452 HT MUSCLE IMAGE SPECT MULT: CPT

## 2023-01-26 PROCEDURE — 84484 ASSAY OF TROPONIN QUANT: CPT | Performed by: INTERNAL MEDICINE

## 2023-01-26 PROCEDURE — A9502 TC99M TETROFOSMIN: HCPCS | Performed by: INTERNAL MEDICINE

## 2023-01-26 PROCEDURE — 250N000009 HC RX 250: Performed by: EMERGENCY MEDICINE

## 2023-01-26 PROCEDURE — 250N000011 HC RX IP 250 OP 636: Performed by: INTERNAL MEDICINE

## 2023-01-26 PROCEDURE — 250N000013 HC RX MED GY IP 250 OP 250 PS 637: Performed by: INTERNAL MEDICINE

## 2023-01-26 PROCEDURE — 93018 CV STRESS TEST I&R ONLY: CPT | Performed by: INTERNAL MEDICINE

## 2023-01-26 PROCEDURE — 343N000001 HC RX 343: Performed by: INTERNAL MEDICINE

## 2023-01-26 PROCEDURE — 250N000011 HC RX IP 250 OP 636

## 2023-01-26 PROCEDURE — G0463 HOSPITAL OUTPT CLINIC VISIT: HCPCS

## 2023-01-26 PROCEDURE — 99232 SBSQ HOSP IP/OBS MODERATE 35: CPT | Performed by: INTERNAL MEDICINE

## 2023-01-26 PROCEDURE — 93017 CV STRESS TEST TRACING ONLY: CPT

## 2023-01-26 PROCEDURE — 78452 HT MUSCLE IMAGE SPECT MULT: CPT | Mod: 26 | Performed by: INTERNAL MEDICINE

## 2023-01-26 PROCEDURE — 93306 TTE W/DOPPLER COMPLETE: CPT

## 2023-01-26 PROCEDURE — 85027 COMPLETE CBC AUTOMATED: CPT | Performed by: INTERNAL MEDICINE

## 2023-01-26 PROCEDURE — 96376 TX/PRO/DX INJ SAME DRUG ADON: CPT

## 2023-01-26 PROCEDURE — G0378 HOSPITAL OBSERVATION PER HR: HCPCS

## 2023-01-26 PROCEDURE — 120N000001 HC R&B MED SURG/OB

## 2023-01-26 PROCEDURE — 36415 COLL VENOUS BLD VENIPUNCTURE: CPT | Performed by: INTERNAL MEDICINE

## 2023-01-26 PROCEDURE — 999N000156 HC STATISTIC RCP CONSULT EA 30 MIN

## 2023-01-26 RX ORDER — METOPROLOL SUCCINATE 25 MG/1
25 TABLET, EXTENDED RELEASE ORAL 2 TIMES DAILY
Status: DISCONTINUED | OUTPATIENT
Start: 2023-01-26 | End: 2023-01-29 | Stop reason: HOSPADM

## 2023-01-26 RX ORDER — ATORVASTATIN CALCIUM 20 MG/1
20 TABLET, FILM COATED ORAL DAILY
Status: DISCONTINUED | OUTPATIENT
Start: 2023-01-26 | End: 2023-01-29 | Stop reason: HOSPADM

## 2023-01-26 RX ORDER — NALOXONE HYDROCHLORIDE 0.4 MG/ML
0.4 INJECTION, SOLUTION INTRAMUSCULAR; INTRAVENOUS; SUBCUTANEOUS
Status: DISCONTINUED | OUTPATIENT
Start: 2023-01-26 | End: 2023-01-29 | Stop reason: HOSPADM

## 2023-01-26 RX ORDER — REGADENOSON 0.08 MG/ML
INJECTION, SOLUTION INTRAVENOUS
Status: COMPLETED
Start: 2023-01-26 | End: 2023-01-26

## 2023-01-26 RX ORDER — IPRATROPIUM BROMIDE AND ALBUTEROL SULFATE 2.5; .5 MG/3ML; MG/3ML
3 SOLUTION RESPIRATORY (INHALATION)
Status: DISCONTINUED | OUTPATIENT
Start: 2023-01-26 | End: 2023-01-26

## 2023-01-26 RX ORDER — AMLODIPINE BESYLATE 5 MG/1
5 TABLET ORAL DAILY
Status: DISCONTINUED | OUTPATIENT
Start: 2023-01-26 | End: 2023-01-29 | Stop reason: HOSPADM

## 2023-01-26 RX ORDER — LEVOTHYROXINE SODIUM 100 UG/1
100 TABLET ORAL DAILY
Status: DISCONTINUED | OUTPATIENT
Start: 2023-01-26 | End: 2023-01-29 | Stop reason: HOSPADM

## 2023-01-26 RX ORDER — TORSEMIDE 10 MG/1
10 TABLET ORAL DAILY
Status: DISCONTINUED | OUTPATIENT
Start: 2023-01-26 | End: 2023-01-29 | Stop reason: HOSPADM

## 2023-01-26 RX ORDER — HYDRALAZINE HYDROCHLORIDE 20 MG/ML
5 INJECTION INTRAMUSCULAR; INTRAVENOUS EVERY 6 HOURS PRN
Status: DISCONTINUED | OUTPATIENT
Start: 2023-01-26 | End: 2023-01-29 | Stop reason: HOSPADM

## 2023-01-26 RX ORDER — NALOXONE HYDROCHLORIDE 0.4 MG/ML
0.2 INJECTION, SOLUTION INTRAMUSCULAR; INTRAVENOUS; SUBCUTANEOUS
Status: DISCONTINUED | OUTPATIENT
Start: 2023-01-26 | End: 2023-01-29 | Stop reason: HOSPADM

## 2023-01-26 RX ORDER — CARBOXYMETHYLCELLULOSE SODIUM 5 MG/ML
1 SOLUTION/ DROPS OPHTHALMIC 3 TIMES DAILY PRN
Status: DISCONTINUED | OUTPATIENT
Start: 2023-01-26 | End: 2023-01-29 | Stop reason: HOSPADM

## 2023-01-26 RX ORDER — ACETAMINOPHEN 325 MG/1
650 TABLET ORAL EVERY 4 HOURS PRN
Status: DISCONTINUED | OUTPATIENT
Start: 2023-01-26 | End: 2023-01-29 | Stop reason: HOSPADM

## 2023-01-26 RX ORDER — HYDRALAZINE HYDROCHLORIDE 25 MG/1
25 TABLET, FILM COATED ORAL EVERY 8 HOURS SCHEDULED
Status: DISCONTINUED | OUTPATIENT
Start: 2023-01-26 | End: 2023-01-29 | Stop reason: HOSPADM

## 2023-01-26 RX ORDER — IPRATROPIUM BROMIDE AND ALBUTEROL SULFATE 2.5; .5 MG/3ML; MG/3ML
3 SOLUTION RESPIRATORY (INHALATION) EVERY 4 HOURS PRN
Status: DISCONTINUED | OUTPATIENT
Start: 2023-01-26 | End: 2023-01-29 | Stop reason: HOSPADM

## 2023-01-26 RX ORDER — ACETAMINOPHEN 650 MG/1
650 SUPPOSITORY RECTAL EVERY 4 HOURS PRN
Status: DISCONTINUED | OUTPATIENT
Start: 2023-01-26 | End: 2023-01-29 | Stop reason: HOSPADM

## 2023-01-26 RX ORDER — TRAZODONE HYDROCHLORIDE 50 MG/1
50 TABLET, FILM COATED ORAL AT BEDTIME
Status: DISCONTINUED | OUTPATIENT
Start: 2023-01-26 | End: 2023-01-29 | Stop reason: HOSPADM

## 2023-01-26 RX ADMIN — TETROFOSMIN 11 MCI.: 1.38 INJECTION, POWDER, LYOPHILIZED, FOR SOLUTION INTRAVENOUS at 07:57

## 2023-01-26 RX ADMIN — DIAZEPAM 2 MG: 2 TABLET ORAL at 00:20

## 2023-01-26 RX ADMIN — REGADENOSON 0.4 MG: 0.08 INJECTION, SOLUTION INTRAVENOUS at 09:55

## 2023-01-26 RX ADMIN — DOCUSATE SODIUM 100 MG: 100 CAPSULE, LIQUID FILLED ORAL at 00:21

## 2023-01-26 RX ADMIN — TORSEMIDE 10 MG: 10 TABLET ORAL at 17:27

## 2023-01-26 RX ADMIN — LEVOTHYROXINE SODIUM 100 MCG: 0.1 TABLET ORAL at 17:27

## 2023-01-26 RX ADMIN — HYDRALAZINE HYDROCHLORIDE 25 MG: 25 TABLET, FILM COATED ORAL at 15:51

## 2023-01-26 RX ADMIN — IPRATROPIUM BROMIDE AND ALBUTEROL SULFATE 3 ML: 2.5; .5 SOLUTION RESPIRATORY (INHALATION) at 01:21

## 2023-01-26 RX ADMIN — METOPROLOL SUCCINATE 25 MG: 25 TABLET, EXTENDED RELEASE ORAL at 20:17

## 2023-01-26 RX ADMIN — ATORVASTATIN CALCIUM 20 MG: 10 TABLET, FILM COATED ORAL at 00:20

## 2023-01-26 RX ADMIN — ATORVASTATIN CALCIUM 20 MG: 20 TABLET, FILM COATED ORAL at 20:17

## 2023-01-26 RX ADMIN — ASPIRIN 81 MG CHEWABLE TABLET 324 MG: 81 TABLET CHEWABLE at 11:29

## 2023-01-26 RX ADMIN — DOCUSATE SODIUM 100 MG: 100 CAPSULE, LIQUID FILLED ORAL at 20:17

## 2023-01-26 RX ADMIN — Medication 1 DROP: at 17:28

## 2023-01-26 RX ADMIN — IPRATROPIUM BROMIDE AND ALBUTEROL SULFATE 3 ML: 2.5; .5 SOLUTION RESPIRATORY (INHALATION) at 15:51

## 2023-01-26 RX ADMIN — IPRATROPIUM BROMIDE AND ALBUTEROL SULFATE 3 ML: 2.5; .5 SOLUTION RESPIRATORY (INHALATION) at 11:31

## 2023-01-26 RX ADMIN — NITROGLYCERIN 1 PATCH: 0.1 PATCH TRANSDERMAL at 00:20

## 2023-01-26 RX ADMIN — NITROGLYCERIN 1 PATCH: 0.1 PATCH TRANSDERMAL at 12:59

## 2023-01-26 RX ADMIN — METOPROLOL TARTRATE 25 MG: 25 TABLET, FILM COATED ORAL at 00:23

## 2023-01-26 RX ADMIN — ACETAMINOPHEN 650 MG: 325 TABLET, FILM COATED ORAL at 13:31

## 2023-01-26 RX ADMIN — HYDRALAZINE HYDROCHLORIDE 5 MG: 20 INJECTION INTRAMUSCULAR; INTRAVENOUS at 12:58

## 2023-01-26 RX ADMIN — TETROFOSMIN 33 MCI.: 1.38 INJECTION, POWDER, LYOPHILIZED, FOR SOLUTION INTRAVENOUS at 09:50

## 2023-01-26 RX ADMIN — AMLODIPINE BESYLATE 5 MG: 5 TABLET ORAL at 12:59

## 2023-01-26 RX ADMIN — TRAZODONE HYDROCHLORIDE 50 MG: 50 TABLET ORAL at 21:40

## 2023-01-26 RX ADMIN — METOPROLOL TARTRATE 25 MG: 25 TABLET, FILM COATED ORAL at 11:30

## 2023-01-26 ASSESSMENT — ACTIVITIES OF DAILY LIVING (ADL)
ADLS_ACUITY_SCORE: 43
ADLS_ACUITY_SCORE: 35
ADLS_ACUITY_SCORE: 35
ADLS_ACUITY_SCORE: 34
ADLS_ACUITY_SCORE: 28
ADLS_ACUITY_SCORE: 43
ADLS_ACUITY_SCORE: 43
ADLS_ACUITY_SCORE: 28
ADLS_ACUITY_SCORE: 43
ADLS_ACUITY_SCORE: 35
ADLS_ACUITY_SCORE: 34
ADLS_ACUITY_SCORE: 43

## 2023-01-26 NOTE — UTILIZATION REVIEW
Admission Status; Secondary Review Determination    Under the authority of the Utilization Management Committee, the utilization review process indicated a secondary review on the above patient. The review outcome is based on review of the medical records, discussions with staff, and applying clinical experience noted on the date of the review.    (x) Inpatient Status Appropriate - This patient's medical care is consistent with medical management for inpatient care and reasonable inpatient medical practice.    RATIONALE FOR DETERMINATION: 87-year-old female with history of COPD, aortic valve disease with aortic aneurysm, hypertension who presented to hospital with significant new left shoulder/arm pain that radiates to the left side of the neck along with a new cough.  Patient has ongoing shortness of breath, fatigue and was found to have severe hypertension in the emergency room with initial blood pressure 218/72 with consistent recurrent systolic blood pressures in the 190+ range over the next 8 hours.  With treatment including intravenous and oral hydralazine, metoprolol as well as nitroglycerin.  On hospital day 2 patient continued to have significant hypertension with midday systolic blood pressure 187 requiring intravenous hydralazine in addition to oral medications.  Patient is not safe for discharge and will require greater than 2 nights in the hospital to evaluate patient's chest pain in addition to adequately control blood pressure appropriate for inpatient care.    At the time of admission with the information available to the attending physician more than 2 nights Hospital complex care was anticipated, based on patient risk of adverse outcome if treated as outpatient and complex care required. Inpatient admission is appropriate based on the Medicare guidelines.    This document was produced using voice recognition software    The information on this document is developed by the utilization review  team in order for the business office to ensure compliance. This only denotes the appropriateness of proper admission status and does not reflect the quality of care rendered.    The definitions of Inpatient Status and Observation Status used in making the determination above are those provided in the CMS Coverage Manual, Chapter 1 and Chapter 6, section 70.4.    Sincerely,    Stevie Sparrow MD  Utilization Review  Physician Advisor  [facility].

## 2023-01-26 NOTE — ED PROVIDER NOTES
History   Chief Complaint:  Neck Pain    The history is provided by the patient.      Giselle Chino is a 87 year old female with a history of chronic obstructive pulmonary disease, aortic valve disorder, aortic aneurysm who presents with left-sided neck pain. Giselle reports left shoulder/arm pain that radiates to the left-sided neck and face onset this morning. She explains that she had laid down, gotten up, and needed to lay back down because she was not feeling well. She presently describes 5/10 in severity pressure to the left arm and left-sided neck. She additionally reports cough onset this morning and shortness of breath. Denies fevers, known COVID exposure, and leg pain or swelling. Giselle mentions significant anxiety preventing her from relaxing. Denies suicidal thoughts and active mental health counseling.     Independent Historian:   The patient is an independent historian and I also spoke with her daughter.    ROS:  Review of Systems   Constitutional: Positive for fatigue.   Respiratory: Positive for shortness of breath.    Cardiovascular: Negative for leg swelling.   Musculoskeletal: Positive for arthralgias and neck pain.   Psychiatric/Behavioral: Negative for suicidal ideas. The patient is nervous/anxious.    All other systems reviewed and are negative.    Allergies:  Amoxicillin  Piperacillin-Tazobactam In D5w     Medications:    Atorvastatin   Hydralazine   Duoneb   Levothyroxine   Metoprolol   Oxybutynin   Torsemide  Trazodone    Past Medical History:    Right adrenal mass  Aortic root dilatation   Aortic valve disorder  Cholelithiasis   Confusion   Chronic obstructive pulmonary disease   Diverticulosis large intestine  Fatty liver  Hyperlipidemia   Hypertension   Orthostatic hypotension   Paroxysmal supraventricular tachycardia   Thoracic aneurysm   Thoracoabdominal aneurysm   Thyroid disease   Overactive bladder  Ascending aortic aneurysm   Urine incontinence     Past Surgical History:     Skin graft, full thickness, head/neck     Family History:    Her family history includes Aneurysm in an other family member; Bone Cancer in her sister; C.A.D. in her father; Heart Failure in her mother; Mental Illness in her brother.    Social History:  She reports that she quit smoking about 14 years ago. Her smoking use included cigarettes. She has a 30.00 pack-year smoking history. She has never used smokeless tobacco. She reports that she does not currently use alcohol after a past usage of about 5.8 standard drinks per week. She reports that she does not use drugs.  PCP: Shoaib Mills, DO    Physical Exam     Patient Vitals for the past 24 hrs:   BP Temp Temp src Pulse Resp SpO2   01/25/23 2300 (!) 193/65 -- -- 64 22 94 %   01/25/23 2245 -- -- -- 65 27 95 %   01/25/23 2230 (!) 202/72 -- -- 66 15 94 %   01/25/23 2200 (!) 168/59 -- -- 58 19 92 %   01/25/23 2145 -- -- -- 61 26 --   01/25/23 2130 (!) 169/53 -- -- 60 28 94 %   01/25/23 2100 (!) 189/64 -- -- 63 18 91 %   01/25/23 2045 -- -- -- 65 23 92 %   01/25/23 2030 -- -- -- -- -- 93 %   01/25/23 2015 -- -- -- 68 26 95 %   01/25/23 2000 (!) 205/68 -- -- 67 14 93 %   01/25/23 1955 (!) 205/68 -- -- 67 10 92 %   01/25/23 1945 (!) 168/69 -- -- 70 17 92 %   01/25/23 1940 (!) 163/67 -- -- 71 17 93 %   01/25/23 1845 -- -- -- 66 26 93 %   01/25/23 1830 -- -- -- 63 15 94 %   01/25/23 1815 -- -- -- 66 11 93 %   01/25/23 1800 (!) 196/60 -- -- 67 22 94 %   01/25/23 1730 -- -- -- 65 12 93 %   01/25/23 1715 -- -- -- 63 10 96 %   01/25/23 1700 -- -- -- 65 11 95 %   01/25/23 1647 (!) 218/72 97.8  F (36.6  C) Oral 66 20 96 %     Physical Exam  Nursing note and vitals reviewed.  Constitutional:  Appears well-developed and well-nourished.   HENT:   Head:    Atraumatic.   Mouth/Throat:   Oropharynx is clear and moist. No oropharyngeal exudate.   Eyes:    Pupils are equal, round, and reactive to light.   Neck:    Normal range of motion. Neck supple.      No tracheal deviation  present. No thyromegaly present.   Cardiovascular:  Normal rate, regular rhythm, loud holo-systolic murmur heard best on right upper sternal border. Left radial pulses more prominent and bounding compared to right.   Pulmonary/Chest: Breath sounds are clear and equal without wheezes or crackles.  Abdominal:   Soft. Bowel sounds are normal. Exhibits no distension and      no mass. There is no tenderness.      There is no rebound and no guarding.   Musculoskeletal:  Exhibits no edema.   Lymphadenopathy:  No cervical adenopathy.   Neurological:   Alert and oriented to person, place, and time. GCS 15.  CN 2-12 intact.  and proximal upper extremity strength strong and equal.  Bilateral lower extremity strength strong and equal, including strong dorsiflexion and plantarflexion strength.  Sensation intact and equal to the face, arms and legs.  No facial droop or weakness. Normal speech.  Follows commands and answers questions normally.    Skin:    Skin is warm and dry. No rash noted. No pallor.     Emergency Department Course   ECG:  ECG taken at 1917, ECG read at 1919  Normal sinus rhythm   Left ventricular hypertrophy with repolarization abnormality   Abnormal ECG    Rate 66 bpm. MI interval 194 ms. QRS duration 104 ms. QT/QTc 436/457 ms. P-R-T axes 48 -22 73.     Imaging:  CT Aortic Survey w Contrast   Final Result   IMPRESSION:   1.  Large ascending aortic aneurysm measuring 8.3 cm, previously 7.9 cm on 11/11/2019.   2.  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   3.  Small pericardial effusion also noted on the prior CT scan.            Report per radiology    Laboratory:  Labs Ordered and Resulted from Time of ED Arrival to Time of ED Departure   TROPONIN T, HIGH SENSITIVITY - Abnormal       Result Value    Troponin T, High Sensitivity 18 (*)    CBC WITH PLATELETS AND DIFFERENTIAL - Abnormal    WBC Count 6.7      RBC Count 3.94      Hemoglobin  10.7 (*)     Hematocrit 34.9 (*)     MCV 89      MCH 27.2      MCHC 30.7 (*)     RDW 14.0      Platelet Count 208      % Neutrophils 63      % Lymphocytes 22      % Monocytes 11      % Eosinophils 3      % Basophils 1      % Immature Granulocytes 0      NRBCs per 100 WBC 0      Absolute Neutrophils 4.2      Absolute Lymphocytes 1.5      Absolute Monocytes 0.7      Absolute Eosinophils 0.2      Absolute Basophils 0.1      Absolute Immature Granulocytes 0.0      Absolute NRBCs 0.0     BASIC METABOLIC PANEL - Normal    Sodium 140      Potassium 4.3      Chloride 105      Carbon Dioxide (CO2) 26      Anion Gap 9      Urea Nitrogen 16.0      Creatinine 0.60      Calcium 9.6      Glucose 97      GFR Estimate 87     INFLUENZA A/B & SARS-COV2 PCR MULTIPLEX - Normal    Influenza A PCR Negative      Influenza B PCR Negative      RSV PCR Negative      SARS CoV2 PCR Negative     NT PROBNP INPATIENT - Normal    N terminal Pro BNP Inpatient 1,244       Emergency Department Course & Assessments:    Interventions:  Medications   ipratropium - albuterol 0.5 mg/2.5 mg/3 mL (DUONEB) neb solution 3 mL (3 mLs Nebulization Given 1/25/23 1925)   nitroGLYcerin (NITROSTAT) sublingual tablet 0.4 mg (0.4 mg Sublingual Given 1/1937)   aspirin (ASA) chewable tablet 324 mg (324 mg Oral Given 1/25/23 2317)   iopamidol (ISOVUE-370) solution 120 mL (80 mLs Intravenous Given 1/25/23 2020)   Saline CT scan flush (60 mLs Intravenous Given 1/25/23 2021)   hydrALAZINE (APRESOLINE) tablet 25 mg (25 mg Oral Given 1/25/23 2316)   metoprolol succinate ER (TOPROL XL) 24 hr tablet 25 mg (25 mg Oral Given 1/25/23 2316)   hydrALAZINE (APRESOLINE) injection 5 mg (5 mg Intravenous Given 1/25/23 2318)     Consultations/Discussion of Management or Tests:  ED Course as of 01/25/23 2337 Wed Jan 25, 2023 2233 I consulted with Dr. Corley, hospitalist, regarding the patient's history and presentation here in the emergency department who accepted the patient for  admission.     Assessments:  1859 I obtained history and examined the patient as noted above.   2028 I rechecked the patient and explained findings. I discussed admission with the patient. All questions answered.     Disposition:  The patient was admitted to the hospital under the care of Dr. Corley.     Impression & Plan    CMS Diagnoses:  HEART Score  Background  Calculates the overall risk of adverse event in patient's presenting with chest pain.  Based on 5 criteria (each assigned 0-2 points) including suspiciousness of history, EKG, age, risk factors and troponin.    Data  87 year old female  has Benign essential hypertension; Aneurysm of thoracic aorta; Aortic valve disorder; Hyperlipidemia; Orthostatic hypotension; Thyroid disease; NONSPECIFIC MEDICAL HISTORY; COPD (chronic obstructive pulmonary disease) (H); Adrenal mass, right (H); Hypoxemic respiratory failure, chronic (H); Aortic root dilatation (H); Shortness of breath; and Generalized muscle weakness on their problem list.   reports that she quit smoking about 14 years ago. Her smoking use included cigarettes. She has a 30.00 pack-year smoking history. She has never used smokeless tobacco.  family history includes Aneurysm in an other family member; Bone Cancer in her sister; C.A.D. in her father; Heart Failure in her mother; Mental Illness in her brother.  Recent Labs   Lab 01/25/23  1831   CTROPT 18*     Criteria   0-2 points for each of 5 items (maximum of 10 points):  Score 2- History highly suspicious for coronary syndrome  Score 1- EKG with Non-specific repolarization disturbance  Score 2- Age 65 years or older  Score 2- Three or more risk factors for or history of atherosclerotic disease  Score 1- One to 2 times the normal troponin upper limit  Interpretation  Risk of adverse outcome  Heart Score: 8  Total Score 7-10- Adverse Outcome Risk 72.7% - Supports early aggressive management, typically including cardiac catheterization    Medical Decision  Making:  This patient arrives due to left-sided chest pain with radiation to her left shoulder and left side of her neck.  She has a history of ascending thoracic aortic aneurysm so is concerned about aortic dissection or rupture and CT aorta was performed and showed a slightly larger aneurysmal dilatation but no dissection or rupture and no significant pericardial effusion.  Her troponin was mildly elevated and her chest pain resolved after nitroglycerin concerning for non-ST elevation myocardial infarction so she was admitted to telemetry floor and will have further cardiac work-up.  Her blood pressure is also significantly elevated which could be contributing factor so she was given antihypertensive medications here she is admitted to the care of the hospitalist service.  I did not feel her symptoms were concerning for pulmonary embolism.  There was no sign of pneumonia or pneumothorax.  There is no sign of stroke.    Diagnosis:    ICD-10-CM    1. Non-STEMI (non-ST elevated myocardial infarction) (H)  I21.4       2. Hypertensive urgency  I16.0       3. Aneurysm of ascending aorta without rupture  I71.21          Scribe Disclosure:  Maria A SERRANO, am serving as a scribe at 7:16 PM on 1/25/2023 to document services personally performed by Hannah Hi MD based on my observations and the provider's statements to me.    1/25/2023   Hannah Hi MD [place], Cheri Lee, MD  01/26/23 0016

## 2023-01-26 NOTE — PLAN OF CARE
Goal Outcome Evaluation:       Pt complained of SOB neb treatment effective, desaturation to the low 90'2 while sleeping placed on 2L NC. BP elevated this AM, Paged for prn, recheck BP did not met parameters. Nitroglycerin patch to left upper chest.

## 2023-01-26 NOTE — PROGRESS NOTES
"[facility] RCAT     Date: 1/26/2023  Admission Dx: chest pain   Pulmonary History COPD  Home Nebulizer/MDI Use: Duoneb Q6 prn  Home Oxygen: none  Acuity Level (RCAT flow sheet): 5   Aerosol Therapy initiated: duoneb Q4 prn      Pulmonary Hygiene initiated: deep breath and coughing technique       Volume Expansion initiated: N/A      Current Oxygen Requirements: 2 lpm  Current SpO2: 92%    Re-evaluation date: 1/29/2023         See \"RT Assessments\" flow sheet for patient assessment scoring and Acuity Level Details.             "

## 2023-01-26 NOTE — ED NOTES
[facility]  ED Nurse Handoff Report    Giselle Chino is a 87 year old female   ED Chief complaint: Neck Pain  . ED Diagnosis:   Final diagnoses:   Non-STEMI (non-ST elevated myocardial infarction) (H)     Allergies:   Allergies   Allergen Reactions     Amoxicillin Rash     Piperacillin-Tazobactam In D5w Rash       Code Status: Full Code  Activity level - Baseline/Home:  Independent. Activity Level - Current:   Assist X 1. Lift room needed: No. Bariatric: No   Needed: No   Isolation: No. Infection: Not Applicable.     Vital Signs:   Vitals:    01/25/23 1800 01/25/23 1815 01/25/23 1830 01/25/23 1845   BP: (!) 196/60      Pulse: 67 66 63 66   Resp: 22 11 15 26   Temp:       TempSrc:       SpO2: 94% 93% 94% 93%       Cardiac Rhythm:  ,      Pain level:    Patient confused: No. Patient Falls Risk: Yes.   Elimination Status: Has voided   Patient Report - Initial Complaint: Neck pain.   Focused Assessment:  Giselle Chion is a 87 year old female with a history of chronic obstructive pulmonary disease, aortic valve disorder, aortic aneurysm who presents with left-sided neck pain. Giselle reports left shoulder/arm pain that radiates to the left-sided neck and face onset this morning. She explains that she had laid down, gotten up, and needed to lay back down because she was not feeling well. She presently describes 5/10 in severity pressure to the left arm and left-sided neck. She additionally reports cough onset this morning and shortness of breath. Denies fevers, known COVID exposure, and leg pain or swelling. Giselle mentions significant anxiety preventing her from relaxing. Denies suicidal thoughts and active mental health counseling.   Tests Performed: Labs, imaging, EKG.   Abnormal Results:   Labs Ordered and Resulted from Time of ED Arrival to Time of ED Departure   TROPONIN T, HIGH SENSITIVITY - Abnormal       Result Value    Troponin T, High Sensitivity 18 (*)    CBC WITH PLATELETS AND  DIFFERENTIAL - Abnormal    WBC Count 6.7      RBC Count 3.94      Hemoglobin 10.7 (*)     Hematocrit 34.9 (*)     MCV 89      MCH 27.2      MCHC 30.7 (*)     RDW 14.0      Platelet Count 208      % Neutrophils 63      % Lymphocytes 22      % Monocytes 11      % Eosinophils 3      % Basophils 1      % Immature Granulocytes 0      NRBCs per 100 WBC 0      Absolute Neutrophils 4.2      Absolute Lymphocytes 1.5      Absolute Monocytes 0.7      Absolute Eosinophils 0.2      Absolute Basophils 0.1      Absolute Immature Granulocytes 0.0      Absolute NRBCs 0.0     BASIC METABOLIC PANEL - Normal    Sodium 140      Potassium 4.3      Chloride 105      Carbon Dioxide (CO2) 26      Anion Gap 9      Urea Nitrogen 16.0      Creatinine 0.60      Calcium 9.6      Glucose 97      GFR Estimate 87     INFLUENZA A/B & SARS-COV2 PCR MULTIPLEX   NT PROBNP INPATIENT     CT Aortic Survey w Contrast    (Results Pending)      Treatments provided: See MAR  Family Comments: Daughter at bedside  OBS brochure/video discussed/provided to patient:  Yes  ED Medications:   Medications   ipratropium - albuterol 0.5 mg/2.5 mg/3 mL (DUONEB) neb solution 3 mL (3 mLs Nebulization Given 1/25/23 1925)   nitroGLYcerin (NITROSTAT) sublingual tablet 0.4 mg (has no administration in time range)     Drips infusing:  No  For the majority of the shift, the patient's behavior Green. Interventions performed were Frequent rounding.    Sepsis treatment initiated: No     Patient tested for COVID 19 prior to admission: YES    ED Nurse Name/Phone Number: Lavern Apple RN,   7:29 PM    RECEIVING UNIT ED HANDOFF REVIEW    Above ED Nurse Handoff Report was reviewed: YES  Reviewed by: Mara Prakash RN on January 26, 2023 at 4:11 PM
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
## 2023-01-26 NOTE — PHARMACY-ADMISSION MEDICATION HISTORY
Admission medication history interview status for this patient is complete. See [facility] admission navigator for allergy information, prior to admission medications and immunization status.     Medication history interview done, indicate source(s): Patient  Medication history resources (including written lists, pill bottles, clinic record):[facility] list  [facility]    Changes made to PTA medication list:  Added: None  Changed: None  Reported as Not Taking: None  Removed: None    Actions taken by pharmacist (provider contacted, etc):None     Additional medication history information:None    Medication reconciliation/reorder completed by provider prior to medication history?  N  (Y/N)     Medication Affordability:  Not including over the counter (OTC) medications, was there a time in the past 12 months when you did not take your medications as prescribed because of cost?: No     Prior to Admission medications    Medication Sig Last Dose Taking? Auth Provider Long Term End Date   acetaminophen (TYLENOL) 500 MG tablet Take 500-1,000 mg by mouth daily 1/25/2023 Yes Reported, Patient     atorvastatin (LIPITOR) 20 MG tablet Take 1 tablet (20 mg) by mouth daily 1/25/2023 Yes Tyler Machado MD Yes    hydrALAZINE (APRESOLINE) 25 MG tablet One pill three times a day 1/25/2023 Yes Spenser Lyman, IRMA CNP Yes    ipratropium - albuterol 0.5 mg/2.5 mg/3 mL (DUONEB) 0.5-2.5 (3) MG/3ML neb solution Take 1 vial (3 mLs) by nebulization every 6 hours as needed for shortness of breath / dyspnea or wheezing 1/25/2023 Yes Valeriano Beck MD Yes    levothyroxine (SYNTHROID/LEVOTHROID) 100 MCG tablet Take 100 mcg by mouth daily 1/25/2023 Yes Reported, Patient No    metoprolol succinate ER (TOPROL XL) 25 MG 24 hr tablet Take 1 tablet (25 mg) by mouth 2 times daily 1/25/2023 Yes Tyler Machado MD Yes    Multiple Vitamins-Minerals (PRESERVISION AREDS PO) Take 1 tablet by mouth 2 times daily 1/25/2023 Yes Unknown, Entered By  History     oxybutynin (DITROPAN-XL) 10 MG 24 hr tablet Take 10 mg by mouth daily 1/25/2023 Yes Reported, Patient No    polyethylene glycol (MIRALAX) 17 GM/Dose powder Take 17 g by mouth daily as needed for constipation  Yes Unknown, Entered By History     potassium chloride ER (K-DUR/KLOR-CON M) 20 MEQ CR tablet Take 1 tablet (20 mEq) by mouth daily 1/25/2023 Yes Hilda Lambert APRN CNP     torsemide (DEMADEX) 10 MG tablet Take 1 tablet (10 mg) by mouth daily 1/25/2023 Yes Tyler Machado MD Yes    traZODone (DESYREL) 50 MG tablet Take 50 mg by mouth At Bedtime Past Week Yes Reported, Patient Yes

## 2023-01-26 NOTE — PROGRESS NOTES
"[facility]    Medicine Progress Note - Hospitalist Service    Date of Admission:  1/25/2023    Assessment & Plan      Summary of Stay: Giselle Chino is a 87 year old female with a history of htn/hlp, COPD not on home oxygen, G1 diastolic dysfunction and mod aortic stenosis by echo 11/2022, large thoracic aortic aneurysm  admitted on 1/25/2023 with CP     She notes chronic and progressive SOB over the past year but states she doesn't usually have chest pain. This morning when she got out of bed she felt \"woozy\" so went to sit in her chair.  She sat for a while and just tried to be calm and felt better.  She then got up and noticed some chest pressure/pain that radiated up her left neck/jaw. She had some nausea but no vomiting. After that she decided to go lay down in her bed and her symptoms subsided. They returned again this afternoon and so she called EMS and was brought in here     She reports feeling better since being here but is not sure if the NTG was what helped her     In the ER she's been quite hypertensive in the 160-200/50-70's.    Troponin is 18 and EKG to my personal read NSR without acute st-t changes  BMP wnl, CBC with anemia at 10.7  COVID/flu/rsv negative     CT aortic survey with very large thoracic aneurysm at 8.3 cm (!) up from 7.9 cm 2019    Chest Pain   No e/o aneurysmal dissection.  Certainly at risk for CAD, last stress was back in 2014.  I don't see any documentation of prior hx of CAD.  Could be due to uncontrolled BPs as well  -trend trops which are flat.  - stress test and TTE pending.       Progressive SOB  Wonder if this is related to aortic stenosis vs copd vs just age related issues or more likely a combination      Htn/hlp  - On hydralazine, metoprolol, torsemide and lipitor.     COPD  Not on home O2, resume home inhalers when med rec completed     Diastolic dysfunction   Mod aortic stenosis  Thoracic aortic aneurysm   Resume pta BB/diuretic when med rec " completed  Should probably go to a thoracic surgeon to discuss her very large thoracic aneurysm although not sure they'd do anything about it or that she would want to do anything about it.         COVID 19 negative  S/p 2 shot moderna series f/b 3 pfizer boosters 10/2022 (biv)         Diet: Combination Diet No Caffeine Diet, Low Saturated Fat Na <2400mg Diet, Low Saturated Fat Diet    DVT Prophylaxis: Pneumatic Compression Devices  Ulrich Catheter: Not present  Lines: None     Cardiac Monitoring: ACTIVE order. Indication: Chest pain/ ACS rule out (24 hours)  Code Status: No CPR- Pre-arrest intubation OK      Clinically Significant Risk Factors Present on Admission                  # Hypertension: home medication list includes antihypertensive(s)   # Acute Respiratory Failure: Documented O2 saturation < 91%.  Continue supplemental oxygen as needed             Disposition Plan      Expected Discharge Date: 01/27/2023                  Rd Fink MD  Hospitalist Service  [facility]  Securely message with [employer] (more info)  Text page via [employer] Paging/Directory   ______________________________________________________________________    Interval History     No chest pain/worsening SOB/fever/chills.    Physical Exam   Vital Signs: Temp: 97.9  F (36.6  C) Temp src: Oral BP: 136/59 Pulse: 76   Resp: 16 SpO2: 97 % O2 Device: (P) Nasal cannula Oxygen Delivery: (P) 2 LPM  Weight: 0 lbs 0 oz    Gen - AAO x 3, frail.  Lungs - + crackles in bases.  Heart - RR,S1+S2 nml, 3/6 systolic murmur.  Abd - soft, NT, ND, + BS.  Ext - no edema.    Medical Decision Making     40 MINUTES SPENT BY ME on the date of service doing chart review, history, exam, documentation & further activities per the note.      Data     I have personally reviewed the following data over the past 24 hrs:    8.7  \   11.2 (L)   / 197     138 103 13.3 /  106 (H)   4.4 26 0.53 \       Trop: 20 (H) BNP: 1,244       Imaging results  reviewed over the past 24 hrs:   Recent Results (from the past 24 hour(s))   CT Aortic Survey w Contrast    Narrative    EXAM: CT AORTIC SURVEY W CONTRAST  LOCATION: [facility]  DATE/TIME: 1/25/2023 8:36 PM    INDICATION: check for Aortic Dissection Aneurysm rupture  Left chest pain radiating into left neck shoulder  please compare with old CT Aorta since known Thoracic Aortic Aneurysm  COMPARISON: CT angiogram chest, abdomen and pelvis dated 11/11/2019  TECHNIQUE: CT angiogram chest abdomen pelvis during arterial phase of injection of IV contrast. 2D and 3D MIP reconstructions were performed by the CT technologist. Dose reduction techniques were used.   CONTRAST: 80 mL Isovue 370    FINDINGS:   CT ANGIOGRAM CHEST, ABDOMEN, AND PELVIS: Again noted is a large ascending aortic aneurysm measuring 8.3 cm, previously 7.9 cm. Normal caliber aortic arch. Bovine configuration of the aortic arch. The visualized proximal great vessels are patent. Marked   tortuosity of the bilateral common carotid arteries. Ectasia of the descending thoracic aorta which measures 3.5 cm in diameter. Again noted is a large saccular aneurysm arising off the caudal descending thoracic aorta at the level of the diaphragmatic   hiatus. The aorta at this level measures approximately 7.9 cm in diameter. No aortic dissection.    The supraceliac abdominal aorta measures 3.5 cm in diameter. Patent celiac, superior mesenteric, bilateral renal and inferior mesenteric arteries. Moderate stenosis of the left renal artery ostium.    Patent right common iliac artery measures 1.7 cm in diameter. The left common iliac artery measures 1.4 cm in diameter. Patent bilateral common iliac, internal iliac and external iliac arteries. Patent bilateral common femoral arteries.    LUNGS AND PLEURA: No significant finding.    MEDIASTINUM/AXILLAE: Cardiomegaly. Small pericardial effusion.    CORONARY ARTERY CALCIFICATION: Severe.    HEPATOBILIARY:  Cholelithiasis without evidence of acute cholecystitis. Mild hepatomegaly with the liver measuring 18.2 cm in craniocaudal dimension.    PANCREAS: No significant finding.    SPLEEN: No significant finding.    ADRENAL GLANDS: Unchanged 3.7 cm right adrenal lesion measuring 7 Hounsfield units on noncontrast imaging.    KIDNEYS/BLADDER: Unchanged 1.5 cm right upper pole hyperdense lesion measuring 19 Hounsfield units on arterial phase imaging.    BOWEL: No significant finding.    LYMPH NODES: No significant finding.    PELVIC ORGANS: Calcified uterine fibroids. No pelvic free fluid.    MUSCULOSKELETAL: No significant finding.      Impression    IMPRESSION:  1.  Large ascending aortic aneurysm measuring 8.3 cm, previously 7.9 cm on 2019.  2.  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  3.  Small pericardial effusion also noted on the prior CT scan.     NM MPI w Lexiscan   Result Value    Target     Baseline Systolic     Baseline Diastolic BP 63    Last Stress Systolic     Last Stress Diastolic BP 65    Baseline HR 66    Max HR  89    Max Predicted HR  66    Rate Pressure Product 13,172.0    Left Ventricular EF 57    Narrative       The nuclear stress test is probably abnormal.     There is mild ischemia in the mid anterior and anteroseptal segment(s)   of the left ventricle.     Left ventricular function is normal.     The left ventricular ejection fraction at stress is 57%.     A prior study was conducted on 9/10/2014.  This study has changes noted   when compared with the prior study.     Echocardiogram Complete   Result Value    LVEF  60-65%    Narrative    [id number]  [id number]  [id number]  [id number]^STEVE^STEVE^JESSICA     [facility]  Echocardiography Laboratory  [address] [place] [address]     Name: [place] NYE  MRN: [id number]  : 1935  Study Date: 2023 11:42 AM  Age: 87 yrs  Gender:  Female  Patient Location: [facility]  Reason For Study: SOB  Ordering Physician: STEVE WILSON  Performed By: Lionel Madison RDCS     BSA: 1.7 m2  Height: 64 in  Weight: 139 lb  HR: 85  BP: 185/72 mmHg  ______________________________________________________________________________  Procedure  Complete Portable Echo Adult.  ______________________________________________________________________________  Interpretation Summary     The ascending aorta is Severely dilated. (8.4 cm)  Moderate aortic root dilatation.  The visual ejection fraction is 60-65%.  Left ventricular systolic function is normal.  There is mod-severe to severe (3-4+) aortic regurgitation.  Mild valvular aortic stenosis.  Pressure half time 259 ms.  AI ERO is 0.55 cm2  ______________________________________________________________________________  Left Ventricle  The left ventricle is normal in size. A sigmoid septum is present. There is  mild concentric left ventricular hypertrophy. The visual ejection fraction is  60-65%. Left ventricular systolic function is normal. Grade I or early  diastolic dysfunction.     Right Ventricle  The right ventricle is normal in size and function.     Atria  The left atrium is moderately dilated. Right atrial size is normal. There is  no color Doppler evidence of an atrial shunt.     Mitral Valve  There is mild to moderate mitral annular calcification. There is trace mitral  regurgitation.     Tricuspid Valve  There is trace tricuspid regurgitation. Right ventricular systolic pressure  could not be approximated due to inadequate tricuspid regurgitation.     Aortic Valve  The aortic valve is trileaflet. There is mod-severe to severe (3-4+) aortic  regurgitation. Pressure half time 259 ms.  AI ERO is 0.55 cm2. There is an eccentric jet of aortic insufficiency directed  against the anterior mitral leaflet. The calculated aortic valve are is 1.7  cm^2. The peak AoV pressure gradient is 47.0 mmHg. The mean AoV pressure  gradient  is 23.0 mmHg. Mild valvular aortic stenosis.     Pulmonic Valve  There is trace pulmonic valvular regurgitation.     Vessels  Moderate aortic root dilatation. Effacement of the sinotubular junction noted.  The ascending aorta is Severely dilated. (8.4 cm). IVC diameter and  respiratory changes fall into an intermediate range suggesting an RA pressure  of 8 mmHg.     Pericardium  There is no pericardial effusion.     Rhythm  Sinus rhythm was noted.  ______________________________________________________________________________  MMode/2D Measurements & Calculations     IVSd: 1.2 cm  LVIDd: 5.3 cm  LVIDs: 3.5 cm  LVPWd: 1.7 cm  FS: 34.2 %  LV mass(C)d: 332.4 grams  LV mass(C)dI: 198.3 grams/m2  Ao root diam: 4.5 cm  LVOT diam: 2.0 cm  LVOT area: 3.1 cm2  RWT: 0.62     Doppler Measurements & Calculations  MV E max martell: 90.5 cm/sec  MV A max martell: 144.0 cm/sec  MV E/A: 0.63  MV dec slope: 446.0 cm/sec2  MV dec time: 0.16 sec  Ao V2 max: 344.0 cm/sec  Ao max P.0 mmHg  Ao V2 mean: 221.0 cm/sec  Ao mean P.0 mmHg  Ao V2 VTI: 68.0 cm  GIANNA(I,D): 1.7 cm2  GIANNA(V,D): 1.7 cm2  AI P1/2t: 259.3 msec  LV V1 max P.4 mmHg  LV V1 max: 183.0 cm/sec  LV V1 VTI: 37.2 cm  SV(LVOT): 116.9 ml  SI(LVOT): 69.7 ml/m2  PA acc time: 0.10 sec     AV Martell Ratio (DI): 0.53  GIANNA Index (cm2/m2): 1.0  E/E' av.4  Lateral E/e': 13.6  Medial E/e': 9.2     ______________________________________________________________________________  Report approved by: Brayan Mckeon 2023 01:01 PM

## 2023-01-27 PROCEDURE — 250N000013 HC RX MED GY IP 250 OP 250 PS 637: Performed by: INTERNAL MEDICINE

## 2023-01-27 PROCEDURE — 250N000013 HC RX MED GY IP 250 OP 250 PS 637: Performed by: NURSE PRACTITIONER

## 2023-01-27 PROCEDURE — 99223 1ST HOSP IP/OBS HIGH 75: CPT | Performed by: INTERNAL MEDICINE

## 2023-01-27 PROCEDURE — 120N000001 HC R&B MED SURG/OB

## 2023-01-27 RX ORDER — ISOSORBIDE MONONITRATE 30 MG/1
30 TABLET, EXTENDED RELEASE ORAL DAILY
Status: DISCONTINUED | OUTPATIENT
Start: 2023-01-27 | End: 2023-01-29 | Stop reason: HOSPADM

## 2023-01-27 RX ORDER — GLIPIZIDE 10 MG/1
1 TABLET ORAL
Status: DISCONTINUED | OUTPATIENT
Start: 2023-01-27 | End: 2023-01-29 | Stop reason: HOSPADM

## 2023-01-27 RX ADMIN — DEXTRAN 70, GLYCERIN, HYPROMELLOSE 1 DROP: 1; 2; 3 SOLUTION/ DROPS OPHTHALMIC at 22:44

## 2023-01-27 RX ADMIN — LEVOTHYROXINE SODIUM 100 MCG: 0.1 TABLET ORAL at 08:06

## 2023-01-27 RX ADMIN — ACETAMINOPHEN 650 MG: 325 TABLET, FILM COATED ORAL at 08:09

## 2023-01-27 RX ADMIN — DOCUSATE SODIUM 100 MG: 100 CAPSULE, LIQUID FILLED ORAL at 08:06

## 2023-01-27 RX ADMIN — METOPROLOL SUCCINATE 25 MG: 25 TABLET, EXTENDED RELEASE ORAL at 20:32

## 2023-01-27 RX ADMIN — NITROGLYCERIN 1 PATCH: 0.1 PATCH TRANSDERMAL at 08:07

## 2023-01-27 RX ADMIN — METOPROLOL SUCCINATE 25 MG: 25 TABLET, EXTENDED RELEASE ORAL at 08:06

## 2023-01-27 RX ADMIN — TORSEMIDE 10 MG: 10 TABLET ORAL at 08:06

## 2023-01-27 RX ADMIN — ATORVASTATIN CALCIUM 20 MG: 20 TABLET, FILM COATED ORAL at 20:32

## 2023-01-27 RX ADMIN — Medication 1 DROP: at 14:18

## 2023-01-27 RX ADMIN — HYDRALAZINE HYDROCHLORIDE 25 MG: 25 TABLET, FILM COATED ORAL at 06:35

## 2023-01-27 RX ADMIN — HYDRALAZINE HYDROCHLORIDE 25 MG: 25 TABLET, FILM COATED ORAL at 21:11

## 2023-01-27 RX ADMIN — AMLODIPINE BESYLATE 5 MG: 5 TABLET ORAL at 08:07

## 2023-01-27 RX ADMIN — TRAZODONE HYDROCHLORIDE 50 MG: 50 TABLET ORAL at 21:11

## 2023-01-27 RX ADMIN — HYDRALAZINE HYDROCHLORIDE 25 MG: 25 TABLET, FILM COATED ORAL at 14:23

## 2023-01-27 RX ADMIN — ASPIRIN 81 MG CHEWABLE TABLET 324 MG: 81 TABLET CHEWABLE at 08:06

## 2023-01-27 RX ADMIN — Medication 1 DROP: at 18:14

## 2023-01-27 RX ADMIN — DOCUSATE SODIUM 100 MG: 100 CAPSULE, LIQUID FILLED ORAL at 20:32

## 2023-01-27 RX ADMIN — ISOSORBIDE MONONITRATE 30 MG: 30 TABLET, EXTENDED RELEASE ORAL at 11:18

## 2023-01-27 ASSESSMENT — ACTIVITIES OF DAILY LIVING (ADL)
ADLS_ACUITY_SCORE: 28
ADLS_ACUITY_SCORE: 26
ADLS_ACUITY_SCORE: 28
ADLS_ACUITY_SCORE: 26
ADLS_ACUITY_SCORE: 28
ADLS_ACUITY_SCORE: 26
ADLS_ACUITY_SCORE: 28

## 2023-01-27 NOTE — PLAN OF CARE
"/55   Pulse 74   Temp 98.2  F (36.8  C) (Oral)   Resp 16   Ht 1.626 m (5' 4\")   Wt 58.7 kg (129 lb 8 oz)   LMP  (LMP Unknown)   SpO2 96%   BMI 22.23 kg/m      Vitals stable. O2 stable on room air. SBA with walker and gait belt. Denies chest pain, lightheadedness, or dizziness. Nitro patch in place. Per tele tech, SB-HR 57. Britt scan abnormal. Plan for cardiology consult in the am. R eye red with green/yellow discharge, Provider notified and ordered Refresh eye drops. Pt resting comfortably. Will continue to provide supportive cares.       "

## 2023-01-27 NOTE — PLAN OF CARE
"[id number]    Inpatient Progress Note:  A & O X 4. Lung sound clear bilaterally to auscultation . Denies chest pain, shortness of breath, lightheadedness, nausea, or vomit. Stand by assist with a walker and gait belt. Patient on prn hydralazine  Q 6 hours for SBP > 170, prn carboxymethylcellulose Q 8 hours for dry eye. On cardiac monitor, cardiac rhythm SR 65 per telemetry. Stress test abnormal.  Cardiology following .  /48 (BP Location: Left arm)   Pulse 73   Temp 98.3  F (36.8  C) (Oral)   Resp 16   Ht 1.626 m (5' 4\")   Wt 58.7 kg (129 lb 8 oz)   LMP  (LMP Unknown)   SpO2 99%   BMI 22.23 kg/m     Will continue to  provide supportive cares.   Kit Telles RN        "

## 2023-01-27 NOTE — PLAN OF CARE
"[id number]    Inpatient Progress Note:  A & O X 4. Lung sound clear bilaterally to auscultation . Denies chest pain, shortness of breath, lightheadedness, nausea, or vomit. Stand by assist with a walker and gait belt. Patient on prn hydralazine  Q 6 hours for SBP > 170, prn carboxymethylcellulose Q 8 hours for dry eye. On cardiac monitor, cardiac rhythm SR 65 per telemetry. Stress test abnormal.  Cardiology following .  /48 (BP Location: Left arm)   Pulse 73   Temp 98.3  F (36.8  C) (Oral)   Resp 16   Ht 1.626 m (5' 4\")   Wt 58.7 kg (129 lb 8 oz)   LMP  (LMP Unknown)   SpO2 99%   BMI 22.23 kg/m     Will continue to  provide supportive cares.   Kit Telles RN        "

## 2023-01-27 NOTE — PROGRESS NOTES
"[facility]    Medicine Progress Note - Hospitalist Service    Date of Admission:  1/25/2023    Assessment & Plan   Summary of Stay: Giselle Chino is a 87 year old female with a history of htn/hlp, COPD not on home oxygen, G1 diastolic dysfunction and mod aortic stenosis by echo 11/2022, large thoracic aortic aneurysm  admitted on 1/25/2023 with CP     She notes chronic and progressive SOB over the past year but states she doesn't usually have chest pain. This morning when she got out of bed she felt \"woozy\" so went to sit in her chair.  She sat for a while and just tried to be calm and felt better.  She then got up and noticed some chest pressure/pain that radiated up her left neck/jaw. She had some nausea but no vomiting. After that she decided to go lay down in her bed and her symptoms subsided. They returned again this afternoon and so she called EMS and was brought in here     She reports feeling better since being here but is not sure if the NTG was what helped her     In the ER she's been quite hypertensive in the 160-200/50-70's.    Troponin is 18 and EKG to my personal read NSR without acute st-t changes  BMP wnl, CBC with anemia at 10.7  COVID/flu/rsv negative     CT aortic survey with very large thoracic aneurysm at 8.3 cm (!) up from 7.9 cm 2019     Chest Pain   Mildly abnormal stress test in the setting of chest discomfort (LAD territory).   Aortic stenosis/ aortic regurgitation  HTN  HLD  Thoracic aortic aneurysm 8.5 cm severe calcification.  Not an ideal surgical candidate.  Improved.  -- S/P stress test. Seen by Cardiology -- recommending medical management as she has a very large ascending aortic aneurysm that increases risk with cath and wires to access the coronary anatomy.  She has improved as far as symptoms given improvement with BP.  Continue work on afterload reduction and diuresis.  -- Continue amlodipine, hydralazine, metoprolol, torsemide.  Added isosorbide by " Cardiology. Continue statin.   -- Follow up with PCP and Cardiology as outpatient.     COPD  Not on home O2.   Duo nebs PRN.     Macular degeneration: Asking for eye gtts.  Appropriate.       Hypothyroidism: Continue replacement.     COVID 19 status: negative  S/p 2 shot moderna series f/b 3 pfizer boosters 10/2022 (biv)          Diet: Combination Diet No Caffeine Diet, Low Saturated Fat Na <2400mg Diet, Low Saturated Fat Diet    DVT Prophylaxis: ASA  Ulrich Catheter: Not present  Lines: None     Cardiac Monitoring: ACTIVE order. Indication: Chest pain/ ACS rule out (24 hours)  Code Status: No CPR- Pre-arrest intubation OK    Attempted to contact family via phone without success.  Did not leave a message as it was a generic voice mail greeting.        Disposition Plan  pending clinical course.     Expected Discharge Date: 01/28/2023                The patient's care was discussed with the Bedside Nurse, Care Coordinator/, Patient, Patient's Family and Cardiology Team.    IRMA [facility]  Hospitalist Service  [facility]  Securely message with [employer] (more info)  Text page via [employer] Paging/Directory   ______________________________________________________________________    Interval History   Ms. Chino was seen and examined. VSS and improved.  No chest pain.  Still with dyspnea on exertion but states she feels better.      Physical Exam   Vital Signs: Temp: 98.2  F (36.8  C) Temp src: Oral BP: (!) 142/61 Pulse: 94   Resp: 16 SpO2: 93 % O2 Device: None (Room air) Oxygen Delivery: 2 LPM  Weight: 129 lbs 8 oz    GEN:   Alert, oriented x 3, appears comfortable, NAD.  NECK:   Supple ,no mass or thyromegaly   HEENT:  Normocephalic/atraumatic, no scleral icterus, no nasal discharge, mouth moist.  CV:   Regular rate and rhythm, III/VI LIZZY or JVD.  S1 + S2 noted, no S3 or S4.  LUNGS:  Clear upper lobes.  Diminished bases with faint crackles.  ABD:   Active bowel sounds, soft,  non-tender/non-distended.  No rebound/guarding/rigidity.  EXT:   No edema.  No cyanosis.  No joint synovitis noted.  SKIN:   Dry to touch, no exanthems noted in the visualized areas.  Neurologic: Grossly intact,non focal.   Neuropsychiatric:  General: normal, calm and normal eye contact  Level of consciousness: alert / normal  Affect: normal  Orientation: oriented to self, place, time and situation     Medical Decision Making       35 MINUTES SPENT BY ME on the date of service doing chart review, history, exam, documentation & further activities per the note.      Data    Lab Results   Component Value Date    WBC 8.7 01/26/2023    WBC 7.8 11/11/2019     Lab Results   Component Value Date    RBC 4.06 01/26/2023    RBC 4.42 11/11/2019     Lab Results   Component Value Date    HGB 11.2 01/26/2023    HGB 12.4 11/11/2019     Lab Results   Component Value Date    HCT 36.6 01/26/2023    HCT 39.2 11/11/2019     No components found for: MCT  Lab Results   Component Value Date    MCV 90 01/26/2023    MCV 89 11/11/2019     Lab Results   Component Value Date    MCH 27.6 01/26/2023    MCH 28.1 11/11/2019     Lab Results   Component Value Date    MCHC 30.6 01/26/2023    MCHC 31.6 11/11/2019     Lab Results   Component Value Date    RDW 14.1 01/26/2023    RDW 13.5 11/11/2019     Lab Results   Component Value Date     01/26/2023     11/11/2019        Last Comprehensive Metabolic Panel:  Sodium   Date Value Ref Range Status   01/26/2023 138 136 - 145 mmol/L Final   03/16/2021 142 133 - 144 mmol/L Final     Potassium   Date Value Ref Range Status   01/26/2023 4.4 3.4 - 5.3 mmol/L Final   06/22/2022 3.8 3.4 - 5.3 mmol/L Final   03/16/2021 4.0 3.4 - 5.3 mmol/L Final     Chloride   Date Value Ref Range Status   01/26/2023 103 98 - 107 mmol/L Final   06/22/2022 107 94 - 109 mmol/L Final   03/16/2021 109 94 - 109 mmol/L Final     Carbon Dioxide   Date Value Ref Range Status   03/16/2021 31 20 - 32 mmol/L Final     Carbon  Dioxide (CO2)   Date Value Ref Range Status   2023 26 22 - 29 mmol/L Final   2022 28 20 - 32 mmol/L Final     Anion Gap   Date Value Ref Range Status   2023 9 7 - 15 mmol/L Final   2022 4 3 - 14 mmol/L Final   2021 2 (L) 3 - 14 mmol/L Final     Glucose   Date Value Ref Range Status   2023 106 (H) 70 - 99 mg/dL Final   2022 95 70 - 99 mg/dL Final   2021 98 70 - 99 mg/dL Final     Comment:     Fasting specimen     Urea Nitrogen   Date Value Ref Range Status   2023 13.3 8.0 - 23.0 mg/dL Final   2022 13 7 - 30 mg/dL Final   2021 23 7 - 30 mg/dL Final     Creatinine   Date Value Ref Range Status   2023 0.53 0.51 - 0.95 mg/dL Final   2021 0.84 0.52 - 1.04 mg/dL Final     GFR Estimate   Date Value Ref Range Status   2023 90 >60 mL/min/1.73m2 Final     Comment:     eGFR calculated using  CKD-EPI equation.   2021 64 >60 mL/min/[1.73_m2] Final     Comment:     Non  GFR Calc  Starting 2018, serum creatinine based estimated GFR (eGFR) will be   calculated using the Chronic Kidney Disease Epidemiology Collaboration   (CKD-EPI) equation.       Calcium   Date Value Ref Range Status   2023 9.7 8.8 - 10.2 mg/dL Final   2021 9.9 8.5 - 10.1 mg/dL Final      Recent Results (from the past 4320 hour(s))   Echocardiogram Complete   Result Value    LVEF  60-65%    Narrative    [id number]  [id number]  [id number]  [id number]^STEVE^STEVE^JESSICA     [facility]  Echocardiography Laboratory  [address] [place] [address]     Name: INDIANA NYE  MRN: [id number]  : 1935  Study Date: 2023 11:42 AM  Age: 87 yrs  Gender: Female  Patient Location: [facility]  Reason For Study: SOB  Ordering Physician: STEVE WILSON  Performed By: Lionel Madison RDCS     BSA: 1.7 m2  Height: 64 in  Weight: 139 lb  HR: 85  BP: 185/72  mmHg  ______________________________________________________________________________  Procedure  Complete Portable Echo Adult.  ______________________________________________________________________________  Interpretation Summary     The ascending aorta is Severely dilated. (8.4 cm)  Moderate aortic root dilatation.  The visual ejection fraction is 60-65%.  Left ventricular systolic function is normal.  There is mod-severe to severe (3-4+) aortic regurgitation.  Mild valvular aortic stenosis.  Pressure half time 259 ms.  AI ERO is 0.55 cm2  ______________________________________________________________________________  Left Ventricle  The left ventricle is normal in size. A sigmoid septum is present. There is  mild concentric left ventricular hypertrophy. The visual ejection fraction is  60-65%. Left ventricular systolic function is normal. Grade I or early  diastolic dysfunction.     Right Ventricle  The right ventricle is normal in size and function.     Atria  The left atrium is moderately dilated. Right atrial size is normal. There is  no color Doppler evidence of an atrial shunt.     Mitral Valve  There is mild to moderate mitral annular calcification. There is trace mitral  regurgitation.     Tricuspid Valve  There is trace tricuspid regurgitation. Right ventricular systolic pressure  could not be approximated due to inadequate tricuspid regurgitation.     Aortic Valve  The aortic valve is trileaflet. There is mod-severe to severe (3-4+) aortic  regurgitation. Pressure half time 259 ms.  AI ERO is 0.55 cm2. There is an eccentric jet of aortic insufficiency directed  against the anterior mitral leaflet. The calculated aortic valve are is 1.7  cm^2. The peak AoV pressure gradient is 47.0 mmHg. The mean AoV pressure  gradient is 23.0 mmHg. Mild valvular aortic stenosis.     Pulmonic Valve  There is trace pulmonic valvular regurgitation.     Vessels  Moderate aortic root dilatation. Effacement of the sinotubular  junction noted.  The ascending aorta is Severely dilated. (8.4 cm). IVC diameter and  respiratory changes fall into an intermediate range suggesting an RA pressure  of 8 mmHg.     Pericardium  There is no pericardial effusion.     Rhythm  Sinus rhythm was noted.  ______________________________________________________________________________  MMode/2D Measurements & Calculations     IVSd: 1.2 cm  LVIDd: 5.3 cm  LVIDs: 3.5 cm  LVPWd: 1.7 cm  FS: 34.2 %  LV mass(C)d: 332.4 grams  LV mass(C)dI: 198.3 grams/m2  Ao root diam: 4.5 cm  LVOT diam: 2.0 cm  LVOT area: 3.1 cm2  RWT: 0.62     Doppler Measurements & Calculations  MV E max martell: 90.5 cm/sec  MV A max martell: 144.0 cm/sec  MV E/A: 0.63  MV dec slope: 446.0 cm/sec2  MV dec time: 0.16 sec  Ao V2 max: 344.0 cm/sec  Ao max P.0 mmHg  Ao V2 mean: 221.0 cm/sec  Ao mean P.0 mmHg  Ao V2 VTI: 68.0 cm  GIANNA(I,D): 1.7 cm2  GIANNA(V,D): 1.7 cm2  AI P1/2t: 259.3 msec  LV V1 max P.4 mmHg  LV V1 max: 183.0 cm/sec  LV V1 VTI: 37.2 cm  SV(LVOT): 116.9 ml  SI(LVOT): 69.7 ml/m2  PA acc time: 0.10 sec     AV Martell Ratio (DI): 0.53  GIANNA Index (cm2/m2): 1.0  E/E' av.4  Lateral E/e': 13.6  Medial E/e': 9.2     ______________________________________________________________________________  Report approved by: Brayan Mckeon 2023 01:01 PM         Echocardiogram Complete   Result Value    LVEF  55-60%    Narrative    [id number]  [id number]  [id number]  [id number]^CHRISTOPHER^TY^MILDRED     [facility]  Echocardiography Laboratory  [address] [place] [address]     Name: INDIANA NYE  MRN: [id number]  : 1935  Study Date: 2022 08:37 AM  Age: 87 yrs  Gender: Female  Patient Location: [facility]  Reason For Study: Nonrheumatic aortic valve insufficiency, Thoracic aortic  aneurys  Ordering Physician: TY WHITE  Referring Physician: TY WHITE  Performed By: Isabel Anna     BSA: 1.7 m2  Height: 64 in  Weight: 139  lb  HR: 76  BP: 130/67 mmHg  ______________________________________________________________________________  Procedure  Complete Echo Adult.  ______________________________________________________________________________  Interpretation Summary     1. The left ventricle is normal in size. Left ventricular systolic function is  normal. The visual ejection fraction is 55-60%. Grade I or early diastolic  dysfunction. Diastolic Doppler findings (E/E' ratio and/or other parameters)  suggest left ventricular filling pressures are indeterminate. No regional wall  motion abnormalities noted.  2. The right ventricle is normal size. The right ventricular systolic function  is normal.  3. The aortic valve is not well visualized. There is moderate (2+) aortic  regurgitation. Moderate valvular aortic stenosis. The peak AoV pressure  gradient is 48.0 mmHg. The mean AoV pressure gradient is 25.1 mmHg.  4. The ascending aorta is Severely dilated.  5. No pericardial effusion.  6. In direct comparison to the previous study dated 06/21/2022, the findings  are similar. The discrepancy in aortic valve area is related to the elevated  LVOT VTI on the current study.  ______________________________________________________________________________  Left Ventricle  The left ventricle is normal in size. Left ventricular systolic function is  normal. The visual ejection fraction is 55-60%. Grade I or early diastolic  dysfunction. Diastolic Doppler findings (E/E' ratio and/or other parameters)  suggest left ventricular filling pressures are indeterminate. No regional wall  motion abnormalities noted.     Right Ventricle  The right ventricle is normal size. The right ventricular systolic function is  normal.     Atria  The left atrium is severely dilated.     Mitral Valve  There is trace mitral regurgitation.     Tricuspid Valve  There is trace tricuspid regurgitation. Right ventricular systolic pressure  could not be approximated due to  inadequate tricuspid regurgitation.     Aortic Valve  The aortic valve is not well visualized. There is moderate (2+) aortic  regurgitation. Moderate valvular aortic stenosis. The peak AoV pressure  gradient is 48.0 mmHg. The mean AoV pressure gradient is 25.1 mmHg.     Pulmonic Valve  There is trace to mild pulmonic valvular regurgitation. There is no pulmonic  valvular stenosis.     Vessels  The ascending aorta is Severely dilated. The inferior vena cava is normal.     Pericardium  There is no pericardial effusion.     Rhythm  Sinus rhythm was noted.  ______________________________________________________________________________  MMode/2D Measurements & Calculations  asc Aorta Diam: 8.5 cm     LVOT diam: 2.1 cm  LVOT area: 3.5 cm2     Doppler Measurements & Calculations  MV E max martell: 61.2 cm/sec  MV A max martell: 124.8 cm/sec  MV E/A: 0.49  MV dec slope: 164.1 cm/sec2  MV dec time: 0.37 sec  Ao V2 max: 348.7 cm/sec  Ao max P.0 mmHg  Ao V2 mean: 233.3 cm/sec  Ao mean P.1 mmHg  Ao V2 VTI: 71.5 cm  GIANNA(I,D): 2.1 cm2  GIANNA(V,D): 2.0 cm2  AI P1/2t: 376.3 msec  LV V1 max P.2 mmHg  LV V1 max: 201.1 cm/sec  LV V1 VTI: 43.2 cm  SV(LVOT): 153.2 ml  SI(LVOT): 91.4 ml/m2  PA acc time: 0.08 sec  AV Martell Ratio (DI): 0.58  GIANNA Index (cm2/m2): 1.3  E/E' av.8  Lateral E/e': 12.2  Medial E/e': 13.4     ______________________________________________________________________________  Report approved by: Brayan Salazar 2022 10:11 AM
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
## 2023-01-27 NOTE — PLAN OF CARE
"BP (!) 155/55 (BP Location: Left arm)   Pulse 67   Temp 98.6  F (37  C) (Oral)   Resp 16   Ht 1.626 m (5' 4\")   Wt 58.7 kg (129 lb 8 oz)   LMP  (LMP Unknown)   SpO2 96%   BMI 22.23 kg/m      Pt alert and oriented X 4. VSS on RA. Right eye continue to be red, PRN  Refresh plus eye drops in use. Denies chest pain, nausea, dizziness. Nitro patch removed per order.  Tele reading SR HR 65-68. Britt scan abnormal. Pt ambulate with SBA and walker. Pt ate 100 % breakfast and tolerated well. Void well using bed side commode. Plan -Pt was seen by Cardiology, see note darlin. Continue to monitor.     "

## 2023-01-28 ENCOUNTER — APPOINTMENT (OUTPATIENT)
Dept: PHYSICAL THERAPY | Facility: CLINIC | Age: 88
DRG: 306 | End: 2023-01-28
Attending: NURSE PRACTITIONER
Payer: COMMERCIAL

## 2023-01-28 LAB
ANION GAP SERPL CALCULATED.3IONS-SCNC: 9 MMOL/L (ref 7–15)
BUN SERPL-MCNC: 15.1 MG/DL (ref 8–23)
CALCIUM SERPL-MCNC: 9.7 MG/DL (ref 8.8–10.2)
CHLORIDE SERPL-SCNC: 102 MMOL/L (ref 98–107)
CREAT SERPL-MCNC: 0.65 MG/DL (ref 0.51–0.95)
DEPRECATED HCO3 PLAS-SCNC: 28 MMOL/L (ref 22–29)
ERYTHROCYTE [DISTWIDTH] IN BLOOD BY AUTOMATED COUNT: 14.3 % (ref 10–15)
GFR SERPL CREATININE-BSD FRML MDRD: 86 ML/MIN/1.73M2
GLUCOSE SERPL-MCNC: 98 MG/DL (ref 70–99)
HCT VFR BLD AUTO: 33.5 % (ref 35–47)
HGB BLD-MCNC: 10.5 G/DL (ref 11.7–15.7)
MAGNESIUM SERPL-MCNC: 2.1 MG/DL (ref 1.7–2.3)
MCH RBC QN AUTO: 27.3 PG (ref 26.5–33)
MCHC RBC AUTO-ENTMCNC: 31.3 G/DL (ref 31.5–36.5)
MCV RBC AUTO: 87 FL (ref 78–100)
PLATELET # BLD AUTO: 199 10E3/UL (ref 150–450)
POTASSIUM SERPL-SCNC: 3.9 MMOL/L (ref 3.4–5.3)
RBC # BLD AUTO: 3.84 10E6/UL (ref 3.8–5.2)
SODIUM SERPL-SCNC: 139 MMOL/L (ref 136–145)
WBC # BLD AUTO: 7.2 10E3/UL (ref 4–11)

## 2023-01-28 PROCEDURE — 250N000013 HC RX MED GY IP 250 OP 250 PS 637: Performed by: NURSE PRACTITIONER

## 2023-01-28 PROCEDURE — 250N000013 HC RX MED GY IP 250 OP 250 PS 637: Performed by: INTERNAL MEDICINE

## 2023-01-28 PROCEDURE — 83735 ASSAY OF MAGNESIUM: CPT | Performed by: NURSE PRACTITIONER

## 2023-01-28 PROCEDURE — 97162 PT EVAL MOD COMPLEX 30 MIN: CPT | Mod: GP | Performed by: PHYSICAL THERAPIST

## 2023-01-28 PROCEDURE — 99233 SBSQ HOSP IP/OBS HIGH 50: CPT | Performed by: NURSE PRACTITIONER

## 2023-01-28 PROCEDURE — 80048 BASIC METABOLIC PNL TOTAL CA: CPT | Performed by: NURSE PRACTITIONER

## 2023-01-28 PROCEDURE — 97116 GAIT TRAINING THERAPY: CPT | Mod: GP | Performed by: PHYSICAL THERAPIST

## 2023-01-28 PROCEDURE — 36415 COLL VENOUS BLD VENIPUNCTURE: CPT | Performed by: NURSE PRACTITIONER

## 2023-01-28 PROCEDURE — 120N000001 HC R&B MED SURG/OB

## 2023-01-28 PROCEDURE — 85027 COMPLETE CBC AUTOMATED: CPT | Performed by: NURSE PRACTITIONER

## 2023-01-28 RX ORDER — POLYMYXIN B SULFATE AND TRIMETHOPRIM 1; 10000 MG/ML; [USP'U]/ML
2 SOLUTION OPHTHALMIC 4 TIMES DAILY
Status: DISCONTINUED | OUTPATIENT
Start: 2023-01-28 | End: 2023-01-29 | Stop reason: HOSPADM

## 2023-01-28 RX ADMIN — DEXTRAN 70, GLYCERIN, HYPROMELLOSE 1 DROP: 1; 2; 3 SOLUTION/ DROPS OPHTHALMIC at 08:20

## 2023-01-28 RX ADMIN — LEVOTHYROXINE SODIUM 100 MCG: 0.1 TABLET ORAL at 08:20

## 2023-01-28 RX ADMIN — METOPROLOL SUCCINATE 25 MG: 25 TABLET, EXTENDED RELEASE ORAL at 20:38

## 2023-01-28 RX ADMIN — TORSEMIDE 10 MG: 10 TABLET ORAL at 08:20

## 2023-01-28 RX ADMIN — Medication 3 MG: at 00:07

## 2023-01-28 RX ADMIN — AMLODIPINE BESYLATE 5 MG: 5 TABLET ORAL at 08:20

## 2023-01-28 RX ADMIN — HYDRALAZINE HYDROCHLORIDE 25 MG: 25 TABLET, FILM COATED ORAL at 21:53

## 2023-01-28 RX ADMIN — ASPIRIN 81 MG CHEWABLE TABLET 324 MG: 81 TABLET CHEWABLE at 08:20

## 2023-01-28 RX ADMIN — ISOSORBIDE MONONITRATE 30 MG: 30 TABLET, EXTENDED RELEASE ORAL at 08:20

## 2023-01-28 RX ADMIN — TRAZODONE HYDROCHLORIDE 50 MG: 50 TABLET ORAL at 21:53

## 2023-01-28 RX ADMIN — DOCUSATE SODIUM 100 MG: 100 CAPSULE, LIQUID FILLED ORAL at 08:20

## 2023-01-28 RX ADMIN — HYDRALAZINE HYDROCHLORIDE 25 MG: 25 TABLET, FILM COATED ORAL at 05:33

## 2023-01-28 RX ADMIN — DOCUSATE SODIUM 100 MG: 100 CAPSULE, LIQUID FILLED ORAL at 20:38

## 2023-01-28 RX ADMIN — ATORVASTATIN CALCIUM 20 MG: 20 TABLET, FILM COATED ORAL at 20:38

## 2023-01-28 RX ADMIN — POLYMYXIN B SULFATE AND TRIMETHOPRIM SULFATE 2 DROP: 10000; 1 SOLUTION/ DROPS OPHTHALMIC at 20:40

## 2023-01-28 RX ADMIN — METOPROLOL SUCCINATE 25 MG: 25 TABLET, EXTENDED RELEASE ORAL at 08:20

## 2023-01-28 RX ADMIN — POLYMYXIN B SULFATE AND TRIMETHOPRIM SULFATE 2 DROP: 10000; 1 SOLUTION/ DROPS OPHTHALMIC at 16:03

## 2023-01-28 ASSESSMENT — ACTIVITIES OF DAILY LIVING (ADL)
ADLS_ACUITY_SCORE: 32
ADLS_ACUITY_SCORE: 28
ADLS_ACUITY_SCORE: 28
ADLS_ACUITY_SCORE: 32
ADLS_ACUITY_SCORE: 28
ADLS_ACUITY_SCORE: 32
ADLS_ACUITY_SCORE: 28
ADLS_ACUITY_SCORE: 32
ADLS_ACUITY_SCORE: 32
ADLS_ACUITY_SCORE: 31

## 2023-01-28 NOTE — PROGRESS NOTES
01/28/23 1422   Appointment Info   Signing Clinician's Name / Credentials (PT) Tony Gallego DPT   Living Environment   People in Home spouse  (two daughter live nearby and are able to assist)   Current Living Arrangements house   Home Accessibility no concerns   Transportation Anticipated public transportation   Self-Care   Equipment Currently Used at Home walker, standard;shower chair   Fall history within last six months no   General Information   Onset of Illness/Injury or Date of Surgery 01/25/23   Referring Physician Spenser Lyman APRN CNP   Patient/Family Therapy Goals Statement (PT) Return home   Pertinent History of Current Problem (include personal factors and/or comorbidities that impact the POC) per chart review: history of htn/hlp, COPD not on home oxygen, G1 diastolic dysfunction and mod aortic stenosis by echo 11/2022, large thoracic aortic aneurysm  admitted on 1/25/2023 with CP   Cognition   Affect/Mental Status (Cognition) WNL   Orientation Status (Cognition) oriented x 4   Follows Commands (Cognition) WNL   Range of Motion (ROM)   Range of Motion ROM is WFL   Strength (Manual Muscle Testing)   Strength (Manual Muscle Testing) strength is WFL   Bed Mobility   Bed Mobility no deficits identified   Transfers   Transfers no deficits identified   Gait/Stairs (Locomotion)   [place] Level (Gait) supervision   Assistive Device (Gait) walker, front-wheeled   Distance in Feet 10   Clinical Impression   Criteria for Skilled Therapeutic Intervention Yes, treatment indicated   PT Diagnosis (PT) weakness, decreased gait, decreased activity tolerance   Influenced by the following impairments CP, fatigue   Functional limitations due to impairments decreased ambulation ability and safe functional mobility   Clinical Presentation (PT Evaluation Complexity) Stable/Uncomplicated   Clinical Presentation Rationale Demonstrates mobility, transfers, and gait with FWW and SBA x 1   Clinical Decision Making  (Complexity) moderate complexity   Planned Therapy Interventions (PT) gait training;strengthening;transfer training   Anticipated Equipment Needs at Discharge (PT)   (none, patient has all equipment)   Risk & Benefits of therapy have been explained evaluation/treatment results reviewed;care plan/treatment goals reviewed;risks/benefits reviewed;current/potential barriers reviewed;participants voiced agreement with care plan   PT Total Evaluation Time   PT Eval, Moderate Complexity Minutes (47625) 14   Physical Therapy Goals   PT Frequency 3x/week   PT Predicted Duration/Target Date for Goal Attainment 01/30/23   PT Goals Transfers;Gait   PT: Transfers Modified independent   PT: Gait Modified independent   Total Session Time   Total Session Time (sum of timed and untimed services) 14

## 2023-01-28 NOTE — PLAN OF CARE
"INPATIENT NOTE: [id number]     PRIMARY PROBLEM: Aneurysm of ascending aorta without rupture           Vital signs:  Temp: 97.6  F (36.4  C) Temp src: Oral BP: (!) 157/58 Pulse: 68   Resp: 20 SpO2: 94 % O2 Device: None (Room air) Oxygen Delivery: 2 LPM Height: 162.6 cm (5' 4\") Weight: 58.7 kg (129 lb 8 oz)  Estimated body mass index is 22.23 kg/m  as calculated from the following:    Height as of this encounter: 1.626 m (5' 4\").    Weight as of this encounter: 58.7 kg (129 lb 8 oz).        Orientation: Alert and Oriented x4  Neuro: Intact   Pain status: denying pain.   Resp: Lung sounds are Clear. Denies any SOB.   Cardiac: WNL, Denies any Chest Pain   GI: Bowels are active x 4 Quads. Last BM 1/26/23  : Voiding with no concern    Skin: right forearm  LDA: Peripheral IV   Infusions: Patient is Saline locked.   Diet: Low fat  Activity: are 1 Assist with Gait Belt and Walker     Will continue to monitor and provide cares.     Don Mujica RN  "

## 2023-01-28 NOTE — PLAN OF CARE
"Pt ambulate on the hallway, long distance. Returned back to her bed and reported not feeling well (feeling weak, felt her heart racing) VSS on RA. Pt ate supper 50%. Continue to monitor. Tele reading SR with PAC HR 92.   /59 (BP Location: Right arm, Patient Position: Supine, Cuff Size: Child)   Pulse 84   Temp 97.8  F (36.6  C) (Oral)   Resp 20   Ht 1.626 m (5' 4\")   Wt 58.7 kg (129 lb 8 oz)   LMP  (LMP Unknown)   SpO2 94%   BMI 22.23 kg/m        "

## 2023-01-28 NOTE — PROGRESS NOTES
Goal Outcome Evaluation:    Patient had a 10 sec drop in HR to 44 per Tele tech and back to 50s-60s. Not within parameters for notifying provider.

## 2023-01-28 NOTE — ACP (ADVANCE CARE PLANNING)
1/28/2023   Discussed with patient and family.  Prefer home care over TCU.  Wants to be independent for as long as possible and then go to hospice.   Not ready for hospice yet.  We discussed goals of care and POLST was completed and executed.  DNR/DNI, comfort based approach, no enteral nutrition.  No IV/IM abx but okay for PO abx.    POLST form scanned to chart.     IRMA Millan CNP

## 2023-01-28 NOTE — PLAN OF CARE
"Goal Outcome Evaluation:  Care from [id number]    Inpatient Progress Note:  For complete assessment see flow sheet documentation.    BP (!) 161/51 (BP Location: Right arm)   Pulse 61   Temp 97.9  F (36.6  C) (Oral)   Resp 18   Ht 1.626 m (5' 4\")   Wt 58.7 kg (129 lb 8 oz)   LMP  (LMP Unknown)   SpO2 91%   BMI 22.23 kg/m         Orientation: Alert & oriented x 4  Neuro: Intact  Pain status: Denies  Activity: SBA with walker & GB  Resp: WDLs .   Cardiac: On Tele . SB in the 50s  GI: BS active x4  :WDLs    Skin: Some  bruising to R forearm  LDA: PIV saline locked  Infusions: None  Diet: Low fat. No caffeine  Consults: Cards following  Discharge Plan: TBD    Will continue to monitor and provide cares.     Hannah Esparza RN                "

## 2023-01-29 VITALS
TEMPERATURE: 97.4 F | WEIGHT: 129.5 LBS | HEART RATE: 89 BPM | BODY MASS INDEX: 22.11 KG/M2 | SYSTOLIC BLOOD PRESSURE: 107 MMHG | DIASTOLIC BLOOD PRESSURE: 52 MMHG | RESPIRATION RATE: 18 BRPM | HEIGHT: 64 IN | OXYGEN SATURATION: 93 %

## 2023-01-29 PROCEDURE — 99239 HOSP IP/OBS DSCHRG MGMT >30: CPT | Performed by: NURSE PRACTITIONER

## 2023-01-29 PROCEDURE — 250N000013 HC RX MED GY IP 250 OP 250 PS 637: Performed by: INTERNAL MEDICINE

## 2023-01-29 RX ORDER — POLYMYXIN B SULFATE AND TRIMETHOPRIM 1; 10000 MG/ML; [USP'U]/ML
2 SOLUTION OPHTHALMIC 4 TIMES DAILY
Qty: 10 ML | Refills: 0 | Status: ON HOLD | OUTPATIENT
Start: 2023-01-29 | End: 2023-01-01

## 2023-01-29 RX ORDER — AMLODIPINE BESYLATE 5 MG/1
5 TABLET ORAL DAILY
Qty: 90 TABLET | Refills: 3 | Status: ON HOLD | OUTPATIENT
Start: 2023-01-29 | End: 2023-01-01

## 2023-01-29 RX ORDER — ISOSORBIDE MONONITRATE 30 MG/1
30 TABLET, EXTENDED RELEASE ORAL DAILY
Qty: 30 TABLET | Refills: 0 | Status: ON HOLD | OUTPATIENT
Start: 2023-01-30 | End: 2023-01-01

## 2023-01-29 RX ORDER — ISOSORBIDE MONONITRATE 30 MG/1
30 TABLET, EXTENDED RELEASE ORAL DAILY
Qty: 90 TABLET | Refills: 3 | Status: ON HOLD | OUTPATIENT
Start: 2023-01-29 | End: 2023-01-01

## 2023-01-29 RX ORDER — HYDRALAZINE HYDROCHLORIDE 25 MG/1
TABLET, FILM COATED ORAL
Qty: 360 TABLET | Refills: 2 | Status: SHIPPED | OUTPATIENT
Start: 2023-01-29 | End: 2023-05-17

## 2023-01-29 RX ORDER — AMLODIPINE BESYLATE 5 MG/1
5 TABLET ORAL DAILY
Qty: 30 TABLET | Refills: 0 | Status: ON HOLD | OUTPATIENT
Start: 2023-01-30 | End: 2023-01-01

## 2023-01-29 RX ORDER — ASPIRIN 81 MG/1
81 TABLET, CHEWABLE ORAL DAILY
COMMUNITY
Start: 2023-01-30

## 2023-01-29 RX ADMIN — TORSEMIDE 10 MG: 10 TABLET ORAL at 07:58

## 2023-01-29 RX ADMIN — DOCUSATE SODIUM 100 MG: 100 CAPSULE, LIQUID FILLED ORAL at 07:57

## 2023-01-29 RX ADMIN — DEXTRAN 70, GLYCERIN, HYPROMELLOSE 1 DROP: 1; 2; 3 SOLUTION/ DROPS OPHTHALMIC at 07:58

## 2023-01-29 RX ADMIN — METOPROLOL SUCCINATE 25 MG: 25 TABLET, EXTENDED RELEASE ORAL at 07:57

## 2023-01-29 RX ADMIN — ASPIRIN 81 MG CHEWABLE TABLET 324 MG: 81 TABLET CHEWABLE at 07:58

## 2023-01-29 RX ADMIN — AMLODIPINE BESYLATE 5 MG: 5 TABLET ORAL at 07:58

## 2023-01-29 RX ADMIN — ISOSORBIDE MONONITRATE 30 MG: 30 TABLET, EXTENDED RELEASE ORAL at 07:58

## 2023-01-29 RX ADMIN — POLYMYXIN B SULFATE AND TRIMETHOPRIM SULFATE 2 DROP: 10000; 1 SOLUTION/ DROPS OPHTHALMIC at 07:58

## 2023-01-29 RX ADMIN — LEVOTHYROXINE SODIUM 100 MCG: 0.1 TABLET ORAL at 07:57

## 2023-01-29 RX ADMIN — HYDRALAZINE HYDROCHLORIDE 25 MG: 25 TABLET, FILM COATED ORAL at 05:34

## 2023-01-29 ASSESSMENT — ACTIVITIES OF DAILY LIVING (ADL)
ADLS_ACUITY_SCORE: 28

## 2023-01-29 NOTE — CONSULTS
Care Management Initial Consult    General Information  Assessment completed with: Patient,    Type of CM/SW Visit: Offer D/C Planning    Primary Care Provider verified and updated as needed: Yes   Readmission within the last 30 days: no previous admission in last 30 days      Reason for Consult: discharge planning    Communication Assessment  Patient's communication style: spoken language (English or Bilingual)    Hearing Difficulty or Deaf: no   Wear Glasses or Blind: yes    Cognitive  Cognitive/Neuro/Behavioral: WDL                    Living Environment:   People in home: alone     Current living Arrangements: house      Able to return to prior arrangements: yes     Family/Social Support:  Care provided by: self  Provides care for: no one  Marital Status:   Children          Description of Support System: Supportive, Involved    Support Assessment: Adequate family and caregiver support    Current Resources:   Patient receiving home care services: No  Community Resources: Meals on Wheels, Transportation Services  Equipment currently used at home: walker, standard, shower chair    Lifestyle & Psychosocial Needs:  Social Determinants of Health     Tobacco Use: Medium Risk     Smoking Tobacco Use: Former     Smokeless Tobacco Use: Never     Passive Exposure: Not on file   Alcohol Use: Not on file   Financial Resource Strain: Not on file   Food Insecurity: Not on file   Transportation Needs: Not on file   Physical Activity: Not on file   Stress: Not on file   Social Connections: Not on file   Intimate Partner Violence: Not on file   Depression: Not on file   Housing Stability: Not on file     Functional Status:  Prior to admission patient needed assistance:   Dependent ADLs:: Ambulation-walker  Dependent IADLs:: Cleaning, Cooking, Laundry, Shopping, Medication Management, Money Management     Care Management Discharge Note    Discharge Date: 01/29/2023       Discharge Disposition: Home Care    Discharge  Services: None    Discharge DME: None    Discharge Transportation: family or friend will provide    Patient/Family in Agreement with the Plan: yes    Additional Information:  CM consulted for discharge planning, met with pt at bedside to discuss. Pt lives alone in a home and is independent with a WW at baseline. She manages her own medications and IADLs. She has Meals on Wheels for meal delivery. Family and metro mobility assist with transportation.     Reviewed provider/PT recommendation for discharge to home with HC RN/PT/HHA. Pt is agreeable to this plan and has no preference on agency. HC referral sent to [facility], awaiting response at this time.     Pt's dtr will transport pt home at discharge. Bedside RN updated.     Update 1330: [facility] has accepted for home care services. AVS updated, bedside RN updated.     Heather Artis RN BSN   Inpatient Care Coordination  [facility]   Phone [phone]
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
## 2023-01-29 NOTE — PLAN OF CARE
Physical Therapy Discharge Summary    Reason for therapy discharge:    Discharged to home with home therapy.    Progress towards therapy goal(s). See goals on Care Plan in [facility] electronic health record for goal details.  Goals not met.  Barriers to achieving goals:   discharge from facility.    Therapy recommendation(s):    Continued therapy is recommended.  Rationale/Recommendations:  Home PT recommended.  **Not seen by discharging PT; information based on medical record

## 2023-01-29 NOTE — PLAN OF CARE
"Goal Outcome Evaluation:  Care from [id number]     Inpatient Progress Note:  For complete assessment see flow sheet documentation.     /42 (BP Location: Left arm)   Pulse 74   Temp 98.1  F (36.7  C) (Oral)   Resp 20   Ht 1.626 m (5' 4\")   Wt 58.7 kg (129 lb 8 oz)   LMP  (LMP Unknown)   SpO2 93%   BMI 22.23 kg/m       Orientation: Alert & oriented x 4  Neuro: Intact  Pain status: Denies  Activity: SBA with walker & GB to commode  Resp: WNL  Cardiac: WNL, tele DC'd  GI: BS +  :Incontinent, urgency   Skin: Bruising to R forearm, right eye redness  LDA: PIV SL  Infusions: None  Diet: Low sat fat. No caffeine  Consults: PT, CM  Discharge Plan: Home 1/29/23     Will continue to monitor and provide cares.         "

## 2023-01-29 NOTE — PLAN OF CARE
"Goal Outcome Evaluation:  Care from [id number]    Inpatient Progress Note:  For complete assessment see flow sheet documentation.    BP (!) 143/47 (BP Location: Left arm)   Pulse 57   Temp 97.6  F (36.4  C) (Oral)   Resp 16   Ht 1.626 m (5' 4\")   Wt 58.7 kg (129 lb 8 oz)   LMP  (LMP Unknown)   SpO2 92%   BMI 22.23 kg/m         Orientation: Alert & oriented x 4  Neuro: Intact  Pain status: Denies  Activity: SBA with walker & GB  Resp: WDLs .   Cardiac: WNLs  GI: BS active x 4  :Incontinent. Pure wick on.  Skin: Some bruising to R forearm.Redness to right eye  LDA: PIV saline locked  Infusions: None  Diet: Low fat. No caffeine  Consults: PT, SW  Discharge Plan: TBD    Will continue to monitor and provide cares.     Hannah Esparza RN                "

## 2023-02-01 ENCOUNTER — DOCUMENTATION ONLY (OUTPATIENT)
Dept: OTHER | Facility: CLINIC | Age: 88
End: 2023-02-01
Payer: COMMERCIAL

## 2023-02-07 DIAGNOSIS — E78.5 HYPERLIPIDEMIA LDL GOAL <100: ICD-10-CM

## 2023-02-07 RX ORDER — ATORVASTATIN CALCIUM 20 MG/1
20 TABLET, FILM COATED ORAL DAILY
Qty: 90 TABLET | Refills: 3 | Status: SHIPPED | OUTPATIENT
Start: 2023-02-07 | End: 2024-01-01

## 2023-02-07 NOTE — TELEPHONE ENCOUNTER
Received refill request for:  Atorvastatin     [facility] Cardiology Refill Guideline reviewed.  Medication meets criteria for refill.    Carey Zimmer RN, BSN  02/07/23 at 1:53 PM

## 2023-03-31 DIAGNOSIS — I10 HTN (HYPERTENSION): ICD-10-CM

## 2023-03-31 RX ORDER — TORSEMIDE 10 MG/1
10 TABLET ORAL DAILY
Qty: 90 TABLET | Refills: 2 | Status: SHIPPED | OUTPATIENT
Start: 2023-03-31 | End: 2024-01-01

## 2023-04-18 ENCOUNTER — APPOINTMENT (OUTPATIENT)
Dept: GENERAL RADIOLOGY | Facility: CLINIC | Age: 88
End: 2023-04-18
Attending: EMERGENCY MEDICINE
Payer: COMMERCIAL

## 2023-04-18 ENCOUNTER — HOSPITAL ENCOUNTER (EMERGENCY)
Facility: CLINIC | Age: 88
Discharge: HOME | End: 2023-04-18
Attending: EMERGENCY MEDICINE | Admitting: EMERGENCY MEDICINE
Payer: COMMERCIAL

## 2023-04-18 VITALS
TEMPERATURE: 97.9 F | HEART RATE: 92 BPM | DIASTOLIC BLOOD PRESSURE: 79 MMHG | OXYGEN SATURATION: 98 % | SYSTOLIC BLOOD PRESSURE: 141 MMHG | RESPIRATION RATE: 18 BRPM

## 2023-04-18 DIAGNOSIS — I48.91 ATRIAL FIBRILLATION, UNSPECIFIED TYPE (H): ICD-10-CM

## 2023-04-18 LAB
ANION GAP SERPL CALCULATED.3IONS-SCNC: 11 MMOL/L (ref 7–15)
ATRIAL RATE - MUSE: 86 BPM
BASOPHILS # BLD AUTO: 0.1 10E3/UL (ref 0–0.2)
BASOPHILS NFR BLD AUTO: 1 %
BUN SERPL-MCNC: 18.8 MG/DL (ref 8–23)
CALCIUM SERPL-MCNC: 10.3 MG/DL (ref 8.8–10.2)
CHLORIDE SERPL-SCNC: 99 MMOL/L (ref 98–107)
CREAT SERPL-MCNC: 0.73 MG/DL (ref 0.51–0.95)
DEPRECATED HCO3 PLAS-SCNC: 29 MMOL/L (ref 22–29)
DIASTOLIC BLOOD PRESSURE - MUSE: NORMAL MMHG
EOSINOPHIL # BLD AUTO: 0.2 10E3/UL (ref 0–0.7)
EOSINOPHIL NFR BLD AUTO: 3 %
ERYTHROCYTE [DISTWIDTH] IN BLOOD BY AUTOMATED COUNT: 14.2 % (ref 10–15)
GFR SERPL CREATININE-BSD FRML MDRD: 80 ML/MIN/1.73M2
GLUCOSE SERPL-MCNC: 125 MG/DL (ref 70–99)
HCT VFR BLD AUTO: 39.3 % (ref 35–47)
HGB BLD-MCNC: 12.1 G/DL (ref 11.7–15.7)
HOLD SPECIMEN: NORMAL
HOLD SPECIMEN: NORMAL
IMM GRANULOCYTES # BLD: 0 10E3/UL
IMM GRANULOCYTES NFR BLD: 0 %
INTERPRETATION ECG - MUSE: NORMAL
LYMPHOCYTES # BLD AUTO: 1.3 10E3/UL (ref 0.8–5.3)
LYMPHOCYTES NFR BLD AUTO: 23 %
MCH RBC QN AUTO: 26.9 PG (ref 26.5–33)
MCHC RBC AUTO-ENTMCNC: 30.8 G/DL (ref 31.5–36.5)
MCV RBC AUTO: 87 FL (ref 78–100)
MONOCYTES # BLD AUTO: 0.5 10E3/UL (ref 0–1.3)
MONOCYTES NFR BLD AUTO: 9 %
NEUTROPHILS # BLD AUTO: 3.7 10E3/UL (ref 1.6–8.3)
NEUTROPHILS NFR BLD AUTO: 64 %
NRBC # BLD AUTO: 0 10E3/UL
NRBC BLD AUTO-RTO: 0 /100
P AXIS - MUSE: NORMAL DEGREES
PLATELET # BLD AUTO: 233 10E3/UL (ref 150–450)
POTASSIUM SERPL-SCNC: 4 MMOL/L (ref 3.4–5.3)
PR INTERVAL - MUSE: 152 MS
QRS DURATION - MUSE: 92 MS
QT - MUSE: 412 MS
QTC - MUSE: 486 MS
R AXIS - MUSE: -23 DEGREES
RBC # BLD AUTO: 4.5 10E6/UL (ref 3.8–5.2)
SODIUM SERPL-SCNC: 139 MMOL/L (ref 136–145)
SYSTOLIC BLOOD PRESSURE - MUSE: NORMAL MMHG
T AXIS - MUSE: 73 DEGREES
T4 FREE SERPL-MCNC: 1.35 NG/DL (ref 0.9–1.7)
TROPONIN T SERPL HS-MCNC: 20 NG/L
TROPONIN T SERPL HS-MCNC: 20 NG/L
TSH SERPL DL<=0.005 MIU/L-ACNC: 4.7 UIU/ML (ref 0.3–4.2)
VENTRICULAR RATE- MUSE: 84 BPM
WBC # BLD AUTO: 5.7 10E3/UL (ref 4–11)

## 2023-04-18 PROCEDURE — 84443 ASSAY THYROID STIM HORMONE: CPT | Performed by: EMERGENCY MEDICINE

## 2023-04-18 PROCEDURE — 99285 EMERGENCY DEPT VISIT HI MDM: CPT | Mod: 25

## 2023-04-18 PROCEDURE — 80048 BASIC METABOLIC PNL TOTAL CA: CPT | Performed by: EMERGENCY MEDICINE

## 2023-04-18 PROCEDURE — 71046 X-RAY EXAM CHEST 2 VIEWS: CPT

## 2023-04-18 PROCEDURE — 84484 ASSAY OF TROPONIN QUANT: CPT | Performed by: EMERGENCY MEDICINE

## 2023-04-18 PROCEDURE — 84439 ASSAY OF FREE THYROXINE: CPT | Performed by: EMERGENCY MEDICINE

## 2023-04-18 PROCEDURE — 85025 COMPLETE CBC W/AUTO DIFF WBC: CPT | Performed by: EMERGENCY MEDICINE

## 2023-04-18 PROCEDURE — 93005 ELECTROCARDIOGRAM TRACING: CPT

## 2023-04-18 PROCEDURE — 36415 COLL VENOUS BLD VENIPUNCTURE: CPT | Performed by: EMERGENCY MEDICINE

## 2023-04-18 PROCEDURE — 93005 ELECTROCARDIOGRAM TRACING: CPT | Mod: 76

## 2023-04-18 ASSESSMENT — ENCOUNTER SYMPTOMS
DIZZINESS: 0
NUMBNESS: 1
BACK PAIN: 0
SHORTNESS OF BREATH: 0
ARTHRALGIAS: 1

## 2023-04-18 ASSESSMENT — ACTIVITIES OF DAILY LIVING (ADL)
ADLS_ACUITY_SCORE: 35

## 2023-04-18 NOTE — ED TRIAGE NOTES
"     Per daughter a home health RN checked pt's listened to pt's heart at home and stated she had an irregular heart beat and needed to be seen. Pt has hx of aneurysm. Pt states her cardiologists states her \"heart is leaking.\" No cp. Pt complains of tingling & pain that starts on L shoulder and shoots down L side of arm that started this AM and has since subsided. Pt denies hx of a-fib.   "

## 2023-04-18 NOTE — ED PROVIDER NOTES
History     Chief Complaint:  Irregular Heart Beat       The history is provided by the patient.      Giselle Chino is a 87 year old female who presents with concerns for an irregular heart beat noted by home nurse on a routine visit today.  The patient herself denies any symptoms at the time of that visit including chest pain, dizziness, or new shortness of breath.  She notes she has chronic shortness of breath but that is unchanged.  She denies any known history of abnormal heart rhythm.  She notes she woke up with a left shoulder pain and some numbness in her fingers that resolved soon after but also had no chest pain or back pain at that time.  She notes at this time she feels at baseline.    Independent Historian:   None - Patient Only    Review of External Notes: I reviewed outpatient echocardiogram and cardiology notes.    Echocardiogram Complete (1/25/23)  The ascending aorta is Severely dilated. (8.4 cm)  Moderate aortic root dilatation.  The visual ejection fraction is 60-65%.  Left ventricular systolic function is normal.  There is mod-severe to severe (3-4+) aortic regurgitation.  Mild valvular aortic stenosis.  Pressure half time 259 ms.  AI ERO is 0.55 cm2      ROS:  Review of Systems   Respiratory: Negative for shortness of breath.    Cardiovascular: Negative for chest pain.   Musculoskeletal: Positive for arthralgias. Negative for back pain.   Neurological: Positive for numbness (resolved, fingers of left arm). Negative for dizziness.   All other systems reviewed and are negative.    Allergies:  Amoxicillin  Piperacillin-Tazobactam     Medications:    Amlodipine  Aspirin  Atorvastatin  Hydralazine  Imdur  Levothyroxine  Metoprolol  Ditropan  Demadex  Desyrel  Pepcid    Past Medical History:    Adrenal mass  Aortic root dilation  Cholelithiasis  COPD  Diverticulosis  Fatty liver  Hyperlipidemia  Hypertension  Paroxysmal SVT  Thoracoabdominal aneurysm  Thyroid disease  NSTEMI  Orthostatic  hypotension       Family History:    Bone cancer  Coronary artery disease  Heart failure    Social History:  The patient presents with a family member.   Arrived by private vehicle.  PCP: Shoaib Mills     Physical Exam     Patient Vitals for the past 24 hrs:   BP Temp Temp src Pulse Resp SpO2   04/18/23 2023 (!) 141/79 -- -- 92 18 98 %   04/18/23 2020 -- -- -- -- -- 96 %   04/18/23 2005 -- -- -- -- -- 95 %   04/18/23 1950 -- -- -- -- -- 96 %   04/18/23 1935 -- -- -- -- -- 97 %   04/18/23 1920 -- -- -- -- -- 98 %   04/18/23 1905 -- -- -- 104 -- 97 %   04/18/23 1850 -- -- -- 96 -- 97 %   04/18/23 1835 -- -- -- 107 -- 96 %   04/18/23 1820 -- -- -- 84 -- 98 %   04/18/23 1805 -- -- -- 84 -- 96 %   04/18/23 1750 -- -- -- 90 -- 98 %   04/18/23 1735 -- -- -- 104 -- 97 %   04/18/23 1730 -- -- -- 90 -- 100 %   04/18/23 1720 -- -- -- 80 -- 98 %   04/18/23 1715 (!) 152/64 -- -- 63 -- --   04/18/23 1355 128/64 -- -- 70 18 98 %   04/18/23 1126 110/64 97.9  F (36.6  C) Temporal 109 17 98 %        Physical Exam  General: Elderly female, laying on the stretcher  Eyes: PERRL, Conjunctive within normal limits  ENT: Moist mucous membranes, oropharynx clear.   CV: Normal S1S2, no murmur, rub or gallop.  Irregularly irregular.  Rate normal.  Resp: Clear to auscultation bilaterally, no wheezes, rales or rhonchi. Normal respiratory effort.  GI: Abdomen is soft, nontender and nondistended.   MSK: Normal active range of motion.  Skin: Warm and dry. No rashes or lesions or ecchymoses on visible skin.  Neuro: Alert and oriented. Responds appropriately to all questions and commands. No focal findings appreciated. Normal muscle tone.  Psych: Normal mood and affect. Pleasant.    Emergency Department Course   ECG  ECG taken at 1730, ECG read at 1737  Atrial fibrillation with a competing junctional pacemaker  Moderate voltage criteria for LVH, may be normal variant (R in aVL, [place] product)  Nonspecific ST and T wave abnormality  Abnormal  ECG    Rate 95 bpm. SD interval * ms. QRS duration 86 ms. QT/QTc 366/459 ms. P-R-T axes * -28 58.     ECG #2  ECG taken at 1740, ECG read at 1742  Atrial fibrillation   Voltage criteria for left ventricular hypertrophy  Nonspecific ST abnormality  Abnormal ECG   Rate 78 bpm. SD interval * ms. QRS duration 84 ms. QT/QTc 390/444 ms. P-R-T axes * -29 54.     Imaging:  XR Chest 2 Views   Final Result   IMPRESSION: Stable enlargement of the cardiomediastinal silhouette   with large ascending thoracic aortic aneurysm and descending saccular   aneurysm again noted, better seen on the recent CT. No definite   airspace consolidation, pleural effusion or pneumothorax. No acute   bony abnormality.      RONALDO BARKER MD            SYSTEM ID:  [id number]      Report per radiology    Laboratory:  Labs Ordered and Resulted from Time of ED Arrival to Time of ED Departure   TROPONIN T, HIGH SENSITIVITY - Abnormal       Result Value    Troponin T, High Sensitivity 20 (*)    BASIC METABOLIC PANEL - Abnormal    Sodium 139      Potassium 4.0      Chloride 99      Carbon Dioxide (CO2) 29      Anion Gap 11      Urea Nitrogen 18.8      Creatinine 0.73      Calcium 10.3 (*)     Glucose 125 (*)     GFR Estimate 80     CBC WITH PLATELETS AND DIFFERENTIAL - Abnormal    WBC Count 5.7      RBC Count 4.50      Hemoglobin 12.1      Hematocrit 39.3      MCV 87      MCH 26.9      MCHC 30.8 (*)     RDW 14.2      Platelet Count 233      % Neutrophils 64      % Lymphocytes 23      % Monocytes 9      % Eosinophils 3      % Basophils 1      % Immature Granulocytes 0      NRBCs per 100 WBC 0      Absolute Neutrophils 3.7      Absolute Lymphocytes 1.3      Absolute Monocytes 0.5      Absolute Eosinophils 0.2      Absolute Basophils 0.1      Absolute Immature Granulocytes 0.0      Absolute NRBCs 0.0     TROPONIN T, HIGH SENSITIVITY - Abnormal    Troponin T, High Sensitivity 20 (*)    TSH WITH FREE T4 REFLEX - Abnormal    TSH 4.70 (*)    T4 FREE - Normal     "Free T4 1.35          Emergency Department Course & Assessments:     Assessments:  1715 I obtained history and examined the patient as noted above.   1844 I rechecked the patient and explained findings. I discussed anticoagulation with the patient and her daughter.  1923 I rechecked the patient. She felt \"great\" when she walked and would like to go home. She prefers to talk about anticoagulation with her cardiologist rather than initiate it currently which seems reasonable.    Independent Interpretation (X-rays, CTs, rhythm strip):  I reviewed the patient's chest x-ray.  Widened mediastinum noted    Consultations/Discussion of Management or Tests:  1917 I spoke with Dr. Billingsley, Cardiology, regarding the patient's history and presentation in the emergency department today.      Social Determinants of Health affecting care:   None    Disposition:  The patient was discharged to home.     Impression & Plan      Medical Decision Making:  Giselle Chino is an 86 y/o female who presents with concerns for an irregular heartbeat noted by home nurse on visit today.  Initially she was noted to be sinus rhythm with sinus arrhythmia, however repeat EKG demonstrated atrial fibrillation after examination was concerning for this.  Despite this, she is not symptomatic.  It is rate controlled.  I discussed her care with cardiology who felt as though there were no contraindication with her aortic aneurysm for anticoagulation, however it would be prudent and reasonable for the patient to follow-up with her cardiologist for further discussion of anticoagulation given her baseline underlying complex medical history.  The patient felt comfortable with this plan.  Her symptoms when she awoke this morning of left shoulder pain after sleeping on it that resolved shortly after getting up did not seem consistent with ACS or other worrisome pathology such as dissection.  She has been asymptomatic throughout the rest of the day and here appears at " baseline.  She and her daughter are comfortable with the plan as discussed.  They will return with any worsening of symptoms.  All questions were answered prior to discharge.      Diagnosis:    ICD-10-CM    1. Atrial fibrillation, unspecified type (H)  I48.91            Scribe Disclosure:  I, Milly Carpenter, am serving as a scribe at 6:08 PM on 4/18/2023 to document services personally performed by Areli Martinez MD based on my observations and the provider's statements to me.     4/18/2023   Areli Martinez MD Jonkman, Tracy Dianne, MD  04/19/23 [id number]

## 2023-04-20 LAB
ATRIAL RATE - MUSE: 68 BPM
ATRIAL RATE - MUSE: NORMAL BPM
DIASTOLIC BLOOD PRESSURE - MUSE: NORMAL MMHG
DIASTOLIC BLOOD PRESSURE - MUSE: NORMAL MMHG
INTERPRETATION ECG - MUSE: NORMAL
INTERPRETATION ECG - MUSE: NORMAL
P AXIS - MUSE: NORMAL DEGREES
P AXIS - MUSE: NORMAL DEGREES
PR INTERVAL - MUSE: NORMAL MS
PR INTERVAL - MUSE: NORMAL MS
QRS DURATION - MUSE: 84 MS
QRS DURATION - MUSE: 86 MS
QT - MUSE: 366 MS
QT - MUSE: 390 MS
QTC - MUSE: 444 MS
QTC - MUSE: 459 MS
R AXIS - MUSE: -28 DEGREES
R AXIS - MUSE: -29 DEGREES
SYSTOLIC BLOOD PRESSURE - MUSE: NORMAL MMHG
SYSTOLIC BLOOD PRESSURE - MUSE: NORMAL MMHG
T AXIS - MUSE: 54 DEGREES
T AXIS - MUSE: 58 DEGREES
VENTRICULAR RATE- MUSE: 78 BPM
VENTRICULAR RATE- MUSE: 95 BPM

## 2023-05-17 DIAGNOSIS — I16.0 HYPERTENSIVE URGENCY: ICD-10-CM

## 2023-05-17 RX ORDER — HYDRALAZINE HYDROCHLORIDE 25 MG/1
TABLET, FILM COATED ORAL
Qty: 270 TABLET | Refills: 1 | Status: ON HOLD | OUTPATIENT
Start: 2023-05-17 | End: 2023-01-01

## 2023-08-10 NOTE — TELEPHONE ENCOUNTER
[facility] Call Center    Phone Message    May a detailed message be left on voicemail: yes     Reason for Call: Other: Giselle called requesting to speak with her care team about some increased symptoms she has been experiencing but did not provide any more details. Please reach out to discuss. Thank you!     Action Taken: Other: Cardiology    Travel Screening: Not Applicable    Thank you!  [facility]

## 2023-08-16 NOTE — TELEPHONE ENCOUNTER
Spoke with patient who reports that she feels she has had increased SOB for the past few months.  Denies increased swelling/edema.  Patient is wanting to be seen by cardiology.  Advised patient that scheduling will call her to see if there is ADA availability.  Patient advised that if SOB worsens from current baseline it is advised that she go to urgent care/ED.  Will route to scheduling to call patient.  Susie Mujica RN on 8/16/2023 at 2:06 PM

## 2023-10-19 PROBLEM — J96.21 ACUTE ON CHRONIC RESPIRATORY FAILURE WITH HYPOXIA (H): Status: ACTIVE | Noted: 2023-01-01

## 2023-10-19 PROBLEM — J44.1 COPD EXACERBATION (H): Status: ACTIVE | Noted: 2023-01-01

## 2023-10-19 NOTE — ED NOTES
Bed: AICHA  Expected date: 10/19/23  Expected time: 1:48 PM  Means of arrival:   Comments:  [id number]  88F  [place]

## 2023-10-19 NOTE — ED NOTES
[facility]  ED Nurse Handoff Report    ED Chief complaint: Shortness of Breath  . ED Diagnosis:   Final diagnoses:   Acute on chronic respiratory failure with hypoxia (H)   COPD exacerbation (H)       Allergies:   Allergies   Allergen Reactions    Amoxicillin Rash    Piperacillin-Tazobactam In Dex Rash       Code Status: Full Code    Activity level - Baseline/Home:  walker.  Activity Level - Current:   assist of 1.   Lift room needed: No.   Bariatric: No   Needed: No   Isolation: No.   Infection: Not Applicable.     Respiratory status: Room air    Vital Signs (within 30 minutes):   Vitals:    10/19/23 1745 10/19/23 1746 10/19/23 1750 10/19/23 1814   BP: (!) 130/92      Pulse: 95      Resp:       Temp:       TempSrc:       SpO2: 95% 95% 90% 96%       Cardiac Rhythm:  ,      Pain level:    Patient confused: No.   Patient Falls Risk: nonskid shoes/slippers when out of bed, patient and family education, assistive device/personal items within reach, and activity supervised.   Elimination Status: Has voided     Patient Report - Initial Complaint: Shortness of breath.   Focused Assessment: Pt went to clinic today, brought by neighbor for increased SOB, RA sats at clinic 72%. Pt c/o increasing SOB since last night and cough/sore throat, also states she fell a week ago while getting mail. Lung sounds: decreased on left and diffuse crackles t/o right. RR in 40's. RT and MD at bedside in hallway. ABC's intact, alert and oriented X3.           Abnormal Results:   Labs Ordered and Resulted from Time of ED Arrival to Time of ED Departure   COMPREHENSIVE METABOLIC PANEL - Abnormal       Result Value    Sodium 145      Potassium 3.7      Carbon Dioxide (CO2) 34 (*)     Anion Gap 10      Urea Nitrogen 20.6      Creatinine 0.72      GFR Estimate 81      Calcium 10.4 (*)     Chloride 101      Glucose 119 (*)     Alkaline Phosphatase 101      AST 23      ALT 23      Protein Total 7.8      Albumin 4.5       Bilirubin Total 0.6     PROCALCITONIN - Abnormal    Procalcitonin 0.05 (*)    TROPONIN T, HIGH SENSITIVITY - Abnormal    Troponin T, High Sensitivity 23 (*)    CBC WITH PLATELETS AND DIFFERENTIAL - Abnormal    WBC Count 6.2      RBC Count 4.49      Hemoglobin 12.3      Hematocrit 40.9      MCV 91      MCH 27.4      MCHC 30.1 (*)     RDW 14.6      Platelet Count 228      % Neutrophils 66      % Lymphocytes 21      % Monocytes 9      Mids % (Monos, Eos, Basos)        % Eosinophils 3      % Basophils 1      % Immature Granulocytes 0      NRBCs per 100 WBC 0      Absolute Neutrophils 4.1      Absolute Lymphocytes 1.3      Absolute Monocytes 0.6      Mids Abs (Monos, Eos, Basos)        Absolute Eosinophils 0.2      Absolute Basophils 0.1      Absolute Immature Granulocytes 0.0      Absolute NRBCs 0.0     LIPASE - Normal    Lipase 47     MAGNESIUM - Normal    Magnesium 2.3     NT PROBNP INPATIENT - Normal    N terminal Pro BNP Inpatient 1,350     INFLUENZA A/B, RSV, & SARS-COV2 PCR - Normal    Influenza A PCR Negative      Influenza B PCR Negative      RSV PCR Negative      SARS CoV2 PCR Negative     TROPONIN T, HIGH SENSITIVITY   TYPE AND SCREEN, ADULT    ABO/RH(D) A POS      Antibody Screen Negative      SPECIMEN EXPIRATION DATE [id number]     BLOOD CULTURE   BLOOD CULTURE   ABO/RH TYPE AND SCREEN        CT Aortic Survey w Contrast   Final Result   IMPRESSION:       1. Stable size of large ascending thoracic aortic aneurysm.   2. 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   3. No evidence of aortic dissection, rupture or intramural hematoma.   4. No additional visualized explanation for patient's symptoms.      RONALDO BARKER MD            SYSTEM ID:  [id number]      Echocardiogram Complete   Final Result      XR Chest Port 1 View   Final Result   IMPRESSION: Prominent cardiomegaly appears stable. No new focal   airspace disease. No  evidence for pulmonary edema pattern. Stable   nodular opacity medial left lung base corresponding with a known   saccular aneurysm previously demonstrated by CT on 1/25/2023.      MIK CABELLO MD            SYSTEM ID:  [id number]          Treatments provided: See MAR  Family Comments: Family was at bedside and would like an update once she is upstairs.  OBS brochure/video discussed/provided to patient:  Yes  ED Medications:   Medications   ipratropium - albuterol 0.5 mg/2.5 mg/3 mL (DUONEB) neb solution 3 mL (3 mLs Nebulization $Given 10/19/23 1548)   methylPREDNISolone sodium succinate (solu-MEDROL) injection 62.5 mg (has no administration in time range)   sodium chloride (PF) 0.9% PF flush 100 mL (60 mLs Intravenous $Given 10/19/23 1527)   iopamidol (ISOVUE-370) solution 500 mL (72 mLs Intravenous $Given 10/19/23 1527)       Drips infusing:  No  For the majority of the shift this patient was Green.   Interventions performed were N/A.    Sepsis treatment initiated: No    Cares/treatment/interventions/medications to be completed following ED care Follow admission care plan    ED Nurse Name: Mariajose Gupta RN  6:35 PM     RECEIVING UNIT ED HANDOFF REVIEW    Above ED Nurse Handoff Report was reviewed: Yes  Reviewed by: Nico Newman RN on October 19, 2023 at 7:25 PM

## 2023-10-19 NOTE — ED PROVIDER NOTES
History     Chief Complaint:  Shortness of Breath     The history is provided by the patient.      Giselle Chino is a 88 year old female with a history of COPD, aortic root dilation, hyperlipidemia, and hypertension who presents to the ED for evaluation of shortness of breath and weakness. It is unclear when this episode began. Daughter spoke with her this morning and she seemed to be doing ok. Giselle has a nebulizer machine at home that she used most recently yesterday. She developed worsening shortness of breath throughout the day.  She initially presented to urgent care where she was found to have oxygen saturations in the 70s and respiratory rate greater than 40; 911 was then called. She was not given any medications in transit by EMS. Giselle reports chest heaviness and nausea. But denies chest pain. Patient denies pain with breathing, fever, cold/flu symptoms, swelling of the legs, vomiting, or abdominal pain.     Independent Historian:   None - Patient Only    Review of External Notes:  I reviewed the hospital discharge summary from January 29, 2023:  At that time the patient was hospitalized for progressive shortness of breath and chest pain.  At that time she was found to have a large thoracic aortic aneurysm measuring 8.3 cm.  She had an abnormal stress test but was not a candidate for intervention or surgical management.  Cardiology recommended medical management with diuresis, afterload reduction.    ROS:  See HPI    Allergies:  Amoxicillin  Piperacillin-Tazobactam In Dex     Physical Exam   Patient Vitals for the past 24 hrs:   BP Temp Temp src Pulse Resp SpO2 Weight   10/20/23 0811 -- -- -- -- -- 97 % --   10/20/23 0809 -- -- -- -- -- 100 % --   10/20/23 0751 (!) 142/48 97.9  F (36.6  C) Oral 69 18 98 % --   10/20/23 0729 -- -- -- -- 18 99 % --   10/20/23 0344 (!) 85/51 97.7  F (36.5  C) Oral 82 18 96 % --   10/20/23 0249 -- -- -- -- -- 98 % --   10/20/23 0020 110/45 97.6  F (36.4  C) Oral 92 20  98 % --   10/19/23 2146 -- -- -- -- -- -- 48.7 kg (107 lb 6.4 oz)   10/19/23 2010 -- -- -- 75 22 95 % --   10/19/23 2006 (!) (P) 87/63 (P) 97.9  F (36.6  C) (P) Oral (P) 77 -- (P) 95 % --   10/19/23 1915 -- -- -- 99 -- 100 % --   10/19/23 1900 138/89 -- -- 93 -- 94 % --   10/19/23 1830 (!) 147/83 -- -- 92 -- -- --   10/19/23 1822 -- -- -- -- -- 94 % --   10/19/23 1821 -- -- -- -- -- 94 % --   10/19/23 1820 -- -- -- -- -- 94 % --   10/19/23 1814 -- -- -- -- -- 96 % --   10/19/23 1750 -- -- -- -- -- 90 % --   10/19/23 1746 -- -- -- -- -- 95 % --   10/19/23 1745 (!) 130/92 -- -- 95 -- 95 % --   10/19/23 1737 -- -- -- -- -- 94 % --   10/19/23 1734 -- -- -- -- -- 95 % --   10/19/23 1730 132/63 -- -- 90 -- 94 % --   10/19/23 1727 -- -- -- -- -- 92 % --   10/19/23 1645 (!) 153/82 -- -- 86 -- -- --   10/19/23 1636 -- -- -- -- -- 94 % --   10/19/23 1614 -- -- -- -- -- 93 % --   10/19/23 1607 -- -- -- -- (!) 32 -- --   10/19/23 1441 -- -- -- 96 -- 95 % --   10/19/23 1437 -- -- -- 96 -- 96 % --   10/19/23 1430 115/74 -- -- 92 -- 98 % --   10/19/23 1424 -- -- -- 89 -- 97 % --   10/19/23 1418 -- 97.9  F (36.6  C) Oral -- -- -- --   10/19/23 1400 122/72 -- -- 86 -- 99 % --   10/19/23 1359 116/64 -- -- 101 (!) 44 98 % --      Physical Exam  General: Appears frail, with mild increased work of breathing.   Head:  Scalp, face, and head appear normal  Eyes:  Pupils are equal, round, reactive to light     Conjunctivae non-injected and sclerae white  ENT:    The external nose is normal    Pinnae are normal  Neck:  Normal range of motion    There is no rigidity noted    Trachea is in the midline  CV:  Tachycardic rate and irregular rhythm     Holosystolic 5/6 murmur across the precordium. No rub.    Radial pulses 2+ bilaterally.  Resp:  Lungs are equal bilaterally  + tachypnea    Moderate increased work of breathing    No rales, wheezing, or rhonchi  GI:  Abdomen is soft, no rigidity or guarding    No distension, or mass    No tenderness  or rebound tenderness   MS:  Normal muscular tone    Symmetric motor strength    No lower extremity edema. No calf swelling or tenderness.  Skin:  No rash or acute skin lesions noted  Neuro:  Awake and alert  Speech is normal and fluent  Moves all extremities spontaneously  Psych: Normal affect. Appropriate interactions.     Emergency Department Course     ECG  ECG taken at 1414, ECG read at 1420  Sinus tachycardia with premature atrial complexes  Left ventricular hypertrophy with repolarization abnormality (R in aVL, Sokolow-Campa, Hewitt product)  Abnormal ECG   Rate 105 bpm. MT interval 168 ms. QRS duration 92 ms. QT/QTc 384/507 ms. P-R-T axes * -16 76.     Imaging:  CT Aortic Survey w Contrast   Final Result   IMPRESSION:       1. Stable size of large ascending thoracic aortic aneurysm.   2. 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   3. No evidence of aortic dissection, rupture or intramural hematoma.   4. No additional visualized explanation for patient's symptoms.      RONALDO BARKER MD            SYSTEM ID:  [id number]      Echocardiogram Complete   Final Result      XR Chest Port 1 View   Final Result   IMPRESSION: Prominent cardiomegaly appears stable. No new focal   airspace disease. No evidence for pulmonary edema pattern. Stable   nodular opacity medial left lung base corresponding with a known   saccular aneurysm previously demonstrated by CT on 1/25/2023.      MIK CABELLO MD            SYSTEM ID:  [id number]         Report per radiology    Laboratory:  Labs Ordered and Resulted from Time of ED Arrival to Time of ED Departure   COMPREHENSIVE METABOLIC PANEL - Abnormal       Result Value    Sodium 145      Potassium 3.7      Carbon Dioxide (CO2) 34 (*)     Anion Gap 10      Urea Nitrogen 20.6      Creatinine 0.72      GFR Estimate 81      Calcium 10.4 (*)     Chloride 101      Glucose 119 (*)     Alkaline Phosphatase 101       AST 23      ALT 23      Protein Total 7.8      Albumin 4.5      Bilirubin Total 0.6     PROCALCITONIN - Abnormal    Procalcitonin 0.05 (*)    TROPONIN T, HIGH SENSITIVITY - Abnormal    Troponin T, High Sensitivity 23 (*)    CBC WITH PLATELETS AND DIFFERENTIAL - Abnormal    WBC Count 6.2      RBC Count 4.49      Hemoglobin 12.3      Hematocrit 40.9      MCV 91      MCH 27.4      MCHC 30.1 (*)     RDW 14.6      Platelet Count 228      % Neutrophils 66      % Lymphocytes 21      % Monocytes 9      Mids % (Monos, Eos, Basos)        % Eosinophils 3      % Basophils 1      % Immature Granulocytes 0      NRBCs per 100 WBC 0      Absolute Neutrophils 4.1      Absolute Lymphocytes 1.3      Absolute Monocytes 0.6      Mids Abs (Monos, Eos, Basos)        Absolute Eosinophils 0.2      Absolute Basophils 0.1      Absolute Immature Granulocytes 0.0      Absolute NRBCs 0.0     TROPONIN T, HIGH SENSITIVITY - Abnormal    Troponin T, High Sensitivity 22 (*)    LIPASE - Normal    Lipase 47     MAGNESIUM - Normal    Magnesium 2.3     NT PROBNP INPATIENT - Normal    N terminal Pro BNP Inpatient 1,350     INFLUENZA A/B, RSV, & SARS-COV2 PCR - Normal    Influenza A PCR Negative      Influenza B PCR Negative      RSV PCR Negative      SARS CoV2 PCR Negative     TYPE AND SCREEN, ADULT    ABO/RH(D) A POS      Antibody Screen Negative      SPECIMEN EXPIRATION DATE [id number]     ABO/RH TYPE AND SCREEN      Emergency Department Course & Assessments:       Interventions:  Medications   ipratropium - albuterol 0.5 mg/2.5 mg/3 mL (DUONEB) neb solution 3 mL (3 mLs Nebulization $Given 10/19/23 0345)   melatonin tablet 1 mg (has no administration in time range)   ondansetron (ZOFRAN ODT) ODT tab 4 mg (has no administration in time range)     Or   ondansetron (ZOFRAN) injection 4 mg (has no administration in time range)   acetaminophen (TYLENOL) tablet 650 mg (has no administration in time range)     Or   acetaminophen (TYLENOL) Suppository  650 mg (has no administration in time range)   senna-docusate (SENOKOT-S/PERICOLACE) 8.6-50 MG per tablet 1 tablet (has no administration in time range)     Or   senna-docusate (SENOKOT-S/PERICOLACE) 8.6-50 MG per tablet 2 tablet (has no administration in time range)   ipratropium - albuterol 0.5 mg/2.5 mg/3 mL (DUONEB) neb solution 3 mL (3 mLs Nebulization $Given 10/20/23 0729)   albuterol (PROVENTIL) neb solution 2.5 mg (has no administration in time range)   predniSONE (DELTASONE) tablet 40 mg (40 mg Oral $Given 10/20/23 0820)   aspirin (ASA) chewable tablet 81 mg (81 mg Oral $Given 10/20/23 0820)   atorvastatin (LIPITOR) tablet 20 mg (20 mg Oral $Given 10/20/23 0820)   carboxymethylcellulose PF (REFRESH PLUS) 0.5 % ophthalmic solution 1 drop (1 drop Both Eyes $Given 10/19/23 2152)   levothyroxine (SYNTHROID/LEVOTHROID) tablet 100 mcg (100 mcg Oral $Given 10/20/23 0820)   oxyBUTYnin ER (DITROPAN XL) 24 hr tablet 10 mg (10 mg Oral $Given 10/20/23 0820)   traZODone (DESYREL) tablet 50 mg (50 mg Oral $Given 10/19/23 2146)   sodium chloride (PF) 0.9% PF flush 100 mL (60 mLs Intravenous $Given 10/19/23 1527)   iopamidol (ISOVUE-370) solution 500 mL (72 mLs Intravenous $Given 10/19/23 1527)   methylPREDNISolone sodium succinate (solu-MEDROL) injection 62.5 mg (62.5 mg Intravenous $Given 10/19/23 1848)   furosemide (LASIX) injection 20 mg (20 mg Intravenous $Given 10/19/23 2121)   acetaminophen (TYLENOL) tablet 650 mg (650 mg Oral $Given 10/19/23 2146)        Assessments, Independent Interpretation, Consult/Discussion of ManagementTests:  ED Course as of 10/20/23 0822   Thu Oct 19, 2023   1400 I obtained the history and examined the patient as noted above.      1439 I performed an independent rotation of the patient's chest radiograph.  No pneumothorax or large pleural effusions.  There is severe cardiomegaly.   1450 I rechecked the patient and explained findings.     1620 I rechecked the patient and explained findings.     1818 I rechecked and updated the patient.      1823 I spoke with Dr. Goodwin from the hospitalist service regarding the patient's presentation, findings here in the ED, and plan of care.     Social Determinants of Health affecting care:  None    Disposition:  The patient was admitted to the hospital under the care of Dr. Goodwin.     Impression & Plan      Medical Decision Making:  Giselle Chino is a 88 year old female who presents to the ED for evaluation of dyspnea in the setting of known COPD, severe thoracic aortic aneruysms - patient deemed not to be a candidate for intervention, CAD, among others. On arrival to the ED patient tachypneic with increased work of breathing. DDx considered includes COPD exac, pneumonia, pneumothorax, pleural effusion, pulmonary edema, CHF, aortic emergency, among others. DuoNeb given with improvement. No clinical significant volume overload. CXR without acute findings. CT aorta largely stable. No evidence of aortic catastrophe, pulmonary infiltrate or edema, pleural effusions, pneumothorax or other acute thoracic abnormalities.  Echocardiogram was obtained and unchanged from baseline.  No evidence of significantly worsened congestive heart failure, pericardial effusion, tamponade or other new findings.  Patient has significant valvular disease.  Ultimately signs and symptoms are most consistent with COPD exacerbation.  She was continued with nebulizer treatments and systemic steroids were started.  No evidence of infection at this time to indicate the need for antibiotics.  No fever, leukocytosis. COVID/Influenza/RSV testing negative. BNP within normal limits. hsTn minimally elevated, overall presentation not consistent with ACS. ECG unchanged from baseline. No anemia. Given the findings the patient will be admitted for ongoing monitoring, evaluation and treatment. Case discussed with hospitalist and patient admitted in improved condition.    Critical Care Time:   Upon my  evaluation, this patient had a critical illness with high probability of imminent or life-threatening deterioration due to acute respiratory failure with hypoxia, which required my direct attention, intervention, and personal management.    I have personally provided 35 minutes of critical care time exclusive of time spent on separately billable procedures. Time includes review of laboratory data, radiology results, discussion with consultants, reassessment of the patient and monitoring for potential decompensation. Interventions were performed as documented above.     Diagnosis:    ICD-10-CM    1. Acute on chronic respiratory failure with hypoxia (H)  J96.21       2. COPD exacerbation (H)  J44.1            10/19/2023   Dwayne De La O MD       Historical Data:  ______________________________________________________________________  Medications:    Amlodipine   Aspirin 81 mg   Atorvastatin   Hydralazine   Albuterol - neb solution   Isosorbide mononitrate  Levothyroxine   Metoprolol succinate ER   Oxybutynin  Torsemide   Trazodone   Nebulizer   Losartan  Miralax  Pepcid     Past Medical History:   Past Surgical History:     Adrenal mass, right   Aortic root dilatation  Aortic valve disorder  Acute toxic conjunctivitis   Burn  Cholelithiasis  Confusion  COPD  Diverticulosis  Emphysema   Fatty liver  Hyperlipidemia  Hypertension  Incontinence of bladder  Orthostatic hypotension  Overactive bladder  Paroxysmal supraventricular tachycardia  Thoracic aneurysm without mention of rupture  Thoracoabdominal aneurysm   Thyroid disease  Wrist fracture, right  Depression   Hypothyrodism  Conjunctivitis   Pneumonia  Bilateral cataract extraction   Skin graft of head and neck   Non-STEMI   Shortness of breath   Muscle weakness   Hypoxemic respiratory failure      Family History:    family history includes Aneurysm in an other family member; Bone Cancer in her sister; C.A.D. in her father; Heart Failure in her mother; Mental  Illness in her brother. Social History:   reports that she quit smoking about 15 years ago. Her smoking use included cigarettes. She has a 30.00 pack-year smoking history. She has never used smokeless tobacco. She reports that she does not currently use alcohol after a past usage of about 5.8 standard drinks of alcohol per week. She reports that she does not use drugs.     PCP: Shoaib Mills Ryan Clay, MD  10/20/23 0840

## 2023-10-19 NOTE — ED TRIAGE NOTES
Pt went to clinic today, brought by neighbor for increased SOB, RA sats at clinic 72%. Pt c/o increasing SOB since last night and cough/sore throat, also states she fell a week ago while getting mail. Lung sounds: decreased on left and diffuse crackles t/o right. RR in 40's. RT and MD at bedside in hallway. ABC's intact, alert and oriented X3.

## 2023-10-20 NOTE — CONSULTS
Care Management Initial Consult    General Information  Assessment completed with: Patient,    Type of CM/SW Visit: Initial Assessment    Primary Care Provider verified and updated as needed:     Readmission within the last 30 days: no previous admission in last 30 days      Reason for Consult: discharge planning    Communication Assessment  Patient's communication style: spoken language (English or Bilingual)    Hearing Difficulty or Deaf: no   Wear Glasses or Blind: yes    Cognitive  Cognitive/Neuro/Behavioral: WDL                      Living Environment:   People in home: alone     Current living Arrangements: house      Able to return to prior arrangements: yes       Family/Social Support:  Care provided by: self  Provides care for: no one  Marital Status:   Children, Neighbor          Description of Support System: Supportive, Involved    Support Assessment: Adequate family and caregiver support    Current Resources:   Patient receiving home care services: No     Community Resources: Meals on Wheels  Equipment currently used at home: walker, standard  Supplies currently used at home:      Does the patient's insurance plan have a 3 day qualifying hospital stay waiver?  Yes     Which insurance plan 3 day waiver is available? Alternative insurance waiver    Will the waiver be used for post-acute placement? No    Lifestyle & Psychosocial Needs:  Social Determinants of Health     Food Insecurity: Not on file   Depression: Not on file   Housing Stability: Not on file   Tobacco Use: Medium Risk (1/26/2023)    Patient History     Smoking Tobacco Use: Former     Smokeless Tobacco Use: Never     Passive Exposure: Not on file   Financial Resource Strain: Not on file   Alcohol Use: Not on file   Transportation Needs: Not on file   Physical Activity: Not on file   Interpersonal Safety: Not on file   Stress: Not on file   Social Connections: Not on file       Functional Status:  Prior to admission patient needed  assistance:   Dependent ADLs:: Ambulation-walker  Dependent IADLs:: Money Management, Transportation, Medication Management, Meal Preparation, Laundry, Cooking, Cleaning  Assesssment of Functional Status: Not at  functional baseline.    Care Management Discharge Note    Discharge Date: 10/20/2023       Discharge Disposition: Home Care    Discharge Services:      Discharge DME:      Discharge Transportation: family or friend will provide    Private pay costs discussed: Not applicable    Does the patient's insurance plan have a 3 day qualifying hospital stay waiver?  Yes     Which insurance plan 3 day waiver is available? Alternative insurance waiver    Will the waiver be used for post-acute placement? No    PAS Confirmation Code:    Patient/family educated on Medicare website which has current facility and service quality ratings: yes    Education Provided on the Discharge Plan: Yes  Persons Notified of Discharge Plans: Pt, provider, home care.  Patient/Family in Agreement with the Plan: yes    Handoff Referral Completed: No    Additional Information:  CM consulted for discharge planning. Pt admitted with COPD exacerbation. Met with pt at bedside. Pt lives alone in a single family split entry home with good support from neighbor and daughters. Pt uses a walker at baseline. Pt states she is tired and feels weak.   Home Care is being recommended and pt does not have a preference. Referral placed in the home care HUB and [facility] can accept the pt for admission early next week for RN and PT services. Orders have been faxed to [facility].  Daughter will provide transportation home.  Pt has discharge orders for today.  Please call if additional needs arise.    Adele Fairbanks RN   Inpatient Care Coordination  [facility]   Phone: [phone]

## 2023-10-20 NOTE — CONSULTS
"Palliative Care Consultation Note  [facility]      Patient: Giselle Chino  Date of Admission:  10/19/2023    Requesting Clinician / Team: Hospital medicine  Reason for consult:   Goals of care     Recommendations & Counseling     GOALS OF CARE:   Life-prolonging with limits   She is hoping to make it 4 more years but is taking it 1 day at a time.  She next most hopeful to make it to Halloween and then Thanksgiving  Hoping to continue with conservative medical management  Plan to further exploration of goals and support with symptom management- we will place palliative outpatient referral today    ADVANCE CARE PLANNING:  Patient has an advance directive dated 3/3/21.  Primary Health Care Agent Xi Kelly (daughter).  Alternate(s) Celeste Chino (daughter).   There is a POLST form on file, but will need to be updated prior to discharge.  It is currently listed as comfort focused only goals.  However, after further discussion with Giselle and based on chart review, she has been following and would select \"selective treatment.  Recommend filling out new POLST prior to discharge or with primary care provider as she is due to discharge within the hour  Code status: No CPR- Do NOT Intubate    DECISION MAKING:  Patient's decision making preferences: independently  Patient has decision-making capacity today for complex decisions:Intact     MEDICAL MANAGEMENT:   We are not actively managing symptoms at this time.    PSYCHOSOCIAL/SPIRITUAL SUPPORT:  Family she has 2 daughters who live locally.  Her  passed in 2021  [place] community: [place]         These recommendations have been discussed with bedside and primary team.    Irving Cartagena NP  Securely message with [employer] (more info)  Text page via [facility] Paging/Directory       Assessment      Giselle Chino is a 88 year old female with a past medical history of COPD, HFpEF, moderate aortic stenosis, known large thoracic aortic aneurysm, " hypertension, hyperlipidemia who was admitted on 10/19/2023 for acute complaints of shortness of breath.  She was evaluated by the hospitalist medicine service and is being treated for an acute COPD exacerbation.  The palliative care service was consulted to explore goals of care and provide additional coping support given this recent hospitalization and overall her chronic illnesses and not being a surgical candidate for her valvular disease.  Palliative Care Summary:   Today, the patient was seen for: Role introduction and goals of care support.    Met with Giselle in her hospital room.   I introduced our role as an extra layer of support and how we help patients and families dealing with serious, potentially life-limiting illnesses. I explained the composition of the palliative care team.  Palliative care helps patients and families navigate their care while focusing on the whole person; providing emotional, social and spiritual support  Palliative care often assists with symptom management, information sharing about what to expect from the illness, available treatment options and what effect those options may have on the disease course, and provide effective communication and caring support.    HPI, Goals, & Planning:    Functional Status just prior to this current hospitalization:  Palliative Performance Scale (PPS): 90%  Some evidence of disease. Full/normal ambulation, activity/work, self-care, oral intake, & LOC.  Estimated Median Survival in Days: N/A to 108 days.     Treatment Options, Goals, and Advance Care Planning:  Discussed what continuing restorative/life-prolonging care entails, including continued (re)admissions to the hospital, continuing with preventative and primary care, seeing disease/organ-specific specialties for medical treatment in hopes to prolong life for as long as possible.  Shared that with any treatment, there will be risks and benefits that may affect overall quality of life.   Explained where continued care can be done (home care, private pay, skilled nursing facilities as options), but shared that care-management will assist with specifics. Education provided on the role of palliative care while continuing with disease treatments (support/coping, symptom management).  We discussed that if/when disease treatments are no longer effective or when their quality of life is too negatively impacted, patient may elect to focus more solely on quality-of-life measures and stop disease treatment.    Education provided on transition to comfort-focused goals of care would be including discontinuation of IV fluids, cardiac monitoring, labs, tube feeding, TPN, etc. and other interventions that do not directly promote comfort.  Education given that current treatments that promote comfort and quality of life are continued. Anticipatory guidance was given regarding feeding, hunger, fluids at end of life. We discussed utilization of medications to ease air hunger, agitation and restlessness. Discussed that this process is very purposeful in terms of ensuring patient is as comfortable as possible and that family wishes are honored.  Education provided regarding hospice philosophy, prognostic, and eligibility criteria. Discussed what services are provided and those that are not. Discussed common misconceptions. We explored the various disposition options where they can receive hospice care (home, residential hospice homes, LTC with hospice) including subsequent financial and familial implications. Discussed typical anticipated timing of discharge, but we discussed that care-management will assist in specifics.    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     ROS:  Comprehensive ROS is reviewed and is negative except as here & per HPI:     Physical Exam   Vital Signs with Ranges  Temp:  [97.3  F (36.3  C)-97.9  F (36.6  C)] 97.3  F (36.3  C)  Pulse:  [] 74  Resp:  [12-44] 12  BP: ()/(45-92) 132/65  SpO2:  [90 %-100 %] 93 %  107 lbs 6.4 oz    Physical Exam  Vitals and nursing note reviewed.   Constitutional:       General: She is not in acute distress.  HENT:      Head: Normocephalic.      Mouth/Throat:      Mouth: Mucous membranes are moist.      Pharynx: Oropharynx is clear.   Eyes:      Extraocular Movements: Extraocular movements intact.      Conjunctiva/sclera: Conjunctivae normal.      Pupils: Pupils are equal, round, and reactive to light.   Cardiovascular:      Rate and Rhythm: Normal rate and regular rhythm.      Pulses: Normal pulses.   Pulmonary:      Effort: Pulmonary effort is normal. No respiratory distress.      Breath sounds: No wheezing.   Abdominal:      General: Abdomen is flat. There is no distension.      Tenderness: There is no abdominal tenderness.   Musculoskeletal:         General: Normal range of motion.   Skin:     General: Skin is warm and dry.      Capillary Refill: Capillary refill takes less than 2 seconds.   Neurological:      General: No focal deficit present.      Mental Status: She is alert. Mental status is at baseline.   Psychiatric:         Mood and Affect: Mood normal.         Thought Content: Thought content normal.           Data reviewed:  Results for orders placed or performed during the hospital encounter of 10/19/23 (from the past 24 hour(s))   EKG 12-lead, tracing only   Result Value  Ref Range    Systolic Blood Pressure  mmHg    Diastolic Blood Pressure  mmHg    Ventricular Rate 105 BPM    Atrial Rate 115 BPM    AR Interval 168 ms    QRS Duration 92 ms     ms    QTc 507 ms    P Axis  degrees    R AXIS -16 degrees    T Axis 76 degrees    Interpretation ECG       Sinus tachycardia with Premature atrial complexes  Left ventricular hypertrophy with repolarization abnormality ( R in aVL , Sokolow-Campa , Patel product )  Abnormal ECG  When compared with ECG of 18-APR-2023 17:40,  Sinus rhythm has replaced Atrial fibrillation  QT has lengthened     XR Chest Port 1 View    Narrative    CHEST ONE VIEW PORTABLE    10/19/2023 2:14 PM     HISTORY: Shortness of breath.    COMPARISON: X-ray 4/18/2023. CT 1/25/2023.      Impression    IMPRESSION: Prominent cardiomegaly appears stable. No new focal  airspace disease. No evidence for pulmonary edema pattern. Stable  nodular opacity medial left lung base corresponding with a known  saccular aneurysm previously demonstrated by CT on 1/25/2023.    MIK CABELLO MD         SYSTEM ID:  [id number]   CBC with platelets differential    Narrative    The following orders were created for panel order CBC with platelets differential.  Procedure                               Abnormality         Status                     ---------                               -----------         ------                     CBC with platelets and d...[[id number]]  Abnormal            Final result                 Please view results for these tests on the individual orders.   Comprehensive metabolic panel   Result Value Ref Range    Sodium 145 135 - 145 mmol/L    Potassium 3.7 3.4 - 5.3 mmol/L    Carbon Dioxide (CO2) 34 (H) 22 - 29 mmol/L    Anion Gap 10 7 - 15 mmol/L    Urea Nitrogen 20.6 8.0 - 23.0 mg/dL    Creatinine 0.72 0.51 - 0.95 mg/dL    GFR Estimate 81 >60 mL/min/1.73m2    Calcium 10.4 (H) 8.8 - 10.2 mg/dL    Chloride 101 98 - 107 mmol/L    Glucose 119 (H) 70 - 99 mg/dL    Alkaline  Phosphatase 101 35 - 104 U/L    AST 23 0 - 45 U/L    ALT 23 0 - 50 U/L    Protein Total 7.8 6.4 - 8.3 g/dL    Albumin 4.5 3.5 - 5.2 g/dL    Bilirubin Total 0.6 <=1.2 mg/dL   Lipase   Result Value Ref Range    Lipase 47 13 - 60 U/L   Procalcitonin   Result Value Ref Range    Procalcitonin 0.05 (H) <0.05 ng/mL   Troponin T, High Sensitivity   Result Value Ref Range    Troponin T, High Sensitivity 23 (H) <=14 ng/L   Magnesium   Result Value Ref Range    Magnesium 2.3 1.7 - 2.3 mg/dL   Nt probnp inpatient (BNP)   Result Value Ref Range    N terminal Pro BNP Inpatient 1,350 0 - 1,800 pg/mL   Blood Culture Line, venous    Specimen: Line, venous; Blood   Result Value Ref Range    Culture No growth after 12 hours    CBC with platelets and differential   Result Value Ref Range    WBC Count 6.2 4.0 - 11.0 10e3/uL    RBC Count 4.49 3.80 - 5.20 10e6/uL    Hemoglobin 12.3 11.7 - 15.7 g/dL    Hematocrit 40.9 35.0 - 47.0 %    MCV 91 78 - 100 fL    MCH 27.4 26.5 - 33.0 pg    MCHC 30.1 (L) 31.5 - 36.5 g/dL    RDW 14.6 10.0 - 15.0 %    Platelet Count 228 150 - 450 10e3/uL    % Neutrophils 66 %    % Lymphocytes 21 %    % Monocytes 9 %    Mids % (Monos, Eos, Basos)      % Eosinophils 3 %    % Basophils 1 %    % Immature Granulocytes 0 %    NRBCs per 100 WBC 0 <1 /100    Absolute Neutrophils 4.1 1.6 - 8.3 10e3/uL    Absolute Lymphocytes 1.3 0.8 - 5.3 10e3/uL    Absolute Monocytes 0.6 0.0 - 1.3 10e3/uL    Mids Abs (Monos, Eos, Basos)      Absolute Eosinophils 0.2 0.0 - 0.7 10e3/uL    Absolute Basophils 0.1 0.0 - 0.2 10e3/uL    Absolute Immature Granulocytes 0.0 <=0.4 10e3/uL    Absolute NRBCs 0.0 10e3/uL   Symptomatic Influenza A/B, RSV, & SARS-CoV2 PCR (COVID-19) Nasopharyngeal    Specimen: Nasopharyngeal; Swab   Result Value Ref Range    Influenza A PCR Negative Negative    Influenza B PCR Negative Negative    RSV PCR Negative Negative    SARS CoV2 PCR Negative Negative    Narrative    Testing was performed using the Improve Digital Xpress  CoV2/Flu/RSV Assay on the [employer] GeneXpert Instrument. This test should be ordered for the detection of SARS-CoV-2, influenza, and RSV viruses in individuals who meet clinical and/or epidemiological criteria. Test performance is unknown in asymptomatic patients. This test is for in vitro diagnostic use under the FDA EUA for laboratories certified under CLIA to perform high or moderate complexity testing. This test has not been FDA cleared or approved. A negative result does not rule out the presence of PCR inhibitors in the specimen or target RNA in concentration below the limit of detection for the assay. If only one viral target is positive but coinfection with multiple targets is suspected, the sample should be re-tested with another FDA cleared, approved, or authorized test, if coinfection would change clinical management. This test was validated by the [facility]. These laboratories are certified under the Clinical Laboratory Improvement Amendments of 1988 (CLIA-88) as qualified to perform high complexity laboratory testing.   Echocardiogram Complete   Result Value Ref Range    LVEF  60-65%     Narrative    [id number]  [id number]  [id number]  [id number]^CLINTON^MARISOL^SHAMA     [facility]  Echocardiography Laboratory  [address] [place] [address]     Name: [place] NYE  MRN: [id number]  : 1935  Study Date: 10/19/2023 02:30 PM  Age: 88 yrs  Gender: Female  Patient Location: [facility]  Reason For Study: Dyspnea  Ordering Physician: MARISOL ALONZO  Referring Physician: Shoaib Mills  Performed By: Yeny Freeman     BSA: 1.6 m2  Height: 64 in  Weight: 125 lb  HR: 75  BP: 116/64 mmHg  ______________________________________________________________________________  Procedure  Complete Portable Echo Adult. Poor acoustic windows.  ______________________________________________________________________________  Interpretation Summary     This is my clinic patient. The  severe aortic aneurysm/aortic valve disease is  known. Patient has declined surgery and surgeon felt she was too high risk for  surgery  Moderate valvular aortic stenosis.  Pt sitting up in bed supine due to SOB.  Technically difficult study limited views obtained due to body habitus  The right ventricle is normal in size and function.  Atria not well seen due to distortion from asc aorta aneurysm  Right ventricular systolic pressure could not be approximated due to  inadequate tricuspid regurgitation.  IVC diameter >2.1 cm collapsing <50% with sniff suggests a high RA pressure  estimated at 15 mmHg or greater.  There is moderate to mod-severe (2-3+) aortic regurgitation.  Moderate aortic root dilatation.  The ascending aorta is Severely dilated.  Sinus Valsalva 45mm, Asc aorta 87-90 mm  The visual ejection fraction is 60-65%.  ______________________________________________________________________________  Left Ventricle  Proximal septal thickening is noted. The visual ejection fraction is 60-65%.  Diastolic function not assessed due to significant mitral annular  calcification. Normal left ventricular wall motion.     Right Ventricle  The right ventricle is normal in size and function.     Atria  The left atrium is not well visualized. Atria not well seen due to distortion  from asc aorta aneurysm. Right atrium not well visualized.     Mitral Valve  There is moderate mitral annular calcification. There is trace to mild mitral  regurgitation. There is no mitral valve stenosis.     Tricuspid Valve  There is trace tricuspid regurgitation. Right ventricular systolic pressure  could not be approximated due to inadequate tricuspid regurgitation. IVC  diameter >2.1 cm collapsing <50% with sniff suggests a high RA pressure  estimated at 15 mmHg or greater.     Aortic Valve  There is moderate to mod-severe (2-3+) aortic regurgitation. Moderate valvular  aortic stenosis.     Pulmonic Valve  The pulmonic valve is not well  visualized.     Vessels  Moderate aortic root dilatation. The ascending aorta is Severely dilated.  Sinus Valsalva 45mm, Asc aorta 87-90 mm.     Pericardium  The pericardium appears normal.     Rhythm  The patient exhibited occasional PACs. Sinus rhythm was noted.  ______________________________________________________________________________  MMode/2D Measurements & Calculations  Ao root diam: 4.5 cm     Ao root diam index Ht(cm/m): 2.8  Ao root diam index BSA (cm/m2): 2.8  TAPSE: 2.6 cm     Doppler Measurements & Calculations  MV E max martell: 63.6 cm/sec  MV A max martell: 137.3 cm/sec  MV E/A: 0.46  MV dec slope: 223.0 cm/sec2  MV dec time: 0.29 sec  Ao V2 max: 274.8 cm/sec  Ao max P.0 mmHg  Ao V2 mean: 192.2 cm/sec  Ao mean P.0 mmHg  Ao V2 VTI: 45.6 cm  AI P1/2t: 353.7 msec  E/E' av.4  Lateral E/e': 9.7  Medial E/e': 5.1  RV S Martell: 22.5 cm/sec     ______________________________________________________________________________  Report approved by: Brayan Kidd 10/19/2023 03:31 PM         CT Aortic Survey w Contrast    Narrative    CT AORTIC SURVEY W CONTRAST  10/19/2023 3:34 PM    HISTORY:  shortness of breath, chest heaviness, hx ascending aortic  aneurysm    TECHNIQUE: CT scan obtained of the chest, abdomen, and pelvis with IV  contrast. Post contrast scanning performed during the arterial phase.  CT chest also obtained without IV contrast. 67mL [id number]-[id number] IV  injected. Sagittal and coronal reformatted images acquired from the  source post contrast data. Radiation dose for this scan was reduced  using automated exposure control, adjustment of the mA and/or kV  according to patient size, or iterative reconstruction technique.    COMPARISON:  Same-day chest radiograph, CT angiogram 2023    FINDINGS:  Thoracic and abdominal aorta: Moderate to severe calcified and  noncalcified atherosclerosis. Marked aneurysmal dilation of the  ascending thoracic aorta is again noted, currently measuring up to  8.4  cm, previously 8.3 cm (series 10 image 47). Bovine arch again noted.  Mild aneurysmal dilation of the descending thoracic aorta, measuring  up to 3.5 cm, not significantly changed from prior examination. Large  saccular aneurysm again noted just proximal to the diaphragmatic  hiatus, measuring up to 6.1 cm, previously 6.2 cm when remeasured for  consistency (series 6 image 112). Stable aneurysmal dilation of the  suprarenal abdominal aorta, measuring up to 3.5 cm.  There is no  evidence of acute aortic dissection or intramural hematoma.    Other vascular: The visualized portions of the great vessels are  unremarkable. Pulmonary arteries are not well-opacified and are  indeterminate for embolus. The celiac, superior mesenteric, bilateral  renal and inferior mesenteric arteries are patent. No evidence of  significant vascular stenosis in the pelvis. No evidence of active  peritoneal or retroperitoneal hemorrhage.    Chest: No focal airspace consolidation or pleural effusion. Minimal  bibasilar hypoventilatory changes. No suspicious lymphadenopathy.  Severe coronary artery calcifications. No pericardial effusion.    Abdomen/pelvis: Cholelithiasis without evidence of acute cholecystitis  or biliary obstruction. Pancreas, spleen and left adrenal gland are  unremarkable. Stable size of 3.7 cm right adrenal adenoma, no specific  follow-up recommended given stability. No hydronephrosis. Urinary  bladder is unremarkable. Colonic diverticulosis without evidence of  acute diverticulitis or bowel obstruction. No lymphadenopathy or  ascites. Calcified uterine leiomyomas. No acute bony abnormality.      Impression    IMPRESSION:     1. Stable size of large ascending thoracic aortic aneurysm.  2. 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  3. No evidence of aortic dissection, rupture or intramural hematoma.  4. No additional visualized explanation  for patient's symptoms.    RONALDO BARKER MD         SYSTEM ID:  [id number]   ABO/Rh type and screen    Narrative    The following orders were created for panel order ABO/Rh type and screen.  Procedure                               Abnormality         Status                     ---------                               -----------         ------                     Adult Type and Screen[[id number]]                            Edited Result - FINAL        Please view results for these tests on the individual orders.   Blood Culture Wrist, Right    Specimen: Wrist, Right; Blood   Result Value Ref Range    Culture No growth after 12 hours    Adult Type and Screen   Result Value Ref Range    ABO/RH(D) A POS     Antibody Screen Negative Negative    SPECIMEN EXPIRATION DATE [id number]    Troponin T, High Sensitivity   Result Value Ref Range    Troponin T, High Sensitivity 22 (H) <=14 ng/L   Basic metabolic panel   Result Value Ref Range    Sodium 138 135 - 145 mmol/L    Potassium 4.3 3.4 - 5.3 mmol/L    Chloride 100 98 - 107 mmol/L    Carbon Dioxide (CO2) 28 22 - 29 mmol/L    Anion Gap 10 7 - 15 mmol/L    Urea Nitrogen 20.2 8.0 - 23.0 mg/dL    Creatinine 0.75 0.51 - 0.95 mg/dL    GFR Estimate 77 >60 mL/min/1.73m2    Calcium 10.0 8.8 - 10.2 mg/dL    Glucose 150 (H) 70 - 99 mg/dL   CBC with platelets   Result Value Ref Range    WBC Count 4.0 4.0 - 11.0 10e3/uL    RBC Count 3.94 3.80 - 5.20 10e6/uL    Hemoglobin 10.9 (L) 11.7 - 15.7 g/dL    Hematocrit 35.7 35.0 - 47.0 %    MCV 91 78 - 100 fL    MCH 27.7 26.5 - 33.0 pg    MCHC 30.5 (L) 31.5 - 36.5 g/dL    RDW 14.2 10.0 - 15.0 %    Platelet Count 204 150 - 450 10e3/uL       Medical Decision Making       87 MINUTES SPENT BY ME on the date of service doing chart review, history, exam, documentation & further activities per the note.

## 2023-10-20 NOTE — PLAN OF CARE
PRIMARY DIAGNOSIS: SOB, weakness, forgetful  OUTPATIENT/OBSERVATION GOALS TO BE MET BEFORE DISCHARGE:  ADLs back to baseline: No    Activity and level of assistance: Ax2 w/ walker    Pain status: Improved-controlled with oral pain medications.    Return to near baseline physical activity: Yes     Discharge Planner Nurse   Safe discharge environment identified: No  Barriers to discharge: Yes       Entered by: Nico Newman RN 10/19/2023 11:21 PM   Pt is A&Ox4 but she can be forgetful. Soft BP otherwise VSS. On NC 2 L, sats 95%. Ax2 with walker. Baseline is walker. Incontinent of urine, wears diaper at home. Currently has purewick. Pt c/o right lower abd pain 8/10 related to gallstones. One time Tylenol given for pain. Pt did have onset in & out a-fib. Provider notified. Advise to monitor and notify if HR is over 120.   BP (!) (P) 87/63 (BP Location: Left arm)   Pulse 75   Temp (P) 97.9  F (36.6  C) (Oral)   Resp 22   LMP  (LMP Unknown)   SpO2 95%   Please review provider order for any additional goals.   Nurse to notify provider when observation goals have been met and patient is ready for discharge.
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
## 2023-10-20 NOTE — PHARMACY-ADMISSION MEDICATION HISTORY
Pharmacist Admission Medication History    Admission medication history is complete. The information provided in this note is only as accurate as the sources available at the time of the update.    Information Source(s): Patient via in-person    Pertinent Information:      Changes made to PTA medication list:  Added: refresh eye drops  Deleted: amlodipine, hydralazine, duplicate imdur  Changed: None    Medication Affordability:  Not including over the counter (OTC) medications, was there a time in the past 3 months when you did not take your medications as prescribed because of cost?: No    Allergies reviewed with patient and updates made in EHR: yes    Medication History Completed By: Naye Chacon [facility] 10/19/2023 7:59 PM    PTA Med List   Medication Sig Last Dose    acetaminophen (TYLENOL) 500 MG tablet Take 500-1,000 mg by mouth daily 10/19/2023 at am    aspirin (ASA) 81 MG chewable tablet Take 1 tablet (81 mg) by mouth daily 10/19/2023 at am    atorvastatin (LIPITOR) 20 MG tablet Take 1 tablet (20 mg) by mouth daily 10/19/2023 at am    carboxymethylcellulose PF (REFRESH PLUS) 0.5 % ophthalmic solution Place 1 drop into both eyes 2 times daily as needed for dry eyes     ipratropium - albuterol 0.5 mg/2.5 mg/3 mL (DUONEB) 0.5-2.5 (3) MG/3ML neb solution Take 1 vial (3 mLs) by nebulization every 6 hours as needed for shortness of breath / dyspnea or wheezing     isosorbide mononitrate (IMDUR) 30 MG 24 hr tablet Take 1 tablet (30 mg) by mouth daily 10/19/2023 at am    levothyroxine (SYNTHROID/LEVOTHROID) 100 MCG tablet Take 100 mcg by mouth daily 10/19/2023 at am    metoprolol succinate ER (TOPROL XL) 25 MG 24 hr tablet Take 1 tablet (25 mg) by mouth 2 times daily 10/19/2023 at am    Multiple Vitamins-Minerals (PRESERVISION AREDS PO) Take 1 tablet by mouth 2 times daily 10/19/2023 at am    oxybutynin (DITROPAN-XL) 10 MG 24 hr tablet Take 10 mg by mouth daily 10/19/2023 at am    polyethylene glycol (MIRALAX) 17  GM/Dose powder Take 17 g by mouth daily as needed for constipation     potassium chloride ER (K-DUR/KLOR-CON M) 20 MEQ CR tablet Take 1 tablet (20 mEq) by mouth daily 10/19/2023 at am    torsemide (DEMADEX) 10 MG tablet Take 1 tablet (10 mg) by mouth daily 10/19/2023 at am    traZODone (DESYREL) 50 MG tablet Take 50 mg by mouth At Bedtime 10/18/2023 at hs

## 2023-10-20 NOTE — PROGRESS NOTES
"[facility]     Date:10/19/2023  Admission Dx:COPD  Pulmonary HistoryCOPD  Home Nebulizer/MDI Use:Y  Home Oxygen:N  Acuity Level (RCAT flow sheet):3  Aerosol Therapy initiated:Continue Duoneb QID  Pulmonary Hygiene initiated:Cough and Deep Breathe  Volume Expansion initiated:IS  Current Oxygen Requirements:2 LPM  Current SpO2:95%  Re-evaluation date:10/22/2023  Patient Education:y      See \"RT Assessments\" flow sheet for patient assessment scoring and Acuity Level Details.           "

## 2023-10-20 NOTE — DISCHARGE SUMMARY
[facility] [place]  Hospitalist Discharge Summary      Date of Admission:  10/19/2023  Date of Discharge:  10/20/2023  Discharging Provider: Flores Torres PA-C  Discharge Service: Hospitalist Service    Discharge Diagnoses   Acute hypoxemic respiratory failure, resolved    Follow-ups Needed After Discharge   Follow-up Appointments     Follow-up and recommended labs and tests       Follow up with primary care provider, Shoaib Mills, within 7-14   days for hospital follow- up.    Follow up in Palliative care clinic.     Follow up with Cardiology as previously directed [phone] to schedule.          {    Unresulted Labs Ordered in the Past 30 Days of this Admission       Date and Time Order Name Status Description    10/19/2023  2:13 PM Blood Culture Line, venous Preliminary     10/19/2023  2:13 PM Blood Culture Wrist, Right Preliminary         These results will be followed up by hospitalist.     Discharge Disposition   Discharged to home with [facility]  Condition at discharge: Stable    Hospital Course   Giselle Chino is a 88 year old female with PMH significant for COPD not on home oxygen, G1 diastolic dysfunction and moderate aortic stenosis, large thoracic aortic aneurysm, hypertension/hyperlipidemia who was admitted on 10/19/2023 with shortness of breath.    She was admitted to observation.  After being treated in the ED with Solu-Medrol, nebulizers and dose of IV diuretic with improvement in symptoms.  Overnight she was weaned off of oxygen.  She was ambulatory with no hypoxia.  Echocardiogram was updated showing known findings of severe aortic aneurysm and valvular disease.  Palliative care was consulted for ongoing goals of care discussions patient plans to follow-up in the palliative care clinic, continues to pursue selective treatment.  With resolved symptoms she would like to return home today.  I have spoken with her daughter, Xi, and updated her.  We plan on sending home care  referrals at dismissal.  Given rapid improvement in symptoms with steroids, will discharge on a prednisone taper for possible COPD exacerbation.        Acute hypoxemic respiratory failure, Resolved   Brought to clinic by her neighbor today for shortness of breath.  In the clinic, her saturations were 72%.  Respiratory rate greater than 40.  Reportedly increased shortness of breath since last night.  Daughter had called this morning and she seemed to be doing okay.  Associated symptoms include cough and sore throat.  On arrival to the ED, the patient was given Solu-Medrol, nebulizers with improvement in symptoms.  To me, the patient reports that she had multiple episodes of same in the past that were thought to be related to COPD exacerbations.  Of note, she was recently hospitalized in January at which time her chest pressure and shortness of breath potentially were related to her aortic stenosis and aneurysm, and possible CHF.  In addition, on TTE in the ED, her IVC is dilated consistent with elevated RA pressure and so there may be a component of hypovolemia contributing.  She is no longer requiring supplemental oxygen on admission.  -Admit to observation status for monitoring overnight  -Reduce Solu-Medrol to 40 mg prednisone daily with 7 day taper   -DuoNebs, albuterol nebs as needed - she has these at home.   -No hypoxia or need for home oxygen      Severe Aortic Aneurysm, Valvular Disease  Appears hypovolemic, was given diuretic in ED. Previously evaluated by cardiology who discussed with patient.  Has declined surgery and surgeon felt she was too high risk for surgery regardless. Echo was updated with similar findings as to prior, LVEF 60-65%.   -Palliative Care consulted, patient is not interested in hospice at this time  -Continue prior to admission metoprolol, Demadex.  Would not recommend additional diuretics as appears hypovolemic, no weight gain, lower extremity edema and blood pressures have been  softer.  Holding Imdur for now.  Reassess.  Recommend follow-up in cardiology clinic.     Sinus tachycardia with arrhythmia vs PAF  There was some concern on bedside telemetry for possible development of atrial fibrillation however on review of strips does appear most consistent with sinus rhythm and PACs.  No atrial fibrillation with RVR.  Would defer systemic anticoagulation in the absence of objective atrial fibrillation, in addition risks of anticoagulation outweigh benefits in this patient with severe comorbidity     GOC:  Goals of care discussion was had during her last hospitalization given failure to thrive, poor surgical candidate with life-threatening vascular disease.  She is made DNR/DNI.  She would like to be independent for as long as possible and then go directly into hospice.  She is not ready for hospice yet.  She is still independent at home and has been seemingly doing okay with this.  Family lives close by.  POLST was completed in January 2023.     HTN/HLD:  PTA medications once med rec completed     Hypothyroidism: PTA synthroid     Macular degeneration: PTA eye drops    OAB: patient complained of fatigue, stopping prior to admission oxybutynin to see if this helps.  Consider resuming if develops troublesome symptoms of OAB.       Consultations This Hospital Stay   PALLIATIVE CARE ADULT IP CONSULT  CARE MANAGEMENT / SOCIAL WORK IP CONSULT    Code Status   No CPR- Do NOT Intubate    Time Spent on this Encounter   I, Flores Torres PA-C, personally saw the patient today and spent greater than 30 minutes discharging this patient.       Flores Torres PA-C  ______________________________________________________________________    Interval History  Assumed care.  Weaned off of supplemental oxygen overnight.  Denies any shortness of breath or cough.  No chest pain or palpitations.  Ambulated in the hallway using her walker without oxygen.  No hypoxia with ambulation.  Eating meals.    On telemetry,  sinus rhythm in the 80s with sinus arrhythmia.    Physical Exam   Vital Signs: Temp: 98  F (36.7  C) Temp src: Oral BP: 132/64 Pulse: 92   Resp: 16 SpO2: 96 % O2 Device: None (Room air) Oxygen Delivery: 1 LPM  Weight: 107 lbs 6.4 oz    Constitutional: Awake, alert, no apparent distress  Respiratory: No abnormal work of breathing.  Lung sounds are diminished.  No wheezing or inspiratory crackles.    Cardiovascular: Regular rate and rhythm, normal S1 and S2, and no murmur appreciated.   GI: Bowel sounds present. soft, non-distended, non-tender.   Skin/Integument: Warm, dry. no peripheral edema.  Neuro: No focal deficits. Moving all extremities with normal strength. Coordination and sensation grossly intact. Speech clear. No focal deficits.   Psych: Appropriate affect.         Primary Care Physician   Shoaib Mills    Discharge Orders      Adult Palliative Care Referral      Home Care Referral      Reason for your hospital stay    Shortness of breath, hypoxia resolved.     Follow-up and recommended labs and tests     Follow up with primary care provider, Shoaib Mills, within 7-14 days for hospital follow- up.    Follow up in Palliative care clinic.     Follow up with Cardiology as previously directed [phone] to schedule.     Activity    Your activity upon discharge: activity as tolerated use walker     Diet    Follow this diet upon discharge: Regular diet.       Significant Results and Procedures   Results for orders placed or performed during the hospital encounter of 10/19/23   XR Chest Port 1 View    Narrative    CHEST ONE VIEW PORTABLE    10/19/2023 2:14 PM     HISTORY: Shortness of breath.    COMPARISON: X-ray 4/18/2023. CT 1/25/2023.      Impression    IMPRESSION: Prominent cardiomegaly appears stable. No new focal  airspace disease. No evidence for pulmonary edema pattern. Stable  nodular opacity medial left lung base corresponding with a known  saccular aneurysm previously demonstrated by CT on  1/25/2023.    MIK CABELLO MD         SYSTEM ID:  [id number]   CT Aortic Survey w Contrast    Narrative    CT AORTIC SURVEY W CONTRAST  10/19/2023 3:34 PM    HISTORY:  shortness of breath, chest heaviness, hx ascending aortic  aneurysm    TECHNIQUE: CT scan obtained of the chest, abdomen, and pelvis with IV  contrast. Post contrast scanning performed during the arterial phase.  CT chest also obtained without IV contrast. 67mL [id number]-[id number] IV  injected. Sagittal and coronal reformatted images acquired from the  source post contrast data. Radiation dose for this scan was reduced  using automated exposure control, adjustment of the mA and/or kV  according to patient size, or iterative reconstruction technique.    COMPARISON:  Same-day chest radiograph, CT angiogram 1/25/2023    FINDINGS:  Thoracic and abdominal aorta: Moderate to severe calcified and  noncalcified atherosclerosis. Marked aneurysmal dilation of the  ascending thoracic aorta is again noted, currently measuring up to 8.4  cm, previously 8.3 cm (series 10 image 47). Bovine arch again noted.  Mild aneurysmal dilation of the descending thoracic aorta, measuring  up to 3.5 cm, not significantly changed from prior examination. Large  saccular aneurysm again noted just proximal to the diaphragmatic  hiatus, measuring up to 6.1 cm, previously 6.2 cm when remeasured for  consistency (series 6 image 112). Stable aneurysmal dilation of the  suprarenal abdominal aorta, measuring up to 3.5 cm.  There is no  evidence of acute aortic dissection or intramural hematoma.    Other vascular: The visualized portions of the great vessels are  unremarkable. Pulmonary arteries are not well-opacified and are  indeterminate for embolus. The celiac, superior mesenteric, bilateral  renal and inferior mesenteric arteries are patent. No evidence of  significant vascular stenosis in the pelvis. No evidence of active  peritoneal or retroperitoneal hemorrhage.    Chest: No focal airspace  consolidation or pleural effusion. Minimal  bibasilar hypoventilatory changes. No suspicious lymphadenopathy.  Severe coronary artery calcifications. No pericardial effusion.    Abdomen/pelvis: Cholelithiasis without evidence of acute cholecystitis  or biliary obstruction. Pancreas, spleen and left adrenal gland are  unremarkable. Stable size of 3.7 cm right adrenal adenoma, no specific  follow-up recommended given stability. No hydronephrosis. Urinary  bladder is unremarkable. Colonic diverticulosis without evidence of  acute diverticulitis or bowel obstruction. No lymphadenopathy or  ascites. Calcified uterine leiomyomas. No acute bony abnormality.      Impression    IMPRESSION:     1. Stable size of large ascending thoracic aortic aneurysm.  2. 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  3. No evidence of aortic dissection, rupture or intramural hematoma.  4. No additional visualized explanation for patient's symptoms.    RONALDO BARKER MD         SYSTEM ID:  [id number]   Echocardiogram Complete     Value    LVEF  60-65%    Narrative    [id number]  [id number]  [id number]  [id number]^CLINTON^MARISOL^SHAMA     [facility] [place]  Echocardiography Laboratory  [address] [place] [address]     Name: [place] NYE  MRN: [id number]  : 1935  Study Date: 10/19/2023 02:30 PM  Age: 88 yrs  Gender: Female  Patient Location: [facility]  Reason For Study: Dyspnea  Ordering Physician: MARISOL ALONZO  Referring Physician: Shoaib Mills  Performed By: Yeyn Freeman     BSA: 1.6 m2  Height: 64 in  Weight: 125 lb  HR: 75  BP: 116/64 mmHg  ______________________________________________________________________________  Procedure  Complete Portable Echo Adult. Poor acoustic windows.  ______________________________________________________________________________  Interpretation Summary     This is my clinic patient. The severe aortic  aneurysm/aortic valve disease is  known. Patient has declined surgery and surgeon felt she was too high risk for  surgery  Moderate valvular aortic stenosis.  Pt sitting up in bed supine due to SOB.  Technically difficult study limited views obtained due to body habitus  The right ventricle is normal in size and function.  Atria not well seen due to distortion from asc aorta aneurysm  Right ventricular systolic pressure could not be approximated due to  inadequate tricuspid regurgitation.  IVC diameter >2.1 cm collapsing <50% with sniff suggests a high RA pressure  estimated at 15 mmHg or greater.  There is moderate to mod-severe (2-3+) aortic regurgitation.  Moderate aortic root dilatation.  The ascending aorta is Severely dilated.  Sinus Valsalva 45mm, Asc aorta 87-90 mm  The visual ejection fraction is 60-65%.  ______________________________________________________________________________  Left Ventricle  Proximal septal thickening is noted. The visual ejection fraction is 60-65%.  Diastolic function not assessed due to significant mitral annular  calcification. Normal left ventricular wall motion.     Right Ventricle  The right ventricle is normal in size and function.     Atria  The left atrium is not well visualized. Atria not well seen due to distortion  from asc aorta aneurysm. Right atrium not well visualized.     Mitral Valve  There is moderate mitral annular calcification. There is trace to mild mitral  regurgitation. There is no mitral valve stenosis.     Tricuspid Valve  There is trace tricuspid regurgitation. Right ventricular systolic pressure  could not be approximated due to inadequate tricuspid regurgitation. IVC  diameter >2.1 cm collapsing <50% with sniff suggests a high RA pressure  estimated at 15 mmHg or greater.     Aortic Valve  There is moderate to mod-severe (2-3+) aortic regurgitation. Moderate valvular  aortic stenosis.     Pulmonic Valve  The pulmonic valve is not well visualized.      Vessels  Moderate aortic root dilatation. The ascending aorta is Severely dilated.  Sinus Valsalva 45mm, Asc aorta 87-90 mm.     Pericardium  The pericardium appears normal.     Rhythm  The patient exhibited occasional PACs. Sinus rhythm was noted.  ______________________________________________________________________________  MMode/2D Measurements & Calculations  Ao root diam: 4.5 cm     Ao root diam index Ht(cm/m): 2.8  Ao root diam index BSA (cm/m2): 2.8  TAPSE: 2.6 cm     Doppler Measurements & Calculations  MV E max martell: 63.6 cm/sec  MV A max martell: 137.3 cm/sec  MV E/A: 0.46  MV dec slope: 223.0 cm/sec2  MV dec time: 0.29 sec  Ao V2 max: 274.8 cm/sec  Ao max P.0 mmHg  Ao V2 mean: 192.2 cm/sec  Ao mean P.0 mmHg  Ao V2 VTI: 45.6 cm  AI P1/2t: 353.7 msec  E/E' av.4  Lateral E/e': 9.7  Medial E/e': 5.1  RV S Martell: 22.5 cm/sec     ______________________________________________________________________________  Report approved by: Brayan Kidd 10/19/2023 03:31 PM             Discharge Medications   Current Discharge Medication List        START taking these medications    Details   predniSONE (DELTASONE) 10 MG tablet Take 4 tablets (40 mg) by mouth daily for 3 days, THEN 2 tablets (20 mg) daily for 2 days, THEN 1 tablet (10 mg) daily for 1 day, THEN 0.5 tablets (5 mg) daily for 1 day.  Qty: 18 tablet, Refills: 0    Associated Diagnoses: COPD exacerbation (H)           CONTINUE these medications which have CHANGED    Details   ipratropium - albuterol 0.5 mg/2.5 mg/3 mL (DUONEB) 0.5-2.5 (3) MG/3ML neb solution Take 1 vial (3 mLs) by nebulization every 6 hours as needed for shortness of breath or wheezing  Qty: 90 mL, Refills: 1    Associated Diagnoses: COPD exacerbation (H)      isosorbide mononitrate (IMDUR) 30 MG 24 hr tablet Take 1 tablet (30 mg) by mouth daily Hold for SBP <115    Associated Diagnoses: Non-STEMI (non-ST elevated myocardial infarction) (H); Benign essential hypertension            CONTINUE these medications which have NOT CHANGED    Details   acetaminophen (TYLENOL) 500 MG tablet Take 500-1,000 mg by mouth daily      aspirin (ASA) 81 MG chewable tablet Take 1 tablet (81 mg) by mouth daily    Associated Diagnoses: Benign essential hypertension      atorvastatin (LIPITOR) 20 MG tablet Take 1 tablet (20 mg) by mouth daily  Qty: 90 tablet, Refills: 3    Associated Diagnoses: Hyperlipidemia LDL goal <100      carboxymethylcellulose PF (REFRESH PLUS) 0.5 % ophthalmic solution Place 1 drop into both eyes 2 times daily as needed for dry eyes      levothyroxine (SYNTHROID/LEVOTHROID) 100 MCG tablet Take 100 mcg by mouth daily      metoprolol succinate ER (TOPROL XL) 25 MG 24 hr tablet Take 1 tablet (25 mg) by mouth 2 times daily  Qty: 180 tablet, Refills: 3    Associated Diagnoses: Thoracic aortic aneurysm without rupture (H24)      Multiple Vitamins-Minerals (PRESERVISION AREDS PO) Take 1 tablet by mouth 2 times daily      polyethylene glycol (MIRALAX) 17 GM/Dose powder Take 17 g by mouth daily as needed for constipation      potassium chloride ER (K-DUR/KLOR-CON M) 20 MEQ CR tablet Take 1 tablet (20 mEq) by mouth daily  Qty: 90 tablet, Refills: 3    Associated Diagnoses: Benign essential hypertension      torsemide (DEMADEX) 10 MG tablet Take 1 tablet (10 mg) by mouth daily  Qty: 90 tablet, Refills: 2    Associated Diagnoses: HTN (hypertension)      traZODone (DESYREL) 50 MG tablet Take 50 mg by mouth At Bedtime           STOP taking these medications       oxybutynin (DITROPAN-XL) 10 MG 24 hr tablet Comments:   Reason for Stopping:             Allergies   Allergies   Allergen Reactions    Amoxicillin Rash    Piperacillin-Tazobactam In Dex Rash

## 2023-10-20 NOTE — PLAN OF CARE
Patient's After Visit Summary was reviewed with patient and/or daughter.   Patient verbalized understanding of After Visit Summary, recommended follow up and was given an opportunity to ask questions.   Discharge medications sent home with patient/family: E-scribed to patient's pharmacy of choice    Discharged with daughter.    IV access discontinued. Remote tele discontinued and returned to cardiac unit. All personal belongings returned to patient. Escorted to exit via wheelchair with staff.    OBSERVATION patient END time: 4:47 PM

## 2023-10-20 NOTE — PLAN OF CARE
PRIMARY DIAGNOSIS: SOB   OUTPATIENT/OBSERVATION GOALS TO BE MET BEFORE DISCHARGE:  1. Stable vital signs Yes  2. Tolerating diet:Yes  3. Pain controlled with oral pain medications:  Yes  4. Positive bowel sounds:  Yes  5. Voiding without difficulty:  Yes  6. Able to ambulate:  Yes  7. Provider specific discharge goals met:  No    Discharge Planner Nurse   Safe discharge environment identified: Yes  Barriers to discharge: Yes       Entered by: Pushpa Paulino RN 10/20/2023   A&Ox4. Up w/ SBA w/ gait belt and walker. O2 sats stable on RA. Continues scheduled neb tx. BS active x4, tolerating Combination Diet Low Saturated Fat Na <2400mg Diet, No Caffeine Diet, passing flatus. Voiding in adequate amt's. SL. Palliative, CW/SM consulted.    Please review provider order for any additional goals.   Nurse to notify provider when observation goals have been met and patient is ready for discharge.

## 2023-10-20 NOTE — PLAN OF CARE
PRIMARY DIAGNOSIS: SOB   OUTPATIENT/OBSERVATION GOALS TO BE MET BEFORE DISCHARGE:    1. Stable vital signs: Yes  2. Tolerating diet: Yes  3. Pain controlled with oral pain medications:  Yes  4. Positive bowel sounds:  Yes  5. Voiding without difficulty:  Yes  6. Able to ambulate:  Yes  7. Provider specific discharge goals met:  No    Discharge Planner Nurse   Safe discharge environment identified: Yes  Barriers to discharge: No        Entered by: Pushpa Paulino RN 10/20/2023     A&Ox4. Up w/ SBA w/ gait belt and walker. O2 sats stable on RA. Continues scheduled neb tx. BS active x4, tolerating Combination Diet Low Saturated Fat Na <2400mg Diet, No Caffeine Diet, passing flatus. Voiding in adequate amt's. SL. Palliative, CW/SM consulted, see notes.     Please review provider order for any additional goals.   Nurse to notify provider when observation goals have been met and patient is ready for discharge.

## 2023-10-20 NOTE — PLAN OF CARE
PRIMARY DIAGNOSIS: SOB, weakness, urinary sx  OUTPATIENT/OBSERVATION GOALS TO BE MET BEFORE DISCHARGE:  ADLs back to baseline: Yes    Activity and level of assistance: Ax2 walker    Pain status: Improved-controlled with oral pain medications.    Return to near baseline physical activity: Yes     Discharge Planner Nurse   Safe discharge environment identified: No  Barriers to discharge: Yes       Entered by: Nico Newman RN 10/20/2023     A&Ox4, but forgetful. Ax2 with walker (baseline). On tele, received call onset in & out of a-fib. Provider notified, advise to continue to monitor and page if HR is over 120s. Pt is currently resting comfortably. Will continue to monitor.         /45 (BP Location: Left arm)   Pulse 92   Temp 97.6  F (36.4  C) (Oral)   Resp 20   LMP  (LMP Unknown)   SpO2 98%      Please review provider order for any additional goals.   Nurse to notify provider when observation goals have been met and patient is ready for discharge.

## 2023-10-20 NOTE — PLAN OF CARE
Patient has been assessed for Home Oxygen needs. Oxygen readings:    *Pulse oximetry (SpO2) = 97% on room air at rest while awake.    *SpO2 = 95% on room air during activity/with exercise.    Patient ambulated in hallway w/ Ax1 w/ walker and gait belt. Denied SOB. Tolerated well. Up in chair for breakfast.      Respiratory

## 2023-10-20 NOTE — PLAN OF CARE
PRIMARY DIAGNOSIS: SOB   OUTPATIENT/OBSERVATION GOALS TO BE MET BEFORE DISCHARGE:    1. Stable vital signs: Yes  2. Tolerating diet: Yes  3. Pain controlled with oral pain medications:  Yes  4. Positive bowel sounds:  Yes  5. Voiding without difficulty:  Yes  6. Able to ambulate:  Yes  7. Provider specific discharge goals met: Yes    Discharge Planner Nurse   Safe discharge environment identified: Yes  Barriers to discharge: No        Entered by: Pushpa Paulino RN 10/20/2023     A&Ox4. Encouraged ambulation, up in chair with meals. Up w/ SBA w/ gait belt and walker. O2 sats stable on RA. Continues scheduled neb tx. BS active x4, tolerating Combination Diet Low Saturated Fat Na <2400mg Diet, No Caffeine Diet, passing flatus. Voiding in adequate amt's. SL. Palliative, CW/SM consulted, see notes. Plan to discharge this evening, daughter providing transportation at ~4:00 PM.       Please review provider order for any additional goals.   Nurse to notify provider when observation goals have been met and patient is ready for discharge.

## 2023-10-20 NOTE — PLAN OF CARE
ROOM # 228    Living Situation: home alone  : Xi Hylton    Activity level at baseline: walker  Activity level on admit: Ax1 w/ walker    Who will be transporting you at discharge: family    Patient registered to observation; given Patient Bill of Rights; given the opportunity to ask questions about observation status and their plan of care.  Patient has been oriented to the observation room, bathroom and call light is in place.    Discussed discharge goals and expectations with patient/family.

## 2023-10-20 NOTE — PLAN OF CARE
PRIMARY DIAGNOSIS: SOB, weakness, urinary sx  OUTPATIENT/OBSERVATION GOALS TO BE MET BEFORE DISCHARGE:  ADLs back to baseline: Yes    Activity and level of assistance: Ax2 w/ walker    Pain status: Improved-controlled with oral pain medications.    Return to near baseline physical activity: Yes     Discharge Planner Nurse   Safe discharge environment identified: No  Barriers to discharge: Yes       Entered by: Nico Newman RN 10/20/2023 4:17 AM    A&Ox4, but forgetful. Ax2 with walker (baseline). On tele, sinus arrhythmia, sinus rhythm 80s. Will continue to monitor.   BP (!) 85/51 (BP Location: Left arm)   Pulse 82   Temp 97.7  F (36.5  C) (Oral)   Resp 18   Wt 48.7 kg (107 lb 6.4 oz)   LMP  (LMP Unknown)   SpO2 96%   BMI 18.44 kg/m     Please review provider order for any additional goals.   Nurse to notify provider when observation goals have been met and patient is ready for discharge.

## 2023-10-20 NOTE — DISCHARGE INSTRUCTIONS
Hold Imdur until 10/23 may restart if your systolic blood pressure is over 115. Review Med changes on med rec.

## 2023-10-22 NOTE — PROGRESS NOTES
[facility]    Background: Transitional Care Management program identified per system criteria and reviewed by [facility] team for possible outreach.    Assessment: Upon chart review, CCR Team member will not proceed with patient outreach related to this episode of Transitional Care Management program due to reason below:    Patient declined to answer all post hospital discharge questions with CCRC team member and disconnected call.    Patient declined to provide 3 identifiers so RN was unable to provide any information to patient. RN did provide patient with 24/7 nurse line information if she has any questions/concerns.      Plan: Transitional Care Management episode addressed appropriately per reason noted above.      Mariajose Julio, CORINNE  [facility]    *Connected Care Resource Team does NOT follow patient ongoing. Referrals are identified based on internal discharge reports and the outreach is to ensure patient has an understanding of their discharge instructions.
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
## 2023-10-26 NOTE — TELEPHONE ENCOUNTER
Spoke with patient, she states she was unhappy that her BP was checked today over her sweater. She states at home she prefers to check on bare skin.  Reviewed with patient that the readings was in normal range. Reviewed that she is welcome to ask for a 2nd or 3rd reading and may ask the staff to wait while she removes clothing as needed. Patient verbalized understanding and agreed with plan.
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
## 2023-10-26 NOTE — TELEPHONE ENCOUNTER
[facility] Call Center    Phone Message    May a detailed message be left on voicemail: yes     Reason for Call: Other: Patient is wondering if the b/p reading from her clinical visit today was accurate. She states that they went over her sweater. Please call back today if possible.       Action Taken: Other: cardiology    Travel Screening: Not Applicable

## 2023-11-08 NOTE — TELEPHONE ENCOUNTER
Blood work UTI. SOB.  R side lower 9/10. Comes     Reason for Disposition   Urinating more frequently than usual (i.e., frequency)    Additional Information   Negative: Shock suspected (e.g., cold/pale/clammy skin, too weak to stand, low BP, rapid pulse)   Negative: Sounds like a life-threatening emergency to the triager   Negative: Unable to urinate (or only a few drops) > 4 hours and bladder feels very full (e.g., palpable bladder or strong urge to urinate)   Negative: Decreased urination and drinking very little and dehydration suspected (e.g., dark urine, no urine > 12 hours, very dry mouth, very lightheaded)   Negative: Patient sounds very sick or weak to the triager   Negative: Fever > 100.4 F  (38.0 C)   Negative: Side (flank) or lower back pain present   Negative: Bad or foul-smelling urine    Protocols used: Urinary Symptoms-A-OH  Nurse Triage SBAR    Is this a 2nd Level Triage? NO    Pt is calling for lab results from ER visit on 10/20/23.  No UA/UC.  Pt is having increase UTI sxs. Frequency.  Some lower back pain.  Pain goes from 0-9/10.  Denies any fever or dark urine.      Protocol Recommended Disposition:   No disposition on file.    Recommendation: Call her PCP at [facility].  Not able to reach.  Go to SAGE Mahoney RN on 11/8/2023 at 1:44 PM

## 2023-11-08 NOTE — TELEPHONE ENCOUNTER
[facility] Emergency Department/Urgent Care Lab result notification:    Reason for call  Patient wanting to know lab results.    Patient would like to know if she had any urine tests during her hospital stay in October. No urine tests were noted in chart. Patient reports she is having urinary symptom. Advised that she follows up with PCP or go to be seen. Transferred patient to the nurse triage queue so she can be triaged and directed to the appropriate level of care.     Ramin Lopez RN  Customer Service Center Result RN  [facility] Emergency Dept Lab Result RN  # [phone]

## 2023-11-14 PROBLEM — I50.9 ACUTE CONGESTIVE HEART FAILURE, UNSPECIFIED HEART FAILURE TYPE (H): Status: ACTIVE | Noted: 2023-01-01

## 2023-11-14 PROBLEM — J96.01 ACUTE RESPIRATORY FAILURE WITH HYPOXIA (H): Status: ACTIVE | Noted: 2023-01-01

## 2023-11-14 NOTE — PHARMACY-ADMISSION MEDICATION HISTORY
Pharmacist Admission Medication History    Admission medication history is complete. The information provided in this note is only as accurate as the sources available at the time of the update.    Information Source(s): Family member (daughter Xi) via phone + AVS from 10/20/23 (Xi's  helps patient manage meds)    Pertinent Information: Patient unable to provide all needed info. Patient was discharged from [facility] 10/20/23. Per daughter, all meds are same since last discharge, except new abx added for uti + new pain medication added (dispensed by pharmacy, not picked up by family yet) + Potassium tabs discontinued. Last time took meds pta - yesterday am.    Changes made to PTA medication list:  Added: keflex, tramadol  Deleted: none (KCl tabs were NOT on the list already)  Changed: none    Medication Affordability:  Not including over the counter (OTC) medications, was there a time in the past 3 months when you did not take your medications as prescribed because of cost?: No    Allergies reviewed with patient and updates made in EHR: no    Prior to Admission medications    Medication Sig Last Dose Taking?   acetaminophen (TYLENOL) 500 MG tablet Take 500-1,000 mg by mouth daily 11/13/2023 Yes   aspirin (ASA) 81 MG chewable tablet Take 1 tablet (81 mg) by mouth daily 11/13/2023 Yes   atorvastatin (LIPITOR) 20 MG tablet Take 1 tablet (20 mg) by mouth daily 11/13/2023 Yes   carboxymethylcellulose PF (REFRESH PLUS) 0.5 % ophthalmic solution Place 1 drop into both eyes 2 times daily as needed for dry eyes prn Yes   cephALEXin (KEFLEX) 500 MG capsule Take 500 mg by mouth 2 times daily X10 days 11/13/2023 at one dose Yes   ipratropium - albuterol 0.5 mg/2.5 mg/3 mL (DUONEB) 0.5-2.5 (3) MG/3ML neb solution Take 1 vial (3 mLs) by nebulization every 6 hours as needed for shortness of breath or wheezing prn Yes   isosorbide mononitrate (IMDUR) 30 MG 24 hr tablet Take 1 tablet (30 mg) by mouth daily 11/13/2023 Yes    levothyroxine (SYNTHROID/LEVOTHROID) 100 MCG tablet Take 100 mcg by mouth daily 11/13/2023 Yes   metoprolol succinate ER (TOPROL XL) 25 MG 24 hr tablet Take 1 tablet (25 mg) by mouth 2 times daily 11/13/2023 Yes   Multiple Vitamins-Minerals (PRESERVISION AREDS PO) Take 1 tablet by mouth 2 times daily 11/13/2023 Yes   polyethylene glycol (MIRALAX) 17 GM/Dose powder Take 17 g by mouth daily as needed for constipation prn Yes   torsemide (DEMADEX) 10 MG tablet Take 1 tablet (10 mg) by mouth daily 11/13/2023 Yes   traZODone (DESYREL) 50 MG tablet Take 50 mg by mouth At Bedtime 11/12/2023 Yes   traMADol (ULTRAM) 50 MG tablet Take 50 mg by mouth 4 times daily NEW RX from 11.13.23 - Not started yet
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
## 2023-11-14 NOTE — PROGRESS NOTES
Patient Transfer Information  Patient connected to monitoring equipment on arrival: yes Vital signs monitor     Patient connected to wall oxygen on arrival: Yes    Belongings: Transferred with patient    Safety check completed: Yes

## 2023-11-14 NOTE — CONSULTS
"SPIRITUAL HEALTH SERVICES - Consult Note   ED-28  Referral Source/Reason for Visit: [facility] STAT Consult.    Summary and Recommendations -  Giselle identifies as Islam and values prayer.    Plan: Informed pt how she can request further  support once she is admitted to the floor.  This author and other chaplains remain available per pt/family request.     Betito Teresa M.Div., [facility]  Staff     SHS available  for emergent requests/referrals, either by paging the on-call  or by entering an ASAP/STAT consult in [facility], which will also page the on-call .    Assessment    Saw pt Giselle Chino per [facility] STAT Consult.    Patient/Family Understanding of Illness and Goals of Care - Giselle reported that she has a history of \"lung disease, aneurysm, and gallstones\" and that she came in with shortness of breath.    Distress and Loss - She shared that her spouse  three years ago and since then \"things have gotten out of hand.\"    Strengths, Coping, and Resources -   Giselle named her two daughters and two granddaughters as being core to her support network.  She expressed hope that \"things are getting better everyday.\"    Meaning, Beliefs, and Spirituality - Giselle identifies as Islam and welcomed prayer.   "

## 2023-11-14 NOTE — PROGRESS NOTES
23 1632   Appointment Info   Signing Clinician's Name / Credentials (PT) Chana Wallace DPT   Living Environment   People in Home alone   Current Living Arrangements house   Home Accessibility stairs to enter home;stairs within home   Number of Stairs, Within Home, Primary six   Stair Railings, Within Home, Primary railings safe and in good condition   Transportation Anticipated family or friend will provide   Living Environment Comments Pt lives in split level home,   3 years ago   Self-Care   Usual Activity Tolerance moderate   Current Activity Tolerance fair   Regular Exercise No   Equipment Currently Used at Home walker, standard;shower chair;grab bar, toilet;grab bar, tub/shower   Fall history within last six months yes   Number of times patient has fallen within last six months 1   Activity/Exercise/Self-Care Comment Pt IND at baseline, uses FWW for all mobility. Pt does own cooking and cleaning. Children assist w/ yard work and grocery shopping   General Information   Onset of Illness/Injury or Date of Surgery 23   Referring Physician Pascual Guzman MD   Patient/Family Therapy Goals Statement (PT) Return home   Pertinent History of Current Problem (include personal factors and/or comorbidities that impact the POC) Per chart: Giselle Mak is a very pleasant 88-year-old woman with numerous chronic illnesses including COPD, CHF, impressively large ascending thoracic aortic aneurysm with moderate to severe aortic stenosis who is felt not to be a surgical candidate as well as chronic right upper quadrant abdominal pain possibly from a biliary origin for which she is also not felt to be a surgical candidate who presents with shortness of breath, wheezing and hypoxia   Existing Precautions/Restrictions fall   Cognition   Affect/Mental Status (Cognition) WFL   Orientation Status (Cognition) oriented x 4   Follows Commands (Cognition) WFL   Pain Assessment   Patient Currently  in Pain No   Integumentary/Edema   Integumentary/Edema no deficits were identifed   Posture    Posture Forward head position;Protracted shoulders   Range of Motion (ROM)   Range of Motion ROM is WFL   Strength (Manual Muscle Testing)   Strength (Manual Muscle Testing) Deficits observed during functional mobility   Bed Mobility   Comment, (Bed Mobility) Supine to sit SBA   Transfers   Comment, (Transfers) Sit to stand CGA w/ FWW   Gait/Stairs (Locomotion)   Comment, (Gait/Stairs) Pt amb w/ FWW and CGA   Balance   Balance Comments Good seated and fair standing   Sensory Examination   Sensory Perception patient reports no sensory changes   Clinical Impression   Criteria for Skilled Therapeutic Intervention Yes, treatment indicated   PT Diagnosis (PT) Impaired functional mobility and gait   Influenced by the following impairments Weakness, decreased activity tolerance, impaired balance, SOB   Functional limitations due to impairments Limited functional mobility requiring AD and assist   Clinical Presentation (PT Evaluation Complexity) stable   Clinical Presentation Rationale Based on PMH, current status, and social support   Clinical Decision Making (Complexity) low complexity   Planned Therapy Interventions (PT) balance training;bed mobility training;gait training;stair training;strengthening;transfer training;progressive activity/exercise   Risk & Benefits of therapy have been explained evaluation/treatment results reviewed;care plan/treatment goals reviewed;risks/benefits reviewed;current/potential barriers reviewed;participants voiced agreement with care plan;participants included;patient   PT Total Evaluation Time   PT Eval, Low Complexity Minutes (11293) 10   Physical Therapy Goals   PT Frequency Daily   PT Predicted Duration/Target Date for Goal Attainment 11/21/23   PT Goals Bed Mobility;Transfers;Gait;Stairs   PT: Bed Mobility Independent;Supine to/from sit   PT: Transfers Modified independent;Sit to/from  stand;Assistive device   PT: Gait Modified independent;Assistive device;100 feet   PT: Stairs Supervision/stand-by assist;6 stairs   Interventions   Interventions Quick Adds Therapeutic Activity;Gait Training   Therapeutic Activity   Therapeutic Activities: dynamic activities to improve functional performance Minutes (18511) 24   Symptoms Noted During/After Treatment Fatigue;Shortness of breath   Treatment Detail/Skilled Intervention Pt supine in bed upon therapist arrival, agreeable to PT. Pt on 2.5 Ls O2, SpO2 97%. Pt sat EOB, denied dizziness, SpO2 95%. Pt performed sit <> stand x3 reps w/ FWW and CGA. Seated rest break. Pt performed sit to stand w/ FWW and SBA. Pt amb 30' w/ FWW and CGA. Seated rest break, SpO2 95%. Time gathering O2 tank. Pt performed sit to stand w/ FWW and SBA. Pt amb 80' w/ FWW and CGA, therapist managing O2 tank. Pt reported mild SOB and fatigue, sat EOB, SpO2 96%. Pt able to shift hips up in bed w/ cues. Pt transferred to supine w/ SBA. Pt able to reposition in bed. Therapist encouraged pt to ambulate throughout the day w/ nursing and FWW. Bed alarm on, all needs w/in reach, HOB elevated, RN updated.   PT Discharge Planning   PT Plan Progress transfers, amb w/ FWW. Trial stairs   PT Discharge Recommendation (DC Rec) Transitional Care Facility;home with assist;home with home care physical therapy   PT Rationale for DC Rec Pt below baseline, currently SBA-CGA for mobility. Pt limited by SOB. Pt has stairs to navigate at home, recommend TCU to increase strength and functional mobility. If pt's family can provide 24/7 assist, pt may be able to d/c home w/ assist and home PT.   PT Brief overview of current status SBA-CGA   PT Equipment Needed at Discharge walker, standard  (Pt has FWW)   Total Session Time   Timed Code Treatment Minutes 24   Total Session Time (sum of timed and untimed services) 34

## 2023-11-14 NOTE — PLAN OF CARE
Shift : 3845-2042      Shift Summary; Occurences, Discussions & Interventions of Note : Admitted to floor at 1420. Pt alert and oriented. Talked with MD about options as the pt is not looking to do procedures for aneurysm, wishes to be comfortable, DNR DNI. Palliative discussion started.  Tele noted flips between a-fib and NSR.  Skin WDL except for redness on buttox crease, no opening, no adjustments need. Pt repositioned with assistance and also repositions self. Pt reports dizzyness while laying supine, so head of bed elevated appropriately.   Pt has chronic Upper quadrant pain, denied pain for me. Pt has prn tylenol and dilaudid available.      Assessment Findings of Note : Crackles at bases, dim lung sounds. Aortic aneurysm audible on chest.     Considerations : Monitor respiratory status, and PO2. Repositioning.    Plan : Per PT, mobility wise, pt could go home with family assistance, otherwise TCU. Per MD, considering home oxygen, treating infection, achieving and maintaining pt comfort.      Goal Outcome Evaluation:      Plan of Care Reviewed With: patient    Overall Patient Progress: improvingOverall Patient Progress: improving    Outcome Evaluation: Pt in good spirits. Alert oriented pleasant. Pt had successful initial consult with PT, assist of 1. Palliative approach discussed with pt and family, DNR DNI.

## 2023-11-14 NOTE — ED NOTES
[facility]  ED Nurse Handoff Report    ED Chief complaint: Shortness of Breath  . ED Diagnosis:   Final diagnoses:   None       Allergies:   Allergies   Allergen Reactions    Amoxicillin Rash    Piperacillin-Tazobactam In Dex Rash       Code Status: DNR / DNI    Activity level - Baseline/Home:  walker.  Activity Level - Current:   assist of 1.   Lift room needed: No.   Bariatric: No   Needed: No   Isolation: No.   Infection: Not Applicable.     Respiratory status: Nasal cannula    Vital Signs (within 30 minutes):   Vitals:    11/14/23 0634 11/14/23 0638 11/14/23 0639 11/14/23 0640   BP:       Pulse:    77   Resp:    30   Temp:       TempSrc:       SpO2: (!) 89% 94% 95% 95%       Cardiac Rhythm:  ,      Pain level:    Patient confused: No.   Patient Falls Risk: activity supervised.   Elimination Status:  Has not voided, purewick placed      Patient Report - Initial Complaint: Shortness of breath.   Focused Assessment: Giselle Chino is a 88 year old female who presents for evaluation of shortness of breath.  She tells me that her symptoms began yesterday.  She did feel more short of breath throughout the day and afternoon.  She feels short of breath this evening.  She does nebulizer treatments without improvement.  EMS was called and found her to be hypoxemic 88%.  They did not initiate any treatments.  She denies any chest pain.  No fever, chills, cough.  She been having increased upper quadrant pain over the last 3 days.  This is chronic and present for last few years.  She saw surgeon before to have her gallbladder removed for this pain, but, she is not a surgical candidate given her large thoracic aneurysm.  She was recently here and treated for COPD as well as CHF.      Abnormal Results:   Labs Ordered and Resulted from Time of ED Arrival to Time of ED Departure   COMPREHENSIVE METABOLIC PANEL - Abnormal       Result Value    Sodium 142      Potassium 3.5      Carbon Dioxide  (CO2) 34 (*)     Anion Gap 8      Urea Nitrogen 19.2      Creatinine 0.73      GFR Estimate 80      Calcium 10.3 (*)     Chloride 100      Glucose 101 (*)     Alkaline Phosphatase 107 (*)     AST 16      ALT 21      Protein Total 7.2      Albumin 4.2      Bilirubin Total 0.6     TROPONIN T, HIGH SENSITIVITY - Abnormal    Troponin T, High Sensitivity 26 (*)    NT PROBNP INPATIENT - Abnormal    N terminal Pro BNP Inpatient 1,946 (*)    CBC WITH PLATELETS AND DIFFERENTIAL - Abnormal    WBC Count 7.3      RBC Count 4.16      Hemoglobin 11.7      Hematocrit 37.6      MCV 90      MCH 28.1      MCHC 31.1 (*)     RDW 14.0      Platelet Count 203      % Neutrophils 76      % Lymphocytes 13      % Monocytes 8      % Eosinophils 2      % Basophils 1      % Immature Granulocytes 0      NRBCs per 100 WBC 0      Absolute Neutrophils 5.5      Absolute Lymphocytes 1.0      Absolute Monocytes 0.6      Absolute Eosinophils 0.2      Absolute Basophils 0.1      Absolute Immature Granulocytes 0.0      Absolute NRBCs 0.0     INFLUENZA A/B, RSV, & SARS-COV2 PCR - Normal    Influenza A PCR Negative      Influenza B PCR Negative      RSV PCR Negative      SARS CoV2 PCR Negative     ROUTINE UA WITH MICROSCOPIC REFLEX TO CULTURE        Abdomen US, limited (RUQ only)   Final Result   IMPRESSION:   1.  Cholelithiasis. There is no biliary dilatation or evidence of cholecystitis.            XR Chest 2 Views   Final Result   IMPRESSION: Stable cardiomediastinal enlargement. No focal consolidation or pleural effusion. Atherosclerotic aorta. Oval density in the medial left lung base corresponds with known aneurysm seen on prior CT.          Treatments provided: IV, fluids, CT, Duoneb, solumederol  Family Comments: Daughter was at bedside  OBS brochure/video discussed/provided to patient:  N/A  ED Medications:   Medications   furosemide (LASIX) injection 60 mg (has no administration in time range)   methylPREDNISolone sodium succinate (solu-MEDROL)  injection 125 mg (125 mg Intravenous $Given 11/14/23 0613)   ipratropium - albuterol 0.5 mg/2.5 mg/3 mL (DUONEB) neb solution 3 mL (3 mLs Nebulization $Given 11/14/23 0611)       Drips infusing:  No  For the majority of the shift this patient was Green.   Interventions performed were NA.    Sepsis treatment initiated: No    Cares/treatment/interventions/medications to be completed following ED care: See MAR    ED Nurse Name: Josephine Escobar RN  7:23 AM    RECEIVING UNIT ED HANDOFF REVIEW    Above ED Nurse Handoff Report was reviewed: Yes  Reviewed by: Ion Foreman RN on November 14, 2023 at 1:02 PM

## 2023-11-14 NOTE — ED TRIAGE NOTES
"  Pt to the ED with a c/o increased SOB since yesterday/evening. Pt has hx of COPD. Also c/o right upper quad/\"gall bladder stone\" right upper quad pain x several months. Pt walked to the door on her own power. EMS noted 88% on RA - placed on 3L of O2 via NC - sats improved to 100%. Pt is alert, orient and appropriate. Her skin is warm and dry.   "

## 2023-11-14 NOTE — PROVIDER NOTIFICATION
Verbal. Talked with MD about sepsis flag which occurred in the ED. MD does not want sepsis work up for pt, MD declined cultures, lactic, abx, and fluids.     Clock stopped at 16:16
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
## 2023-11-14 NOTE — CONSULTS
Palliative Care Consultation Note  [facility] [place]      Patient: Giselle Chino  Date of Admission:  11/14/2023    Requesting Clinician / Team: hospitalist  Reason for consult: Goals of care  Decisional support  Patient and family support       Recommendations & Counseling     GOALS OF CARE:   Restorative with limits   Ok to continue with current plan to continue with hospitalizations    ADVANCE CARE PLANNING:  Patient has an advance directive dated 3/3/2021.  Primary Health Care Agent Xi Kelly.  Alternate(s) Celeste Chino.   There is a POLST form on file, but will need to be updated prior to discharge.  Code status: No CPR- Do NOT Intubate    MEDICAL MANAGEMENT:   #Anxiety  Use/practice relaxation strategies and Identify early signs/symptoms of anxiety     #General Weakness/Debility   Appreciate the input of therapies for discharge recommendations    PSYCHOSOCIAL/SPIRITUAL SUPPORT:  Family   [place]artha community: Muslim     Palliative Care will continue to follow. Thank you for the consult and allowing us to aid in the care of Giselle Chino.    These recommendations have been discussed with Medical team.    Florecita Beltran NP  Securely message with Vocera (more info)  Text page via [facility] Paging/Directory       Assessment      Giselle Chino is a 88 year old female with a past medical history of COPD, Congestive heart failure, aortic stenosis, thoracic aortic aneurysm, HTN, hyperlipidemia, who presented on 11/14 with  shortness of breath.      Today, the patient was seen for:  Goals of care    Palliative Care Summary:   Met with patient and family.   Introduced the role of palliative care as an interdisciplinary team that cares for patients with serious illness to help support symptom management, communication, coping for patients and their families as well as support with medical decision making.     Prognosis, Goals, & Planning:    Functional Status just prior to this current  hospitalization:  Recurrent hospitalizations    Prognosis, Goals, and/or Advance Care Planning:  We discussed general treatment options (full/restorative, selective/conservatives, and comfort only/hospice). We then discussed how these specifically apply to her condition.  Based on this discussion, patient has decided to continue with current plan of care    Code Status was addressed today:   yes        Patient has decision-making capacity today for complex decisions:Intact            Coping, Meaning, & Spirituality:   Mood, coping, and/or meaning in the context of serious illness were addressed today: Yes    Social:   Living situation:self and has family support often    Medications:  I have reviewed this patient's medication profile and medications from this hospitalization. Notable medications:     ROS:  Comprehensive ROS is reviewed and is negative except as here & per HPI:     Physical Exam   Vital Signs with Ranges  Temp:  [97.6  F (36.4  C)-98.2  F (36.8  C)] 98.2  F (36.8  C)  Pulse:  [77-97] 81  Resp:  [9-68] 20  BP: (134-199)/() 134/65  SpO2:  [88 %-98 %] 97 %  0 lbs 0 oz    PHYSICAL EXAM:  Constitutional: alert and no distress   Cardiovascular: negative  Respiratory: positive findings: dyspnea on exertion  Psychiatric: mentation appears normal, affect normal/bright, and anxious at times  Abdomen: Abdomen soft, non-tender. BS normal. No masses, organomegaly  Pain: no s/s of pain    Data reviewed:  Results for orders placed or performed during the hospital encounter of 11/14/23 (from the past 24 hour(s))   EKG 12-lead, tracing only   Result Value Ref Range    Systolic Blood Pressure  mmHg    Diastolic Blood Pressure  mmHg    Ventricular Rate 77 BPM    Atrial Rate  BPM    FL Interval  ms    QRS Duration 88 ms     ms    QTc 448 ms    P Axis  degrees    R AXIS -8 degrees    T Axis 160 degrees    Interpretation ECG       Atrial fibrillation with a competing junctional pacemaker  Moderate voltage  criteria for LVH, may be normal variant ( R in aVL , [facility] product )  ST elevation, consider early repolarization, pericarditis, or injury  ST & T wave abnormality, consider lateral ischemia  Abnormal ECG  When compared with ECG of 19-OCT-2023 14:14,  Atrial fibrillation has replaced Sinus rhythm  Inverted T waves have replaced nonspecific T wave abnormality in Lateral leads  QT has shortened     CBC with platelets differential    Narrative    The following orders were created for panel order CBC with platelets differential.  Procedure                               Abnormality         Status                     ---------                               -----------         ------                     CBC with platelets and d...[[id number]]  Abnormal            Final result                 Please view results for these tests on the individual orders.   Comprehensive metabolic panel   Result Value Ref Range    Sodium 142 135 - 145 mmol/L    Potassium 3.5 3.4 - 5.3 mmol/L    Carbon Dioxide (CO2) 34 (H) 22 - 29 mmol/L    Anion Gap 8 7 - 15 mmol/L    Urea Nitrogen 19.2 8.0 - 23.0 mg/dL    Creatinine 0.73 0.51 - 0.95 mg/dL    GFR Estimate 80 >60 mL/min/1.73m2    Calcium 10.3 (H) 8.8 - 10.2 mg/dL    Chloride 100 98 - 107 mmol/L    Glucose 101 (H) 70 - 99 mg/dL    Alkaline Phosphatase 107 (H) 35 - 104 U/L    AST 16 0 - 45 U/L    ALT 21 0 - 50 U/L    Protein Total 7.2 6.4 - 8.3 g/dL    Albumin 4.2 3.5 - 5.2 g/dL    Bilirubin Total 0.6 <=1.2 mg/dL   Troponin T, High Sensitivity   Result Value Ref Range    Troponin T, High Sensitivity 26 (H) <=14 ng/L   Nt probnp inpatient (BNP)   Result Value Ref Range    N terminal Pro BNP Inpatient 1,946 (H) 0 - 1,800 pg/mL   Symptomatic Influenza A/B, RSV, & SARS-CoV2 PCR (COVID-19) Nasopharyngeal    Specimen: Nasopharyngeal; Swab   Result Value Ref Range    Influenza A PCR Negative Negative    Influenza B PCR Negative Negative    RSV PCR Negative Negative    SARS CoV2 PCR Negative Negative     Narrative    Testing was performed using the Xpert Xpress CoV2/Flu/RSV Assay on the [employer] Gene[employer]pert Instrument. This test should be ordered for the detection of SARS-CoV-2, influenza, and RSV viruses in individuals who meet clinical and/or epidemiological criteria. Test performance is unknown in asymptomatic patients. This test is for in vitro diagnostic use under the FDA EUA for laboratories certified under CLIA to perform high or moderate complexity testing. This test has not been FDA cleared or approved. A negative result does not rule out the presence of PCR inhibitors in the specimen or target RNA in concentration below the limit of detection for the assay. If only one viral target is positive but coinfection with multiple targets is suspected, the sample should be re-tested with another FDA cleared, approved, or authorized test, if coinfection would change clinical management. This test was validated by the [facility]. These laboratories are certified under the Clinical Laboratory Improvement Amendments of 1988 (CLIA-88) as qualified to perform high complexity laboratory testing.   CBC with platelets and differential   Result Value Ref Range    WBC Count 7.3 4.0 - 11.0 10e3/uL    RBC Count 4.16 3.80 - 5.20 10e6/uL    Hemoglobin 11.7 11.7 - 15.7 g/dL    Hematocrit 37.6 35.0 - 47.0 %    MCV 90 78 - 100 fL    MCH 28.1 26.5 - 33.0 pg    MCHC 31.1 (L) 31.5 - 36.5 g/dL    RDW 14.0 10.0 - 15.0 %    Platelet Count 203 150 - 450 10e3/uL    % Neutrophils 76 %    % Lymphocytes 13 %    % Monocytes 8 %    % Eosinophils 2 %    % Basophils 1 %    % Immature Granulocytes 0 %    NRBCs per 100 WBC 0 <1 /100    Absolute Neutrophils 5.5 1.6 - 8.3 10e3/uL    Absolute Lymphocytes 1.0 0.8 - 5.3 10e3/uL    Absolute Monocytes 0.6 0.0 - 1.3 10e3/uL    Absolute Eosinophils 0.2 0.0 - 0.7 10e3/uL    Absolute Basophils 0.1 0.0 - 0.2 10e3/uL    Absolute Immature Granulocytes 0.0 <=0.4 10e3/uL    Absolute NRBCs 0.0  10e3/uL   XR Chest 2 Views    Narrative    EXAM: XR CHEST 2 VIEWS  LOCATION: [facility]  DATE: 11/14/2023    INDICATION: SOB  COMPARISON: 10/19/2023      Impression    IMPRESSION: Stable cardiomediastinal enlargement. No focal consolidation or pleural effusion. Atherosclerotic aorta. Oval density in the medial left lung base corresponds with known aneurysm seen on prior CT.   Abdomen US, limited (RUQ only)    Narrative    EXAM: US ABDOMEN LIMITED  LOCATION: [facility]  DATE: 11/14/2023    INDICATION: RUQ pain, cholelithiasis  COMPARISON: None.  TECHNIQUE: Limited abdominal ultrasound.    FINDINGS:    GALLBLADDER: Gallstones in an otherwise normal gallbladder. No wall thickening, or pericholecystic fluid. Negative sonographic Ramon's sign.    BILE DUCTS: No biliary dilatation. The common duct measures 5 mm.    LIVER: Normal parenchyma with smooth contour. No focal mass.    RIGHT KIDNEY: No hydronephrosis.    PANCREAS: The visualized portions are normal.    No ascites.      Impression    IMPRESSION:  1.  Cholelithiasis. There is no biliary dilatation or evidence of cholecystitis.       EKG 12 lead   Result Value Ref Range    Systolic Blood Pressure  mmHg    Diastolic Blood Pressure  mmHg    Ventricular Rate 88 BPM    Atrial Rate 88 BPM    ID Interval 202 ms    QRS Duration 90 ms     ms    QTc 471 ms    P Axis 58 degrees    R AXIS 9 degrees    T Axis 108 degrees    Interpretation ECG       Sinus rhythm with Premature atrial complexes  Left ventricular hypertrophy with repolarization abnormality ( Sokolow-Campa )  Abnormal ECG  When compared with ECG of 14-NOV-2023 04:48, (unconfirmed)  Sinus rhythm has replaced Atrial fibrillation     UA with Microscopic reflex to Culture    Specimen: Urine, Midstream   Result Value Ref Range    Color Urine Light Yellow Colorless, Straw, Light Yellow, Yellow    Appearance Urine Clear Clear    Glucose Urine Negative Negative mg/dL     Bilirubin Urine Negative Negative    Ketones Urine Negative Negative mg/dL    Specific Gravity Urine 1.012 1.003 - 1.035    Blood Urine Negative Negative    pH Urine 7.0 5.0 - 7.0    Protein Albumin Urine Negative Negative mg/dL    Urobilinogen Urine Normal Normal, 2.0 mg/dL    Nitrite Urine Negative Negative    Leukocyte Esterase Urine Trace (A) Negative    RBC Urine 1 <=2 /HPF    WBC Urine 10 (H) <=5 /HPF    Squamous Epithelials Urine <1 <=1 /HPF    Narrative    Urine Culture not indicated       Medical Decision Making       63 MINUTES SPENT BY ME on the date of service doing chart review, history, exam, documentation & further activities per the note.

## 2023-11-15 NOTE — PLAN OF CARE
"Goal Outcome Evaluation:      Plan of Care Reviewed With: patient    Overall Patient Progress: no changeOverall Patient Progress: no change    Outcome Evaluation: .  Vss, no co pain/cp/sob, attempted to wean to RA but not successful, tolerating 1L per NC now in the los 90s%. Tele SR. PT/OT/SW, pall care following.  DNR/DNI, alarms on for safety, up with Ax1+walker+gb, up to chair for meals, encouraged po intake. Continue poc and monitoring.     Problem: Adult Inpatient Plan of Care  Goal: Plan of Care Review  Description: The Plan of Care Review/Shift note should be completed every shift.  The Outcome Evaluation is a brief statement about your assessment that the patient is improving, declining, or no change.  This information will be displayed automatically on your shift  note.  Outcome: Not Progressing  Flowsheets (Taken 11/15/2023 1357)  Outcome Evaluation: .  Plan of Care Reviewed With: patient  Overall Patient Progress: no change  Goal: Patient-Specific Goal (Individualized)  Description: You can add care plan individualizations to a care plan. Examples of Individualization might be:  \"Parent requests to be called daily at 9am for status\", \"I have a hard time hearing out of my right ear\", or \"Do not touch me to wake me up as it startles  me\".  Outcome: Not Progressing  Goal: Absence of Hospital-Acquired Illness or Injury  Outcome: Not Progressing  Intervention: Identify and Manage Fall Risk  Recent Flowsheet Documentation  Taken 11/15/2023 1313 by Hayley Contreras, RN  Safety Promotion/Fall Prevention: safety round/check completed  Taken 11/15/2023 1200 by Hayley Contreras RN  Safety Promotion/Fall Prevention: safety round/check completed  Taken 11/15/2023 1132 by Hayley Contreras, RN  Safety Promotion/Fall Prevention: safety round/check completed  Taken 11/15/2023 1032 by Hayley Contreras, RN  Safety Promotion/Fall Prevention: safety round/check completed  Taken 11/15/2023 0946 by Hayley Contreras, RN  Safety " Promotion/Fall Prevention: safety round/check completed  Taken 11/15/2023 0901 by Hayley Contreras RN  Safety Promotion/Fall Prevention:   assistive device/personal items within reach   activity supervised   treat underlying cause   treat reversible contributory factors   supervised activity   safety round/check completed   room organization consistent   room near nurse's station   room door open   patient and family education   nonskid shoes/slippers when out of bed   mobility aid in reach   lighting adjusted   increase visualization of patient   increased rounding and observation   clutter free environment maintained  Taken 11/15/2023 0838 by Hayley Contreras, RN  Safety Promotion/Fall Prevention: safety round/check completed  Taken 11/15/2023 0734 by Hayley Contreras RN  Safety Promotion/Fall Prevention: safety round/check completed  Intervention: Prevent Skin Injury  Recent Flowsheet Documentation  Taken 11/15/2023 0901 by Hayley Contreras RN  Body Position: (with encouragement)   position changed independently   weight shifting   other (see comments)  Intervention: Prevent and Manage VTE (Venous Thromboembolism) Risk  Recent Flowsheet Documentation  Taken 11/15/2023 0901 by Hayley Contreras RN  VTE Prevention/Management: SCDs (sequential compression devices) off  Goal: Optimal Comfort and Wellbeing  Outcome: Not Progressing  Goal: Readiness for Transition of Care  Outcome: Not Progressing     Problem: Fall Injury Risk  Goal: Absence of Fall and Fall-Related Injury  Outcome: Not Progressing  Intervention: Identify and Manage Contributors  Recent Flowsheet Documentation  Taken 11/15/2023 0901 by Hayley Contreras RN  Medication Review/Management:   high-risk medications identified   medications reviewed  Intervention: Promote Injury-Free Environment  Recent Flowsheet Documentation  Taken 11/15/2023 1313 by Hayley Contreras RN  Safety Promotion/Fall Prevention: safety round/check completed  Taken 11/15/2023  1200 by aHyley Contreras RN  Safety Promotion/Fall Prevention: safety round/check completed  Taken 11/15/2023 1132 by Hayley Contreras RN  Safety Promotion/Fall Prevention: safety round/check completed  Taken 11/15/2023 1032 by Hayley Contreras RN  Safety Promotion/Fall Prevention: safety round/check completed  Taken 11/15/2023 0946 by Hayley Contreras RN  Safety Promotion/Fall Prevention: safety round/check completed  Taken 11/15/2023 0901 by Hayley Contreras RN  Safety Promotion/Fall Prevention:   assistive device/personal items within reach   activity supervised   treat underlying cause   treat reversible contributory factors   supervised activity   safety round/check completed   room organization consistent   room near nurse's station   room door open   patient and family education   nonskid shoes/slippers when out of bed   mobility aid in reach   lighting adjusted   increase visualization of patient   increased rounding and observation   clutter free environment maintained  Taken 11/15/2023 0838 by Hayley Contreras RN  Safety Promotion/Fall Prevention: safety round/check completed  Taken 11/15/2023 0734 by Hayley Contreras RN  Safety Promotion/Fall Prevention: safety round/check completed     Problem: Pain Acute  Goal: Optimal Pain Control and Function  Outcome: Progressing  Intervention: Prevent or Manage Pain  Recent Flowsheet Documentation  Taken 11/15/2023 0901 by Hayley Contreras, RN  Medication Review/Management:   high-risk medications identified   medications reviewed     Problem: Gas Exchange Impaired  Goal: Optimal Gas Exchange  Outcome: Progressing

## 2023-11-15 NOTE — PROGRESS NOTES
"PALLIATIVE CARE PROGRESS NOTE  [facility]     Patient Name: Giselle Chino  Date of Admission: 11/14/2023   Today the patient was seen for:   On going goals of care  Emotional and decisional support     Recommendations & Counseling     GOALS OF CARE:   Restorative with limits   \"Would not want to be kept alive on machines\"  Ok with further hospitalizations as needed as she reports good quality of life currently    ADVANCE CARE PLANNING:  Patient has an advance directive dated 3/3/2021.  Primary Health Care Agent Xi Kelly.  Alternate(s) Celeste Chino.   There is a POLST form on file, but will need to be updated prior to discharge.  Code status: No CPR- Do NOT Intubate     MEDICAL MANAGEMENT:   #Anxiety  Use/practice relaxation strategies and Identify early signs/symptoms of anxiety     #General Weakness/Debility   Appreciate the input of therapies for discharge recommendations     PSYCHOSOCIAL/SPIRITUAL SUPPORT:  Family   [place]artha community: [employer] -spiritual care is following     Palliative Care will continue to follow. Thank you for the consult and allowing us to aid in the care of Giselle Chino.    These recommendations have been discussed with medical team.    Florecita Beltran NP  Securely message with Vocera (more info)  Text page via [facility] Paging/Directory      Assessments          Giselle Chino is a 88 year old female with a past medical history of COPD, Congestive heart failure, aortic stenosis, thoracic aortic aneurysm, HTN, hyperlipidemia, who presented on 11/14 with  shortness of breath.               Interval History:     Per patient or family/caregivers today:  Patient in good mood today. She reports that she is feeling better today. She is eating well and breathing is better. We discussed the POLST form, she would like a new one completed to reflect that she is ok with selective treatments. She currently feels that coming back to the hospital is helpful and ok " with her. We discussed that if at any point she would like to no longer want hospitalization then a different plan of care or hospice could be appropriate. She reports her goal is to go home as previous.             Review of Systems:     Besides above, an additional  system ROS was reviewed and is unremarkable               Physical Exam:   Temp: 98  F (36.7  C) Temp src: Oral Temp  Min: 98  F (36.7  C)  Max: 98.2  F (36.8  C) BP: 111/53 Pulse: 75   Resp: 20 SpO2: 96 % O2 Device: Nasal cannula Oxygen Delivery: 1 LPM  Vital Signs with Ranges  Temp:  [98  F (36.7  C)-98.2  F (36.8  C)] 98  F (36.7  C)  Pulse:  [] 75  Resp:  [18-24] 20  BP: (111-134)/(33-67) 111/53  SpO2:  [85 %-100 %] 96 %  101 lbs 3.2 oz    EXAM:  Constitutional: alert and no distress   Cardiovascular: negative  Respiratory: positive findings: dyspnea at times  Psychiatric: mentation appears normal and affect normal/bright  Abdomen: Abdomen soft, non-tender. BS normal. No masses, organomegaly  Pain: no s/s of pain               Current Problem List:   Principal Problem:    Acute congestive heart failure, unspecified heart failure type (H)  Active Problems:    Acute respiratory failure with hypoxia (H)      Allergies   Allergen Reactions    Amoxicillin Rash    Piperacillin-Tazobactam In Dex Rash            Data Reviewed:       Results for orders placed or performed during the hospital encounter of 11/14/23 (from the past 24 hour(s))   Basic metabolic panel   Result Value Ref Range    Sodium 143 135 - 145 mmol/L    Potassium 4.1 3.4 - 5.3 mmol/L    Chloride 100 98 - 107 mmol/L    Carbon Dioxide (CO2) 33 (H) 22 - 29 mmol/L    Anion Gap 10 7 - 15 mmol/L    Urea Nitrogen 31.1 (H) 8.0 - 23.0 mg/dL    Creatinine 0.90 0.51 - 0.95 mg/dL    GFR Estimate 62 >60 mL/min/1.73m2    Calcium 9.9 8.8 - 10.2 mg/dL    Glucose 132 (H) 70 - 99 mg/dL   CBC with platelets   Result Value Ref Range    WBC Count 4.8 4.0 - 11.0 10e3/uL    RBC Count 4.20 3.80 - 5.20  10e6/uL    Hemoglobin 11.6 (L) 11.7 - 15.7 g/dL    Hematocrit 38.6 35.0 - 47.0 %    MCV 92 78 - 100 fL    MCH 27.6 26.5 - 33.0 pg    MCHC 30.1 (L) 31.5 - 36.5 g/dL    RDW 13.7 10.0 - 15.0 %    Platelet Count 230 150 - 450 10e3/uL          Medical Decision Making       37 MINUTES SPENT BY ME on the date of service doing chart review, history, exam, documentation & further activities per the note.

## 2023-11-15 NOTE — CONSULTS
Care Management Initial Consult    General Information  Assessment completed with: Patient,    Type of CM/SW Visit: Initial Assessment    Primary Care Provider verified and updated as needed: Yes   Readmission within the last 30 days:        Reason for Consult: care coordination/care conference, discharge planning    Communication Assessment  Patient's communication style: spoken language (English or Bilingual)    Hearing Difficulty or Deaf: no   Wear Glasses or Blind: yes    Cognitive  Cognitive/Neuro/Behavioral: WDL                      Living Environment:   People in home: alone     Current living Arrangements: house      Able to return to prior arrangements: yes       Family/Social Support:  Care provided by: self  Provides care for: no one  Marital Status:   Children          Description of Support System: Supportive, Involved    Support Assessment: Adequate family and caregiver support    Current Resources:   Patient receiving home care services: Yes  Skilled Home Care Services: Skilled Nursing, Physical Therapy  Community Resources: Meals on Wheels, Home Care  Equipment currently used at home: walker, standard, shower chair, grab bar, toilet, grab bar, tub/shower    Lifestyle & Psychosocial Needs:  Social Determinants of Health     Food Insecurity: Not on file   Depression: Not on file   Housing Stability: Not on file   Tobacco Use: Medium Risk (10/26/2023)    Patient History     Smoking Tobacco Use: Former     Smokeless Tobacco Use: Never     Passive Exposure: Not on file   Financial Resource Strain: Not on file   Alcohol Use: Not on file   Transportation Needs: Not on file   Physical Activity: Not on file   Interpersonal Safety: Not on file   Stress: Not on file   Social Connections: Not on file       Functional Status:  Prior to admission patient needed assistance:   Dependent ADLs:: Ambulation-walker  Dependent IADLs:: Meal Preparation, Medication Management, Money Management, Transportation,  Shopping    Discharge Date: 11/16/2023     Discharge Disposition: Home, [facility] Home Care    Discharge Services: Meals on Wheels    Discharge Transportation: family or friend will provide    Education Provided on the Discharge Plan: Yes    Patient/Family in Agreement with the Plan: yes    Handoff Referral Completed: No        Additional Information:  CM consulted for discharge planning. Patient was admitted with shortness of breath and COPD exacerbation with URR 11%. She is on IV solumedrol, nebs and O2. She has been evaluated by Physical Therapy/Occupational Therapy with recommendations for Transitional Care Unit vs home with home care. Met with patient at bedside to discuss plan of care. She is alert and oriented. She verified that she lives at home alone in [place]. She has [facility] for ?RN/Physical Therapy she thinks. She gets meals on wheels 7 days a week with deliveries on Tues/Fri. She does not use home oxygen. Her daughter and son in law are very supportive. Her son in law helps set up her meds for her. He daughter is also available to assist with shopping and cleaning. She does her own laundry and dishes. We discussed discharge recommendations from therapy for Transitional Care Unit vs home with home care. Patient states she will go home on discharge. She is not interested in Transitional Care Unit at this time. She verified that she lives in a split level with approx 6 steps to upstairs. Once she gets upstairs she will be fine living on that level. She gives permission to speak to her daughter Xi about discharge plan of care.     Call placed to [facility] and message was left for Hospital Liaison to clarify services. Updated them that patient will need HC RN/Physical Therapy/Occupational Therapy/HHA on discharge.    Call placed to patient's daughter Xi and spoke to her about discharge plan. Daughter verified that they live a couple of miles from patient. They still both work so are unable to provide 24/7  assist but can check in on her daily. She feels patient will be able to manage discharging home with HC services resumed. She will be in to visit patient this afternoon. She will see how she feels patient is doing and if she is at her baseline. She appreciated the update and will reach out if she feels patient is unable to go back home.     Patient states her PCP is Shoaib Mills in [place]. She will make her own follow up appts post discharge. She states she was just in clinic yesterday. Her Cardiologist is Dr Machado at [facility] and she has follow up scheduled for 12/29. Anticipate discharge in 1-2 days. Will cont to follow for discharge plan of care.               Marci Parham RN BSN CM  Inpatient Care Coordination  [facility]  [phone]

## 2023-11-15 NOTE — PLAN OF CARE
Goal Outcome Evaluation:      Plan of Care Reviewed With: patient    Overall Patient Progress: no change    Outcome Evaluation: .    VSS on 2L NC. A&Ox4. Denies pain. On scheduled Tylenol. Denies SOB. Tele:  Ax1 GB walker. On IV Solu-Medrol. Discharge TBD.

## 2023-11-15 NOTE — PROGRESS NOTES
11/15/23 1100   Appointment Info   Signing Clinician's Name / Credentials (OT) Scarlett Wood OTR/L   Rehab Comments (OT) 3L O2   Living Environment   People in Home alone   Current Living Arrangements house   Living Environment Comments Pt has tubshower with grab bars and shower chair. Pt has comfort height toilet with bars. Pt uses 2ww at home for mobility.   Self-Care   Activity/Exercise/Self-Care Comment Pt reports indep with ADLs at baseline. Pt recieves assist with IADLs including meds, meals, transportation, laundry and cleaning.   General Information   Onset of Illness/Injury or Date of Surgery 11/14/23   Referring Physician Pascual Guzman MD   Patient/Family Therapy Goal Statement (OT) return home with family assist   Additional Occupational Profile Info/Pertinent History of Current Problem Per chart: Giselle Mak is a very pleasant 88-year-old woman with numerous chronic illnesses including COPD, CHF, impressively large ascending thoracic aortic aneurysm with moderate to severe aortic stenosis who is felt not to be a surgical candidate as well as chronic right upper quadrant abdominal pain possibly from a biliary origin for which she is also not felt to be a surgical candidate who presents with shortness of breath, wheezing and hypoxia   Existing Precautions/Restrictions oxygen therapy device and L/min   Pain Assessment   Patient Currently in Pain No   Transfers   Transfers toilet transfer;sit-stand transfer   Sit-Stand Transfer   Sit-Stand [place] (Transfers) supervision   Toilet Transfer   [place] Level (Toilet Transfer) supervision   Activities of Daily Living   BADL Assessment/Intervention toileting;lower body dressing   Lower Body Dressing Assessment/Training   [place] Level (Lower Body Dressing) supervision   Toileting   [place] Level (Toileting) supervision   Clinical Impression   Criteria for Skilled Therapeutic Interventions Met (OT) Yes, treatment indicated    OT Diagnosis weakness   OT Problem List-Impairments impacting ADL problems related to;activity tolerance impaired;cognition   Assessment of Occupational Performance 1-3 Performance Deficits   Identified Performance Deficits functional mobility   Planned Therapy Interventions (OT) ADL retraining;progressive activity/exercise;risk factor education;home program guidelines;transfer training   Clinical Decision Making Complexity (OT) problem focused assessment/low complexity   Risk & Benefits of therapy have been explained evaluation/treatment results reviewed;care plan/treatment goals reviewed;risks/benefits reviewed;current/potential barriers reviewed;participants voiced agreement with care plan;participants included;patient   Clinical Impression Comments Pt feels close to functional baseline. Recommend 24/7 support at home from family for 1-2 days until acclamated back to home. If pt can't recieve that support, may need TCU.   OT Total Evaluation Time   OT Eval, Low Complexity Minutes (37451) 5   OT Goals   Therapy Frequency (OT) Daily   OT Predicted Duration/Target Date for Goal Attainment 11/18/23   OT Goals Hygiene/Grooming;Upper Body Dressing;Lower Body Dressing;Toilet Transfer/Toileting;OT Goal 1   OT: Hygiene/Grooming modified independent   OT: Upper Body Dressing Modified independent   OT: Lower Body Dressing Modified independent   OT: Toilet Transfer/Toileting Modified independent   OT: Goal 1 Pt will be mod I with tubshower transfer.   Interventions   Interventions Quick Adds Self-Care/Home Management   Self-Care/Home Management   Self-Care/Home Mgmt/ADL, Compensatory, Meal Prep Minutes (54657) 21   Treatment Detail/Skilled Intervention Pt is SBA with STS from chair with 2ww. Pt ambulates in room with 2ww on 2L of O2; O2 sat drops and needs increase to 3.5L. Pt maintains O2 sat in 90s on 3.5L with activity. Pt is SBA with toileting including hygiene aspects, managing clothing, and toilet transfer. Pt is SBA  with grooming/hygiene at sink with 2ww. Pt ambulates back to chair. Pt is SBA with LB dressing including donning brief and shoes. Pt reports close to baseline. Feels family could stay with her when she returns back [place] for few days.   OT Discharge Planning   OT Plan ADL endurance; monitor O2   OT Discharge Recommendation (DC Rec) home with assist   OT Rationale for DC Rec Anticipate once medically stable, pt will be able to return home with family assist. If family can't stay with pt for first few days home, recommend TCU due to decreased activity tolerance. Pt is close to functional baseline.   OT Brief overview of current status SBA toileting; SBA grooming/hygiene at sink; SBA LB dressing   Total Session Time   Timed Code Treatment Minutes 21   Total Session Time (sum of timed and untimed services) 26

## 2023-11-15 NOTE — PROGRESS NOTES
[facility]  Hospitalist Progress Note  Juanito Blue MD 11/15/23    Reason for Stay (Diagnosis): COPD exacerbation         Assessment and Plan:      Summary of Stay: Giselle Chino is a 88 year old female with hx of COPD, large ascending thoracic aortic aneurysm, moderate to severe AS/AR, diastolic CHF, paroxysmal afib not on AC, chronic biliary colic, and anemia who was admitted on 11/14/2023 with progressive shortness of breath, wheezing, and hypoxia, likely secondary to COPD exacerbation.  In the ER she was initially quite hypertensive up to 200 systolic and was mildly hypoxic to 88%.  CBC and BMP unremarkable except for mild elevation in bicarbonate.  COVID-19, influenza A/B, RSV PCR negative.  Troponin T slightly elevated 24 and NT-proBNP was 1900.  EKG showed atrial arrhythmia without ST elevation or depression.  Chest x-ray did not show any infiltrate or pulmonary edema, however she did receive 1 dose IV Lasix for possible flash pulmonary edema.  Additionally she was started on IV Solu-Medrol and received multiple nebulizer treatments for wheezing consistent with COPD exacerbation and was admitted inpatient.  PT/OT consulted.  Shortness of breath improving with steroids and nebs.    Problem List/Assessment and Plan:   Acute hypoxemic respiratory failure  COPD with acute exacerbation: Presenting with 1 day of shortness of breath worse from baseline and noted to be very wheezy in the ER.  Hypoxic to 88% on room air.  Not chronically on oxygen.  Chest x-ray without infiltrate.  COVID-19, influenza A/B, RSV PCR negative.  Afebrile and no leukocytosis.  Suspect this is secondary to COPD with acute exacerbation.  Additionally, blood pressure was elevated up to almost 200 systolic in the ER initially so flash pulmonary edema possible with her aortic stenosis/regurgitation.  Does not have any peripheral edema on exam.  -Continue IV Solu-Medrol 40 mg twice daily  -4 times daily and albuterol nebs  as needed  -wean O2 as able, formal oxygen testing tomorrow if not on room air    Large ascending thoracic aortic aneurysm  Moderate aortic stenosis  Moderate to severe aortic regurgitation  Chronic diastolic CHF  HTN  Paroxysmal A-fib: She has a known very large ascending thoracic aortic aneurysm along with aortic stenosis and aortic regurgitation.  Previously told that she is not a surgical candidate.  As above possibility of flash pulmonary edema with initial BP close to 200 systolic.  PTA metoprolol succinate 25 mg twice daily, Imdur 30 mg daily, aspirin 81 mg daily, and 10 mg torsemide daily.  TTE shows preserved EF.  EKG shows NSR with PACs.  Previous paroxysmal A-fib not on anticoagulation.  NT-proBNP mildly elevated as was troponin.  Presentation not consistent with acute coronary syndrome.  -Resumed PTA aspirin, Imdur, metoprolol, torsemide    Cholelithiasis  Chronic biliary colic: Has chronic intermittent right upper quadrant abdominal pain secondary to biliary colic and cholelithiasis.  Seen by general surgery previously and felt not to be a surgical candidate.  Reports a few days of worsening abdominal pain.  Abdominal ultrasound does not show any sign of acute cholecystitis and she is afebrile without leukocytosis.  -Acetaminophen as needed  -Oral Dilaudid 1 mg as needed while here  -If she eventually signs on hospice further pain medications as an outpatient could be considered    Chronic anemia: Hemoglobin at baseline of 10-11.      DVT Prophylaxis: Pneumatic Compression Devices  Code Status: DNR / DNI -this was discussed on admission as well as her request for comfort if her condition were to worsen and not pursue ICU level cares.  Palliative care consulted while here.   FEN: Regular diet  Discharge Dispo: Lives in a split-level home by herself.  Family are close by.  Consult PT/OT/SW  Estimated Disch Date / # of Days until Disch: 1 to 2 days pending ability to wean off oxygen    Clinically  "Significant Risk Factors           # Hypercalcemia: Highest Ca = 10.3 mg/dL in last 2 days, will monitor as appropriate        # Hypertension: Noted on problem list  # Acute heart failure with preserved ejection fraction: heart failure noted on problem list, last echo with EF >50%, and receiving IV diuretics       # Cachexia: Estimated body mass index is 17.37 kg/m  as calculated from the following:    Height as of 10/26/23: 1.626 m (5' 4\").    Weight as of this encounter: 45.9 kg (101 lb 3.2 oz)., PRESENT ON ADMISSION                        Interval History (Subjective):      She feels like her shortness of breath is a little bit better today.  No wheezing this morning.  Still has intermittent right upper quadrant abdominal pain similar to baseline.  No cough today.  Afebrile.  Blood pressure much improved on home medications.                  Physical Exam:      Last Vital Signs:  /53 (BP Location: Right arm)   Pulse 75   Temp 98  F (36.7  C) (Oral)   Resp 20   Wt 45.9 kg (101 lb 3.2 oz)   LMP  (LMP Unknown)   SpO2 96%   BMI 17.37 kg/m        Intake/Output Summary (Last 24 hours) at 11/15/2023 1244  Last data filed at 11/15/2023 1000  Gross per 24 hour   Intake 260 ml   Output --   Net 260 ml       Constitutional: Awake, NAD, frail-appearing  Eyes: sclera white   HEENT:  MMM  Respiratory: Decreased airflow overall, no focal crackles or wheeze.  Appears comfortable on 2 L via NC.  Cardiovascular: Irregularly, irregular rhythm, 3/6 systolic murmur  GI: non-tender, not distended, bowel sounds present  Skin: no rash    Musculoskeletal/extremities: No lower extremity edema  Neurologic: Alert and answering questions appropriately  Psychiatric: calm, cooperative          Medications:      All current medications were reviewed with changes reflected in problem list.         Data:      All new lab and imaging data were personally reviewed.   Labs:  Recent Labs   Lab 11/15/23  0705 11/14/23  0503    142 "   POTASSIUM 4.1 3.5   CHLORIDE 100 100   CO2 33* 34*   ANIONGAP 10 8   * 101*   BUN 31.1* 19.2   CR 0.90 0.73   GFRESTIMATED 62 80   KRISTEL 9.9 10.3*     Recent Labs   Lab 11/15/23  0705 11/14/23  0503   WBC 4.8 7.3   HGB 11.6* 11.7   HCT 38.6 37.6   MCV 92 90    203      Imaging:   None today      Juanito Blue MD

## 2023-11-15 NOTE — PROGRESS NOTES
"PALLIATIVE CARE SOCIAL WORK Progress Note   Location: [place]    Join visit with palliative NP Florecita. Patient in good spirits states \"I am hopeful today\"; Denies concerns or questions. Feels well supported by family. No specific Palliative care SW needs identified.     Plan: palliative care remains involved.    Clinical Social Work Interventions:   Assessment of palliative specific issues     Adjustment to illness counseling    Mallorie Watkins [facility]  [facility], Palliative Care  Securely message with the [employer] Web Console (learn more here)  Or please use Palliative Team Pager     "

## 2023-11-15 NOTE — PROGRESS NOTES
"[facility]     Date: 11/14/23  Admission Dx: COPD  Pulmonary History COPD  Home Nebulizer/MDI Use: Duoneb as needed  Home Oxygen: None  Acuity Level (RCAT flow sheet): 4  Aerosol Therapy initiated: Duoneb Q4 PRN      Pulmonary Hygiene initiated: NA      Volume Expansion initiated: IS      Current Oxygen Requirements: 2L  Current SpO2: 97%    Re-evaluation date: 11/17/23    Patient Education: Completed      See \"RT Assessments\" flow sheet for patient assessment scoring and Acuity Level Details.        .sign  "

## 2023-11-16 NOTE — PROGRESS NOTES
"PALLIATIVE CARE PROGRESS NOTE  [facility]     Patient Name: Giselle Chino  Date of Admission: 11/14/2023   Today the patient was seen for:   On going goals of care  Emotional and decisional support     Recommendations & Counseling     GOALS OF CARE:   Restorative with limits   \"Would not want to be kept alive on machines\"  Ok with further hospitalizations as needed as she reports good quality of life currently    ADVANCE CARE PLANNING:  Patient has an advance directive dated 3/3/2021.  Primary Health Care Agent Xi Kelly.  Alternate(s) Celeste Chino.   New polst completed today to reflect DNR DNI Selective treatments per patient's expressed wishes  Code status: No CPR- Do NOT Intubate     MEDICAL MANAGEMENT:   #Anxiety  Use/practice relaxation strategies and Identify early signs/symptoms of anxiety     #General Weakness/Debility   Appreciate the input of therapies for discharge recommendations     PSYCHOSOCIAL/SPIRITUAL SUPPORT:  Family   [place] community: [facility] -spiritual care is following     Palliative Care will continue to follow. Thank you for the consult and allowing us to aid in the care of Giselle Chino.    These recommendations have been discussed with medical team.    Florecita Beltran NP  Securely message with Vocera (more info)  Text page via [facility] Paging/Directory      Assessments          Giselle Chino is a 88 year old female with a past medical history of COPD, Congestive heart failure, aortic stenosis, thoracic aortic aneurysm, HTN, hyperlipidemia, who presented on 11/14 with  shortness of breath.               Interval History:     Per patient or family/caregivers today:  Patient in good spirits. She reports that she is feeling better. We discussed her POLST form and this was completed today. Her daughter Xi was contacted and updated on polst completion            Review of Systems:     Besides above, an additional  system ROS was reviewed and is " unremarkable               Physical Exam:   Temp: 97.6  F (36.4  C) Temp src: Oral Temp  Min: 97.5  F (36.4  C)  Max: 98  F (36.7  C) BP: (!) 142/55 Pulse: 75   Resp: 18 SpO2: 95 % O2 Device: Nasal cannula Oxygen Delivery: 1 LPM  Vital Signs with Ranges  Temp:  [97.5  F (36.4  C)-98  F (36.7  C)] 97.6  F (36.4  C)  Pulse:  [61-75] 75  Resp:  [16-28] 18  BP: (117-142)/(36-55) 142/55  SpO2:  [93 %-99 %] 95 %  103 lbs 0 oz    EXAM:  Constitutional: alert and no distress   Cardiovascular: negative  Respiratory: positive findings: dyspnea at times  Psychiatric: mentation appears normal and affect normal/bright  Abdomen: Abdomen soft, non-tender. BS normal. No masses, organomegaly  Pain: no s/s of pain               Current Problem List:   Principal Problem:    Acute congestive heart failure, unspecified heart failure type (H)  Active Problems:    Acute respiratory failure with hypoxia (H)      Allergies   Allergen Reactions    Amoxicillin Rash    Piperacillin-Tazobactam In Dex Rash            Data Reviewed:       No results found for this or any previous visit (from the past 24 hour(s)).         Medical Decision Making       27 MINUTES SPENT BY ME on the date of service doing chart review, history, exam, documentation & further activities per the note.

## 2023-11-16 NOTE — PLAN OF CARE
Goal Outcome Evaluation:      Plan of Care Reviewed With: patient    Overall Patient Progress: no change    Outcome Evaluation: .    VSS on 1L NC. A&Ox4. Denies pain. On scheduled Tylenol. Denies SOB. Tele:  Ax1 GB walker. On IV Solu-Medrol. PT/OT/SW, palliative following. Discharge TBD.

## 2023-11-16 NOTE — PLAN OF CARE
A&Ox2. VSS. 2 LPM NC with activity. Tele SB. Ax1 gait belt and walker. Home O2 assessment done. PT and OT following. Continuous pulse ox. Vicki. Solumedrol and Tylenol given. Complains of RUQ abdominal pain 8/10 baseline. Plan for home tomorrow.

## 2023-11-16 NOTE — PROGRESS NOTES
Patient has been assessed for Home Oxygen needs. Oxygen readings:    *Pulse oximetry (SpO2) = 95% on room air at rest while awake.    *SpO2 improved to 97% on 2 liters/minute at rest.    *SpO2 = 94% on room air during activity/with exercise.    *SpO2 improved to 97% on 2 liters/minute during activity/with exercise.

## 2023-11-16 NOTE — PLAN OF CARE
Goal Outcome Evaluation:  Care [id number]     Pt A/O up with assist of 1 gb/walker. On cardiac diet, no caffeine.  Pt was on 1 Lpm - was able to wean off O2 with saturation of 95.  TELE SR. No pain. PT/OT/SW following.  DNR/DNI status.  Pt was feeling weak at dinner time, sat up in bed to eat.

## 2023-11-16 NOTE — PROGRESS NOTES
[facility]  Hospitalist Progress Note  Juanito Blue MD 11/16/23    Reason for Stay (Diagnosis): COPD exacerbation         Assessment and Plan:      Summary of Stay: Giselle Chino is a 88 year old female with hx of COPD, large ascending thoracic aortic aneurysm, moderate to severe AS/AR, diastolic CHF, paroxysmal afib not on AC, chronic biliary colic, and anemia who was admitted on 11/14/2023 with progressive shortness of breath, wheezing, and hypoxia, likely secondary to COPD exacerbation.  In the ER she was initially quite hypertensive up to 200 systolic and was mildly hypoxic to 88%.  CBC and BMP unremarkable except for mild elevation in bicarbonate.  COVID-19, influenza A/B, RSV PCR negative.  Troponin T slightly elevated 24 and NT-proBNP was 1900.  EKG showed atrial arrhythmia without ST elevation or depression.  Chest x-ray did not show any infiltrate or pulmonary edema, however she did receive 1 dose IV Lasix for possible flash pulmonary edema.  Additionally she was started on IV Solu-Medrol and received multiple nebulizer treatments for wheezing consistent with COPD exacerbation and was admitted inpatient.  PT/OT consulted.  Shortness of breath improving with steroids and nebs.    Problem List/Assessment and Plan:   Acute hypoxemic respiratory failure  COPD with acute exacerbation: Presenting with 1 day of shortness of breath worse from baseline and noted to be very wheezy in the ER.  Hypoxic to 88% on room air.  Not chronically on oxygen.  Chest x-ray without infiltrate.  COVID-19, influenza A/B, RSV PCR negative.  Afebrile and no leukocytosis.  Suspect this is secondary to COPD with acute exacerbation.  Additionally, blood pressure was elevated up to almost 200 systolic in the ER initially so flash pulmonary edema possible with her aortic stenosis/regurgitation.  Does not have any peripheral edema on exam.  -Continue IV Solu-Medrol 40 mg twice daily today, prednisone taper on  discharge  - DuoNebs 4 times daily and albuterol nebs as needed  -Able to wean to room air today at rest.  Also was able to ambulate with O2 sats 94% on room air with exercise so likely should not need a prescription on discharge    Large ascending thoracic aortic aneurysm  Moderate aortic stenosis  Moderate to severe aortic regurgitation  Chronic diastolic CHF  HTN  Paroxysmal A-fib: She has a known very large ascending thoracic aortic aneurysm along with aortic stenosis and aortic regurgitation.  Previously told that she is not a surgical candidate.  As above possibility of flash pulmonary edema with initial BP close to 200 systolic.  PTA metoprolol succinate 25 mg twice daily, Imdur 30 mg daily, aspirin 81 mg daily, and 10 mg torsemide daily.  TTE shows preserved EF.  EKG shows NSR with PACs.  Previous paroxysmal A-fib not on anticoagulation.  NT-proBNP mildly elevated as was troponin.  Presentation not consistent with acute coronary syndrome.  -Resumed PTA aspirin, Imdur, metoprolol, torsemide  -Having a few short PSVT episodes on telemetry this afternoon so checking BMP and magnesium level now    Cholelithiasis  Chronic biliary colic: Has chronic intermittent right upper quadrant abdominal pain secondary to biliary colic and cholelithiasis.  Seen by general surgery previously and felt not to be a surgical candidate.  Reports a few days of worsening abdominal pain.  Abdominal ultrasound does not show any sign of acute cholecystitis and she is afebrile without leukocytosis.  -Acetaminophen as needed  -Oral Dilaudid 1 mg as needed while here  -If she eventually signs on hospice in the future, further pain medications as an outpatient could be considered    Chronic anemia: Hemoglobin at baseline of 10-11.      DVT Prophylaxis: Pneumatic Compression Devices  Code Status: DNR / DNI -this was discussed on admission as well as her request for comfort if her condition were to worsen and not pursue ICU level cares.   "Palliative care consulted while here.   FEN: Regular diet  Discharge Dispo: Lives in a split-level home by herself.  Family are close by.  Consulted PT/OT/SW. TCU consideration recommended, however patient declining this option at this time.  She was able to navigate stairs although for now she is needing to descend backwards which is somewhat of a concern which I discussed with her daughter.  Estimated Disch Date / # of Days until Disch: Home tomorrow.  Clinically Significant Risk Factors                  # Hypertension: Noted on problem list    # Acute heart failure with preserved ejection fraction: heart failure noted on problem list, last echo with EF >50%, and receiving IV diuretics       # Cachexia: Estimated body mass index is 17.68 kg/m  as calculated from the following:    Height as of 10/26/23: 1.626 m (5' 4\").    Weight as of this encounter: 46.7 kg (103 lb)., PRESENT ON ADMISSION                        Interval History (Subjective):      Her shortness of breath feels improved.  Denies any cough.  Continues to have intermittent right upper quadrant abdominal pain which is baseline.  Afebrile.  No wheezing currently.                  Physical Exam:      Last Vital Signs:  BP (!) 142/55 (BP Location: Right arm)   Pulse 75   Temp 97.6  F (36.4  C) (Oral)   Resp 18   Wt 46.7 kg (103 lb)   LMP  (LMP Unknown)   SpO2 95%   BMI 17.68 kg/m        Intake/Output Summary (Last 24 hours) at 11/16/2023 1438  Last data filed at 11/16/2023 1100  Gross per 24 hour   Intake 260 ml   Output --   Net 260 ml       Constitutional: Awake, NAD, frail-appearing  Eyes: sclera white   HEENT:  MMM  Respiratory: Overall airflow improved, no crackles or wheeze.  Appears comfortable on 1 L via NC.  Cardiovascular: Irregularly, irregular rhythm, 3/6 systolic murmur  GI: non-tender, not distended, bowel sounds present  Skin: no rash    Musculoskeletal/extremities: No lower extremity edema  Neurologic: Alert and answering questions " appropriately  Psychiatric: calm, cooperative          Medications:      All current medications were reviewed with changes reflected in problem list.         Data:      All new lab and imaging data were personally reviewed.   Labs:  Bmp and Mg level pending    Imaging:   None today      Juanito Blue MD

## 2023-11-16 NOTE — PLAN OF CARE
Goal Outcome Evaluation:       Pt alert and orientated, with some intermittent confusion, but pleasant.   Pt on TELE SB 50's with PSVT. Pt assist of 1 gb/walker.  C/o RUQ pain, gave ibuprofen. Tolerating reg diet.  PT/OT/SW following. Pt on 2 lpm NC - need to wean off, wears for comfort. Pt did O2 test today, will not need O2 when discharging. Possible discharge tomorrow.

## 2023-11-17 NOTE — PROGRESS NOTES
Care Management Discharge Note    Discharge Date: 11/17/2023       Discharge Disposition: Home, Home Care    Discharge Services: Meals on Wheels    Discharge DME: None    Discharge Transportation: family or friend will provide    Private pay costs discussed: Not applicable    Does the patient's insurance plan have a 3 day qualifying hospital stay waiver?  Yes     Which insurance plan 3 day waiver is available? Alternative insurance waiver    Will the waiver be used for post-acute placement? No    Patient/family educated on Medicare website which has current facility and service quality ratings: no    Education Provided on the Discharge Plan: Yes  Persons Notified of Discharge Plans: Pt, [facility]  Patient/Family in Agreement with the Plan: yes    Handoff Referral Completed: No    Additional Information:  Pt will be discharging home today with resumption of care orders for home RN PT OT HHA through [facility]. Notified [facility] and resumption of care orders faxed.   Met with pt to review discharge plan, pt has no further questions or concerns. Pt states family will be providing transportation home.  Please call if additional needs arise.    Adele Fairbanks RN   Inpatient Care Coordination  [facility]   Phone: [phone]

## 2023-11-17 NOTE — DISCHARGE SUMMARY
[facility]  Discharge Summary  Name: Giselle Chino    MRN: [id number]  YOB: 1935    Age: 88 year old  Date of Discharge:  11/17/2023  1:31 PM  Date of Admission: 11/14/2023  Primary Care Provider: Shoaib Mills  Discharge Physician:  Juanito Blue MD  Discharging Service:  Hospitalist      Discharge Diagnoses:  Acute hypoxemic respiratory failure  COPD with acute exacerbation  Large ascending thoracic aortic aneurysm  Moderate aortic stenosis  Moderate to severe aortic regurgitation  Chronic diastolic CHF  HTN  Paroxysmal A-fib  Cholelithiasis  Chronic biliary colic  Chronic anemia     Hospital Course:  Summary of Stay: Giselle Chino is a 88 year old female with hx of COPD, large ascending thoracic aortic aneurysm, moderate to severe AS/AR, diastolic CHF, paroxysmal afib not on AC, chronic biliary colic, and anemia who was admitted on 11/14/2023 with progressive shortness of breath, wheezing, and hypoxia, likely secondary to COPD exacerbation.  In the ER she was initially quite hypertensive up to 200 systolic and was mildly hypoxic to 88%.  CBC and BMP unremarkable except for mild elevation in bicarbonate.  COVID-19, influenza A/B, RSV PCR negative.  Troponin T slightly elevated 24 and NT-proBNP was 1900.  EKG showed atrial arrhythmia without ST elevation or depression.  Chest x-ray did not show any infiltrate or pulmonary edema, however she did receive 1 dose IV Lasix for possible flash pulmonary edema.  Additionally she was started on IV Solu-Medrol and received multiple nebulizer treatments for wheezing consistent with COPD exacerbation and was admitted inpatient.  PT/OT consulted.  Shortness of breath improving with steroids and nebs.  Weaned to room air including with ambulation with O2 sats 95%.  Patient preferring to discharge home with ongoing home care RN/PT/OT/home health aide.  Prescribed prednisone taper on discharge over the next 9 days.     Problem  List/Assessment and Plan:   Acute hypoxemic respiratory failure  COPD with acute exacerbation: Presenting with 1 day of shortness of breath worse from baseline and noted to be very wheezy in the ER.  Hypoxic to 88% on room air.  Not chronically on oxygen.  Chest x-ray without infiltrate.  COVID-19, influenza A/B, RSV PCR negative.  Afebrile and no leukocytosis.  Suspect this is secondary to COPD with acute exacerbation.  Additionally, blood pressure was elevated up to almost 200 systolic in the ER initially so flash pulmonary edema possible with her aortic stenosis/regurgitation.  Does not have any peripheral edema on exam.  -Clinically improved here with IV Solu-Medrol and nebulizer treatments.  Now weaned to room air with O2 sats 95% even with ambulation.  -Prescribed prednisone taper on discharge over the next 9 days.  -Resume PTA nebulizer treatment     Large ascending thoracic aortic aneurysm  Moderate aortic stenosis  Moderate to severe aortic regurgitation  Chronic diastolic CHF  HTN  Paroxysmal A-fib: She has a known very large ascending thoracic aortic aneurysm along with aortic stenosis and aortic regurgitation.  Previously told that she is not a surgical candidate.  As above possibility of flash pulmonary edema with initial BP close to 200 systolic.  PTA metoprolol succinate 25 mg twice daily, Imdur 30 mg daily, aspirin 81 mg daily, and 10 mg torsemide daily.  TTE shows preserved EF.  EKG shows NSR with PACs.  Previous paroxysmal A-fib not on anticoagulation.  NT-proBNP mildly elevated as was troponin.  Presentation not consistent with acute coronary syndrome.  -Resumed PTA aspirin, Imdur, metoprolol, torsemide     Cholelithiasis  Chronic biliary colic: Has chronic intermittent right upper quadrant abdominal pain secondary to biliary colic and cholelithiasis.  Seen by general surgery previously and felt not to be a surgical candidate.  Reports a few days of worsening abdominal pain.  Abdominal ultrasound  does not show any sign of acute cholecystitis and she is afebrile without leukocytosis.  -Acetaminophen as needed  -If she eventually signs on hospice in the future, further pain medications as an outpatient could be considered    Asymptomatic bacteriuria: Recently was prescribed cephalexin prior to admission that was not continued while here.  Did not receive any antibiotics while here.  Unclear why her previous UA/UC was done which showed small LE and mild pyuria.  Urine culture grew Enterococcus faecalis.  Currently asymptomatic, afebrile, no leukocytosis so I do not recommend antibiotics at this time but to watch for any UTI symptoms/fevers which I discussed with her and her daughter.     Chronic anemia: Hemoglobin at baseline of 10-11.     Discharge Disposition:  Discharged to home     Allergies:  Allergies   Allergen Reactions    Amoxicillin Rash    Piperacillin-Tazobactam In Dex Rash        Discharge Medications:   Current Discharge Medication List        START taking these medications    Details   predniSONE (DELTASONE) 20 MG tablet Take 2 tabs daily x 3 days, then 1 tab daily x 3 days, then 1/2 tab daily x 3 days.  Qty: 11 tablet, Refills: 0    Associated Diagnoses: Chronic obstructive pulmonary disease with acute exacerbation (H)      senna-docusate (SENOKOT-S/PERICOLACE) 8.6-50 MG tablet Take 1 tablet by mouth daily as needed for constipation  Qty: 30 tablet, Refills: 0    Associated Diagnoses: Constipation, unspecified constipation type           CONTINUE these medications which have CHANGED    Details   polyethylene glycol (MIRALAX) 17 GM/Dose powder Take 17 g by mouth daily as needed for constipation  Qty: 510 g, Refills: 0    Associated Diagnoses: Constipation, unspecified constipation type           CONTINUE these medications which have NOT CHANGED    Details   acetaminophen (TYLENOL) 500 MG tablet Take 500-1,000 mg by mouth daily      aspirin (ASA) 81 MG chewable tablet Take 1 tablet (81 mg) by  mouth daily    Associated Diagnoses: Benign essential hypertension      atorvastatin (LIPITOR) 20 MG tablet Take 1 tablet (20 mg) by mouth daily  Qty: 90 tablet, Refills: 3    Associated Diagnoses: Hyperlipidemia LDL goal <100      carboxymethylcellulose PF (REFRESH PLUS) 0.5 % ophthalmic solution Place 1 drop into both eyes 2 times daily as needed for dry eyes      ipratropium - albuterol 0.5 mg/2.5 mg/3 mL (DUONEB) 0.5-2.5 (3) MG/3ML neb solution Take 1 vial (3 mLs) by nebulization every 6 hours as needed for shortness of breath or wheezing  Qty: 90 mL, Refills: 1    Associated Diagnoses: COPD exacerbation (H)      isosorbide mononitrate (IMDUR) 30 MG 24 hr tablet Take 1 tablet (30 mg) by mouth daily    Associated Diagnoses: Non-STEMI (non-ST elevated myocardial infarction) (H); Benign essential hypertension      levothyroxine (SYNTHROID/LEVOTHROID) 100 MCG tablet Take 100 mcg by mouth daily      metoprolol succinate ER (TOPROL XL) 25 MG 24 hr tablet Take 1 tablet (25 mg) by mouth 2 times daily  Qty: 180 tablet, Refills: 3    Associated Diagnoses: Thoracic aortic aneurysm without rupture (H24)      Multiple Vitamins-Minerals (PRESERVISION AREDS PO) Take 1 tablet by mouth 2 times daily      torsemide (DEMADEX) 10 MG tablet Take 1 tablet (10 mg) by mouth daily  Qty: 90 tablet, Refills: 2    Associated Diagnoses: HTN (hypertension)      traZODone (DESYREL) 50 MG tablet Take 50 mg by mouth At Bedtime      traMADol (ULTRAM) 50 MG tablet Take 50 mg by mouth 4 times daily           STOP taking these medications       cephALEXin (KEFLEX) 500 MG capsule Comments:   Reason for Stopping:                Condition on Discharge:  Discharge condition: Stable   Discharge vitals: Blood pressure (!) 158/45, pulse 53, temperature 97.6  F (36.4  C), temperature source Axillary, resp. rate 18, weight 48.2 kg (106 lb 3.2 oz), SpO2 100%, not currently breastfeeding.   Code status on discharge: DNR / DNI     History of Illness:  See  detailed admission note for full details.    Physical Exam:  Blood pressure (!) 158/45, pulse 53, temperature 97.6  F (36.4  C), temperature source Axillary, resp. rate 18, weight 48.2 kg (106 lb 3.2 oz), SpO2 100%, not currently breastfeeding.  Wt Readings from Last 1 Encounters:   11/17/23 48.2 kg (106 lb 3.2 oz)     Constitutional: Awake, NAD, frail-appearing  Eyes: sclera white   HEENT:  MMM  Respiratory: Fair airflow overall, no focal crackles or wheeze currently.  On room air and appears comfortable.  Cardiovascular: Irregularly, irregular rhythm, 3/6 systolic murmur  GI: non-tender, not distended, bowel sounds present  Skin: no rash    Musculoskeletal/extremities: No lower extremity edema  Neurologic: Alert and answering questions appropriately  Psychiatric: calm, cooperative     Procedures other than Imaging:  None     Imaging:  Results for orders placed or performed during the hospital encounter of 11/14/23   XR Chest 2 Views    Narrative    EXAM: XR CHEST 2 VIEWS  LOCATION: [facility]  DATE: 11/14/2023    INDICATION: SOB  COMPARISON: 10/19/2023      Impression    IMPRESSION: Stable cardiomediastinal enlargement. No focal consolidation or pleural effusion. Atherosclerotic aorta. Oval density in the medial left lung base corresponds with known aneurysm seen on prior CT.   Abdomen US, limited (RUQ only)    Narrative    EXAM: US ABDOMEN LIMITED  LOCATION: [facility]  DATE: 11/14/2023    INDICATION: RUQ pain, cholelithiasis  COMPARISON: None.  TECHNIQUE: Limited abdominal ultrasound.    FINDINGS:    GALLBLADDER: Gallstones in an otherwise normal gallbladder. No wall thickening, or pericholecystic fluid. Negative sonographic Ramon's sign.    BILE DUCTS: No biliary dilatation. The common duct measures 5 mm.    LIVER: Normal parenchyma with smooth contour. No focal mass.    RIGHT KIDNEY: No hydronephrosis.    PANCREAS: The visualized portions are normal.    No  ascites.      Impression    IMPRESSION:  1.  Cholelithiasis. There is no biliary dilatation or evidence of cholecystitis.            Consultations:  No consultations were requested during this admission.       Recent Lab Results:  Recent Labs   Lab 11/15/23  0705 11/14/23  0503   WBC 4.8 7.3   HGB 11.6* 11.7   HCT 38.6 37.6   MCV 92 90    203     Recent Labs   Lab 11/16/23  1454 11/15/23  0705 11/14/23  0503    143 142   POTASSIUM 3.9 4.1 3.5   CHLORIDE 98 100 100   CO2 35* 33* 34*   ANIONGAP 7 10 8   * 132* 101*   BUN 40.1* 31.1* 19.2   CR 0.72 0.90 0.73   GFRESTIMATED 81 62 80   KRISTEL 10.0 9.9 10.3*     Recent Labs   Lab 11/14/23  0503   NTBNPI 1,946*     Troponin T 26  COVID19, influenza A/B, RSV PCR negative       Pending Results:    Unresulted Labs Ordered in the Past 30 Days of this Admission       No orders found from 10/15/2023 to 11/15/2023.           These results will be followed up by patient's primary care provider.    Discharge Instructions and Follow-Up:   Discharge Procedure Orders   Home Care Referral   Referral Priority: Routine: Next available opening Referral Type: Home Health Therapies & Aides   Number of Visits Requested: 1     Reason for your hospital stay   Order Comments: You were hospitalized for a COPD exacerbation causing shortness of breath that has significantly improved with a few days of IV steroids and nebulizer treatments.  You will finish a prednisone taper over the next 9 days to make sure the lungs continue to heal.  We did not continue any antibiotics while here and you did not have any sign of infection at this time so I would say to not take the cephalexin prescription at home unless you develop symptoms of burning sensation with urination, urinary frequency, fevers, or confusion.     Follow-up and recommended labs and tests    Order Comments: Follow up with primary care provider, Shoaib Mills, within 7 days for hospital follow- up.  No follow up labs  or test are needed.     Activity   Order Comments: Your activity upon discharge: activity as tolerated     Order Specific Question Answer Comments   Is discharge order? Yes      No CPR- Do NOT Intubate     Order Specific Question Answer Comments   Code status determined by: Discussion with patient/ legal decision maker      Diet   Order Comments: Follow this diet upon discharge: Orders Placed This Encounter      Regular Diet Adult     Order Specific Question Answer Comments   Is discharge order? Yes          I, Juanito Blue MD, personally saw the patient today and spent greater than 30 minutes discharging this patient.    Juanito Blue MD

## 2023-11-17 NOTE — PLAN OF CARE
Patient has been assessed for Home Oxygen needs. Oxygen readings:    *Pulse oximetry (SpO2) = 96% on room air at rest while awake.    *SpO2 improved to 100% on 1liters/minute at rest.    *SpO2 = 94% on room air during activity/with exercise.    *SpO2 improved to 96% on 1liters/minute during activity/with exercise.

## 2023-11-17 NOTE — PROVIDER NOTIFICATION
2132 paged crosscover MD: Hi, pt BP is 138/42 and HR between 56-58. Pt has Metoprolol that is due but there are no parameters. Would you like me to still give this? Thanks!     2135 per MD Brito: Please hold this evenings dose

## 2023-11-17 NOTE — PLAN OF CARE
Goal Outcome Evaluation:       Discharged after instructions reviewed with family and understanding acknowledged. Copy given of instructions given,  Discharge meds given.  Has belongings

## 2023-11-17 NOTE — PROGRESS NOTES
Informed of patient with bradycardia in 50s. Held evening metoprolol dose. Later dosing to be determined by primary team.     Marlon Brito MD  Hospitalist    Per chart review, patient has history of large ascending thoracic aorta measuring 8.4 cm. Will follow patient's vitals Q4H overnight to ensure blood pressure/HR do not get elevated. If HR increases above 60 will add metoprolol back.    Marlon Brito MD  Hospitalist

## 2023-11-17 NOTE — PLAN OF CARE
Physical Therapy Discharge Summary     Reason for therapy discharge:    Discharged to home with home PT    Progress towards therapy goal(s). See goals on Care Plan in [facility] electronic health record for goal details.  Goals met, but supervision on stairs.    Therapy recommendation(s):    Continued therapy is recommended.  Rationale/Recommendations:  Would recommend pt to have A with ADLs/IADLs. Unclear if family can provide this assistance level. Also pt/family should have pt on 1 level living as has to descend stairs backwards at this time. MD did disuss concerns with family yesterday.

## 2023-11-17 NOTE — PLAN OF CARE
Occupational Therapy Discharge Summary    Reason for therapy discharge:    Discharged to home with home therapy.    Progress towards therapy goal(s). See goals on Care Plan in [facility] electronic health record for goal details.  Goals partially met.  Barriers to achieving goals:   discharge from facility.    Therapy recommendation(s):    Continued therapy is recommended.  Rationale/Recommendations:  HHOT for ADL and IADL safety and independence in home environment.

## 2023-11-17 NOTE — PLAN OF CARE
Goal Outcome Evaluation:      Plan of Care Reviewed With: patient    Overall Patient Progress: improving    Outcome Evaluation: .    VSS on 1.5L NC. A&Ox4. Denies pain. On scheduled Tylenol. Denies SOB. Tele: SB. PM Metoprolol held per MD as patient was bradycardic, HR maintaining rate of 56-61. On IV Solu-Medrol. Ax1 GB walker. PT/OT/SW, palliative following. Possible discharge home today.

## 2023-11-18 NOTE — PROGRESS NOTES
"[facility] [place]    Background: Transitional Care Management program identified per system criteria and reviewed by [facility] team for possible outreach.    Assessment: Upon chart review, CCR[facility] Team member will not proceed with patient outreach related to this episode of Transitional Care Management program due to reason below:    Patient declined to answer all post hospital discharge questions with CCRC team member and disconnected call.    Patient would only answer \"everything's fine\".  When RN asked if patient had all of her medications patient stated \"everything's fine\" and said \"bye\" and disconnected call.     RN was unable to complete TCM questions.     Plan: Transitional Care Management episode addressed appropriately per reason noted above.      Mariajose Julio RN  [facility] Care Resource [facility]    *Connected Care Resource Team does NOT follow patient ongoing. Referrals are identified based on internal discharge reports and the outreach is to ensure patient has an understanding of their discharge instructions.  "
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
## 2024-01-01 ENCOUNTER — HOSPITAL ENCOUNTER (INPATIENT)
Facility: CLINIC | Age: 89
LOS: 2 days | DRG: 283 | End: 2024-07-11
Attending: EMERGENCY MEDICINE | Admitting: HOSPITALIST
Payer: COMMERCIAL

## 2024-01-01 ENCOUNTER — APPOINTMENT (OUTPATIENT)
Dept: CARDIOLOGY | Facility: CLINIC | Age: 89
DRG: 283 | End: 2024-01-01
Attending: INTERNAL MEDICINE
Payer: COMMERCIAL

## 2024-01-01 ENCOUNTER — APPOINTMENT (OUTPATIENT)
Dept: CT IMAGING | Facility: CLINIC | Age: 89
DRG: 283 | End: 2024-01-01
Attending: EMERGENCY MEDICINE
Payer: COMMERCIAL

## 2024-01-01 ENCOUNTER — HOSPITAL ENCOUNTER (EMERGENCY)
Facility: CLINIC | Age: 89
Discharge: HOME | End: 2024-04-11
Attending: EMERGENCY MEDICINE | Admitting: EMERGENCY MEDICINE
Payer: COMMERCIAL

## 2024-01-01 ENCOUNTER — DOCUMENTATION ONLY (OUTPATIENT)
Dept: OTHER | Facility: CLINIC | Age: 89
End: 2024-01-01
Payer: COMMERCIAL

## 2024-01-01 ENCOUNTER — APPOINTMENT (OUTPATIENT)
Dept: CT IMAGING | Facility: CLINIC | Age: 89
DRG: 283 | End: 2024-01-01
Attending: PHYSICIAN ASSISTANT
Payer: COMMERCIAL

## 2024-01-01 ENCOUNTER — APPOINTMENT (OUTPATIENT)
Dept: GENERAL RADIOLOGY | Facility: CLINIC | Age: 89
End: 2024-01-01
Attending: EMERGENCY MEDICINE
Payer: COMMERCIAL

## 2024-01-01 ENCOUNTER — APPOINTMENT (OUTPATIENT)
Dept: GENERAL RADIOLOGY | Facility: CLINIC | Age: 89
DRG: 283 | End: 2024-01-01
Attending: EMERGENCY MEDICINE
Payer: COMMERCIAL

## 2024-01-01 ENCOUNTER — HEALTH MAINTENANCE LETTER (OUTPATIENT)
Age: 89
End: 2024-01-01

## 2024-01-01 VITALS
RESPIRATION RATE: 18 BRPM | SYSTOLIC BLOOD PRESSURE: 152 MMHG | TEMPERATURE: 98.1 F | WEIGHT: 114.2 LBS | BODY MASS INDEX: 19.6 KG/M2 | HEART RATE: 92 BPM | DIASTOLIC BLOOD PRESSURE: 70 MMHG | OXYGEN SATURATION: 94 %

## 2024-01-01 VITALS
WEIGHT: 98.33 LBS | BODY MASS INDEX: 16.88 KG/M2 | DIASTOLIC BLOOD PRESSURE: 46 MMHG | TEMPERATURE: 98.2 F | OXYGEN SATURATION: 100 % | SYSTOLIC BLOOD PRESSURE: 138 MMHG | RESPIRATION RATE: 28 BRPM | HEART RATE: 55 BPM

## 2024-01-01 DIAGNOSIS — I10 HTN (HYPERTENSION): ICD-10-CM

## 2024-01-01 DIAGNOSIS — I50.9 ACUTE ON CHRONIC CONGESTIVE HEART FAILURE, UNSPECIFIED HEART FAILURE TYPE (H): ICD-10-CM

## 2024-01-01 DIAGNOSIS — E78.5 HYPERLIPIDEMIA LDL GOAL <100: ICD-10-CM

## 2024-01-01 DIAGNOSIS — I71.20 THORACIC AORTIC ANEURYSM WITHOUT RUPTURE (H): ICD-10-CM

## 2024-01-01 DIAGNOSIS — R41.82 ALTERED MENTAL STATUS, UNSPECIFIED ALTERED MENTAL STATUS TYPE: ICD-10-CM

## 2024-01-01 DIAGNOSIS — R06.02 SOB (SHORTNESS OF BREATH): ICD-10-CM

## 2024-01-01 LAB
ALBUMIN SERPL BCG-MCNC: 3.8 G/DL (ref 3.5–5.2)
ALBUMIN SERPL BCG-MCNC: 4 G/DL (ref 3.5–5.2)
ALBUMIN SERPL BCG-MCNC: 4.1 G/DL (ref 3.5–5.2)
ALBUMIN UR-MCNC: NEGATIVE MG/DL
ALLEN'S TEST: NO
ALP SERPL-CCNC: 66 U/L (ref 40–150)
ALP SERPL-CCNC: 70 U/L (ref 40–150)
ALP SERPL-CCNC: 72 U/L (ref 40–150)
ALT SERPL W P-5'-P-CCNC: 37 U/L (ref 0–50)
ALT SERPL W P-5'-P-CCNC: 39 U/L (ref 0–50)
ALT SERPL W P-5'-P-CCNC: 49 U/L (ref 0–50)
ANION GAP SERPL CALCULATED.3IONS-SCNC: 10 MMOL/L (ref 7–15)
ANION GAP SERPL CALCULATED.3IONS-SCNC: 10 MMOL/L (ref 7–15)
ANION GAP SERPL CALCULATED.3IONS-SCNC: 12 MMOL/L (ref 7–15)
ANION GAP SERPL CALCULATED.3IONS-SCNC: 8 MMOL/L (ref 7–15)
ANION GAP SERPL CALCULATED.3IONS-SCNC: 9 MMOL/L (ref 7–15)
APPEARANCE UR: CLEAR
AST SERPL W P-5'-P-CCNC: 27 U/L (ref 0–45)
AST SERPL W P-5'-P-CCNC: 30 U/L (ref 0–45)
AST SERPL W P-5'-P-CCNC: 37 U/L (ref 0–45)
ATRIAL RATE - MUSE: 96 BPM
ATRIAL RATE - MUSE: NORMAL BPM
BACTERIA #/AREA URNS HPF: ABNORMAL /HPF
BASE EXCESS BLDA CALC-SCNC: 9.2 MMOL/L (ref -3–3)
BASE EXCESS BLDV CALC-SCNC: 11.9 MMOL/L (ref -3–3)
BASE EXCESS BLDV CALC-SCNC: 8 MMOL/L (ref -3–3)
BASE EXCESS BLDV CALC-SCNC: 8.9 MMOL/L (ref -3–3)
BASOPHILS # BLD AUTO: 0.1 10E3/UL (ref 0–0.2)
BASOPHILS NFR BLD AUTO: 1 %
BILIRUB SERPL-MCNC: 0.5 MG/DL
BILIRUB SERPL-MCNC: 0.6 MG/DL
BILIRUB SERPL-MCNC: 0.7 MG/DL
BILIRUB UR QL STRIP: NEGATIVE
BUN SERPL-MCNC: 21.6 MG/DL (ref 8–23)
BUN SERPL-MCNC: 22.8 MG/DL (ref 8–23)
BUN SERPL-MCNC: 24.4 MG/DL (ref 8–23)
BUN SERPL-MCNC: 25 MG/DL (ref 8–23)
BUN SERPL-MCNC: 34.7 MG/DL (ref 8–23)
CALCIUM SERPL-MCNC: 10.4 MG/DL (ref 8.8–10.2)
CALCIUM SERPL-MCNC: 9.5 MG/DL (ref 8.8–10.2)
CALCIUM SERPL-MCNC: 9.6 MG/DL (ref 8.8–10.2)
CALCIUM SERPL-MCNC: 9.7 MG/DL (ref 8.8–10.2)
CALCIUM SERPL-MCNC: 9.7 MG/DL (ref 8.8–10.2)
CHLORIDE SERPL-SCNC: 103 MMOL/L (ref 98–107)
CHLORIDE SERPL-SCNC: 104 MMOL/L (ref 98–107)
COHGB MFR BLD: 2.3 % (ref 0–2)
COHGB MFR BLD: 4.1 % (ref 0–2)
COHGB MFR BLD: 97.2 % (ref 95–96)
COLOR UR AUTO: ABNORMAL
CREAT SERPL-MCNC: 0.7 MG/DL (ref 0.51–0.95)
CREAT SERPL-MCNC: 0.7 MG/DL (ref 0.51–0.95)
CREAT SERPL-MCNC: 0.79 MG/DL (ref 0.51–0.95)
CREAT SERPL-MCNC: 0.84 MG/DL (ref 0.51–0.95)
CREAT SERPL-MCNC: 0.85 MG/DL (ref 0.51–0.95)
D DIMER PPP FEU-MCNC: 2.86 UG/ML FEU (ref 0–0.5)
DEPRECATED HCO3 PLAS-SCNC: 29 MMOL/L (ref 22–29)
DEPRECATED HCO3 PLAS-SCNC: 33 MMOL/L (ref 22–29)
DEPRECATED HCO3 PLAS-SCNC: 33 MMOL/L (ref 22–29)
DEPRECATED HCO3 PLAS-SCNC: 34 MMOL/L (ref 22–29)
DEPRECATED HCO3 PLAS-SCNC: 35 MMOL/L (ref 22–29)
DIASTOLIC BLOOD PRESSURE - MUSE: NORMAL MMHG
DIASTOLIC BLOOD PRESSURE - MUSE: NORMAL MMHG
EGFRCR SERPLBLD CKD-EPI 2021: 66 ML/MIN/1.73M2
EGFRCR SERPLBLD CKD-EPI 2021: 67 ML/MIN/1.73M2
EGFRCR SERPLBLD CKD-EPI 2021: 72 ML/MIN/1.73M2
EGFRCR SERPLBLD CKD-EPI 2021: 83 ML/MIN/1.73M2
EGFRCR SERPLBLD CKD-EPI 2021: 84 ML/MIN/1.73M2
ELLIPTOCYTES BLD QL SMEAR: SLIGHT
EOSINOPHIL # BLD AUTO: 0.1 10E3/UL (ref 0–0.7)
EOSINOPHIL NFR BLD AUTO: 2 %
EOSINOPHIL NFR BLD AUTO: 2 %
EOSINOPHIL NFR BLD AUTO: 3 %
ERYTHROCYTE [DISTWIDTH] IN BLOOD BY AUTOMATED COUNT: 14 % (ref 10–15)
ERYTHROCYTE [DISTWIDTH] IN BLOOD BY AUTOMATED COUNT: 14.1 % (ref 10–15)
ERYTHROCYTE [DISTWIDTH] IN BLOOD BY AUTOMATED COUNT: 14.2 % (ref 10–15)
ERYTHROCYTE [DISTWIDTH] IN BLOOD BY AUTOMATED COUNT: 14.2 % (ref 10–15)
ERYTHROCYTE [DISTWIDTH] IN BLOOD BY AUTOMATED COUNT: 14.3 % (ref 10–15)
FLUAV RNA SPEC QL NAA+PROBE: NEGATIVE
FLUBV RNA RESP QL NAA+PROBE: NEGATIVE
GLUCOSE BLDC GLUCOMTR-MCNC: 61 MG/DL (ref 70–99)
GLUCOSE BLDC GLUCOMTR-MCNC: 76 MG/DL (ref 70–99)
GLUCOSE SERPL-MCNC: 100 MG/DL (ref 70–99)
GLUCOSE SERPL-MCNC: 74 MG/DL (ref 70–99)
GLUCOSE SERPL-MCNC: 82 MG/DL (ref 70–99)
GLUCOSE SERPL-MCNC: 97 MG/DL (ref 70–99)
GLUCOSE SERPL-MCNC: 98 MG/DL (ref 70–99)
GLUCOSE UR STRIP-MCNC: NEGATIVE MG/DL
HCO3 BLD-SCNC: 39 MMOL/L (ref 21–28)
HCO3 BLDV-SCNC: 34 MMOL/L (ref 21–28)
HCO3 BLDV-SCNC: 39 MMOL/L (ref 21–28)
HCO3 BLDV-SCNC: 40 MMOL/L (ref 21–28)
HCO3 BLDV-SCNC: 41 MMOL/L (ref 21–28)
HCO3 BLDV-SCNC: 43 MMOL/L (ref 21–28)
HCT VFR BLD AUTO: 37 % (ref 35–47)
HCT VFR BLD AUTO: 38 % (ref 35–47)
HCT VFR BLD AUTO: 38.6 % (ref 35–47)
HCT VFR BLD AUTO: 39.1 % (ref 35–47)
HCT VFR BLD AUTO: 39.8 % (ref 35–47)
HGB BLD-MCNC: 11.2 G/DL (ref 11.7–15.7)
HGB BLD-MCNC: 11.6 G/DL (ref 11.7–15.7)
HGB BLD-MCNC: 11.6 G/DL (ref 11.7–15.7)
HGB BLD-MCNC: 12 G/DL (ref 11.7–15.7)
HGB BLD-MCNC: 12.2 G/DL (ref 11.7–15.7)
HGB UR QL STRIP: NEGATIVE
HOLD SPECIMEN: NORMAL
IMM GRANULOCYTES # BLD: 0 10E3/UL
IMM GRANULOCYTES NFR BLD: 0 %
INTERPRETATION ECG - MUSE: NORMAL
INTERPRETATION ECG - MUSE: NORMAL
KETONES UR STRIP-MCNC: NEGATIVE MG/DL
LACTATE BLD-SCNC: 0.9 MMOL/L
LACTATE BLD-SCNC: 0.9 MMOL/L
LACTATE SERPL-SCNC: 0.3 MMOL/L (ref 0.7–2)
LEUKOCYTE ESTERASE UR QL STRIP: NEGATIVE
LVEF ECHO: NORMAL
LYMPHOCYTES # BLD AUTO: 0.9 10E3/UL (ref 0.8–5.3)
LYMPHOCYTES # BLD AUTO: 1 10E3/UL (ref 0.8–5.3)
LYMPHOCYTES # BLD AUTO: 1.7 10E3/UL (ref 0.8–5.3)
LYMPHOCYTES NFR BLD AUTO: 15 %
LYMPHOCYTES NFR BLD AUTO: 15 %
LYMPHOCYTES NFR BLD AUTO: 25 %
MAGNESIUM SERPL-MCNC: 2.4 MG/DL (ref 1.7–2.3)
MCH RBC QN AUTO: 28.6 PG (ref 26.5–33)
MCH RBC QN AUTO: 28.7 PG (ref 26.5–33)
MCH RBC QN AUTO: 28.9 PG (ref 26.5–33)
MCH RBC QN AUTO: 28.9 PG (ref 26.5–33)
MCH RBC QN AUTO: 29.1 PG (ref 26.5–33)
MCHC RBC AUTO-ENTMCNC: 30.1 G/DL (ref 31.5–36.5)
MCHC RBC AUTO-ENTMCNC: 30.2 G/DL (ref 31.5–36.5)
MCHC RBC AUTO-ENTMCNC: 30.3 G/DL (ref 31.5–36.5)
MCHC RBC AUTO-ENTMCNC: 30.5 G/DL (ref 31.5–36.5)
MCHC RBC AUTO-ENTMCNC: 31.2 G/DL (ref 31.5–36.5)
MCV RBC AUTO: 93 FL (ref 78–100)
MCV RBC AUTO: 94 FL (ref 78–100)
MCV RBC AUTO: 95 FL (ref 78–100)
MCV RBC AUTO: 95 FL (ref 78–100)
MCV RBC AUTO: 96 FL (ref 78–100)
MONOCYTES # BLD AUTO: 0.5 10E3/UL (ref 0–1.3)
MONOCYTES # BLD AUTO: 0.6 10E3/UL (ref 0–1.3)
MONOCYTES # BLD AUTO: 0.6 10E3/UL (ref 0–1.3)
MONOCYTES NFR BLD AUTO: 9 %
MUCOUS THREADS #/AREA URNS LPF: PRESENT /LPF
NEUTROPHILS # BLD AUTO: 4 10E3/UL (ref 1.6–8.3)
NEUTROPHILS # BLD AUTO: 4.3 10E3/UL (ref 1.6–8.3)
NEUTROPHILS # BLD AUTO: 4.6 10E3/UL (ref 1.6–8.3)
NEUTROPHILS NFR BLD AUTO: 63 %
NEUTROPHILS NFR BLD AUTO: 72 %
NEUTROPHILS NFR BLD AUTO: 73 %
NITRATE UR QL: NEGATIVE
NRBC # BLD AUTO: 0 10E3/UL
NRBC BLD AUTO-RTO: 0 /100
NT-PROBNP SERPL-MCNC: 9289 PG/ML (ref 0–1800)
NT-PROBNP SERPL-MCNC: ABNORMAL PG/ML (ref 0–1800)
O2/TOTAL GAS SETTING VFR VENT: 30 %
O2/TOTAL GAS SETTING VFR VENT: 30 %
O2/TOTAL GAS SETTING VFR VENT: 40 %
O2/TOTAL GAS SETTING VFR VENT: 40 %
OXYHGB MFR BLDV: 87 % (ref 70–75)
OXYHGB MFR BLDV: 89 % (ref 70–75)
OXYHGB MFR BLDV: 92 % (ref 70–75)
P AXIS - MUSE: 48 DEGREES
P AXIS - MUSE: NORMAL DEGREES
PCO2 BLD: 81 MM HG (ref 35–45)
PCO2 BLDV: 59 MM HG (ref 40–50)
PCO2 BLDV: 73 MM HG (ref 40–50)
PCO2 BLDV: 94 MM HG (ref 40–50)
PCO2 BLDV: 96 MM HG (ref 40–50)
PCO2 BLDV: 97 MM HG (ref 40–50)
PH BLD: 7.29 [PH] (ref 7.35–7.45)
PH BLDV: 7.22 [PH] (ref 7.32–7.43)
PH BLDV: 7.23 [PH] (ref 7.32–7.43)
PH BLDV: 7.26 [PH] (ref 7.32–7.43)
PH BLDV: 7.35 [PH] (ref 7.32–7.43)
PH BLDV: 7.38 [PH] (ref 7.32–7.43)
PH UR STRIP: 7 [PH] (ref 5–7)
PLAT MORPH BLD: ABNORMAL
PLATELET # BLD AUTO: 151 10E3/UL (ref 150–450)
PLATELET # BLD AUTO: 153 10E3/UL (ref 150–450)
PLATELET # BLD AUTO: 159 10E3/UL (ref 150–450)
PLATELET # BLD AUTO: 168 10E3/UL (ref 150–450)
PLATELET # BLD AUTO: 171 10E3/UL (ref 150–450)
PO2 BLD: 85 MM HG (ref 80–105)
PO2 BLDV: 17 MM HG (ref 25–47)
PO2 BLDV: 18 MM HG (ref 25–47)
PO2 BLDV: 63 MM HG (ref 25–47)
PO2 BLDV: 66 MM HG (ref 25–47)
PO2 BLDV: 78 MM HG (ref 25–47)
POTASSIUM SERPL-SCNC: 3.7 MMOL/L (ref 3.4–5.3)
POTASSIUM SERPL-SCNC: 3.9 MMOL/L (ref 3.4–5.3)
POTASSIUM SERPL-SCNC: 4 MMOL/L (ref 3.4–5.3)
POTASSIUM SERPL-SCNC: 4.1 MMOL/L (ref 3.4–5.3)
POTASSIUM SERPL-SCNC: 4.2 MMOL/L (ref 3.4–5.3)
PR INTERVAL - MUSE: 150 MS
PR INTERVAL - MUSE: NORMAL MS
PROT SERPL-MCNC: 5.9 G/DL (ref 6.4–8.3)
PROT SERPL-MCNC: 6.3 G/DL (ref 6.4–8.3)
PROT SERPL-MCNC: 6.4 G/DL (ref 6.4–8.3)
QRS DURATION - MUSE: 90 MS
QRS DURATION - MUSE: 92 MS
QT - MUSE: 380 MS
QT - MUSE: 392 MS
QTC - MUSE: 435 MS
QTC - MUSE: 480 MS
R AXIS - MUSE: -12 DEGREES
R AXIS - MUSE: -20 DEGREES
RBC # BLD AUTO: 3.88 10E6/UL (ref 3.8–5.2)
RBC # BLD AUTO: 4.04 10E6/UL (ref 3.8–5.2)
RBC # BLD AUTO: 4.05 10E6/UL (ref 3.8–5.2)
RBC # BLD AUTO: 4.13 10E6/UL (ref 3.8–5.2)
RBC # BLD AUTO: 4.22 10E6/UL (ref 3.8–5.2)
RBC MORPH BLD: ABNORMAL
RBC URINE: 1 /HPF
RSV RNA SPEC NAA+PROBE: NEGATIVE
SAO2 % BLDA: 95 % (ref 92–100)
SAO2 % BLDV: 19 % (ref 70–75)
SAO2 % BLDV: 24 % (ref 70–75)
SAO2 % BLDV: 89.2 % (ref 70–75)
SAO2 % BLDV: 91.4 % (ref 70–75)
SAO2 % BLDV: 94.3 % (ref 70–75)
SARS-COV-2 RNA RESP QL NAA+PROBE: NEGATIVE
SODIUM SERPL-SCNC: 145 MMOL/L (ref 135–145)
SODIUM SERPL-SCNC: 146 MMOL/L (ref 135–145)
SP GR UR STRIP: 1.02 (ref 1–1.03)
SYSTOLIC BLOOD PRESSURE - MUSE: NORMAL MMHG
SYSTOLIC BLOOD PRESSURE - MUSE: NORMAL MMHG
T AXIS - MUSE: 114 DEGREES
T AXIS - MUSE: 99 DEGREES
T4 FREE SERPL-MCNC: 1.05 NG/DL (ref 0.9–1.7)
TROPONIN T SERPL HS-MCNC: 32 NG/L
TROPONIN T SERPL HS-MCNC: 37 NG/L
TROPONIN T SERPL HS-MCNC: 39 NG/L
TROPONIN T SERPL HS-MCNC: 44 NG/L
TSH SERPL DL<=0.005 MIU/L-ACNC: 19.29 UIU/ML (ref 0.3–4.2)
UROBILINOGEN UR STRIP-MCNC: NORMAL MG/DL
VENTRICULAR RATE- MUSE: 74 BPM
VENTRICULAR RATE- MUSE: 96 BPM
WBC # BLD AUTO: 5.6 10E3/UL (ref 4–11)
WBC # BLD AUTO: 5.8 10E3/UL (ref 4–11)
WBC # BLD AUTO: 5.8 10E3/UL (ref 4–11)
WBC # BLD AUTO: 6.3 10E3/UL (ref 4–11)
WBC # BLD AUTO: 6.8 10E3/UL (ref 4–11)
WBC URINE: 2 /HPF

## 2024-01-01 PROCEDURE — 85379 FIBRIN DEGRADATION QUANT: CPT | Performed by: EMERGENCY MEDICINE

## 2024-01-01 PROCEDURE — 250N000009 HC RX 250: Performed by: EMERGENCY MEDICINE

## 2024-01-01 PROCEDURE — 96375 TX/PRO/DX INJ NEW DRUG ADDON: CPT

## 2024-01-01 PROCEDURE — 999N000185 HC STATISTIC TRANSPORT TIME EA 15 MIN

## 2024-01-01 PROCEDURE — 250N000011 HC RX IP 250 OP 636: Performed by: INTERNAL MEDICINE

## 2024-01-01 PROCEDURE — 99418 PROLNG IP/OBS E/M EA 15 MIN: CPT

## 2024-01-01 PROCEDURE — 80053 COMPREHEN METABOLIC PANEL: CPT | Performed by: EMERGENCY MEDICINE

## 2024-01-01 PROCEDURE — 999N000157 HC STATISTIC RCP TIME EA 10 MIN

## 2024-01-01 PROCEDURE — 84439 ASSAY OF FREE THYROXINE: CPT | Performed by: EMERGENCY MEDICINE

## 2024-01-01 PROCEDURE — 99233 SBSQ HOSP IP/OBS HIGH 50: CPT | Performed by: INTERNAL MEDICINE

## 2024-01-01 PROCEDURE — 99233 SBSQ HOSP IP/OBS HIGH 50: CPT

## 2024-01-01 PROCEDURE — 81001 URINALYSIS AUTO W/SCOPE: CPT | Performed by: EMERGENCY MEDICINE

## 2024-01-01 PROCEDURE — 71275 CT ANGIOGRAPHY CHEST: CPT

## 2024-01-01 PROCEDURE — 250N000011 HC RX IP 250 OP 636: Performed by: EMERGENCY MEDICINE

## 2024-01-01 PROCEDURE — 70450 CT HEAD/BRAIN W/O DYE: CPT

## 2024-01-01 PROCEDURE — 93005 ELECTROCARDIOGRAM TRACING: CPT

## 2024-01-01 PROCEDURE — 85025 COMPLETE CBC W/AUTO DIFF WBC: CPT | Performed by: EMERGENCY MEDICINE

## 2024-01-01 PROCEDURE — 94640 AIRWAY INHALATION TREATMENT: CPT

## 2024-01-01 PROCEDURE — 36415 COLL VENOUS BLD VENIPUNCTURE: CPT | Performed by: HOSPITALIST

## 2024-01-01 PROCEDURE — 99233 SBSQ HOSP IP/OBS HIGH 50: CPT | Performed by: PHYSICIAN ASSISTANT

## 2024-01-01 PROCEDURE — 250N000011 HC RX IP 250 OP 636

## 2024-01-01 PROCEDURE — 83605 ASSAY OF LACTIC ACID: CPT | Performed by: PHYSICIAN ASSISTANT

## 2024-01-01 PROCEDURE — G0378 HOSPITAL OBSERVATION PER HR: HCPCS

## 2024-01-01 PROCEDURE — 250N000013 HC RX MED GY IP 250 OP 250 PS 637: Performed by: HOSPITALIST

## 2024-01-01 PROCEDURE — 93005 ELECTROCARDIOGRAM TRACING: CPT | Mod: 76

## 2024-01-01 PROCEDURE — 82803 BLOOD GASES ANY COMBINATION: CPT

## 2024-01-01 PROCEDURE — 99285 EMERGENCY DEPT VISIT HI MDM: CPT | Mod: 25

## 2024-01-01 PROCEDURE — 93306 TTE W/DOPPLER COMPLETE: CPT | Mod: 26 | Performed by: INTERNAL MEDICINE

## 2024-01-01 PROCEDURE — 258N000003 HC RX IP 258 OP 636: Performed by: PHYSICIAN ASSISTANT

## 2024-01-01 PROCEDURE — 94660 CPAP INITIATION&MGMT: CPT

## 2024-01-01 PROCEDURE — 36415 COLL VENOUS BLD VENIPUNCTURE: CPT | Performed by: EMERGENCY MEDICINE

## 2024-01-01 PROCEDURE — 36600 WITHDRAWAL OF ARTERIAL BLOOD: CPT

## 2024-01-01 PROCEDURE — 82374 ASSAY BLOOD CARBON DIOXIDE: CPT | Performed by: EMERGENCY MEDICINE

## 2024-01-01 PROCEDURE — 93306 TTE W/DOPPLER COMPLETE: CPT

## 2024-01-01 PROCEDURE — 250N000009 HC RX 250: Performed by: PHYSICIAN ASSISTANT

## 2024-01-01 PROCEDURE — 99418 PROLNG IP/OBS E/M EA 15 MIN: CPT | Performed by: PHYSICIAN ASSISTANT

## 2024-01-01 PROCEDURE — 82375 ASSAY CARBOXYHB QUANT: CPT | Performed by: PHYSICIAN ASSISTANT

## 2024-01-01 PROCEDURE — 82375 ASSAY CARBOXYHB QUANT: CPT | Performed by: EMERGENCY MEDICINE

## 2024-01-01 PROCEDURE — 84443 ASSAY THYROID STIM HORMONE: CPT | Performed by: EMERGENCY MEDICINE

## 2024-01-01 PROCEDURE — 5A09457 ASSISTANCE WITH RESPIRATORY VENTILATION, 24-96 CONSECUTIVE HOURS, CONTINUOUS POSITIVE AIRWAY PRESSURE: ICD-10-PCS | Performed by: PHYSICIAN ASSISTANT

## 2024-01-01 PROCEDURE — 82805 BLOOD GASES W/O2 SATURATION: CPT | Performed by: PHYSICIAN ASSISTANT

## 2024-01-01 PROCEDURE — 80048 BASIC METABOLIC PNL TOTAL CA: CPT | Performed by: HOSPITALIST

## 2024-01-01 PROCEDURE — 85027 COMPLETE CBC AUTOMATED: CPT | Performed by: PHYSICIAN ASSISTANT

## 2024-01-01 PROCEDURE — 80053 COMPREHEN METABOLIC PANEL: CPT | Performed by: PHYSICIAN ASSISTANT

## 2024-01-01 PROCEDURE — 96374 THER/PROPH/DIAG INJ IV PUSH: CPT

## 2024-01-01 PROCEDURE — 84484 ASSAY OF TROPONIN QUANT: CPT | Performed by: EMERGENCY MEDICINE

## 2024-01-01 PROCEDURE — 250N000011 HC RX IP 250 OP 636: Performed by: HOSPITALIST

## 2024-01-01 PROCEDURE — 120N000001 HC R&B MED SURG/OB

## 2024-01-01 PROCEDURE — 84484 ASSAY OF TROPONIN QUANT: CPT | Mod: 91 | Performed by: EMERGENCY MEDICINE

## 2024-01-01 PROCEDURE — 250N000011 HC RX IP 250 OP 636: Performed by: PHYSICIAN ASSISTANT

## 2024-01-01 PROCEDURE — 120N000013 HC R&B IMCU

## 2024-01-01 PROCEDURE — 258N000003 HC RX IP 258 OP 636: Performed by: INTERNAL MEDICINE

## 2024-01-01 PROCEDURE — 71046 X-RAY EXAM CHEST 2 VIEWS: CPT

## 2024-01-01 PROCEDURE — 87637 SARSCOV2&INF A&B&RSV AMP PRB: CPT | Performed by: EMERGENCY MEDICINE

## 2024-01-01 PROCEDURE — 80053 COMPREHEN METABOLIC PANEL: CPT | Performed by: HOSPITALIST

## 2024-01-01 PROCEDURE — 250N000009 HC RX 250: Performed by: INTERNAL MEDICINE

## 2024-01-01 PROCEDURE — 36415 COLL VENOUS BLD VENIPUNCTURE: CPT | Performed by: PHYSICIAN ASSISTANT

## 2024-01-01 PROCEDURE — 83880 ASSAY OF NATRIURETIC PEPTIDE: CPT | Performed by: EMERGENCY MEDICINE

## 2024-01-01 PROCEDURE — 99223 1ST HOSP IP/OBS HIGH 75: CPT | Performed by: HOSPITALIST

## 2024-01-01 PROCEDURE — 82040 ASSAY OF SERUM ALBUMIN: CPT | Performed by: PHYSICIAN ASSISTANT

## 2024-01-01 PROCEDURE — 85025 COMPLETE CBC W/AUTO DIFF WBC: CPT | Performed by: HOSPITALIST

## 2024-01-01 PROCEDURE — S5010 5% DEXTROSE AND 0.45% SALINE: HCPCS | Performed by: INTERNAL MEDICINE

## 2024-01-01 PROCEDURE — 83735 ASSAY OF MAGNESIUM: CPT | Performed by: EMERGENCY MEDICINE

## 2024-01-01 PROCEDURE — 99222 1ST HOSP IP/OBS MODERATE 55: CPT

## 2024-01-01 PROCEDURE — 85048 AUTOMATED LEUKOCYTE COUNT: CPT | Performed by: PHYSICIAN ASSISTANT

## 2024-01-01 PROCEDURE — 71045 X-RAY EXAM CHEST 1 VIEW: CPT

## 2024-01-01 PROCEDURE — 250N000009 HC RX 250: Performed by: HOSPITALIST

## 2024-01-01 RX ORDER — IPRATROPIUM BROMIDE AND ALBUTEROL SULFATE 2.5; .5 MG/3ML; MG/3ML
3 SOLUTION RESPIRATORY (INHALATION)
Status: DISCONTINUED | OUTPATIENT
Start: 2024-01-01 | End: 2024-01-01

## 2024-01-01 RX ORDER — FAMOTIDINE 20 MG/1
20 TABLET, FILM COATED ORAL 2 TIMES DAILY
Status: DISCONTINUED | OUTPATIENT
Start: 2024-01-01 | End: 2024-01-01 | Stop reason: DRUGHIGH

## 2024-01-01 RX ORDER — IPRATROPIUM BROMIDE AND ALBUTEROL SULFATE 2.5; .5 MG/3ML; MG/3ML
3 SOLUTION RESPIRATORY (INHALATION) ONCE
Status: COMPLETED | OUTPATIENT
Start: 2024-01-01 | End: 2024-01-01

## 2024-01-01 RX ORDER — ATORVASTATIN CALCIUM 20 MG/1
20 TABLET, FILM COATED ORAL DAILY
Status: DISCONTINUED | OUTPATIENT
Start: 2024-01-01 | End: 2024-01-01 | Stop reason: HOSPADM

## 2024-01-01 RX ORDER — HYDROMORPHONE HYDROCHLORIDE 1 MG/ML
2 SOLUTION ORAL
Status: DISCONTINUED | OUTPATIENT
Start: 2024-01-01 | End: 2024-01-01 | Stop reason: HOSPADM

## 2024-01-01 RX ORDER — TORSEMIDE 5 MG/1
10 TABLET ORAL DAILY
Status: DISCONTINUED | OUTPATIENT
Start: 2024-01-01 | End: 2024-01-01 | Stop reason: HOSPADM

## 2024-01-01 RX ORDER — IPRATROPIUM BROMIDE AND ALBUTEROL SULFATE 2.5; .5 MG/3ML; MG/3ML
1 SOLUTION RESPIRATORY (INHALATION) EVERY 6 HOURS PRN
Status: DISCONTINUED | OUTPATIENT
Start: 2024-01-01 | End: 2024-01-01 | Stop reason: HOSPADM

## 2024-01-01 RX ORDER — FUROSEMIDE 10 MG/ML
40 INJECTION INTRAMUSCULAR; INTRAVENOUS ONCE
Status: COMPLETED | OUTPATIENT
Start: 2024-01-01 | End: 2024-01-01

## 2024-01-01 RX ORDER — ATROPINE SULFATE 10 MG/ML
2 SOLUTION/ DROPS OPHTHALMIC EVERY 4 HOURS PRN
Status: DISCONTINUED | OUTPATIENT
Start: 2024-01-01 | End: 2024-01-01 | Stop reason: HOSPADM

## 2024-01-01 RX ORDER — ONDANSETRON 4 MG/1
4 TABLET, ORALLY DISINTEGRATING ORAL EVERY 6 HOURS PRN
Status: DISCONTINUED | OUTPATIENT
Start: 2024-01-01 | End: 2024-01-01

## 2024-01-01 RX ORDER — HYDROMORPHONE HYDROCHLORIDE 2 MG/1
2 TABLET ORAL
Status: DISCONTINUED | OUTPATIENT
Start: 2024-01-01 | End: 2024-01-01 | Stop reason: HOSPADM

## 2024-01-01 RX ORDER — SALIVA STIMULANT COMB. NO.3
1 SPRAY, NON-AEROSOL (ML) MUCOUS MEMBRANE
Status: DISCONTINUED | OUTPATIENT
Start: 2024-01-01 | End: 2024-01-01 | Stop reason: HOSPADM

## 2024-01-01 RX ORDER — NALOXONE HYDROCHLORIDE 0.4 MG/ML
0.2 INJECTION, SOLUTION INTRAMUSCULAR; INTRAVENOUS; SUBCUTANEOUS
Status: DISCONTINUED | OUTPATIENT
Start: 2024-01-01 | End: 2024-01-01 | Stop reason: HOSPADM

## 2024-01-01 RX ORDER — OXYBUTYNIN CHLORIDE 10 MG/1
20 TABLET, EXTENDED RELEASE ORAL DAILY
COMMUNITY

## 2024-01-01 RX ORDER — CARBOXYMETHYLCELLULOSE SODIUM 5 MG/ML
1 SOLUTION/ DROPS OPHTHALMIC
Status: DISCONTINUED | OUTPATIENT
Start: 2024-01-01 | End: 2024-01-01

## 2024-01-01 RX ORDER — ENOXAPARIN SODIUM 100 MG/ML
30 INJECTION SUBCUTANEOUS EVERY 24 HOURS
Status: DISCONTINUED | OUTPATIENT
Start: 2024-01-01 | End: 2024-01-01

## 2024-01-01 RX ORDER — METOPROLOL TARTRATE 1 MG/ML
5 INJECTION, SOLUTION INTRAVENOUS EVERY 6 HOURS
Status: DISCONTINUED | OUTPATIENT
Start: 2024-01-01 | End: 2024-01-01

## 2024-01-01 RX ORDER — TRAZODONE HYDROCHLORIDE 50 MG/1
50 TABLET, FILM COATED ORAL
Status: DISCONTINUED | OUTPATIENT
Start: 2024-01-01 | End: 2024-01-01 | Stop reason: HOSPADM

## 2024-01-01 RX ORDER — LORAZEPAM 2 MG/ML
1 INJECTION INTRAMUSCULAR
Status: DISCONTINUED | OUTPATIENT
Start: 2024-01-01 | End: 2024-01-01 | Stop reason: HOSPADM

## 2024-01-01 RX ORDER — SODIUM CHLORIDE, SODIUM LACTATE, POTASSIUM CHLORIDE, CALCIUM CHLORIDE 600; 310; 30; 20 MG/100ML; MG/100ML; MG/100ML; MG/100ML
INJECTION, SOLUTION INTRAVENOUS CONTINUOUS
Status: DISCONTINUED | OUTPATIENT
Start: 2024-01-01 | End: 2024-01-01

## 2024-01-01 RX ORDER — ONDANSETRON 4 MG/1
4 TABLET, ORALLY DISINTEGRATING ORAL EVERY 6 HOURS PRN
Status: DISCONTINUED | OUTPATIENT
Start: 2024-01-01 | End: 2024-01-01 | Stop reason: HOSPADM

## 2024-01-01 RX ORDER — ONDANSETRON 2 MG/ML
4 INJECTION INTRAMUSCULAR; INTRAVENOUS EVERY 6 HOURS PRN
Status: DISCONTINUED | OUTPATIENT
Start: 2024-01-01 | End: 2024-01-01 | Stop reason: HOSPADM

## 2024-01-01 RX ORDER — OLANZAPINE 5 MG/1
5 TABLET, ORALLY DISINTEGRATING ORAL EVERY 6 HOURS PRN
Status: DISCONTINUED | OUTPATIENT
Start: 2024-01-01 | End: 2024-01-01 | Stop reason: HOSPADM

## 2024-01-01 RX ORDER — ALBUTEROL SULFATE 0.83 MG/ML
2.5 SOLUTION RESPIRATORY (INHALATION)
Status: DISCONTINUED | OUTPATIENT
Start: 2024-01-01 | End: 2024-01-01 | Stop reason: HOSPADM

## 2024-01-01 RX ORDER — LIDOCAINE 40 MG/G
CREAM TOPICAL
Status: DISCONTINUED | OUTPATIENT
Start: 2024-01-01 | End: 2024-01-01 | Stop reason: HOSPADM

## 2024-01-01 RX ORDER — BISACODYL 10 MG
10 SUPPOSITORY, RECTAL RECTAL
Status: DISCONTINUED | OUTPATIENT
Start: 2024-07-13 | End: 2024-01-01 | Stop reason: HOSPADM

## 2024-01-01 RX ORDER — METHYLPREDNISOLONE SODIUM SUCCINATE 40 MG/ML
40 INJECTION, POWDER, LYOPHILIZED, FOR SOLUTION INTRAMUSCULAR; INTRAVENOUS EVERY 8 HOURS
Status: DISCONTINUED | OUTPATIENT
Start: 2024-01-01 | End: 2024-01-01

## 2024-01-01 RX ORDER — NALOXONE HYDROCHLORIDE 0.4 MG/ML
0.1 INJECTION, SOLUTION INTRAMUSCULAR; INTRAVENOUS; SUBCUTANEOUS
Status: DISCONTINUED | OUTPATIENT
Start: 2024-01-01 | End: 2024-01-01 | Stop reason: HOSPADM

## 2024-01-01 RX ORDER — ASPIRIN 81 MG/1
81 TABLET, CHEWABLE ORAL DAILY
Status: DISCONTINUED | OUTPATIENT
Start: 2024-01-01 | End: 2024-01-01 | Stop reason: HOSPADM

## 2024-01-01 RX ORDER — IOPAMIDOL 755 MG/ML
500 INJECTION, SOLUTION INTRAVASCULAR ONCE
Status: COMPLETED | OUTPATIENT
Start: 2024-01-01 | End: 2024-01-01

## 2024-01-01 RX ORDER — DEXTROSE MONOHYDRATE AND SODIUM CHLORIDE 5; .45 G/100ML; G/100ML
INJECTION, SOLUTION INTRAVENOUS CONTINUOUS
Status: DISCONTINUED | OUTPATIENT
Start: 2024-01-01 | End: 2024-01-01

## 2024-01-01 RX ORDER — FAMOTIDINE 20 MG/1
20 TABLET, FILM COATED ORAL DAILY
Status: DISCONTINUED | OUTPATIENT
Start: 2024-01-01 | End: 2024-01-01 | Stop reason: HOSPADM

## 2024-01-01 RX ORDER — POLYETHYLENE GLYCOL 3350 17 G/17G
17 POWDER, FOR SOLUTION ORAL DAILY PRN
Status: DISCONTINUED | OUTPATIENT
Start: 2024-01-01 | End: 2024-01-01 | Stop reason: HOSPADM

## 2024-01-01 RX ORDER — LEVOTHYROXINE SODIUM 100 UG/1
100 TABLET ORAL DAILY
Status: DISCONTINUED | OUTPATIENT
Start: 2024-01-01 | End: 2024-01-01 | Stop reason: HOSPADM

## 2024-01-01 RX ORDER — SENNOSIDES 8.6 MG
1 TABLET ORAL 2 TIMES DAILY PRN
Status: DISCONTINUED | OUTPATIENT
Start: 2024-01-01 | End: 2024-01-01 | Stop reason: HOSPADM

## 2024-01-01 RX ORDER — ONDANSETRON 2 MG/ML
4 INJECTION INTRAMUSCULAR; INTRAVENOUS EVERY 6 HOURS PRN
Status: DISCONTINUED | OUTPATIENT
Start: 2024-01-01 | End: 2024-01-01

## 2024-01-01 RX ORDER — FUROSEMIDE 10 MG/ML
20 INJECTION INTRAMUSCULAR; INTRAVENOUS ONCE
Status: COMPLETED | OUTPATIENT
Start: 2024-01-01 | End: 2024-01-01

## 2024-01-01 RX ORDER — DONEPEZIL HYDROCHLORIDE 5 MG/1
5 TABLET, FILM COATED ORAL AT BEDTIME
Status: DISCONTINUED | OUTPATIENT
Start: 2024-01-01 | End: 2024-01-01 | Stop reason: HOSPADM

## 2024-01-01 RX ORDER — DONEPEZIL HYDROCHLORIDE 5 MG/1
5 TABLET, FILM COATED ORAL AT BEDTIME
COMMUNITY

## 2024-01-01 RX ORDER — ATORVASTATIN CALCIUM 20 MG/1
20 TABLET, FILM COATED ORAL DAILY
Qty: 90 TABLET | Refills: 1 | Status: SHIPPED | OUTPATIENT
Start: 2024-01-01

## 2024-01-01 RX ORDER — FAMOTIDINE 20 MG/1
20 TABLET, FILM COATED ORAL 2 TIMES DAILY
COMMUNITY

## 2024-01-01 RX ORDER — METOPROLOL SUCCINATE 25 MG/1
25 TABLET, EXTENDED RELEASE ORAL 2 TIMES DAILY
Status: DISCONTINUED | OUTPATIENT
Start: 2024-01-01 | End: 2024-01-01 | Stop reason: HOSPADM

## 2024-01-01 RX ORDER — TORSEMIDE 10 MG/1
10 TABLET ORAL DAILY
Qty: 90 TABLET | Refills: 3 | Status: SHIPPED | OUTPATIENT
Start: 2024-01-01

## 2024-01-01 RX ORDER — ACETAMINOPHEN 500 MG
500-1000 TABLET ORAL DAILY
Status: DISCONTINUED | OUTPATIENT
Start: 2024-01-01 | End: 2024-01-01 | Stop reason: HOSPADM

## 2024-01-01 RX ORDER — CARBOXYMETHYLCELLULOSE SODIUM 5 MG/ML
1-2 SOLUTION/ DROPS OPHTHALMIC
Status: DISCONTINUED | OUTPATIENT
Start: 2024-01-01 | End: 2024-01-01 | Stop reason: HOSPADM

## 2024-01-01 RX ORDER — ISOSORBIDE MONONITRATE 30 MG/1
30 TABLET, EXTENDED RELEASE ORAL DAILY
Status: DISCONTINUED | OUTPATIENT
Start: 2024-01-01 | End: 2024-01-01 | Stop reason: HOSPADM

## 2024-01-01 RX ORDER — LORAZEPAM 2 MG/ML
0.5 INJECTION INTRAMUSCULAR ONCE
Status: COMPLETED | OUTPATIENT
Start: 2024-01-01 | End: 2024-01-01

## 2024-01-01 RX ORDER — ALBUTEROL SULFATE 90 UG/1
2 AEROSOL, METERED RESPIRATORY (INHALATION) EVERY 6 HOURS PRN
COMMUNITY

## 2024-01-01 RX ORDER — ATORVASTATIN CALCIUM 20 MG/1
20 TABLET, FILM COATED ORAL DAILY
Qty: 90 TABLET | Refills: 0 | Status: SHIPPED | OUTPATIENT
Start: 2024-01-01 | End: 2024-01-01

## 2024-01-01 RX ORDER — LORAZEPAM 1 MG/1
1 TABLET ORAL
Status: DISCONTINUED | OUTPATIENT
Start: 2024-01-01 | End: 2024-01-01 | Stop reason: HOSPADM

## 2024-01-01 RX ORDER — METOPROLOL SUCCINATE 25 MG/1
25 TABLET, EXTENDED RELEASE ORAL 2 TIMES DAILY
Qty: 180 TABLET | Refills: 3 | Status: SHIPPED | OUTPATIENT
Start: 2024-01-01

## 2024-01-01 RX ORDER — OXYBUTYNIN CHLORIDE 5 MG/1
20 TABLET, EXTENDED RELEASE ORAL DAILY
Status: DISCONTINUED | OUTPATIENT
Start: 2024-01-01 | End: 2024-01-01 | Stop reason: HOSPADM

## 2024-01-01 RX ORDER — TRAMADOL HYDROCHLORIDE 50 MG/1
50 TABLET ORAL 4 TIMES DAILY
Status: DISCONTINUED | OUTPATIENT
Start: 2024-01-01 | End: 2024-01-01

## 2024-01-01 RX ORDER — HYDROMORPHONE HYDROCHLORIDE 1 MG/ML
0.3 INJECTION, SOLUTION INTRAMUSCULAR; INTRAVENOUS; SUBCUTANEOUS
Status: DISCONTINUED | OUTPATIENT
Start: 2024-01-01 | End: 2024-01-01 | Stop reason: HOSPADM

## 2024-01-01 RX ORDER — HYDROMORPHONE HYDROCHLORIDE 1 MG/ML
0.5 INJECTION, SOLUTION INTRAMUSCULAR; INTRAVENOUS; SUBCUTANEOUS
Status: DISCONTINUED | OUTPATIENT
Start: 2024-01-01 | End: 2024-01-01 | Stop reason: HOSPADM

## 2024-01-01 RX ORDER — MINERAL OIL/HYDROPHIL PETROLAT
OINTMENT (GRAM) TOPICAL
Status: DISCONTINUED | OUTPATIENT
Start: 2024-01-01 | End: 2024-01-01 | Stop reason: HOSPADM

## 2024-01-01 RX ORDER — HALOPERIDOL 5 MG/ML
0.5 INJECTION INTRAMUSCULAR EVERY 6 HOURS PRN
Status: DISCONTINUED | OUTPATIENT
Start: 2024-01-01 | End: 2024-01-01 | Stop reason: HOSPADM

## 2024-01-01 RX ORDER — AMOXICILLIN 250 MG
1 CAPSULE ORAL DAILY PRN
Status: DISCONTINUED | OUTPATIENT
Start: 2024-01-01 | End: 2024-01-01

## 2024-01-01 RX ORDER — HYDROMORPHONE HYDROCHLORIDE 1 MG/ML
1 SOLUTION ORAL
Status: DISCONTINUED | OUTPATIENT
Start: 2024-01-01 | End: 2024-01-01 | Stop reason: HOSPADM

## 2024-01-01 RX ADMIN — ENOXAPARIN SODIUM 30 MG: 30 INJECTION SUBCUTANEOUS at 17:15

## 2024-01-01 RX ADMIN — LORAZEPAM 0.5 MG: 2 INJECTION INTRAMUSCULAR; INTRAVENOUS at 02:51

## 2024-01-01 RX ADMIN — IPRATROPIUM BROMIDE AND ALBUTEROL SULFATE 3 ML: .5; 3 SOLUTION RESPIRATORY (INHALATION) at 01:52

## 2024-01-01 RX ADMIN — IPRATROPIUM BROMIDE AND ALBUTEROL SULFATE 3 ML: .5; 3 SOLUTION RESPIRATORY (INHALATION) at 11:20

## 2024-01-01 RX ADMIN — FUROSEMIDE 40 MG: 10 INJECTION, SOLUTION INTRAMUSCULAR; INTRAVENOUS at 13:00

## 2024-01-01 RX ADMIN — ONDANSETRON 4 MG: 2 INJECTION INTRAMUSCULAR; INTRAVENOUS at 00:14

## 2024-01-01 RX ADMIN — LORAZEPAM 1 MG: 2 INJECTION INTRAMUSCULAR; INTRAVENOUS at 16:23

## 2024-01-01 RX ADMIN — IOPAMIDOL 65 ML: 755 INJECTION, SOLUTION INTRAVENOUS at 13:24

## 2024-01-01 RX ADMIN — IPRATROPIUM BROMIDE AND ALBUTEROL SULFATE 3 ML: .5; 3 SOLUTION RESPIRATORY (INHALATION) at 11:26

## 2024-01-01 RX ADMIN — METOPROLOL TARTRATE 5 MG: 5 INJECTION INTRAVENOUS at 02:56

## 2024-01-01 RX ADMIN — SODIUM CHLORIDE, POTASSIUM CHLORIDE, SODIUM LACTATE AND CALCIUM CHLORIDE: 600; 310; 30; 20 INJECTION, SOLUTION INTRAVENOUS at 06:14

## 2024-01-01 RX ADMIN — METHYLPREDNISOLONE SODIUM SUCCINATE 40 MG: 40 INJECTION, POWDER, FOR SOLUTION INTRAMUSCULAR; INTRAVENOUS at 10:04

## 2024-01-01 RX ADMIN — METOPROLOL SUCCINATE 25 MG: 25 TABLET, EXTENDED RELEASE ORAL at 21:00

## 2024-01-01 RX ADMIN — ONDANSETRON 4 MG: 2 INJECTION INTRAMUSCULAR; INTRAVENOUS at 06:08

## 2024-01-01 RX ADMIN — HYDROMORPHONE HYDROCHLORIDE 0.5 MG: 1 INJECTION, SOLUTION INTRAMUSCULAR; INTRAVENOUS; SUBCUTANEOUS at 00:56

## 2024-01-01 RX ADMIN — HALOPERIDOL LACTATE 0.5 MG: 5 INJECTION, SOLUTION INTRAMUSCULAR at 15:36

## 2024-01-01 RX ADMIN — FAMOTIDINE 20 MG: 10 INJECTION, SOLUTION INTRAVENOUS at 10:07

## 2024-01-01 RX ADMIN — DEXTROSE AND SODIUM CHLORIDE: 5; 450 INJECTION, SOLUTION INTRAVENOUS at 10:13

## 2024-01-01 RX ADMIN — SODIUM CHLORIDE 100 ML: 9 INJECTION, SOLUTION INTRAVENOUS at 13:25

## 2024-01-01 RX ADMIN — FUROSEMIDE 20 MG: 10 INJECTION, SOLUTION INTRAMUSCULAR; INTRAVENOUS at 14:43

## 2024-01-01 RX ADMIN — DONEPEZIL HYDROCHLORIDE 5 MG: 5 TABLET ORAL at 21:01

## 2024-01-01 RX ADMIN — IPRATROPIUM BROMIDE AND ALBUTEROL SULFATE 3 ML: .5; 3 SOLUTION RESPIRATORY (INHALATION) at 08:46

## 2024-01-01 RX ADMIN — METOPROLOL TARTRATE 5 MG: 5 INJECTION INTRAVENOUS at 10:08

## 2024-01-01 RX ADMIN — HYDROMORPHONE HYDROCHLORIDE 0.5 MG: 1 INJECTION, SOLUTION INTRAMUSCULAR; INTRAVENOUS; SUBCUTANEOUS at 19:01

## 2024-01-01 ASSESSMENT — ACTIVITIES OF DAILY LIVING (ADL)
ADLS_ACUITY_SCORE: 45
ADLS_ACUITY_SCORE: 45
ADLS_ACUITY_SCORE: 39
ADLS_ACUITY_SCORE: 44
ADLS_ACUITY_SCORE: 39
ADLS_ACUITY_SCORE: 45
ADLS_ACUITY_SCORE: 39
ADLS_ACUITY_SCORE: 38
ADLS_ACUITY_SCORE: 45
ADLS_ACUITY_SCORE: 45
ADLS_ACUITY_SCORE: 39
ADLS_ACUITY_SCORE: 41
ADLS_ACUITY_SCORE: 40
ADLS_ACUITY_SCORE: 40
ADLS_ACUITY_SCORE: 45
ADLS_ACUITY_SCORE: 39
ADLS_ACUITY_SCORE: 39
ADLS_ACUITY_SCORE: 40
ADLS_ACUITY_SCORE: 38
ADLS_ACUITY_SCORE: 40
ADLS_ACUITY_SCORE: 45
ADLS_ACUITY_SCORE: 43
ADLS_ACUITY_SCORE: 39
ADLS_ACUITY_SCORE: 40
ADLS_ACUITY_SCORE: 38
ADLS_ACUITY_SCORE: 39
ADLS_ACUITY_SCORE: 45
ADLS_ACUITY_SCORE: 45
ADLS_ACUITY_SCORE: 40
ADLS_ACUITY_SCORE: 45
ADLS_ACUITY_SCORE: 39
ADLS_ACUITY_SCORE: 38
ADLS_ACUITY_SCORE: 39
ADLS_ACUITY_SCORE: 41
ADLS_ACUITY_SCORE: 44
ADLS_ACUITY_SCORE: 39
ADLS_ACUITY_SCORE: 38
ADLS_ACUITY_SCORE: 44
ADLS_ACUITY_SCORE: 41
ADLS_ACUITY_SCORE: 39
ADLS_ACUITY_SCORE: 40
ADLS_ACUITY_SCORE: 38
ADLS_ACUITY_SCORE: 39
ADLS_ACUITY_SCORE: 45
ADLS_ACUITY_SCORE: 38
ADLS_ACUITY_SCORE: 43
ADLS_ACUITY_SCORE: 40
ADLS_ACUITY_SCORE: 41
ADLS_ACUITY_SCORE: 38
ADLS_ACUITY_SCORE: 39
ADLS_ACUITY_SCORE: 40
ADLS_ACUITY_SCORE: 40
ADLS_ACUITY_SCORE: 39
ADLS_ACUITY_SCORE: 38
ADLS_ACUITY_SCORE: 45
ADLS_ACUITY_SCORE: 41
ADLS_ACUITY_SCORE: 38
ADLS_ACUITY_SCORE: 39
ADLS_ACUITY_SCORE: 44
ADLS_ACUITY_SCORE: 39
ADLS_ACUITY_SCORE: 39
ADLS_ACUITY_SCORE: 38
ADLS_ACUITY_SCORE: 40
ADLS_ACUITY_SCORE: 38
ADLS_ACUITY_SCORE: 39

## 2024-01-01 ASSESSMENT — COLUMBIA-SUICIDE SEVERITY RATING SCALE - C-SSRS

## 2024-03-27 NOTE — TELEPHONE ENCOUNTER
[facility] Refill Guideline reviewed.  Medication does not meet criteria for refill due to needs Lipids/ALT.  Messaged to providers team for further review.

## 2024-04-11 NOTE — ED PROVIDER NOTES
History     Chief Complaint:  Shortness of Breath    The history is provided by the patient.     Giselle Chino is a 88 year old female with history of COPD, hypertension, hyperlipidemia, acute on chronic respiratory failure with hypoxia, and CHF presenting via EMS for evaluation of shortness of breath. Per EMS reports, Giselle was brought to the ED for shortness of breath for which she did not know how to use her inhalers. She reportedly received one albuterol nebulizer treatment en route. Giselle states that she is breathing better now. She notes ongoing shortness of breath that is similar to today's episode. She also notes an occasional cough. She denies chest pain, fevers, or leg swelling. She denies use of oxygen at home. She notes that she is not eating much.    Independent Historian:   EMS reports supplement the above history.    Review of External Notes:     Patient was last admitted to the hospital November 20, 2023 at that time she was admitted for acute respiratory failure related to COPD exacerbation.  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  Thyroid disease  Hypoxemic respiratory failure, chronic  Non-STEMI  Acute on chronic respiratory failure with hypoxia  Acute CHF, unspecified heart failure type    Past Surgical History:    Cataract extraction, bilateral  Skin graft full thickness head / neck    Physical Exam   Patient Vitals for the past 24 hrs:   BP Temp Temp src Pulse Resp SpO2 Weight   04/11/24 0943 (!)  152/70 -- -- -- -- 94 % --   04/11/24 0928 (!) 146/77 -- -- 92 -- 94 % --   04/11/24 0858 (!) 160/70 -- -- -- -- 92 % --   04/11/24 0843 (!) 148/67 -- -- -- -- 93 % --   04/11/24 0825 (!) 130/91 98.1  F (36.7  C) Temporal 96 18 94 % 51.8 kg (114 lb 3.2 oz)     Physical Exam  Vitals reviewed.   Constitutional:       General: She is not in acute distress.     Appearance: She is not ill-appearing.   HENT:      Head: Normocephalic and atraumatic.   Eyes:      Extraocular Movements: Extraocular movements intact.   Cardiovascular:      Rate and Rhythm: Normal rate and regular rhythm.   Pulmonary:      Effort: Pulmonary effort is normal. No respiratory distress.      Breath sounds: Wheezing present.   Abdominal:      Palpations: Abdomen is soft.      Tenderness: There is no abdominal tenderness. There is no guarding.   Musculoskeletal:      Cervical back: Normal range of motion.   Skin:     General: Skin is warm and dry.   Neurological:      Mental Status: She is alert and oriented to person, place, and time.      GCS: GCS eye subscore is 4. GCS verbal subscore is 5. GCS motor subscore is 6.   Psychiatric:         Behavior: Behavior normal.           Emergency Department Course   ECG  ECG taken at 0841, ECG read at 0845  Sinus rhythm with premature atrial complexes  Left ventricular hypertrophy with repolarization abnormality ( R in aVL, Sokolow-Campa, Patel product)  Abnormal ECG   No significant change as compared to prior, dated 11/14/23.  Rate 96 bpm. DC interval 150 ms. QRS duration 92 ms. QT/QTc 380/480 ms. P-R-T axes 48 -12 114.     Imaging:  XR Chest 2 Views   Final Result   IMPRESSION: Similar enlarged cardiomediastinal silhouette. Similar   known thoracic aortic aneurysm. Right mid/lower lung hazy opacity   could be infectious/inflammatory, possibly confounded by patient   rotation. Trace bilateral pleural effusions.       CRISTINO NARVAEZ MD            SYSTEM ID:  [id number]        Laboratory:  Labs  Ordered and Resulted from Time of ED Arrival to Time of ED Departure   BASIC METABOLIC PANEL - Abnormal       Result Value    Sodium 145      Potassium 4.1      Chloride 104      Carbon Dioxide (CO2) 29      Anion Gap 12      Urea Nitrogen 21.6      Creatinine 0.70      GFR Estimate 84      Calcium 10.4 (*)     Glucose 100 (*)    TROPONIN T, HIGH SENSITIVITY - Abnormal    Troponin T, High Sensitivity 39 (*)    MAGNESIUM - Abnormal    Magnesium 2.4 (*)    NT PROBNP INPATIENT - Abnormal    N terminal Pro BNP Inpatient 9,289 (*)    CBC WITH PLATELETS AND DIFFERENTIAL - Abnormal    WBC Count 6.8      RBC Count 4.22      Hemoglobin 12.2      Hematocrit 39.1      MCV 93      MCH 28.9      MCHC 31.2 (*)     RDW 14.0      Platelet Count 171      % Neutrophils 63      % Lymphocytes 25      % Monocytes 9      % Eosinophils 2      % Basophils 1      % Immature Granulocytes 0      NRBCs per 100 WBC 0      Absolute Neutrophils 4.3      Absolute Lymphocytes 1.7      Absolute Monocytes 0.6      Absolute Eosinophils 0.1      Absolute Basophils 0.1      Absolute Immature Granulocytes 0.0      Absolute NRBCs 0.0     ISTAT GASES LACTATE VENOUS POCT - Abnormal    Lactic Acid POCT 0.9      Bicarbonate Venous POCT 34 (*)     O2 Sat, Venous POCT 24 (*)     pCO2 Venous POCT 59 (*)     pH Venous POCT 7.38      pO2 Venous POCT 18 (*)    TROPONIN T, HIGH SENSITIVITY - Abnormal    Troponin T, High Sensitivity 37 (*)    INFLUENZA A/B, RSV, & SARS-COV2 PCR - Normal    Influenza A PCR Negative      Influenza B PCR Negative      RSV PCR Negative      SARS CoV2 PCR Negative       Emergency Department Course & Assessments:    Interventions:  Medications   ipratropium - albuterol 0.5 mg/2.5 mg/3 mL (DUONEB) neb solution 3 mL (3 mLs Nebulization $Given 4/11/24 5675)     Assessments:  0807 I obtained history and examined the patient as noted above.  1151 I discussed findings and discharge with the patient. All questions answered.     Independent  Interpretation (X-rays, CTs, rhythm strip):  I reviewed the patient's chest X-ray and note CXR appears similar to prior.    Consultations/Discussion of Management or Tests:  ED Course as of 04/11/24 1202   Thu Apr 11, 2024   0844 Lactic Acid POCT: 0.9   0844 O2 Sat, Venous POCT(!): 24   0844 pCO2 Venous POCT(!): 59   0844 pH Venous POCT: 7.38   0910 N-Terminal Pro BNP Inpatient(!): 9,289   0910 Troponin T, High Sensitivity(!): 39   0949 Reassessed the patient she denies any shortness of breath.  States she did proved after the DuoNeb.  States that she does have a neb tx at home. States she is unsure if she using it.    1119 Troponin down trended   1130 Reevaluate the patient she is sitting comfortably at this time.  Psych troponin trending down.  She states that she feels much better and wants to go home.  I explained to her that her BNP was elevated.  She denies any worsening edema.  Denies any shortness of breath exertion or laying flat.  States that she was unable to use her inhaler earlier today and that is what triggered response.  I discussed with her that she needs to follow-up with cardiology for further evaluation.  She agrees with plan of care.  Patient is well-appearing in no acute distress.  No acute respiratory distress.   1158 Family is at the bedside.  I updated them on results.  Patient states he is ready to go home.  States that her symptoms have resolved.  I discussed all the results at length with son at the bedside.  Discussed elevated BNP and need for cardiology follow-up.  He states that they will follow-up with cardiology in cardiologist.  Discussed continued inhalers and neb treatment at home.  Nurse provided education regarding inhaler and nebulizer.  Patient stable for discharge at this time noted to any hypoxia.  She is in no acute respiratory distress.  She is well-appearing.     Social Determinants of Health affecting care:   None    Disposition:  The patient was  discharged.    Impression & Plan    MIPS (If applicable):  N/A    Medical Decision Makin-year-old female resenting today with shortness of breath.  Significant history of COPD, was trying to use her inhaler earlier today but had difficulty with it.  She then called 911.  They gave her albuterol neb treatment with improvement of her symptoms.  On arrival her O2 saturations are in the high 90s.  She has diffuse wheezing throughout.  Patient stated that she was feeling much better wanted go home.  Discussed with her plan for blood work.  Her chest x-ray showed a large aneurysm that is seen on prior imaging.  She denies any chest pain, back pain.  Her blood pressure stable here.  She continued to be stable on room air.  She ambulated to the bathroom without any evidence of hypoxia.  Her BNP was found be elevated.  She denies any other joint swelling.  Denies any increased redness of breath with exertion or laying flat.  Troponin was slightly elevated but downtrended.  Low suspicion for ACS or heart failure exacerbation given patient presentation.  Symptoms likely related to COPD exacerbation given that her symptoms resolved after neb treatments.  I discussed with her the results.  She stated multiple time she was ready to go home.  Family the bedside was updated on results and plan of care.  Discussed plan for continued albuterol and neb treatments that she has at home.  Instructed to follow-up PCP and cardiology.  Discussed with her care instructions and return precautions.  She verbalized understanding agreement.    Diagnosis:    ICD-10-CM    1. SOB (shortness of breath)  R06.02         Scribe Disclosure:  Mariya SERRANO, am serving as a scribe at 8:24 AM on 2024 to document services personally performed by Janice Guo DO based on my observations and the provider's statements to me.   2024   Janice Guo DO Doan, Tiffani, DO  24 1243

## 2024-04-11 NOTE — ED TRIAGE NOTES
Pt was [facility] em[facility] pt was sob and unsure how to use her inhaler.  Pt was given one neb en route with improvement of breathing EMS noticed some lip breathing. Breathing improved with neb      Triage Assessment (Adult)       Row Name 04/11/24 0827          Triage Assessment    Airway WDL WDL        Respiratory WDL    Respiratory WDL X;all     Rhythm/Pattern, Respiratory shortness of breath        Skin Circulation/Temperature WDL    Skin Circulation/Temperature WDL WDL        Cardiac WDL    Cardiac WDL WDL        Peripheral/Neurovascular WDL    Peripheral Neurovascular WDL WDL        Cognitive/Neuro/Behavioral WDL    Cognitive/Neuro/Behavioral WDL WDL

## 2024-04-11 NOTE — PROGRESS NOTES
[facility]    Patient is currently receiving home care services from [facility]. The patient is currently receiving [facility] services.  and home health team have been notified of patient admission. [facility] Liaison will continue to follow patient during stay. If appropriate provide orders to resume home care at time of discharge and make sure BRENT date is appropriate, ensuring a safe discharge plan.     Thank you,     [place] Chun, LPN  Home Care Liaison   Cell: [phone]

## 2024-04-11 NOTE — ED NOTES
Bed: RADHA  Expected date: 4/11/24  Expected time: 7:45 AM  Means of arrival:   Comments:  [address]

## 2024-06-06 NOTE — PROGRESS NOTES
HPI and Plan:   See dictation  [id number]    Orders Placed This Encounter   Procedures     Basic metabolic panel     Follow-Up with Cardiac Advanced Practice Provider       Orders Placed This Encounter   Medications     DISCONTD: TORSEMIDE PO     Sig: Take 10 mg by mouth daily Reported on 2/17/2017     CYCLOBENZAPRINE HCL PO     Sig: Take 10 mg by mouth     losartan (COZAAR) 50 MG tablet     Sig: Take 1 tablet (50 mg) by mouth daily     Dispense:  90 tablet     Refill:  3       Medications Discontinued During This Encounter   Medication Reason     omeprazole (PRILOSEC) 20 MG capsule      TORSEMIDE PO          Encounter Diagnosis   Name Primary?     Thoracic aortic aneurysm without rupture (H)        CURRENT MEDICATIONS:  Current Outpatient Prescriptions   Medication Sig Dispense Refill     CYCLOBENZAPRINE HCL PO Take 10 mg by mouth       losartan (COZAAR) 50 MG tablet Take 1 tablet (50 mg) by mouth daily 90 tablet 3     oxybutynin (DITROPAN-XL) 10 MG 24 hr tablet Take 10 mg by mouth daily       metoprolol (TOPROL-XL) 25 MG 24 hr tablet Take 1 tablet (25 mg) by mouth daily 30 tablet 3     potassium chloride SA (K-DUR/KLOR-CON M) 10 MEQ CR tablet 2  tablets (20 meq) daily       oxyCODONE (ROXICODONE) 5 MG IR tablet Take 1 tablet (5 mg) by mouth every 6 hours as needed for pain (Patient taking differently: Take 10 mg by mouth every 6 hours as needed for pain ) 10 tablet 0     umeclidinium-vilanterol (ANORO ELLIPTA) 62.5-25 MCG/INH oral inhaler Inhale 1 puff into the lungs daily 1 Inhaler 11     amLODIPine (NORVASC) 5 MG tablet Take 1 tablet (5 mg) by mouth daily 90 tablet 3     atorvastatin (LIPITOR) 20 MG tablet Take 1 tablet (20 mg) by mouth daily 90 tablet 3     hydrALAZINE (APRESOLINE) 25 MG tablet Take 1 tablet (25 mg) by mouth 3 times daily 90 tablet 11     Levothyroxine Sodium 112 MCG CAPS Take by mouth daily          ALLERGIES   No Known Allergies    PAST MEDICAL HISTORY:  Past Medical History   Diagnosis Date      [[place] [facility]: Checked. "Adrenal mass, right (H)      likely adenoma     Aortic valve disorder      mod AI, mild AS     Burn 2008     fell into fire pit, grafting     Cholelithiasis      COPD (chronic obstructive pulmonary disease) (H)      low DLCO and FEV1     Hyperlipidaemia      Hypertension      NONSPECIFIC MEDICAL HISTORY      extensive psycho-social issues leading to her stopping all meds in past and result profound medical illness     Orthostatic hypotension      partially med induced     Thoracic aneurysm without mention of rupture      severe aorta aneurysm  7.8cm asc., 3.6cm thoraco/abd, with clot and ?[facility]-deemed too high risk by [facility] for repair     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    FAMILY HISTORY:  Family History   Problem Relation Age of Onset     C.A.D. Father      and \"old age\"     Aneurysm       no FH of aorta aneurysm       SOCIAL HISTORY:  Social History     Social History     Marital status:      Spouse name: N/A     Number of children: N/A     Years of education: N/A     Social History Main Topics     Smoking status: Former Smoker     Packs/day: 1.00     Years: 30.00     Types: Cigarettes     Smokeless tobacco: Former User     Quit date: 8/21/2008     Alcohol use 3.5 oz/week     7 Shots of liquor per week      Comment: occ     Drug use: No     Sexual activity: Not Asked     Other Topics Concern     Caffeine Concern No     0-2 pops daily - decaf     Sleep Concern No     Special Diet No     really careful with salt     Exercise No     walking around house, outside and at the mall, 10 mins at a time     Social History Narrative       Review of Systems:  Skin:  Negative       Eyes:  Positive for glasses    ENT:  Negative for      Respiratory:  Positive for wheezing;cough;shortness of breath     Cardiovascular:    " "palpitations;fatigue;Positive for;lightheadedness    Gastroenterology: Negative for      Genitourinary:  Positive for urinary frequency    Musculoskeletal:  Positive for arthritis;joint pain;back pain fell on ice a month ago  Neurologic:  Negative for      Psychiatric:         Heme/Lymph/Imm:  Negative      Endocrine:    thyroid disorder      Physical Exam:  Vitals: /48  Pulse 78  Ht 1.626 m (5' 4\")  Wt 68 kg (150 lb)  BMI 25.75 kg/m2    Constitutional:  cooperative, alert and oriented, well developed, well nourished, in no acute distress   UNKEMPT    Skin:  warm and dry to the touch, no apparent skin lesions or masses noted   extensive burn, taylor back--minimal--RESIDUAL ECCHYMOSIS R FLANK FROM FALL IN 1/2017    Head:  normocephalic, no masses or lesions        Eyes:  pupils equal and round, conjunctivae and lids unremarkable, sclera white, no xanthalasma, EOMS intact, no nystagmus        ENT:  no pallor or cyanosis, dentition good        Neck:  carotid pulses are full and equal bilaterally, JVP normal, no carotid bruit, no thyromegaly   loud transmission Systole and diastole of  heart murmur into carotids    Chest:  normal breath sounds, clear to auscultation, normal A-P diameter, normal symmetry, normal respiratory excursion, no use of accessory muscles     no wheeze, ronchi, + prolonged exp phase    Cardiac: regular rhythm       grade 3;systolic ejection murmur   grade 3;holodiastolic murmur marked AI murmur  and forward flow murmur/AS thruout entire chest and into neck    Abdomen:  BS normoactive   cannot feel abd aorta    Vascular: pulses full and equal, no bruits auscultated                                   minimal \"water hammer\" pulses    Extremities and Back:  no edema              Neurological:  affect appropriate, oriented to time, person and place   a bit \"stiff\" almost mask facies, no h/o parkinson, no currently on psych drugs that would inhibit motor fxn          CC  Tyler Machado MD  [facility] " [facility]  [address]  [place] [address]

## 2024-07-08 PROBLEM — R41.82 ALTERED MENTAL STATUS, UNSPECIFIED ALTERED MENTAL STATUS TYPE: Status: ACTIVE | Noted: 2024-01-01

## 2024-07-08 PROBLEM — I50.9 ACUTE ON CHRONIC CONGESTIVE HEART FAILURE, UNSPECIFIED HEART FAILURE TYPE (H): Status: ACTIVE | Noted: 2024-01-01

## 2024-07-08 NOTE — ED NOTES
[facility]  ED Nurse Handoff Report    ED Chief complaint: Altered Mental Status  . ED Diagnosis:   Final diagnoses:   None       Allergies:   Allergies   Allergen Reactions    Amoxicillin Rash    Piperacillin-Tazobactam In Dex Rash       Code Status: Full Code    Activity level - Baseline/Home:  independent.  Activity Level - Current:   standby.   Lift room needed: No.   Bariatric: No   Needed: No   Isolation: No.   Infection: Not Applicable.     Respiratory status: Room air    Vital Signs (within 30 minutes):   Vitals:    07/08/24 1049 07/08/24 1100   BP: (!) 152/61 (!) 148/69   Pulse: 63 65   Resp: 18    Temp:  98.1  F (36.7  C)   TempSrc:  Oral   SpO2: 94% 94%   Weight: 50.9 kg (112 lb 3.4 oz)        Cardiac Rhythm:  ,      Pain level:    Patient confused: Yes.   Patient Falls Risk: arm band in place.   Elimination Status: Has voided     Patient Report - Initial Complaint: confusion.   Focused Assessment:   Pt arrives via EMS from home with low o2 saturations, pt is 94% on room on arrival. Per EMS pt called herself due to smelling a gas leak and home. Pt lives independently at home. Per pt she has O2 at home but ems was unable to find any supplies. EMS attempted to reach family but was unable to. Per EMS they saw a DNR and comfort care order on the fridge prior to leaving. Pt has hx of a-fib but unsure if on thinners.        Abnormal Results:   Labs Ordered and Resulted from Time of ED Arrival to Time of ED Departure   COMPREHENSIVE METABOLIC PANEL - Abnormal       Result Value    Sodium 146 (*)     Potassium 4.0      Carbon Dioxide (CO2) 33 (*)     Anion Gap 10      Urea Nitrogen 22.8      Creatinine 0.70      GFR Estimate 83      Calcium 9.7      Chloride 103      Glucose 82      Alkaline Phosphatase 70      AST 37      ALT 49      Protein Total 6.3 (*)     Albumin 4.0      Bilirubin Total 0.7     TROPONIN T, HIGH SENSITIVITY - Abnormal    Troponin T, High Sensitivity 44 (*)     D DIMER QUANTITATIVE - Abnormal    D-Dimer Quantitative 2.86 (*)    NT PROBNP INPATIENT - Abnormal    N terminal Pro BNP Inpatient 13,231 (*)    CARBON MONOXIDE - Abnormal    Carbon Monoxide 2.3 (*)    TSH WITH FREE T4 REFLEX - Abnormal    TSH 19.29 (*)    CBC WITH PLATELETS AND DIFFERENTIAL - Abnormal    WBC Count 5.6      RBC Count 4.05      Hemoglobin 11.6 (*)     Hematocrit 38.6      MCV 95      MCH 28.6      MCHC 30.1 (*)     RDW 14.3      Platelet Count 153      % Neutrophils 72      % Lymphocytes 15      % Monocytes 9      % Eosinophils 3      % Basophils 1      % Immature Granulocytes 0      NRBCs per 100 WBC 0      Absolute Neutrophils 4.0      Absolute Lymphocytes 0.9      Absolute Monocytes 0.5      Absolute Eosinophils 0.1      Absolute Basophils 0.1      Absolute Immature Granulocytes 0.0      Absolute NRBCs 0.0     ISTAT GASES LACTATE VENOUS POCT - Abnormal    Lactic Acid POCT 0.9      Bicarbonate Venous POCT 41 (*)     O2 Sat, Venous POCT 19 (*)     pCO2 Venous POCT 73 (*)     pH Venous POCT 7.35      pO2 Venous POCT 17 (*)    ROUTINE UA WITH MICROSCOPIC   T4 FREE        XR Chest 1 View   Final Result   IMPRESSION: New small left pleural effusion and left basilar   atelectasis. Stable marked cardiomegaly. No pneumothorax.      YVES GRIGSBY MD            SYSTEM ID:  [id number]      CT Head w/o Contrast   Final Result   IMPRESSION: Diffuse cerebral volume loss and cerebral white matter   changes consistent with chronic small vessel ischemic disease. No   evidence for acute intracranial pathology.      Radiation dose for this scan was reduced using automated exposure   control, adjustment of the mA and/or kV according to patient size, or   iterative reconstruction technique.      NEGRITA RUEDA MD            SYSTEM ID:  [id number]      CT Chest Pulmonary Embolism w Contrast    (Results Pending)       Treatments provided: See MAR  Family Comments: attempted to call family with update  OBS brochure/video  discussed/provided to patient:  Yes  ED Medications:   Medications   ipratropium - albuterol 0.5 mg/2.5 mg/3 mL (DUONEB) neb solution 3 mL (has no administration in time range)       Drips infusing:  No  For the majority of the shift this patient was Green.   Interventions performed were N/A.    Sepsis treatment initiated: No    Cares/treatment/interventions/medications to be completed following ED care: N/A    ED Nurse Name: Fern Parker RN  12:22 PM    RECEIVING UNIT ED HANDOFF REVIEW    Above ED Nurse Handoff Report was reviewed: Yes  Reviewed by: Gena Cornejo RN on July 8, 2024 at 5:28 PM   ZACH Gilbert called the ED to inform them the note was read: Yes

## 2024-07-08 NOTE — PLAN OF CARE
"ROOM # 215    Living Situation (if not independent, order SW consult):  Facility name:  : [facility]    Activity level at baseline: independent  Activity level on admit: SBA/gb/walker    Who will be transporting you at discharge: family    Patient registered to observation; given Patient Bill of Rights; given the opportunity to ask questions about observation status and their plan of care.  Patient has been oriented to the observation room, bathroom and call light is in place.    Discussed discharge goals and expectations with patient/family.                 Problem: Adult Inpatient Plan of Care  Goal: Plan of Care Review  Description: The Plan of Care Review/Shift note should be completed every shift.  The Outcome Evaluation is a brief statement about your assessment that the patient is improving, declining, or no change.  This information will be displayed automatically on your shift  note.  Outcome: Progressing  Flowsheets (Taken 7/8/2024 1846)  Outcome Evaluation: in bed w/ alarm on. Pt confused  Plan of Care Reviewed With: patient  Overall Patient Progress: improving  Goal: Patient-Specific Goal (Individualized)  Description: You can add care plan individualizations to a care plan. Examples of Individualization might be:  \"Parent requests to be called daily at 9am for status\", \"I have a hard time hearing out of my right ear\", or \"Do not touch me to wake me up as it startles  me\".  Outcome: Progressing  Goal: Absence of Hospital-Acquired Illness or Injury  Outcome: Progressing  Intervention: Identify and Manage Fall Risk  Recent Flowsheet Documentation  Taken 7/8/2024 1812 by Gena Cornejo, RN  Safety Promotion/Fall Prevention:   activity supervised   assistive device/personal items within reach   clutter free environment maintained   nonskid shoes/slippers when out of bed   safety round/check completed  Intervention: Prevent Skin Injury  Recent Flowsheet Documentation  Taken 7/8/2024 1812 by Clemente, " Gena LEVY RN  Body Position: position changed independently  Intervention: Prevent Infection  Recent Flowsheet Documentation  Taken 7/8/2024 1812 by Gena Cornejo, RN  Infection Prevention:   single patient room provided   rest/sleep promoted  Goal: Optimal Comfort and Wellbeing  Outcome: Progressing  Goal: Readiness for Transition of Care  Outcome: Progressing  Intervention: Mutually Develop Transition Plan  Recent Flowsheet Documentation  Taken 7/8/2024 1836 by Gena Cornejo, RN  Equipment Currently Used at Home: walker, standard     Problem: Delirium  Goal: Optimal Coping  Outcome: Progressing  Goal: Improved Behavioral Control  Outcome: Progressing  Goal: Improved Attention and Thought Clarity  Outcome: Progressing  Goal: Improved Sleep  Outcome: Progressing         Goal Outcome Evaluation:      Plan of Care Reviewed With: patient    Overall Patient Progress: improvingOverall Patient Progress: improving    Outcome Evaluation: in bed w/ alarm on. Pt confused

## 2024-07-08 NOTE — ED PROVIDER NOTES
Emergency Department Note      History of Present Illness     Chief Complaint   Altered Mental Status      HPI   Giselle Chino is a 89 year old female with a history of COPD, CHF, hypertension who presents with altered mental status, shortness of breath. EMS called to her residents after the patient reported a smell of a gas leak. The patient was found with low O2 saturation. She endorses O2 use when needed at home but EMS was not able to find any supplies in her residence. EMS reached out to family after finding a DNR and comfort care order on her fridge but was unable to reach anyone. The patient states she has been feeling short of breath. She denies any cough, fever.     Independent Historian   I called and spoke with patient's daughter and son-in-law.  Son-in-law also came to the emergency department.  They confirm that there is no concern about Sydney Barnett  Curb and monoxide exposure at her house.  Patient lives alone independently but family checks on her frequently.  She sometimes does be confused and anxious.  At family administers her medications they feel confident that she has been taking these.  Confirmed DNR/DNI status.    Review of External Notes   I reviewed a cardiology note from 12/29/23 where she had an aortic aneurysm and declined surgery.    Past Medical History     Medical History and Problem List   Adrenal mass   Aortic root dilatation   Aortic valve disorder   Cholelithiasis   Depression   Ascending aortic aneurysm   Aortic valve defect   Pneumonia   COPD  Confusion   Hyperlipidemia  Hypothyroidism   Hypertension   Thoracic aneurysm without mention of rupture   NSTEMI  CHF    Medications   Aspirin   Lipitor   Atarax  Duoneb   Synthroid   Imdur   Cozaar   Toprol   Deltasone   Demadex   Ultram   Desyrel     Surgical History   No past surgical history on file.    Physical Exam     Patient Vitals for the past 24 hrs:   BP Temp Temp src Pulse Resp SpO2 Weight   07/08/24 1100 (!) 148/69 98.1  F  (36.7  C) Oral 65 -- 94 % --   07/08/24 1049 (!) 152/61 -- -- 63 18 94 % 50.9 kg (112 lb 3.4 oz)     Physical Exam    Gen: alert, frail appearing, oriented only to person  CV: RRR, systolic murmur  Pulm: breath sounds equal, lungs clear  Abd: Soft, nontender  Back: no evidence of injury, no cva tenderness  MSK: no deformity, moves all extremities    Neuro: alert, no facial droop, moves all 4 ext without focal weakness, oriented only to person, confused, answers some direct questions      Diagnostics     Lab Results   Labs Ordered and Resulted from Time of ED Arrival to Time of ED Departure   COMPREHENSIVE METABOLIC PANEL - Abnormal       Result Value    Sodium 146 (*)     Potassium 4.0      Carbon Dioxide (CO2) 33 (*)     Anion Gap 10      Urea Nitrogen 22.8      Creatinine 0.70      GFR Estimate 83      Calcium 9.7      Chloride 103      Glucose 82      Alkaline Phosphatase 70      AST 37      ALT 49      Protein Total 6.3 (*)     Albumin 4.0      Bilirubin Total 0.7     TROPONIN T, HIGH SENSITIVITY - Abnormal    Troponin T, High Sensitivity 44 (*)    D DIMER QUANTITATIVE - Abnormal    D-Dimer Quantitative 2.86 (*)    NT PROBNP INPATIENT - Abnormal    N terminal Pro BNP Inpatient 13,231 (*)    CARBON MONOXIDE - Abnormal    Carbon Monoxide 2.3 (*)    TSH WITH FREE T4 REFLEX - Abnormal    TSH 19.29 (*)    CBC WITH PLATELETS AND DIFFERENTIAL - Abnormal    WBC Count 5.6      RBC Count 4.05      Hemoglobin 11.6 (*)     Hematocrit 38.6      MCV 95      MCH 28.6      MCHC 30.1 (*)     RDW 14.3      Platelet Count 153      % Neutrophils 72      % Lymphocytes 15      % Monocytes 9      % Eosinophils 3      % Basophils 1      % Immature Granulocytes 0      NRBCs per 100 WBC 0      Absolute Neutrophils 4.0      Absolute Lymphocytes 0.9      Absolute Monocytes 0.5      Absolute Eosinophils 0.1      Absolute Basophils 0.1      Absolute Immature Granulocytes 0.0      Absolute NRBCs 0.0     ISTAT GASES LACTATE VENOUS POCT -  Abnormal    Lactic Acid POCT 0.9      Bicarbonate Venous POCT 41 (*)     O2 Sat, Venous POCT 19 (*)     pCO2 Venous POCT 73 (*)     pH Venous POCT 7.35      pO2 Venous POCT 17 (*)    TROPONIN T, HIGH SENSITIVITY - Abnormal    Troponin T, High Sensitivity 32 (*)    T4 FREE - Normal    Free T4 1.05     ROUTINE UA WITH MICROSCOPIC       Imaging   CT Chest Pulmonary Embolism w Contrast   Final Result   IMPRESSION:   1.  No pulmonary emboli.   2.  Small bilateral pleural effusions and bibasilar atelectasis.   3.  Stable large aneurysmal ascending thoracic aorta measuring 7.8 cm.   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    4.  Cardiomegaly.   5.  Contrast refluxes into the IVC and hepatic veins suggestive of   cardiac dysfunction.      YVES GRIGSBY MD            SYSTEM ID:  [id number]      XR Chest 1 View   Final Result   IMPRESSION: New small left pleural effusion and left basilar   atelectasis. Stable marked cardiomegaly. No pneumothorax.      YVES GRIGSBY MD            SYSTEM ID:  [id number]      CT Head w/o Contrast   Final Result   IMPRESSION: Diffuse cerebral volume loss and cerebral white matter   changes consistent with chronic small vessel ischemic disease. No   evidence for acute intracranial pathology.      Radiation dose for this scan was reduced using automated exposure   control, adjustment of the mA and/or kV according to patient size, or   iterative reconstruction technique.      NEGRITA RUEDA MD            SYSTEM ID:  [id number]          EKG   ECG results from 07/08/24   EKG 12 lead     Value    Systolic Blood Pressure     Diastolic Blood Pressure     Ventricular Rate 74    Atrial Rate     WI Interval     QRS Duration 90        QTc 435    P Axis     R AXIS -20    T Axis 99    Interpretation ECG      Sinus rhythm with PAC  Voltage criteria for left ventricular hypertrophy ( R in aVL , Sokolow-[place] , [place] product )  Marked ST abnormality, possible anterior subendocardial  injury  Abnormal ECG         Independent Interpretation   Chest x-ray-cardiomegaly, small effusion, no pneumothorax    ED Course      Medications Administered   Medications   sodium chloride 0.9 % bag 100 mL for CT scan flush use (100 mLs As instructed $Given 7/8/24 1325)   ipratropium - albuterol 0.5 mg/2.5 mg/3 mL (DUONEB) neb solution 3 mL (3 mLs Nebulization $Given 7/8/24 1120)   iopamidol (ISOVUE-370) solution 500 mL (65 mLs Intravenous $Given 7/8/24 1324)   furosemide (LASIX) injection 20 mg (20 mg Intravenous $Given 7/8/24 1443)       Procedures   Procedures     Discussion of Management   Discussed admission with Dr. Chang excepted observation unit    ED Course   ED Course as of 07/08/24 1507   Mon Jul 08, 2024   1053 I initially assessed the patient and obtained the above history and physical exam.   1241 I reached out to the patients daughter updated her on patient condition and plan of care      Medical Decision Making / Diagnosis     PASCUAL Chino is a 89 year old female presents for altered mental status.  No report of fall.  No external signs of trauma.  CT head negative for intracranial hemorrhage.  Patient also notes shortness of breath.  History is very limited secondary to confusion.  Patient has history of COPD however no overt wheezing on exam.  Oxygen saturations varying based on pulse oximetry however no overt respiratory distress.  Patient states she feels improved after neb treatment.  Chest x-ray showed no overt pneumothorax or pneumonia.  PE considered, CT chest showed no pulmonary embolism.  Chronic changes of cardiomegaly and thoracic 80 arctic aneurysm are noted.  Family confirms that patient is not a surgical candidate and no intervention for this is desired or planned.  Patient denies current chest pain.  No abdominal pain or tenderness on my exam.  CT of the chest does show cardiomegaly and signs of CHF.  BNP markedly elevated.  EKG showed sinus rhythm with frequent PACs  which is largely unchanged.  Troponin 32.  No active chest pain.  Doubt ACS.  Small dose of Lasix given due to history of aortic stenosis and insufficiency did not want to over diurese.  TSH is markedly elevated however T4 free T4 within normal limits.  Patient does have a history of confusion secondary to hypothyroidism.  May need further dose adjustment of Synthroid.  Will defer to hospitalist team.  UA pending.  No overt signs of infection.  Discussed with Dr. Chang of the hospital service and admit to the observation unit.    Disposition   Patient was admitted to Dr. Chang to the observation for further monitoring of symptoms.    Diagnosis     ICD-10-CM    1. Altered mental status, unspecified altered mental status type  R41.82       2. Acute on chronic congestive heart failure, unspecified heart failure type (H)  I50.9              Scribe Disclosure:  Jarad SERRANO, am serving as a scribe at 10:52 AM on 7/8/2024 to document services personally performed by Kaylin Hinojosa MD based on my observations and the provider's statements to me.        Kaylin Hinojosa MD  07/08/24 0585

## 2024-07-08 NOTE — ED NOTES
Pt attempting to get out of bed, writer entered room. Pt was soiled with urine and feces. Pt was changed and requesting food. While attempting to get out of bed pt removed IV.
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
## 2024-07-08 NOTE — ED TRIAGE NOTES
Pt arrives via EMS from home with low o2 saturations, pt is 94% on room on arrival. Per EMS pt called herself due to smelling a gas leak and home. Pt lives independently at home. Per pt she has O2 at home but ems was unable to find any supplies. EMS attempted to reach family but was unable to. Per EMS they saw a DNR and comfort care order on the fridge prior to leaving. Pt has hx of a-fib but unsure if on thinners.      Triage Assessment (Adult)       Row Name 07/08/24 105          Triage Assessment    Airway WDL WDL        Respiratory WDL    Respiratory WDL WDL        Cardiac WDL    Cardiac WDL WDL

## 2024-07-08 NOTE — PHARMACY-ADMISSION MEDICATION HISTORY
Pharmacist Admission Medication History    Admission medication history is complete. The information provided in this note is only as accurate as the sources available at the time of the update.    Information Source(s): Patient and Family member via in-person    Pertinent Information: Spoke with Son-in-law Fernando Kelly who sets up her pill box for her. She took all of her morning meds today, he confirmed by looking at her pill box.     Patient was prescribed tramadol in November to help manage gall bladder pain since she is not a surgery candidate, she has only used 2-3 tablets since November.     Changes made to PTA medication list:  Added: Famotidine, Donepezil, albuterol inhaler, Oxybutynin  Deleted: Prednisone  Changed: None    Allergies reviewed with patient and updates made in EHR: yes    Medication History Completed By: Rolando Miguel RPH 7/8/2024 2:48 PM  PTA Med List   Medication Sig Last Dose    acetaminophen (TYLENOL) 500 MG tablet Take 500-1,000 mg by mouth daily 7/8/2024 at AM    albuterol (PROAIR HFA/PROVENTIL HFA/VENTOLIN HFA) 108 (90 Base) MCG/ACT inhaler Inhale 2 puffs into the lungs every 6 hours as needed for shortness of breath, wheezing or cough 7/8/2024    aspirin (ASA) 81 MG chewable tablet Take 1 tablet (81 mg) by mouth daily 7/8/2024 at AM    atorvastatin (LIPITOR) 20 MG tablet Take 1 tablet (20 mg) by mouth daily 7/8/2024 at AM    carboxymethylcellulose PF (REFRESH PLUS) 0.5 % ophthalmic solution Place 1 drop into both eyes 2 times daily as needed for dry eyes Past Week at PRN    donepezil (ARICEPT) 5 MG tablet Take 5 mg by mouth at bedtime 7/7/2024 at PM    famotidine (PEPCID) 20 MG tablet Take 20 mg by mouth 2 times daily 7/8/2024 at AM    hydrOXYzine HCl (ATARAX) 25 MG tablet Take 25 mg by mouth every 6 hours as needed Past Month at PRN    ipratropium - albuterol 0.5 mg/2.5 mg/3 mL (DUONEB) 0.5-2.5 (3) MG/3ML neb solution Take 1 vial (3 mLs) by nebulization every 6 hours as needed for  shortness of breath or wheezing 7/8/2024 at AM    isosorbide mononitrate (IMDUR) 30 MG 24 hr tablet Take 1 tablet (30 mg) by mouth daily 7/8/2024 at AM    levothyroxine (SYNTHROID/LEVOTHROID) 100 MCG tablet Take 100 mcg by mouth daily 7/8/2024 at AM    metoprolol succinate ER (TOPROL XL) 25 MG 24 hr tablet Take 1 tablet (25 mg) by mouth 2 times daily 7/8/2024 at AM    Multiple Vitamins-Minerals (PRESERVISION AREDS PO) Take 1 tablet by mouth 2 times daily 7/8/2024 at AM    oxyBUTYnin ER (DITROPAN XL) 10 MG 24 hr tablet Take 20 mg by mouth daily 7/8/2024 at AM    polyethylene glycol (MIRALAX) 17 GM/Dose powder Take 17 g by mouth daily as needed for constipation Past Week at PRN    senna-docusate (SENOKOT-S/PERICOLACE) 8.6-50 MG tablet Take 1 tablet by mouth daily as needed for constipation Past Week at PRN    torsemide (DEMADEX) 10 MG tablet Take 1 tablet (10 mg) by mouth daily 7/8/2024 at AM    traMADol (ULTRAM) 50 MG tablet Take 50 mg by mouth 4 times daily More than a month    traZODone (DESYREL) 50 MG tablet Take 50 mg by mouth nightly as needed for sleep Past Month at PRN

## 2024-07-08 NOTE — ED NOTES
Pt attempted to get out of bed, pt had BM and was changed. Pt was cleaned with bath wipes and placed in a clean gown and brief.

## 2024-07-08 NOTE — ED NOTES
Bed: ED03  Expected date:   Expected time:   Means of arrival:   Comments:  Tresa [phone], F, hypoxia and confusion

## 2024-07-09 NOTE — PLAN OF CARE
PRIMARY DIAGNOSIS: AMS  OUTPATIENT/OBSERVATION GOALS TO BE MET BEFORE DISCHARGE:  ADLs back to baseline: No    Activity and level of assistance: A1x GB/Walker    Pain status: Pain free.    Return to near baseline physical activity: No     Discharge Planner Nurse   Safe discharge environment identified: No  Barriers to discharge: Yes       Entered by: Lucia Cortes RN 07/09/2024 5:03 AM    Managed to sleep after nebs. On continuous oxygen at 2L initially then lowered to 1.5LPM/NC maintaining Sats 95%. Plan Palliative, SW and OT consult.     Please review provider order for any additional goals.   Nurse to notify provider when observation goals have been met and patient is ready for discharge.

## 2024-07-09 NOTE — PROGRESS NOTES
A BiPAP of  14/6 @ 40% was applied to the pt via the mask for an increase in WOB and/or SOB.  The bridge of the nose looks good and remains intact. Pt is tolerating it well. Will continue to monitor and assess the pt's current respiratory status and needs.    /59 (BP Location: Right arm)   Pulse 78   Temp 98  F (36.7  C) (Oral)   Resp 20   Wt 44.6 kg (98 lb 5.2 oz)   LMP  (LMP Unknown)   SpO2 96%   BMI 16.88 kg/m        Jermain Morse, RT

## 2024-07-09 NOTE — PLAN OF CARE
"Temp: 97.7  F (36.5  C) Temp src: Axillary BP: 112/42 Pulse: 57   Resp: 29 SpO2: 99 % O2 Device: BiPAP/CPAP Oxygen Delivery: 1.5 LPM     Transfer from OBS onto [facility] status. VSS on Continuous BiPaP 30% FiO2.  Oriented to Self Only. No Oral meds until stable off of BiPaP.  Bedside attendant present.  Palliative consult placed.  Continue care.    Goal Outcome Evaluation:      Plan of Care Reviewed With: patient    Overall Patient Progress: no changeOverall Patient Progress: no change    Outcome Evaluation: Patient received from Observation at 1730 on Continuous BiPaP.  Alert to self only.      Problem: Comorbidity Management  Goal: Maintenance of COPD Symptom Control  7/9/2024 1806 by Forrest Shirley RN  Outcome: Not Progressing  7/9/2024 1806 by Forrest Shirley RN  Outcome: Not Progressing     Problem: Gas Exchange Impaired  Goal: Optimal Gas Exchange  Outcome: Not Progressing     Problem: Adult Inpatient Plan of Care  Goal: Plan of Care Review  Description: The Plan of Care Review/Shift note should be completed every shift.  The Outcome Evaluation is a brief statement about your assessment that the patient is improving, declining, or no change.  This information will be displayed automatically on your shift  note.  Outcome: Progressing  Flowsheets (Taken 7/9/2024 1806)  Outcome Evaluation: Patient received from Observation at 1730 on Continuous BiPaP.  Alert to self only.  Plan of Care Reviewed With: patient  Overall Patient Progress: no change  Goal: Patient-Specific Goal (Individualized)  Description: You can add care plan individualizations to a care plan. Examples of Individualization might be:  \"Parent requests to be called daily at 9am for status\", \"I have a hard time hearing out of my right ear\", or \"Do not touch me to wake me up as it startles  me\".  Outcome: Progressing  Goal: Absence of Hospital-Acquired Illness or Injury  Outcome: Progressing  Goal: Optimal Comfort and Wellbeing  Outcome: " Progressing  Goal: Readiness for Transition of Care  Outcome: Progressing     Problem: Delirium  Goal: Optimal Coping  7/9/2024 1806 by Forrest Shirley RN  Outcome: Progressing  7/9/2024 1806 by Forrest Shirley RN  Outcome: Progressing  Goal: Improved Behavioral Control  7/9/2024 1806 by Forrest Shirley RN  Outcome: Progressing  7/9/2024 1806 by Forrest Shirley RN  Outcome: Progressing  Intervention: Minimize Safety Risk  Recent Flowsheet Documentation  Taken 7/9/2024 1538 by Forrest Shirley RN  Enhanced Safety Measures: room near unit station  Goal: Improved Attention and Thought Clarity  7/9/2024 1806 by Forrest Shirley RN  Outcome: Progressing  7/9/2024 1806 by Forrest Shirley RN  Outcome: Progressing  Goal: Improved Sleep  7/9/2024 1806 by Forrest Shirley RN  Outcome: Progressing  7/9/2024 1806 by Forrest Shirley RN  Outcome: Progressing
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
## 2024-07-09 NOTE — PROGRESS NOTES
Patient transport to and from CT while on BiPAP with no complications.    An ABG was completed on the pt's left radial site @ 14:17 on an FIO2 of  30%. Pressure was held until bleeding stopped. No complications noted during the procedure.      Jermain Morse, RT

## 2024-07-09 NOTE — PROGRESS NOTES
[facility]    Medicine Progress Note - Hospitalist Service    Date of Admission:  7/8/2024    Assessment & Plan   Giselle Chino is a 89 year old female admitted on 7/8/2024 with history of COPD, CHF, impressively large ascending thoracic aortic aneurysm with moderate to severe aortic stenosis who is felt not to be a surgical candidate as well as chronic right upper quadrant abdominal pain possibly from a biliary origin for which she is also not felt to be a surgical candidate who presents from home by EMS after calling for a suspected gas leak and being found with altered mental status and hypoxia.     In the ED she was afebrile with blood pressure 148/69, pulse 65 and breathing comfortably on room air without hypoxia.  Lab work showed creatinine 0.7, sodium 146, CO2 33, normal electrolytes, normal anion gap, normal LFTs, glucose 82, BNP of 13,231, high-sensitivity troponin of 44, TSH of 19.29 with normal free T4, normal lactic acid and CBC remarkable for WBC 5.6 and hemoglobin 11.6.  Notably carbon monoxide was mildly elevated at 2.3 and venous blood gas showed a pCO2 of 73.  D-dimer was elevated at 2.86.  Chest x-ray showed new small left pleural effusion and left basilar atelectasis, CT PE study was negative for PE but showed small bilateral pleural effusions and bibasilar atelectasis.  She was given a DuoNeb and Lasix 20 mg IV with observation admission to the hospital.      Overnight she was given oxygen due to hypoxia of 84%.  This morning she was obtunded and impossible to arouse so rapid response was called.  Suspicion for hypercapnic respiratory failure was high and she was placed on BiPAP with stat VBG ordered showing CO2 level of 97.  Patient is noted to be DNR/DNI and this was confirmed with family.  It was also noted that her carbon monoxide level was mildly elevated on admission and therefore this was rechecked and increased to 4.1.  Poison control was called who is not  concerned with this number and states that normal adults could have levels up to 5 without need for intervention.  They suspect that her respiratory failure and altered mental status is more so related to her elevated CO2 levels.  CT head was negative.  Initially BiPAP did not improve her CO2 until respiratory therapy increased settings to 18/7 and now it is slowly improving but she has yet to arouse.  She has been on BiPAP consistently since 9 AM without significant improvement.  Long discussion with family about goals of care, withdrawal of BiPAP or continuation of BiPAP were had and ultimately family would like to see if patient improves by tomorrow on BiPAP.  Will transfer care to [facility] status given goals of care.    Family adamantly refuses intubation at this point which is consistent with patient's signed POLST. Please contact Xi, her daughter if any escalation of care like pressors is needed. I suspect they wouldn't want this but I was unable to get a hold of her this afternoon to ask this question. I added LR at 75 ml/hr for low BP.     # Hypercapnic respiratory failure suspect multifactorial related to underlying sleep apnea and COPD  # Obtunded  -On review of previous CO2 levels it seems baseline is in the 50s  -Suspect due to underlying COPD and undiagnosed sleep apnea  -On admission CO2 was 72 and this morning while patient was obtunded it was 97  -BiPAP has been started and CO2 levels are slowly improving with most recent being 81 with settings on BiPAP at 18/7 at 30%  -Long discussion with family and she does not want intubation.  They would like to continue with BiPAP to see if she has improvement by tomorrow but if no improvement they may consider withdrawing care and transitioning to comfort.    -Check VBG in AM  -Monitor on telemetry  -Transfer care to [facility]  -Will hold all oral meds, she is high risk for aspiration  -Palliative care consult placed    #Soft pressures  -pressures have been  intermittently soft suspect due to lack of oral intake  -Added LR at 75 ml/hr, may need to stop if she becomes increasingly hypoxic    # Elevated carboxyhemoglobin  -On admission carbon oxide level was 2.3 and on recheck was 4.1  -Poison control called and states that this number is within the normal range and they can expect numbers anywhere up to 5 for non-smokers and 10 for smokers  -No need for further workup    # Hypernatremia  -Appears dehydrated  -Will monitor    # Paroxysmal atrial fibrillation  -Not on anticoagulation and do not recommend this given her large thoracic aorta aneurysm  -PTA Toprol XL 25 mg twice daily  -Continue with metoprolol 5 mg IV every 6 hours with hold parameters    # Known aortic stenosis  # Severe aortic regurgitation  # Chronic diastolic heart failure  -Holding PTA Imdur, aspirin and torsemide given n.p.o. status  -Holding PTA Toprol XL but in place metoprolol IV ordered    # Known large ascending thoracic aortic aneurysm  -Not a surgical candidate and apparently she has been evaluated for this multiple times in the past. Impressively large on imaging. Suspect this will continue to expand. She is DNR/DNI and told previous provider that if she does become significantly more ill that we should not pursue ICU type cares but rather just focus on comfort with narcotic pain medications etc. and make sure she does not suffer.     # Hypothyroidism  -TSH elevated at 19 but T4 is normal  -Holding levothyroxine given n.p.o. status       Diet: Low Saturated Fat Na <2400 mg    DVT Prophylaxis: Enoxaparin (Lovenox) SQ  Ulrich Catheter: Not present  Lines: None     Cardiac Monitoring: ACTIVE order. Indication: hypercapnic respiratory failure  Code Status: No CPR- Do NOT Intubate      Clinically Significant Risk Factors Present on Admission         # Hypernatremia: Highest Na = 146 mmol/L in last 2 days, will monitor as appropriate        # Drug Induced Platelet Defect: home medication list includes  an antiplatelet medication   # Hypertension: Noted on problem list  # Acute heart failure with preserved ejection fraction: heart failure noted on problem list, last echo with EF >50%, and receiving IV diuretics  # Non-Invasive mechanical ventilation: current O2 Device: BiPAP/CPAP  # Acute hypoxic respiratory failure: continue supplemental O2 as needed                       Disposition Plan     Medically Ready for Discharge: Anticipated in 2-4 Days           The patient's care was discussed with the Bedside Nurse, Care Coordinator/, Patient, Patient's Family, Intensivist Consultant(s), and poison control and supervising physician Dr. Leo Daly PA-C  Hospitalist Service  [facility]  Securely message with Sun-eee (more info)  Text page via [facility] Paging/Directory   ______________________________________________________________________    Interval History   Patient was unarousable this morning but vitally stable.  Lungs sound clear without wheezing or rhonchi.  BiPAP placed and patient is starting to open eyes but still not talking    Physical Exam   Vital Signs: Temp: 97.6  F (36.4  C) Temp src: Tympanic BP: (!) 96/33 Pulse: 63   Resp: 27 SpO2: 97 % O2 Device: BiPAP/CPAP Oxygen Delivery: 1.5 LPM  Weight: 98 lbs 5.2 oz    General Appearance: Initially not opening eyes or arousable but now is opening eyes but not speaking or following commands  Respiratory: Appears to be breathing comfortably without wheezes or rhonchi  Cardiovascular: RRR without murmur   GI: bowel sounds are present without tenderness  Skin: No rashes or open sores      Medical Decision Making       85 MINUTES SPENT BY ME on the date of service doing chart review, history, exam, documentation & further activities per the note.  At least 45 minutes of this was critical care    Data     I have personally reviewed the following data over the past 24 hrs:    5.8  \   12.0   / 159     146 (H) 103  25.0 (H) /  98   3.9 35 (H) 0.84 \     ALT: 39 AST: 27 AP: 72 TBILI: 0.6   ALB: 4.1 TOT PROTEIN: 6.4 LIPASE: N/A     Procal: N/A CRP: N/A Lactic Acid: 0.3 (L)         Imaging results reviewed over the past 24 hrs:   Recent Results (from the past 24 hour(s))   CT Head w/o Contrast    Narrative    CT SCAN OF THE HEAD WITHOUT CONTRAST   7/9/2024 1:54 PM     HISTORY: Acute mental status changes/confusion.    TECHNIQUE:  Axial images of the head and coronal reformations without  IV contrast material. Radiation dose for this scan was reduced using  automated exposure control, adjustment of the mA and/or kV according  to patient size, or iterative reconstruction technique.    COMPARISON: CT of the head dated 7/8/2024.    FINDINGS: The ventricles appear stable in size and configuration.  There is presumed ex vacuo dilatation of the bilateral lateral  ventricles and the third ventricle, as before. Mild to moderate  generalized cerebral volume loss and moderate patchy and confluent  nonspecific hypoattenuation in the cerebral white matter, as before.  This likely represents sequela of chronic small vessel ischemic  disease. No CT finding of large transcortical acute or subacute  infarct, acute intracranial hemorrhage, extra-axial fluid collection,  or mass effect. Scattered intracranial atherosclerotic calcifications.    Bilateral lens implants. Small polypoid lesion in the right sphenoid  sinus may represent a retention cyst or polyp. Symmetric soft tissue  polypoid lesions opacifying the olfactory clefts bilaterally, as  before, possibly representing respiratory epithelial adenomatoid  hamartomas or polyps. The bony calvarium and bones of the skull base  appear intact.       Impression    IMPRESSION:  1. No acute intracranial hemorrhage, extra-axial fluid collection, or  mass effect.  2. Brain atrophy and presumed chronic small vessel ischemic changes,  as described.  3. Other chronic/incidental findings, as described,  similar to prior  exam.

## 2024-07-09 NOTE — PROVIDER NOTIFICATION
[employer] Notified:    Patient's latest VS /53 CO 68 RR 23 O2 Sats 84% on RA, complained of SOB. PRN Duonebs given with  some relief oxygen given at 2LPM/NC latest Sats 94%. Has history of COPD and CHF.Please advise. Thanks.    Lucia Cortes RN

## 2024-07-09 NOTE — PLAN OF CARE
Dx: Altered mental status     /54 (BP Location: Right arm)   Pulse 69   Temp 97.6  F (36.4  C) (Tympanic)   Resp 18   Wt 44.6 kg (98 lb 5.2 oz)   LMP  (LMP Unknown)   SpO2 100%   BMI 16.88 kg/m       Patient not alert and oriented- still unresponsive but opening her eyes randomly. Continued BiPAP. No nonverbal signs of pain. IV saline locked. CT ordered. Family at bedside, sitter at bedside.     OUTPATIENT/OBSERVATION GOALS TO BE MET BEFORE DISCHARGE:  ADLs back to baseline: No    Activity and level of assistance: Not OOB at this time    Pain status: no nonverbal signs of pain    Return to near baseline physical activity: No     Discharge Planner Nurse   Safe discharge environment identified: No  Barriers to discharge: Yes       Entered by: Swetha Torres RN 07/09/2024 1:11 PM     Please review provider order for any additional goals.   Nurse to notify provider when observation goals have been met and patient is ready for discharge.    Goal Outcome Evaluation:      Plan of Care Reviewed With: patient    Overall Patient Progress: improvingOverall Patient Progress: improving    Outcome Evaluation: patient on BiPAP, sitter at bedside

## 2024-07-09 NOTE — PLAN OF CARE
PRIMARY DIAGNOSIS: AMS  OUTPATIENT/OBSERVATION GOALS TO BE MET BEFORE DISCHARGE:  ADLs back to baseline: No    Activity and level of assistance: A1x GB/Walker    Pain status: Pain free.    Return to near baseline physical activity: No     Discharge Planner Nurse   Safe discharge environment identified: No  Barriers to discharge: Yes       Entered by: Lucia Cortes RN 07/09/2024 4:32 AM     Alert but disoriented to time, place and situation. Dyspnea on exertion observed. Incontinent of urine and stool.    Please review provider order for any additional goals.   Nurse to notify provider when observation goals have been met and patient is ready for discharge

## 2024-07-09 NOTE — PLAN OF CARE
"Pt is confused. Pt denies pain or discomfort. VSS. Pt has no IV. Pt is on tele monitoring. Pt is on fall precaution. Pt is assist of one in transfer. Plan for palliative consult. Pt is sleeping in bed. Bed alarm in place and call light within reach. Will continue to monitor and assess pt.       Goal Outcome Evaluation:      Plan of Care Reviewed With: patient    Overall Patient Progress: improvingOverall Patient Progress: improving    Outcome Evaluation: pt is confused. pt is on tele monitoring. bed alarm in place.      Problem: Adult Inpatient Plan of Care  Goal: Plan of Care Review  Description: The Plan of Care Review/Shift note should be completed every shift.  The Outcome Evaluation is a brief statement about your assessment that the patient is improving, declining, or no change.  This information will be displayed automatically on your shift  note.  Outcome: Progressing  Flowsheets (Taken 7/8/2024 2111)  Outcome Evaluation: pt is confused. pt is on tele monitoring. bed alarm in place.  Plan of Care Reviewed With: patient  Overall Patient Progress: improving  Goal: Patient-Specific Goal (Individualized)  Description: You can add care plan individualizations to a care plan. Examples of Individualization might be:  \"Parent requests to be called daily at 9am for status\", \"I have a hard time hearing out of my right ear\", or \"Do not touch me to wake me up as it startles  me\".  Outcome: Progressing  Goal: Absence of Hospital-Acquired Illness or Injury  Outcome: Progressing  Intervention: Identify and Manage Fall Risk  Recent Flowsheet Documentation  Taken 7/8/2024 2049 by Don De Anda RN  Safety Promotion/Fall Prevention:   assistive device/personal items within reach   activity supervised   clutter free environment maintained   increased rounding and observation   increase visualization of patient   nonskid shoes/slippers when out of bed   patient and family education   lighting adjusted   safety " round/check completed  Intervention: Prevent Skin Injury  Recent Flowsheet Documentation  Taken 7/8/2024 2049 by Don De Anda RN  Body Position: position changed independently  Intervention: Prevent Infection  Recent Flowsheet Documentation  Taken 7/8/2024 2049 by Don De Anda, RN  Infection Prevention:   hand hygiene promoted   rest/sleep promoted   single patient room provided  Goal: Optimal Comfort and Wellbeing  Outcome: Progressing  Goal: Readiness for Transition of Care  Outcome: Progressing     Problem: Delirium  Goal: Optimal Coping  Outcome: Progressing  Goal: Improved Behavioral Control  Outcome: Progressing  Intervention: Minimize Safety Risk  Recent Flowsheet Documentation  Taken 7/8/2024 2049 by Don De Anda RN  Enhanced Safety Measures:   pain management   room near unit station  Goal: Improved Attention and Thought Clarity  Outcome: Progressing  Goal: Improved Sleep  Outcome: Progressing

## 2024-07-09 NOTE — PLAN OF CARE
Dx: Altered mental status     /55 (BP Location: Right arm)   Pulse 76   Temp 97.6  F (36.4  C) (Axillary)   Resp 23   Wt 44.6 kg (98 lb 5.2 oz)   LMP  (LMP Unknown)   SpO2 97%   BMI 16.88 kg/m       Patient not alert and oriented- unresponsive. Unable to wake patient for morning medication- rapid response called, patient started on BiPAP machine. Assist x1 with walker- not OOB. Regular diet- unable to eat at this time. No nonverbal signs of pain. IV saline locked. Sitter at bedside.     OUTPATIENT/OBSERVATION GOALS TO BE MET BEFORE DISCHARGE:  ADLs back to baseline: No    Activity and level of assistance: not OOB at this time    Pain status: Pain free.    Return to near baseline physical activity: No     Discharge Planner Nurse   Safe discharge environment identified: No  Barriers to discharge: Yes       Entered by: Swetha Torres RN 07/09/2024 11:39 AM     Please review provider order for any additional goals.   Nurse to notify provider when observation goals have been met and patient is ready for discharge.    Goal Outcome Evaluation:      Plan of Care Reviewed With: patient    Overall Patient Progress: improvingOverall Patient Progress: improving    Outcome Evaluation: patient on BiPAP, sitter at bedside

## 2024-07-09 NOTE — PROGRESS NOTES
[facility] HEALTH SERVICES Progress Note  Obs 2    Responded to patient's room regarding Adult Rapid Response overhead CODE.    Patient receiving cares.  No family present.  SHS not needed.    SHS remains available upon request.    Rev. Celeste Rivera M.Div.  Staff   Phone  [phone]

## 2024-07-10 NOTE — CONSULTS
Care Management Follow Up    Length of Stay (days): 1    Expected Discharge Date: 07/11/2024     Concerns to be Addressed:         Additional Information:  SW is aware of consult. Per review, patient was living at home, alone prior to coming to the hospital. Patient was independent with good family support.     Patient is currently on BIPAP. Patient is being followed by palliative for GOC. Will monitor recs and assess when appropriate.     MELANIE Servin, [facility]  Emergency Room   Please contact the SW on the floor in which the patient is staying for any questions or concerns

## 2024-07-10 NOTE — PROGRESS NOTES
Respiratory Therapy Note    Patient transitioned to comfort cares this afternoon. BiPAP therapy stopped. Nebulizers changed to prn. RT to follow.     Feli Grace, RT  4:27 PM July 10, 2024

## 2024-07-10 NOTE — PROGRESS NOTES
[facility]    Medicine Progress Note - Hospitalist Service    Date of Admission:  7/8/2024    Assessment & Plan     Giselle Chino is a 89 year old female admitted on 7/8/2024 with history of COPD, CHF, impressively large ascending thoracic aortic aneurysm with moderate to severe aortic stenosis who is felt not to be a surgical candidate as well as chronic right upper quadrant abdominal pain possibly from a biliary origin for which she is also not felt to be a surgical candidate who presents from home by EMS after calling for a suspected gas leak and being found with altered mental status and hypoxia.      In the ED she was afebrile with blood pressure 148/69, pulse 65 and breathing comfortably on room air without hypoxia.  Lab work showed creatinine 0.7, sodium 146, CO2 33, normal electrolytes, normal anion gap, normal LFTs, glucose 82, BNP of 13,231, high-sensitivity troponin of 44, TSH of 19.29 with normal free T4, normal lactic acid and CBC remarkable for WBC 5.6 and hemoglobin 11.6.  Notably carbon monoxide was mildly elevated at 2.3 and venous blood gas showed a pCO2 of 73.  D-dimer was elevated at 2.86.  Chest x-ray showed new small left pleural effusion and left basilar atelectasis, CT PE study was negative for PE but showed small bilateral pleural effusions and bibasilar atelectasis.  She was given a DuoNeb and Lasix 20 mg IV with observation admission to the hospital.       Overnight she was given oxygen due to hypoxia of 84%.  This morning she was obtunded and impossible to arouse so rapid response was called.  Suspicion for hypercapnic respiratory failure was high and she was placed on BiPAP with stat VBG ordered showing CO2 level of 97.  Patient is noted to be DNR/DNI and this was confirmed with family.  It was also noted that her carbon monoxide level was mildly elevated on admission and therefore this was rechecked and increased to 4.1.  Poison control was called who is not  concerned with this number and states that normal adults could have levels up to 5 without need for intervention.  They suspect that her respiratory failure and altered mental status is more so related to her elevated CO2 levels.  CT head was negative.  Initially BiPAP did not improve her CO2 until respiratory therapy increased settings to 18/7 and now it is slowly improving but she has yet to arouse.  She has been on BiPAP consistently since 9 AM without significant improvement.  Long discussion with family about goals of care, withdrawal of BiPAP or continuation of BiPAP were had and ultimately family would like to see if patient improves by tomorrow on BiPAP.  Will transfer care to [facility] status given goals of care.    Acute hypoxic and hypercapnic respiratory failure.  Acute exacerbation of chronic heart failure with preserved ejection fraction.  Possible COPD exacerbation.  -Suspect that CHF is the main  of respiratory failure.  -No bronchospasm on exam on 7/10.  -Requiring BiPAP therapy.  -Recheck VBG later today.  -Wean off BiPAP when able.  -Currently NPO.  -Hypoglycemic this morning.  -Requiring gentle IV fluids with dextrose.  -Give furosemide 40 mg IV once.  -Weigh daily.  -Strict intake and output monitoring.  -Start methylprednisolone 40 mg IV every 8 hours.  -DuoNebs 4 times a day.  -Albuterol more often if needed.  -With no obvious bronchospasm and no obvious infection on CT, hold off on antibiotics at this time.  -Check procalcitonin level.    Acute metabolic encephalopathy.  -Due to hypercapnic respiratory failure.  -pCO2 remains significantly elevated on most recent blood gas.  -Continue BiPAP therapy.  -Recheck VBG later today.    Hypothyroidism.  -Hold levothyroxine while NPO.    Hypertension.  -Hold isosorbide mononitrate while NPO.  -Change metoprolol to IV 5 mg every 6 hours while NPO.  -Furosemide as above.    Hypoglycemia.  -Glucose 61 this morning.  -Starting methylprednisolone.  -Gentle  IV fluids with dextrose present while NPO.    Hypernatremia.  -Mild.  Sodium 146.  -Recheck metabolic panel tomorrow.    Type II myocardial infarction.  -Due to demand ischemia from respiratory failure.  -Metoprolol as above.    Paroxysmal atrial fibrillation.  Large thoracic aortic aneurysm.  -Not on anticoagulation due to aneurysm.  -IV metoprolol while NPO.    Severe aortic stenosis.  Aortic regurgitation.  -Has previously there refused aortic valve replacement.        Diet: NPO for Medical/Clinical Reasons Except for: No Exceptions    DVT Prophylaxis: Enoxaparin (Lovenox) SQ  Ulrich Catheter: Not present  Lines: None     Cardiac Monitoring: ACTIVE order. Indication: Tachyarrhythmias, acute (48 hours)  Code Status: No CPR- Do NOT Intubate      Clinically Significant Risk Factors         # Hypernatremia: Highest Na = 146 mmol/L in last 2 days, will monitor as appropriate          # Hypertension: Noted on problem list  # Acute heart failure with preserved ejection fraction: heart failure noted on problem list, last echo with EF >50%, and receiving IV diuretics                        Disposition Plan     Medically Ready for Discharge: Anticipated in 2-4 Days             Yoav Newsome DO  Hospitalist Service  [facility]  Securely message with [employer] (more info)  Text page via [employer] Paging/Directory   ______________________________________________________________________    Interval History   Short of breath.  Denies chest pain, fevers, nausea.    Physical Exam   Vital Signs: Temp: 98.3  F (36.8  C) Temp src: Axillary BP: 121/55 Pulse: 71   Resp: 28 SpO2: 100 % O2 Device: BiPAP/CPAP    Weight: 98 lbs 5.2 oz    Gen: Appears comfortable on BiPAP.  Awake.  Seems to answer yes/no questions.  Eyes:  PERRL, sclera anicteric.  OP:  MMM, no lesions noted through BiPAP mask.  CV: Mildly irregular, +3/6 murmur.  Lung: Crackles in lower fields b/l, normal effort.  Ab:  +BS, soft.  Skin:  Warm, dry to  touch.  No rash.  Ext:  1+ pitting edema LE b/l.      Medical Decision Making       55 MINUTES SPENT BY ME on the date of service doing chart review, history, exam, documentation & further activities per the note.      Data     I have personally reviewed the following data over the past 24 hrs:    5.8  \   11.2 (L)   / 168     146 (H) 103 34.7 (H) /  61 (L)   4.2 33 (H) 0.85 \     ALT: 37 AST: 30 AP: 66 TBILI: 0.5   ALB: 3.8 TOT PROTEIN: 5.9 (L) LIPASE: N/A       Imaging results reviewed over the past 24 hrs:   Recent Results (from the past 24 hour(s))   Echocardiogram Complete   Result Value    LVEF  55-60%    Narrative    [id number]  [id number]  [id number]  [id number]^LINO^ANDRE^ABBY     [facility]  Echocardiography Laboratory  [address] [place] [address]     Name: [place] NYE  MRN: [id number]  : 1935  Study Date: 07/10/2024 10:22 AM  Age: 89 yrs  Gender: Female  Patient Location: [facility]  Reason For Study: CHF  Ordering Physician: ANDRE HUYNH  Performed By: Akua Baltazar     BSA: 1.4 m2  Height: 61 in  Weight: 98 lb  BP: 110/48 mmHg  ______________________________________________________________________________  Procedure  Complete Portable Echo Adult.  ______________________________________________________________________________  Interpretation Summary     Ascending aorta aneurysm is present. The ascending aorta is severely  aneurysmal measuring 8.2 cm. This measured 9.0 cm last year.  Aortic root aneurysm is present. Aortic root is similar in dimension to last  year being aneurysmal at 4.5 cm  Trivial pericardial effusion  The visual ejection fraction is 55-60%.  Left ventricular systolic function is normal.  On two-dimensional imaging, the aortic valve does not appear to open well and  appears to be markedly thickened. Clearly the calculated aortic valve area is  erroneous. Significant aortic stenosis is most likely present.  The study was technically  difficult.  ______________________________________________________________________________  Left Ventricle  The left ventricle is normal in size. There is mild concentric left  ventricular hypertrophy. A sigmoid septum is present. The visual ejection  fraction is 55-60%. Left ventricular systolic function is normal. Left  ventricular diastolic function is not assessable. Septal motion is consistent  with conduction abnormality.     Right Ventricle  The right ventricle is normal in size and function.     Mitral Valve  The mitral valve leaflets are moderately thickened. There is trace mitral  regurgitation. The mitral valve does not appear to open well. A mean gradient  was not measured across the mitral valve. Mild mitral stenosis may be  present..     Tricuspid Valve  There is mild (1+) tricuspid regurgitation. The right ventricular systolic  pressure is approximated at 28.0 mmHg plus the right atrial pressure.     Aortic Valve  The aortic valve is not well visualized. The number of aortic valve leaflets  cannot be determined due to image quality and the presence of significant  thickening and calcification. There is moderate (2+) aortic regurgitation. On  two-dimensional imaging, the aortic valve does not appear to open well and  appears to be markedly thickened. Clearly the calculated aortic valve area is  erroneous. Significant aortic stenosis is most likely present. The peak AoV  pressure gradient is 38.0 mmHg. The mean AoV pressure gradient is 20.0 mmHg.     Pulmonic Valve  There is trace pulmonic valvular regurgitation. Normal pulmonic valve  velocity.     Vessels  Aortic root aneurysm is present. (4.5 cm). Ascending aorta aneurysm is  present. (8.2 cm). IVC diameter >2.1 cm collapsing <50% with sniff suggests a  high RA pressure estimated at 15 mmHg or greater. (Patient is on bipap which  can interfere with assessment of the IVC).     Pericardium  Trivial pericardial effusion.     Rhythm  The rhythm was sinus  bradycardia.     ______________________________________________________________________________  MMode/2D Measurements & Calculations  IVSd: 1.2 cm  LVIDd: 6.0 cm  LVIDs: 4.2 cm  LVPWd: 0.91 cm     FS: 29.9 %  LV mass(C)d: 259.9 grams  LV mass(C)dI: 186.3 grams/m2  Ao root diam: 4.5 cm  asc Aorta Diam: 8.2 cm  LVOT diam: 2.2 cm  LVOT area: 3.8 cm2  Ao root diam index Ht(cm/m): 2.9  Ao root diam index BSA (cm/m2): 3.2  Asc Ao diam index BSA (cm/m2): 5.9  Asc Ao diam index Ht(cm/m): 5.3  RWT: 0.30  TAPSE: 2.2 cm     Doppler Measurements & Calculations  Ao V2 max: 310.0 cm/sec  Ao max P.0 mmHg  Ao V2 mean: 208.9 cm/sec  Ao mean P.0 mmHg  Ao V2 VTI: 72.8 cm  GIANNA(I,D): 2.2 cm2  GIANNA(V,D): 2.1 cm2  LV V1 max P.4 mmHg  LV V1 max: 168.6 cm/sec  LV V1 VTI: 41.9 cm  SV(LVOT): 158.2 ml  SI(LVOT): 113.4 ml/m2     TR max martell: 264.6 cm/sec  TR max P.0 mmHg  AV Martell Ratio (DI): 0.54  GIANNA Index (cm2/m2): 1.6     ______________________________________________________________________________  Report approved by: Brayan Mckeon 07/10/2024 12:11 PM

## 2024-07-10 NOTE — PROGRESS NOTES
Cross cover notified that patient has been agitated and pulling off her BiPAP mask for the last few hours.  She was put on BiPAP due to significant oxygen desaturation and when she takes off the BiPAP O2 sats quickly go into the 70s.  She also has hypercapnic respiratory failure as well.  She is DNR/DNI.  -Ordered 0.5 mg IV lorazepam x 1

## 2024-07-10 NOTE — PROGRESS NOTES
Notified by nurse that family at bedside wanting to discuss goals of care.    Returned to room and discussed goals of care with family and patient.  Patient agitated.  Not willing to wear BiPAP therapy longer.  Family does want to transition goals of care to comfort care only.  Stop BiPAP therapy.  Stop aggressive medication therapy.  Start comfort cares.  Comfort medications as needed.

## 2024-07-10 NOTE — PLAN OF CARE
"VSS afebrile. Pt requiring Continuous BIPAP, see blood gas. NPO.  Pt gets restless and agitated at times, trying to pull out lines and BIPAP. Pt confused. Sitter at bedside. MD aware, PRN Haldol added. RT following. IV Steroids and Scheduled Nebs. Bg 61, IVF's changed To D5 1/2 NS.  IV Lasix added. Palliative following.     Pt pulling at BIPAP to take it off. Pt wanting BIPAP off and very addiment not wanting BIPAP, able to redirect at times, until later this afternoon, gave IV Haldol and family at bedside with MD discussing wishes. Plan to go comfort cares.     Goal Outcome Evaluation:      Plan of Care Reviewed With: patient, family    Overall Patient Progress: decliningOverall Patient Progress: declining    Outcome Evaluation: On BIPAP, IMC, pt confused Sitter at bedside.  IV steroids added and scheduled Nebs. RT following.      Problem: Adult Inpatient Plan of Care  Goal: Plan of Care Review  Description: The Plan of Care Review/Shift note should be completed every shift.  The Outcome Evaluation is a brief statement about your assessment that the patient is improving, declining, or no change.  This information will be displayed automatically on your shift  note.  Outcome: Not Progressing  Flowsheets (Taken 7/10/2024 1204)  Outcome Evaluation: On BIPAP, IMC, pt confused Sitter at bedside.  IV steroids added and scheduled Nebs. RT following.  Plan of Care Reviewed With:   patient   family  Overall Patient Progress: declining  Goal: Patient-Specific Goal (Individualized)  Description: You can add care plan individualizations to a care plan. Examples of Individualization might be:  \"Parent requests to be called daily at 9am for status\", \"I have a hard time hearing out of my right ear\", or \"Do not touch me to wake me up as it startles  me\".  Outcome: Not Progressing  Goal: Absence of Hospital-Acquired Illness or Injury  Outcome: Not Progressing  Intervention: Identify and Manage Fall Risk  Recent Flowsheet " Documentation  Taken 7/10/2024 0828 by Jennifer Neal RN  Safety Promotion/Fall Prevention: activity supervised  Intervention: Prevent Skin Injury  Recent Flowsheet Documentation  Taken 7/10/2024 1023 by Jennifer Neal RN  Body Position: turned  Goal: Optimal Comfort and Wellbeing  Outcome: Not Progressing  Goal: Readiness for Transition of Care  Outcome: Not Progressing     Problem: Delirium  Goal: Optimal Coping  Outcome: Not Progressing  Goal: Improved Behavioral Control  Outcome: Not Progressing  Goal: Improved Attention and Thought Clarity  Outcome: Not Progressing  Goal: Improved Sleep  Outcome: Not Progressing     Problem: Comorbidity Management  Goal: Maintenance of COPD Symptom Control  Outcome: Not Progressing  Intervention: Maintain COPD Symptom Control  Recent Flowsheet Documentation  Taken 7/10/2024 0828 by Jennifer Neal RN  Medication Review/Management: medications reviewed     Problem: Gas Exchange Impaired  Goal: Optimal Gas Exchange  Outcome: Not Progressing

## 2024-07-10 NOTE — PROGRESS NOTES
Palliative Care Progress note  [facility]      Patient: Giselle Chino  Date of Admission:  7/8/2024    Requesting Clinician / Team: Hospitalist  Reason for consult: Goals of care  Decisional support  Patient and family support       Recommendations & Counseling     GOALS OF CARE:   Restorative with limits   Transitioned to comfort-focused care 7/10.     ADVANCE CARE PLANNING:  Patient has an advance directive dated 03/03/21.  Primary Health Care Agent Xi Kelly (Daughter).  Alternate(s) Celeste Chino (Daughter)     There is a POLST form on file, this was reviewed and current.  Code status: No CPR- Do NOT Intubate    MEDICAL MANAGEMENT:   Comfort Care   Below are common symptoms routinely seen in patients with comfort focused goals and at the end of life. This patient may not currently exhibit all of these symptoms; however, if these were to occur our recommendations are as follows:     #Pain  1st choice: hydromorphone PO 1-2 mg q 2 hour as needed.   2nd choice:hydromorphone IV 0.3-0.5 mg q 1 hour as needed (ordered for you)  Adjunct: Tylenol DC/PO/    #Air hunger/Dyspnea   1st choice: hydromorphone PO 1-2 mg q 2 hour as needed.   2nd choice:hydromorphone IV 0.3-0.5 mg q 1 hour as needed (ordered for you)      #Anxiety    1st choice: Lorazepam PO/SL q 1 mg  q 3 hour as needed.   2nd choice: Lorazepam IV q 1 mg  q 3 hour as needed    #Secretion burden   Robinul 0.1 mg (PO/IV) q 4 hours as needed. If ineffective, increase up to 0.3 mg   Consider atropine if Robinul ineffective after dose increase      #Nausea   Zofran 4 mg q 6 hours as needed. Can increase to 8 mg   Zyprexa 5 mg q 6 hours as needed     #Agitation  Aggressive symptoms control first, then  1st choice: Zyprexa 5 mg ODT or IM   2nd choice: Increase dose of Zyprexa to 10 mg or consider switch to Haldol   3rd choice: Lorazepam as above     PSYCHOSOCIAL/SPIRITUAL SUPPORT:  Family: Xi Kelly (daughter, [facility]), Celeste Chino (daughter,  [facility]), JARVIS NYE (spouse passed away December 2021)     Martha community: Anglican   Spiritual: Declined     Palliative Care will continue to follow. Thank you for the consult and allowing us to aid in the care of Giselle Nye.    These recommendations have been discussed with medicine, nursing and family.    IRMA Bryson CNP  [facility], Palliative Care  Securely message with the Axial Web Console (learn more here) or  Text page via [facility] Paging/Directory     Assessment      Giselle Nye is a 89 year old female admitted on 7/8/2024. She lives alone at home with frequent visits and supervision by family members (all my information is gathered from Dr. Hinojosa emergency department physician who interviewed the family).  Apparently, the patient placed a call to 911 today.  She complained of smelling a gas leak in her house.  When the EMS crew arrived the patient was hypoxemic.  They did check around the house and did not find any evidence of gas leak.      Today, the patient was seen for:  # Hypercapnic respiratory failure suspect multifactorial related to underlying sleep apnea and COPD  # Elevated carboxyhemoglobin  # Obtunded  #Palliative care encounter   #Goals of care discussion   #Patient/family support    History of Present Illness   Met with iX Desai, Celeste, Fernando and granddaughter at bedside. Family shared they have been struggling to transition Giselle to comfort care for selfish reasons.  Family repeatedly stated she would not want to prolong life, and we are going against her wishes.  This evening, Giselle adamantly refused to wear her BiPAP, and family shared they felt relief and felt it was appropriate to transition Giselle to comfort care measures only.    Giselle was seen laying in bed comfortably, at this point she remains on room air, and appears comfortable. She received IV haldol and ativan prior to my visit. She expressed interest in chocolate ice cream, and nursing staff is assisting with  this. Briefly provided, and signs and symptoms of end-of-life with family at bedside again and answered all their questions. Reviewed current comfort medications, and added IV hydromorphone 3-0.5 mg every hour as needed. Patient will likely approach end-of-life in the hospital.     Prognosis, Goals, & Planning:   Functional Status just prior to this current hospitalization:  ECOG3 (Capable of only limited self-care; needs help with ADLs; in bed/chair >50% of waking hours)    Prognosis, Goals, and/or Advance Care Planning:  See above.     Code Status was addressed today:   Yes, We discussed potential risks and rationale of attempting cardiac resuscitation, intubation, and mechanical ventilation.  We also discussed probability of survival as well as quality of life implications.  Based on this discussion, patient or surrogate response/decision: DNR/DNI, transitioned to comfort care 7/10      Patient's decision making preferences: unable to assess        Patient has decision-making capacity today for complex decisions: Unable to assess             Coping, Meaning, & Spirituality:   Unable to assess     Social:   Living situation:lives with family  Important relationships/caregivers:Family and her 2 granddaughters  Occupation: unable to assess   Areas of fulfillment/seferino: loves food, likes to go out for drives, she enjoys being active and was a runner    Medications:  Reviewed this patient's medication profile and medications from this hospitalization.   [place] Board of Pharmacy Data Base Reviewed:     ROS:  Comprehensive ROS is reviewed and is negative except as here & per HPI:     Physical Exam   Vital Signs with Ranges  Temp:  [97.4  F (36.3  C)-98.3  F (36.8  C)] 98.2  F (36.8  C)  Pulse:  [55-80] 55  Resp:  [20-40] 28  BP: (110-138)/(39-71) 138/46  FiO2 (%):  [30 %] 30 %  SpO2:  [86 %-100 %] 100 %  Wt Readings from Last 10 Encounters:   07/09/24 44.6 kg (98 lb 5.2 oz)   04/11/24 51.8 kg (114 lb 3.2 oz)   12/29/23  47.4 kg (104 lb 9.6 oz)   11/17/23 48.2 kg (106 lb 3.2 oz)   10/26/23 49.9 kg (110 lb)   10/19/23 48.7 kg (107 lb 6.4 oz)   01/26/23 58.7 kg (129 lb 8 oz)   06/21/22 63.2 kg (139 lb 6.4 oz)   12/09/21 62.1 kg (137 lb)   08/31/21 62.5 kg (137 lb 11.2 oz)     98 lbs 5.2 oz    PHYSICAL EXAM:  Limited exam, restless, on BiPAP. Primary RN and sitter at bedside.     Data reviewed:  CMP  Recent Labs   Lab 07/10/24  0824 07/10/24  0755 07/09/24  2112 07/09/24  0918   NA  --  146*  --  146*   POTASSIUM  --  4.2  --  3.9   CHLORIDE  --  103  --  103   CO2  --  33*  --  35*   ANIONGAP  --  10  --  8   GLC 61* 74   < > 98   BUN  --  34.7*  --  25.0*   CR  --  0.85  --  0.84   GFRESTIMATED  --  66  --  67   KRISTEL  --  9.6  --  9.5   PROTTOTAL  --  5.9*  --  6.4   ALBUMIN  --  3.8  --  4.1   BILITOTAL  --  0.5  --  0.6   ALKPHOS  --  66  --  72   AST  --  30  --  27   ALT  --  37  --  39    < > = values in this interval not displayed.     CBC  Recent Labs   Lab 07/10/24  0755 07/09/24  0918   WBC 5.8 5.8   RBC 3.88 4.13   HGB 11.2* 12.0   HCT 37.0 39.8   MCV 95 96   MCH 28.9 29.1   MCHC 30.3* 30.2*   RDW 14.1 14.2    159         Medical Decision Making       MANAGEMENT DISCUSSED with the following over the past 24 hours: medicine, primary RN, and family   NOTE(S)/MEDICAL RECORDS REVIEWED over the past 24 hours: medicine and nursing  70 MINUTES SPENT BY ME on the date of service doing chart review, history, exam, documentation & further activities per the note.

## 2024-07-10 NOTE — PROGRESS NOTES
NUTRITION BRIEF NOTE  Chart reviewed d/t positive MST score. Upon chart review, noted comfort care election. RD will not sign on at this time. Please page/consult if needed.  Imelda Valdez RD, LD, [facility]  Pager - 3rd floor/ICU: [phone]  Pager - All other floors: [phone]  Pager - Weekend/holiday: [phone]  Office: [phone]

## 2024-07-10 NOTE — PROGRESS NOTES
[facility]    [facility].  and home health team have been notified of patients admission. [facility] liaison will continue to follow patient during hospital stay. If appropriate, provide home care orders to resume services at the time of discharge. If patients discharge date changes, please reach out to clinical liaison or the HUB to ensure SOC/BRENT date is still appropriate for a safe discharge plan.     Thank you,     ERICA Chun, LPN  Home Care Liaison   Cell: [phone]

## 2024-07-10 NOTE — PLAN OF CARE
"VS stable. 95-98% on BiPAP. Diminished LS. Confused. Alert to self only. Tele is sinus rhythm. No complaints of pain. Complained of nausea and was given anti emetics. Starting at 0000 patient was agitated and pulling off BiPAP mask and oxygen levels would quickly go to the 70's. She is confused and thinks the machine is killing her. Received an one time order of ativan that was given at 0251.    Goal Outcome Evaluation:      Plan of Care Reviewed With: patient    Overall Patient Progress: no changeOverall Patient Progress: no change    Outcome Evaluation: On continous BiPAP      Problem: Adult Inpatient Plan of Care  Goal: Plan of Care Review  Description: The Plan of Care Review/Shift note should be completed every shift.  The Outcome Evaluation is a brief statement about your assessment that the patient is improving, declining, or no change.  This information will be displayed automatically on your shift  note.  7/10/2024 0357 by Darcy Rios, RN  Outcome: Progressing  Flowsheets (Taken 7/10/2024 0357)  Outcome Evaluation: On continous BiPAP  Plan of Care Reviewed With: patient  Overall Patient Progress: no change  7/10/2024 0355 by Darcy Rios, RN  Outcome: Progressing  Flowsheets (Taken 7/10/2024 0355)  Outcome Evaluation: On continous BiPap.  Plan of Care Reviewed With: patient  Overall Patient Progress: no change  Goal: Patient-Specific Goal (Individualized)  Description: You can add care plan individualizations to a care plan. Examples of Individualization might be:  \"Parent requests to be called daily at 9am for status\", \"I have a hard time hearing out of my right ear\", or \"Do not touch me to wake me up as it startles  me\".  7/10/2024 0357 by Darcy Rios, RN  Outcome: Progressing  7/10/2024 0355 by Darcy Rios, RN  Outcome: Progressing  Goal: Absence of Hospital-Acquired Illness or Injury  7/10/2024 0357 by Darcy Rios, RN  Outcome: Progressing  7/10/2024 0355 by Darcy Rios, " RN  Outcome: Progressing  Intervention: Identify and Manage Fall Risk  Recent Flowsheet Documentation  Taken 7/9/2024 2100 by Darcy Rios, RN  Safety Promotion/Fall Prevention:   clutter free environment maintained   lighting adjusted   nonskid shoes/slippers when out of bed  Intervention: Prevent Skin Injury  Recent Flowsheet Documentation  Taken 7/9/2024 2100 by Darcy Rios RN  Body Position: weight shifting  Intervention: Prevent Infection  Recent Flowsheet Documentation  Taken 7/9/2024 2100 by Darcy Rios, RN  Infection Prevention: single patient room provided  Goal: Optimal Comfort and Wellbeing  7/10/2024 0357 by Darcy Rios RN  Outcome: Progressing  7/10/2024 0355 by Darcy Rios RN  Outcome: Progressing  Goal: Readiness for Transition of Care  7/10/2024 0357 by Darcy Rios RN  Outcome: Progressing  7/10/2024 0355 by Darcy Rios RN  Outcome: Progressing     Problem: Delirium  Goal: Optimal Coping  7/10/2024 0357 by Darcy Rios RN  Outcome: Progressing  7/10/2024 0355 by Darcy Rios, RN  Outcome: Progressing  Goal: Improved Behavioral Control  7/10/2024 0357 by Darcy Rios RN  Outcome: Progressing  7/10/2024 0355 by Darcy Rios RN  Outcome: Progressing  Intervention: Minimize Safety Risk  Recent Flowsheet Documentation  Taken 7/9/2024 2100 by Darcy Rios RN  Communication Enhancement Strategies: call light answered in person  Enhanced Safety Measures:  at bedside  Goal: Improved Attention and Thought Clarity  7/10/2024 0357 by Darcy Rios RN  Outcome: Progressing  7/10/2024 0355 by Darcy Rios, RN  Outcome: Progressing  Intervention: Maximize Cognitive Function  Recent Flowsheet Documentation  Taken 7/9/2024 2100 by Darcy Rios RN  Sensory Stimulation Regulation:   care clustered   lighting decreased   quiet environment promoted  Reorientation Measures: clock in view  Goal: Improved Sleep  7/10/2024 0357 by Darcy Rios  RN  Outcome: Progressing  7/10/2024 0355 by Darcy Rios RN  Outcome: Progressing     Problem: Comorbidity Management  Goal: Maintenance of COPD Symptom Control  7/10/2024 0357 by Darcy Rios RN  Outcome: Progressing  7/10/2024 0355 by Darcy Rios RN  Outcome: Progressing  Intervention: Maintain COPD Symptom Control  Recent Flowsheet Documentation  Taken 7/9/2024 2100 by Darcy Rios RN  Medication Review/Management: medications reviewed     Problem: Gas Exchange Impaired  Goal: Optimal Gas Exchange  7/10/2024 0357 by Darcy Rios RN  Outcome: Progressing  7/10/2024 0355 by Darcy Rios RN  Outcome: Progressing  Intervention: Optimize Oxygenation and Ventilation  Recent Flowsheet Documentation  Taken 7/9/2024 2100 by Darcy Rios RN  Airway/Ventilation Management: airway patency maintained

## 2024-07-10 NOTE — PROGRESS NOTES
MD paged: Since 0000 patient has been agitated and pulling off BiPAP mask. She is on BiPAP due to elevated C02 levels. Before she was on BiPAP she was placed on oxygen due to  oxygen desaturation. She is confused and thinks the machine is killing her. When she takes off mask her oxygen level quickly goes to the 70's. She is NPO due to being on the BiPAP. Is there anything we can give her?     Order received for ativan 0.5 mg IV for one dose.

## 2024-07-11 NOTE — DEATH PRONOUNCEMENT
MD DEATH PRONOUNCEMENT    Called to pronounce Giselle Chino dead.    Physical Exam: Unresponsive to noxious stimuli, Spontaneous respirations absent, Breath sounds absent, Carotid pulse absent, Heart sounds absent, and Pupillary light reflex absent    Patient was pronounced dead at 03:04 AM, 2024.    Preliminary Cause of Death: CHF, COPD    Active Problems:    Altered mental status, unspecified altered mental status type    Acute on chronic congestive heart failure, unspecified heart failure type (H)       Infectious disease present?: NO    Communicable disease present? (examples: HIV, chicken pox, TB, Ebola, CJD) :  NO    Multi-drug resistant organism present? (example: MRSA): NO    Please consider an autopsy if any of the following exist:  NO Unexpected or unexplained death during or following any dental, medical, or surgical diagnostic treatment procedures.   NO Death of mother at or up to seven days after delivery.     NO All  and pediatric deaths.     NO Death where the cause is sufficiently obscure to delay completion of the death certificate.   NO Deaths in which autopsy would confirm a suspected illness/condition that would affect surviving family members or recipients of transplanted organs.     The following deaths must be reported to the 's Office:  NO A death that may be due entirely or in part to any factors other than natural disease (recent surgery, recent trauma, suspected abuse/neglect).   NO A death that may be an accident, suicide, or homicide.     NO Any sudden, unexpected death in which there is no prior history of significant heart disease or any other condition associated with sudden death.   NO A death under suspicious, unusual, or unexpected circumstances.    NO Any death which is apparently due to natural causes but in which the  does not have a personal physician familiar with the patient s medical history, social, or environmental situation or the  circumstances of the terminal event.   NO Any death apparently due to Sudden Infant Death Syndrome.     NO Deaths that occur during, in association with, or as consequences of a diagnostic, therapeutic, or anesthetic procedure.   NO Any death in which a fracture of a major bone has occurred within the past (6) six months.   NO A death of persons note seen by their physician within 120 days of demise.     NO Any death in which the  was an inmate of a public institution or was in the custody of Law Enforcement personnel.   NO  All unexpected deaths of children   UNKNOWN Solid organ donors   NO Unidentified bodies   NO Deaths of persons whose bodies are to be cremated or otherwise disposed of so that the bodies will later be unavailable for examination;   NO Deaths unattended by a physician outside of a licensed healthcare facility or licensed residential hospice program   NO Deaths occurring within 24 hours of arrival to a health care facility if death is unexpected.    NO Deaths associated with the decedent s employment.   NO Deaths attributed to acts of terrorism.   NO Any death in which there is uncertainty as to whether it is a medical examiner s care should be discussed with the medical investigator.        Body disposition: RN to notify family

## 2024-07-11 NOTE — PLAN OF CARE
"Transitioned to comfort care, prn haldol, ativan, and dilaudid given, pt resting comfortably, no complaints of pain, breathing shallow.     Goal Outcome Evaluation:      Plan of Care Reviewed With: patient, family          Outcome Evaluation: transitioned to comfort cares, prn haldol, ativan, & dilaudid given      Problem: Delirium  Goal: Improved Behavioral Control  Outcome: Not Progressing  Goal: Improved Attention and Thought Clarity  Outcome: Not Progressing     Problem: Comorbidity Management  Goal: Maintenance of COPD Symptom Control  Outcome: Not Progressing     Problem: Gas Exchange Impaired  Goal: Optimal Gas Exchange  Outcome: Not Progressing     Problem: Adult Inpatient Plan of Care  Goal: Plan of Care Review  Description: The Plan of Care Review/Shift note should be completed every shift.  The Outcome Evaluation is a brief statement about your assessment that the patient is improving, declining, or no change.  This information will be displayed automatically on your shift  note.  Outcome: Progressing  Flowsheets (Taken 7/10/2024 2051)  Outcome Evaluation: transitioned to comfort cares, prn haldol, ativan, & dilaudid given  Plan of Care Reviewed With:   patient   family  Goal: Patient-Specific Goal (Individualized)  Description: You can add care plan individualizations to a care plan. Examples of Individualization might be:  \"Parent requests to be called daily at 9am for status\", \"I have a hard time hearing out of my right ear\", or \"Do not touch me to wake me up as it startles  me\".  Outcome: Progressing  Goal: Absence of Hospital-Acquired Illness or Injury  Outcome: Progressing  Intervention: Identify and Manage Fall Risk  Recent Flowsheet Documentation  Taken 7/10/2024 1700 by Casandra Ly, RN  Safety Promotion/Fall Prevention:   supervised activity   safety round/check completed  Goal: Optimal Comfort and Wellbeing  Outcome: Progressing  Goal: Readiness for Transition of Care  Outcome: Progressing   "   Problem: Delirium  Goal: Optimal Coping  Outcome: Progressing  Goal: Improved Sleep  Outcome: Progressing

## 2024-07-11 NOTE — PROVIDER NOTIFICATION
Pt  at 0304. Confirmed with second RN no breath sounds or heart beat detected. MD notified to confirm time of death.     Xi daughter notified.     Pt belongings sent home with family.    Pt transferred to [facility] at 0621.

## 2024-07-21 NOTE — DISCHARGE SUMMARY
[facility]    Death Summary - Hospitalist Service     Date of Admission:  7/8/2024  Date of Death: 7/11/2024  3:04 AM  Discharging Provider: Yoav Newsome,     Discharge Diagnoses   Acute hypoxic and hypercapnic respiratory failure.  Acute exacerbation of chronic heart failure with preserved ejection fraction.  Probable COPD exacerbation.  Acute metabolic encephalopathy.  Hypothyroidism.  Hypertension.  Hypoglycemia.  Hypernatremia.  Type II myocardial infarction due to demand ischemia.  Paroxysmal atrial fibrillation.  Large thoracic aortic aneurysm.  Severe aortic stenosis.  Aortic regurgitation.    Cause of death:   Acute hypoxic and hypercapnic respiratory failure due to CHF and COPD exacerbations.    Hospital Course   Giselle Chino is a 89 year old female admitted on 7/8/2024 with history of COPD, CHF, impressively large ascending thoracic aortic aneurysm with moderate to severe aortic stenosis who is felt not to be a surgical candidate as well as chronic right upper quadrant abdominal pain possibly from a biliary origin for which she is also not felt to be a surgical candidate who presents from home by EMS after calling for a suspected gas leak and being found with altered mental status and hypoxia.      In the ED she was afebrile with blood pressure 148/69, pulse 65 and breathing comfortably on room air without hypoxia.  Lab work showed creatinine 0.7, sodium 146, CO2 33, normal electrolytes, normal anion gap, normal LFTs, glucose 82, BNP of 13,231, high-sensitivity troponin of 44, TSH of 19.29 with normal free T4, normal lactic acid and CBC remarkable for WBC 5.6 and hemoglobin 11.6.  Notably carbon monoxide was mildly elevated at 2.3 and venous blood gas showed a pCO2 of 73.  D-dimer was elevated at 2.86.  Chest x-ray showed new small left pleural effusion and left basilar atelectasis, CT PE study was negative for PE but showed small bilateral pleural effusions and bibasilar  atelectasis.  She was given a DuoNeb and Lasix 20 mg IV with observation admission to the hospital.       Overnight she was given oxygen due to hypoxia of 84%.  This morning she was obtunded and impossible to arouse so rapid response was called.  Suspicion for hypercapnic respiratory failure was high and she was placed on BiPAP with stat VBG ordered showing CO2 level of 97.  Patient is noted to be DNR/DNI and this was confirmed with family.  It was also noted that her carbon monoxide level was mildly elevated on admission and therefore this was rechecked and increased to 4.1.  Poison control was called who is not concerned with this number and states that normal adults could have levels up to 5 without need for intervention.  They suspect that her respiratory failure and altered mental status is more so related to her elevated CO2 levels.  CT head was negative.  She was on continuous BiPAP therapy initially.    She was started on diuresis with IV furosemide.  Started on IV methylprednisolone.  On DuoNebs and albuterol.  Did not have any significant improvement.  Palliative care consult was obtained.  Family and patient did decide to discontinue BiPAP therapy and any other aggressive cares.  Was started on comfort cares.  Patient did ultimately die early in the morning on 7/11/2024.      [facility]  ______________________________________________________________________        Consultations This Hospital Stay   CARE MANAGEMENT / SOCIAL WORK IP CONSULT  PHYSICAL THERAPY ADULT IP CONSULT  OCCUPATIONAL THERAPY ADULT IP CONSULT  PALLIATIVE CARE ADULT IP CONSULT  SPIRITUAL HEALTH SERVICES IP CONSULT    Primary Care Physician   Shoaib Mills
